# Patient Record
Sex: MALE | Race: WHITE | Employment: FULL TIME | URBAN - METROPOLITAN AREA
[De-identification: names, ages, dates, MRNs, and addresses within clinical notes are randomized per-mention and may not be internally consistent; named-entity substitution may affect disease eponyms.]

---

## 2019-03-14 ENCOUNTER — HOSPITAL ENCOUNTER (OUTPATIENT)
Dept: GENERAL RADIOLOGY | Age: 64
Discharge: HOME | End: 2019-03-14
Payer: COMMERCIAL

## 2019-03-14 DIAGNOSIS — Z87.891 FORMER SMOKER: ICD-10-CM

## 2019-03-14 PROCEDURE — 71046 X-RAY EXAM CHEST 2 VIEWS: CPT

## 2022-06-02 ENCOUNTER — APPOINTMENT (OUTPATIENT)
Dept: MRI IMAGING | Age: 67
DRG: 025 | End: 2022-06-02
Attending: FAMILY MEDICINE
Payer: COMMERCIAL

## 2022-06-02 ENCOUNTER — APPOINTMENT (OUTPATIENT)
Dept: GENERAL RADIOLOGY | Age: 67
DRG: 025 | End: 2022-06-02
Attending: HOSPITALIST
Payer: COMMERCIAL

## 2022-06-02 ENCOUNTER — HOSPITAL ENCOUNTER (INPATIENT)
Age: 67
LOS: 13 days | Discharge: NURSING, REHAB OR LONG-TERM CARE | DRG: 025 | End: 2022-06-15
Attending: EMERGENCY MEDICINE | Admitting: HOSPITALIST
Payer: COMMERCIAL

## 2022-06-02 ENCOUNTER — APPOINTMENT (OUTPATIENT)
Dept: CT IMAGING | Age: 67
DRG: 025 | End: 2022-06-02
Attending: FAMILY MEDICINE
Payer: COMMERCIAL

## 2022-06-02 DIAGNOSIS — G93.89 RIGHT FRONTAL LOBE MASS: Primary | ICD-10-CM

## 2022-06-02 DIAGNOSIS — G93.89 BRAIN MASS: ICD-10-CM

## 2022-06-02 DIAGNOSIS — Z98.890 STATUS POST SURGERY: ICD-10-CM

## 2022-06-02 LAB
ALBUMIN SERPL-MCNC: 3.5 G/DL (ref 3.5–5)
ALBUMIN/GLOB SERPL: 0.9 {RATIO} (ref 1.1–2.2)
ALP SERPL-CCNC: 71 U/L (ref 45–117)
ALT SERPL-CCNC: 42 U/L (ref 12–78)
ANION GAP SERPL CALC-SCNC: 1 MMOL/L (ref 5–15)
APAP SERPL-MCNC: <2 UG/ML (ref 10–30)
APPEARANCE UR: CLEAR
APPEARANCE UR: CLEAR
AST SERPL-CCNC: 23 U/L (ref 15–37)
BACTERIA URNS QL MICRO: NEGATIVE /HPF
BASOPHILS # BLD: 0 K/UL (ref 0–0.1)
BASOPHILS NFR BLD: 0 % (ref 0–1)
BILIRUB SERPL-MCNC: 0.5 MG/DL (ref 0.2–1)
BILIRUB UR QL: NEGATIVE
BILIRUB UR QL: NEGATIVE
BUN SERPL-MCNC: 19 MG/DL (ref 6–20)
BUN/CREAT SERPL: 17 (ref 12–20)
CALCIUM SERPL-MCNC: 9.4 MG/DL (ref 8.5–10.1)
CHLORIDE SERPL-SCNC: 108 MMOL/L (ref 97–108)
CO2 SERPL-SCNC: 31 MMOL/L (ref 21–32)
COLOR UR: NORMAL
COLOR UR: NORMAL
COVID-19 RAPID TEST, COVR: NOT DETECTED
CREAT SERPL-MCNC: 1.12 MG/DL (ref 0.7–1.3)
DIFFERENTIAL METHOD BLD: ABNORMAL
EOSINOPHIL # BLD: 0.1 K/UL (ref 0–0.4)
EOSINOPHIL NFR BLD: 1 % (ref 0–7)
EPITH CASTS URNS QL MICRO: NORMAL /LPF
ERYTHROCYTE [DISTWIDTH] IN BLOOD BY AUTOMATED COUNT: 11.9 % (ref 11.5–14.5)
ETHANOL SERPL-MCNC: <10 MG/DL
GLOBULIN SER CALC-MCNC: 4 G/DL (ref 2–4)
GLUCOSE SERPL-MCNC: 116 MG/DL (ref 65–100)
GLUCOSE UR STRIP.AUTO-MCNC: NEGATIVE MG/DL
GLUCOSE UR STRIP.AUTO-MCNC: NEGATIVE MG/DL
HCT VFR BLD AUTO: 47.4 % (ref 36.6–50.3)
HGB BLD-MCNC: 15.1 G/DL (ref 12.1–17)
HGB UR QL STRIP: NEGATIVE
HGB UR QL STRIP: NEGATIVE
HYALINE CASTS URNS QL MICRO: NORMAL /LPF (ref 0–5)
IMM GRANULOCYTES # BLD AUTO: 0.1 K/UL (ref 0–0.04)
IMM GRANULOCYTES NFR BLD AUTO: 1 % (ref 0–0.5)
KETONES UR QL STRIP.AUTO: NEGATIVE MG/DL
KETONES UR QL STRIP.AUTO: NEGATIVE MG/DL
LEUKOCYTE ESTERASE UR QL STRIP.AUTO: NEGATIVE
LEUKOCYTE ESTERASE UR QL STRIP.AUTO: NEGATIVE
LYMPHOCYTES # BLD: 1.6 K/UL (ref 0.8–3.5)
LYMPHOCYTES NFR BLD: 16 % (ref 12–49)
MCH RBC QN AUTO: 30.7 PG (ref 26–34)
MCHC RBC AUTO-ENTMCNC: 31.9 G/DL (ref 30–36.5)
MCV RBC AUTO: 96.3 FL (ref 80–99)
MONOCYTES # BLD: 0.7 K/UL (ref 0–1)
MONOCYTES NFR BLD: 7 % (ref 5–13)
NEUTS SEG # BLD: 8 K/UL (ref 1.8–8)
NEUTS SEG NFR BLD: 75 % (ref 32–75)
NITRITE UR QL STRIP.AUTO: NEGATIVE
NITRITE UR QL STRIP.AUTO: NEGATIVE
NRBC # BLD: 0 K/UL (ref 0–0.01)
NRBC BLD-RTO: 0 PER 100 WBC
PH UR STRIP: 7.5 [PH] (ref 5–8)
PH UR STRIP: 7.5 [PH] (ref 5–8)
PLATELET # BLD AUTO: 214 K/UL (ref 150–400)
PMV BLD AUTO: 10.7 FL (ref 8.9–12.9)
POTASSIUM SERPL-SCNC: 4.1 MMOL/L (ref 3.5–5.1)
PROT SERPL-MCNC: 7.5 G/DL (ref 6.4–8.2)
PROT UR STRIP-MCNC: NEGATIVE MG/DL
PROT UR STRIP-MCNC: NEGATIVE MG/DL
RBC # BLD AUTO: 4.92 M/UL (ref 4.1–5.7)
RBC #/AREA URNS HPF: NORMAL /HPF (ref 0–5)
SALICYLATES SERPL-MCNC: <1.7 MG/DL (ref 2.8–20)
SODIUM SERPL-SCNC: 140 MMOL/L (ref 136–145)
SOURCE, COVRS: NORMAL
SP GR UR REFRACTOMETRY: 1.01 (ref 1–1.03)
SP GR UR REFRACTOMETRY: 1.01 (ref 1–1.03)
TSH SERPL DL<=0.05 MIU/L-ACNC: 1.03 UIU/ML (ref 0.36–3.74)
UR CULT HOLD, URHOLD: NORMAL
UROBILINOGEN UR QL STRIP.AUTO: 1 EU/DL (ref 0.2–1)
UROBILINOGEN UR QL STRIP.AUTO: 1 EU/DL (ref 0.2–1)
WBC # BLD AUTO: 10.5 K/UL (ref 4.1–11.1)
WBC URNS QL MICRO: NORMAL /HPF (ref 0–4)

## 2022-06-02 PROCEDURE — A9576 INJ PROHANCE MULTIPACK: HCPCS

## 2022-06-02 PROCEDURE — 74011000250 HC RX REV CODE- 250: Performed by: HOSPITALIST

## 2022-06-02 PROCEDURE — 84443 ASSAY THYROID STIM HORMONE: CPT

## 2022-06-02 PROCEDURE — 99285 EMERGENCY DEPT VISIT HI MDM: CPT

## 2022-06-02 PROCEDURE — 71045 X-RAY EXAM CHEST 1 VIEW: CPT

## 2022-06-02 PROCEDURE — 65270000046 HC RM TELEMETRY

## 2022-06-02 PROCEDURE — 74011250636 HC RX REV CODE- 250/636

## 2022-06-02 PROCEDURE — 81001 URINALYSIS AUTO W/SCOPE: CPT

## 2022-06-02 PROCEDURE — 74011250636 HC RX REV CODE- 250/636: Performed by: FAMILY MEDICINE

## 2022-06-02 PROCEDURE — 87635 SARS-COV-2 COVID-19 AMP PRB: CPT

## 2022-06-02 PROCEDURE — 70450 CT HEAD/BRAIN W/O DYE: CPT

## 2022-06-02 PROCEDURE — 82077 ASSAY SPEC XCP UR&BREATH IA: CPT

## 2022-06-02 PROCEDURE — 80179 DRUG ASSAY SALICYLATE: CPT

## 2022-06-02 PROCEDURE — 96375 TX/PRO/DX INJ NEW DRUG ADDON: CPT

## 2022-06-02 PROCEDURE — 74011250636 HC RX REV CODE- 250/636: Performed by: HOSPITALIST

## 2022-06-02 PROCEDURE — 74011250636 HC RX REV CODE- 250/636: Performed by: EMERGENCY MEDICINE

## 2022-06-02 PROCEDURE — 70553 MRI BRAIN STEM W/O & W/DYE: CPT

## 2022-06-02 PROCEDURE — 80053 COMPREHEN METABOLIC PANEL: CPT

## 2022-06-02 PROCEDURE — 96374 THER/PROPH/DIAG INJ IV PUSH: CPT

## 2022-06-02 PROCEDURE — 81003 URINALYSIS AUTO W/O SCOPE: CPT

## 2022-06-02 PROCEDURE — C9113 INJ PANTOPRAZOLE SODIUM, VIA: HCPCS | Performed by: FAMILY MEDICINE

## 2022-06-02 PROCEDURE — 85025 COMPLETE CBC W/AUTO DIFF WBC: CPT

## 2022-06-02 PROCEDURE — 80143 DRUG ASSAY ACETAMINOPHEN: CPT

## 2022-06-02 RX ORDER — ONDANSETRON 2 MG/ML
4 INJECTION INTRAMUSCULAR; INTRAVENOUS
Status: DISCONTINUED | OUTPATIENT
Start: 2022-06-02 | End: 2022-06-15 | Stop reason: HOSPADM

## 2022-06-02 RX ORDER — OXYCODONE HYDROCHLORIDE 5 MG/1
5 TABLET ORAL
Status: DISCONTINUED | OUTPATIENT
Start: 2022-06-02 | End: 2022-06-15 | Stop reason: HOSPADM

## 2022-06-02 RX ORDER — POLYETHYLENE GLYCOL 3350 17 G/17G
17 POWDER, FOR SOLUTION ORAL DAILY PRN
Status: DISCONTINUED | OUTPATIENT
Start: 2022-06-02 | End: 2022-06-07

## 2022-06-02 RX ORDER — PANTOPRAZOLE SODIUM 40 MG/1
40 TABLET, DELAYED RELEASE ORAL
Status: DISCONTINUED | OUTPATIENT
Start: 2022-06-03 | End: 2022-06-07

## 2022-06-02 RX ORDER — OXYCODONE HYDROCHLORIDE 5 MG/1
10 TABLET ORAL
Status: DISCONTINUED | OUTPATIENT
Start: 2022-06-02 | End: 2022-06-15 | Stop reason: HOSPADM

## 2022-06-02 RX ORDER — PANTOPRAZOLE SODIUM 40 MG/10ML
40 INJECTION, POWDER, LYOPHILIZED, FOR SOLUTION INTRAVENOUS
Status: COMPLETED | OUTPATIENT
Start: 2022-06-02 | End: 2022-06-02

## 2022-06-02 RX ORDER — SODIUM CHLORIDE 0.9 % (FLUSH) 0.9 %
5-40 SYRINGE (ML) INJECTION EVERY 8 HOURS
Status: DISCONTINUED | OUTPATIENT
Start: 2022-06-02 | End: 2022-06-15 | Stop reason: HOSPADM

## 2022-06-02 RX ORDER — DEXAMETHASONE SODIUM PHOSPHATE 4 MG/ML
4 INJECTION, SOLUTION INTRA-ARTICULAR; INTRALESIONAL; INTRAMUSCULAR; INTRAVENOUS; SOFT TISSUE EVERY 6 HOURS
Status: DISCONTINUED | OUTPATIENT
Start: 2022-06-02 | End: 2022-06-07

## 2022-06-02 RX ORDER — ACETAMINOPHEN 650 MG/1
650 SUPPOSITORY RECTAL
Status: DISCONTINUED | OUTPATIENT
Start: 2022-06-02 | End: 2022-06-15 | Stop reason: HOSPADM

## 2022-06-02 RX ORDER — SODIUM CHLORIDE 0.9 % (FLUSH) 0.9 %
5-40 SYRINGE (ML) INJECTION AS NEEDED
Status: DISCONTINUED | OUTPATIENT
Start: 2022-06-02 | End: 2022-06-07

## 2022-06-02 RX ORDER — LEVETIRACETAM 500 MG/5ML
500 INJECTION, SOLUTION, CONCENTRATE INTRAVENOUS EVERY 12 HOURS
Status: DISCONTINUED | OUTPATIENT
Start: 2022-06-03 | End: 2022-06-08

## 2022-06-02 RX ORDER — ONDANSETRON 4 MG/1
4 TABLET, ORALLY DISINTEGRATING ORAL
Status: DISCONTINUED | OUTPATIENT
Start: 2022-06-02 | End: 2022-06-15 | Stop reason: HOSPADM

## 2022-06-02 RX ORDER — SODIUM CHLORIDE 0.9 % (FLUSH) 0.9 %
10 SYRINGE (ML) INJECTION
Status: COMPLETED | OUTPATIENT
Start: 2022-06-02 | End: 2022-06-02

## 2022-06-02 RX ORDER — ACETAMINOPHEN 325 MG/1
650 TABLET ORAL
Status: DISCONTINUED | OUTPATIENT
Start: 2022-06-02 | End: 2022-06-15 | Stop reason: HOSPADM

## 2022-06-02 RX ORDER — DEXAMETHASONE SODIUM PHOSPHATE 10 MG/ML
10 INJECTION INTRAMUSCULAR; INTRAVENOUS
Status: COMPLETED | OUTPATIENT
Start: 2022-06-02 | End: 2022-06-02

## 2022-06-02 RX ORDER — LEVETIRACETAM 500 MG/5ML
1000 INJECTION, SOLUTION, CONCENTRATE INTRAVENOUS
Status: COMPLETED | OUTPATIENT
Start: 2022-06-02 | End: 2022-06-02

## 2022-06-02 RX ADMIN — LEVETIRACETAM 1000 MG: 100 INJECTION, SOLUTION INTRAVENOUS at 15:03

## 2022-06-02 RX ADMIN — Medication 10 ML: at 21:33

## 2022-06-02 RX ADMIN — GADOTERIDOL 20 ML: 279.3 INJECTION, SOLUTION INTRAVENOUS at 22:16

## 2022-06-02 RX ADMIN — DEXAMETHASONE SODIUM PHOSPHATE 4 MG: 4 INJECTION, SOLUTION INTRAMUSCULAR; INTRAVENOUS at 20:06

## 2022-06-02 RX ADMIN — SODIUM CHLORIDE, PRESERVATIVE FREE 10 ML: 5 INJECTION INTRAVENOUS at 22:18

## 2022-06-02 RX ADMIN — Medication 10 ML: at 17:42

## 2022-06-02 RX ADMIN — PANTOPRAZOLE SODIUM 40 MG: 40 INJECTION, POWDER, FOR SOLUTION INTRAVENOUS at 17:42

## 2022-06-02 RX ADMIN — DEXAMETHASONE SODIUM PHOSPHATE 10 MG: 10 INJECTION, SOLUTION INTRAMUSCULAR; INTRAVENOUS at 15:03

## 2022-06-02 NOTE — BSMART NOTE
ELSY consulted and patient admitted to medical floor. Consult removed.      PASCUAL Weinberg, Supervisee in Social Work

## 2022-06-02 NOTE — ED TRIAGE NOTES
Patient arrives to ED, reports \"chronic energy issues\". Wife reports patient has been having trouble keeping track of times and sleep schedule has been off. Patient denies SI/HI, denies auditory and visual hallucinations.

## 2022-06-02 NOTE — H&P
[address] [facility] Adult  Hospitalist Group  History and Physical    Date of Service:  6/2/2022  Primary Care Provider: Jessica Bautista MD  Source of information: The patient    Chief Complaint:  weakness and behavior desirae     History of Presenting Illness:   Dick Gore is a 61 y.o. male who presents with weakness and behavior desirae     Patient is a 42-year-old male with past medical history of seasonal allergies, spinal stenosis with chronic back pain,  who presents for evaluation of \"weakness. \"    He said that he has a spinal stenosis which affected his the left side, his left side is always weaker than right side, but lately he notes that he noticed this started in January. It is progressively worsened, particularly over the past 2 weeks. He has not seen his primary care doctor for this problem. His wife is with him and describes that he moves extremely slowly. He endorses difficulty with physically getting in and out of chairs. It seems that he has lost sense of time. For example, he started emptying their groceries from the car the other day at 4 PM and did not finish emptying the groceries until 10 PM.  His wife additionally had a primary care doctor's appointment set up for today, and gave him time to get ready. When she went upstairs to have him come to the car, he was not dressed yet and was sitting on the bed singing to himself. He denies headaches, visual changes. His wife also endorses that his sleep cycle is off. He is now getting up at 8 PM as if it were morning and stays up all night. He has not had fevers at home. He notes that in fall of last year, he noticed a weird flash of image or light out of the corner of his eye. He went to the eye doctor, who ran tests and did not see anything concerning. He was referred to a neurological ophthalmologist, who stated his work-up was normal.  He then saw a cardiologist, and had a normal work-up there.   Denies issues with hallucinations, delusions, anxiety, depression, suicidal ideation, homicidal ideation       REVIEW OF SYSTEMS:  General: hpi,no changes of weight  HEENT: no headache, no vision changes, no nose discharge, no hearing changes   RES: no wheezing, no cough, no sob  CVS: no cp, no palpitation. Muscular: no joint swelling, no muscle pain, no leg swelling  Skin: no rash, no itching   GI: no vomiting, no diarrhea  : no dysuria, no hematuria  Hemo: no gum bleeding, no petechial   Neuro: HPI. Endo: no polydipsia   Psych: denied depression     No past medical history on file. spinal stenosis   No past surgical history on file. none   Prior to Admission medications    Not on File   diclofenac     Allergies   Allergen Reactions    Keflex [Cephalexin] Rash      No family history on file. Denied brain tumor in family. Denied DM in family   Social History:     None smoking  None alcohol     Family and social history were personally reviewed, all pertinent and relevant details are outlined as above. Objective:     Visit Vitals  /79 (BP 1 Location: Left upper arm, BP Patient Position: At rest)   Pulse (!) 106   Temp 98.7 °F (37.1 °C)   Resp 18   SpO2 94%      O2 Device: None (Room air)    PHYSICAL EXAM:   General: Alert x oriented x 3, awake, no acute distress   HEENT: PEERL, EOMI, moist mucus membranes  Neck: Supple, no JVD, no meningeal signs  Chest: Clear to auscultation bilaterally   CVS: RRR, S1 S2 heard, no murmurs/rubs/gallops  Abd: Soft, non-tender, non-distended, +bowel sounds   Ext: No clubbing, no cyanosis, no edema  Neuro/Psych: Pleasant mood and affect, CN 2-12 grossly intact, sensory grossly within normal limit, left leg 5-/5. DTR 1+ x 4  Cap refill: Brisk, less than 3 seconds  Pulses: 2+, symmetric in all extremities  Skin: Warm, dry, without rashes or lesions    Data Review: All diagnostic labs and studies have been reviewed.     Abnormal Labs Reviewed   CBC WITH AUTOMATED DIFF - Abnormal; Notable for the following components:       Result Value    IMMATURE GRANULOCYTES 1 (*)     ABS. IMM. GRANS. 0.1 (*)     All other components within normal limits   METABOLIC PANEL, COMPREHENSIVE - Abnormal; Notable for the following components:    Anion gap 1 (*)     Glucose 116 (*)     A-G Ratio 0.9 (*)     All other components within normal limits   ACETAMINOPHEN - Abnormal; Notable for the following components:    Acetaminophen level <2 (*)     All other components within normal limits   SALICYLATE - Abnormal; Notable for the following components:    Salicylate level <1.7 (*)     All other components within normal limits       All Micro Results     Procedure Component Value Units Date/Time    COVID-19 RAPID TEST [[id number]] Collected: 06/02/22 1531    Order Status: Completed Specimen: Nasopharyngeal Updated: 06/02/22 5412     Specimen source Nasopharyngeal        COVID-19 rapid test Not detected        Comment: Rapid Abbott ID Now       Rapid NAAT:  The specimen is NEGATIVE for SARS-CoV-2, the novel coronavirus associated with COVID-19. Negative results should be treated as presumptive and, if inconsistent with clinical signs and symptoms or necessary for patient management, should be tested with an alternative molecular assay. Negative results do not preclude SARS-CoV-2 infection and should not be used as the sole basis for patient management decisions. This test has been authorized by the FDA under an Emergency Use Authorization (EUA) for use by authorized laboratories. Fact sheet for Healthcare Providers: ConventionUpdate.co.nz  Fact sheet for Patients: ConventionUpdate.co.nz       Methodology: Isothermal Nucleic Acid Amplification         URINE CULTURE HOLD SAMPLE [[id number]]     Order Status: Sent Specimen: Urine           IMAGING:   CT HEAD WO CONT   Final Result      1.  Right frontal lobe mass is suggested with severe right anterior cerebral deep   white matter edema and shift of midline structures. Possible minimal petechial   hemorrhage associated but no significant parenchymal or extra-axial hemorrhagic   collection. 2. Mild sinusitis. The findings were called to Dr. Darris Soulier on 6/2/2022 at 1420 hours by Dr. Lisset Martinez. [facility]    (Results Pending)        ECG/ECHO:  No results found for this or any previous visit. Assessment:   Given the patient's current clinical presentation, there is a high level of concern for decompensation if discharged from the emergency department. Complex decision making was performed, which includes reviewing the patient's available past medical records, laboratory results, and imaging studies. Active Problems:    Brain mass (6/2/2022)      Plan:     1. Brain MAss with cerebral edema: sz prophylaxis with iv keppra. Iv decadron 4mg q6h. NS consulted, pending Brain mri w/wc contrast to further eval, and further plan will be decided based on the image finding. Check cxr           DIET: ADULT DIET Regular   ISOLATION PRECAUTIONS: There are currently no Active Isolations  CODE STATUS: Full Code   DVT PROPHYLAXIS: SCDs  FUNCTIONAL STATUS PRIOR TO HOSPITALIZATION: Fully active and ambulatory; able to carry on all self-care without restriction. EARLY MOBILITY ASSESSMENT: Recommend an assessment from physical therapy and/or occupational therapy  ANTICIPATED DISCHARGE: Greater than 48 hours. EMERGENCY CONTACT/SURROGATE DECISION MAKER: pt makes his own decision, wife bedside     CRITICAL CARE WAS PERFORMED FOR THIS ENCOUNTER: NO.      Signed By: Agnes Morrison MD     June 2, 2022         Please note that this dictation may have been completed with Zina Scale, the computer voice recognition software. Quite often unanticipated grammatical, syntax, homophones, and other interpretive errors are inadvertently transcribed by the computer software. Please disregard these errors.   Please excuse any errors that have escaped final proofreading.

## 2022-06-02 NOTE — CONSULTS
62 yo with progressive memory difficulties and personality change. Head CT shows right frontal mass with significant edema and midline shift. Agree with Decadron and keppra. Agree with admission and MRI brain with and without contrast,  Further recommendations when MRI completed. Thank you for this consultation.

## 2022-06-02 NOTE — ED PROVIDER NOTES
Patient is a 62-year-old male with past medical history of seasonal allergies, past who presents for evaluation of \"chronic energy issues. \"  He notes that he noticed this started in January. It is progressively worsened, particularly over the past 2 weeks. He has not seen his primary care doctor for this problem. His wife is with him and describes that he moves extremely slowly. He endorses difficulty with physically getting in and out of chairs. It seems that he has lost sense of time. For example, he started emptying their groceries from the car the other day at 4 PM and did not finish emptying the groceries until 10 PM.  His wife additionally had a primary care doctor's appointment set up for today, and gave him time to get ready. When she went upstairs to have him come to the car, he was not dressed yet and was sitting on the bed singing to himself. He denies headaches, visual changes. His wife also endorses that his sleep cycle is off. He is now getting up at 8 PM as if it were morning and stays up all night. He has not had fevers at home. He notes that in fall of last year, he noticed a weird flash of image or light out of the corner of his eye. He went to the eye doctor, who ran tests and did not see anything concerning. He was referred to a neurological ophthalmologist, who stated his work-up was normal.  He then saw a cardiologist, and had a normal work-up there. Denies issues with hallucinations, delusions, anxiety, depression, suicidal ideation, homicidal ideation. No past medical history on file. No past surgical history on file. No family history on file.     Social History     Socioeconomic History    Marital status:      Spouse name: Not on file    Number of children: Not on file    Years of education: Not on file    Highest education level: Not on file   Occupational History    Not on file   Tobacco Use    Smoking status: Not on file    Smokeless tobacco: Not on file   Substance and Sexual Activity    Alcohol use: Not on file    Drug use: Not on file    Sexual activity: Not on file   Other Topics Concern    Not on file   Social History Narrative    Not on file     Social Determinants of Health     Financial Resource Strain:     Difficulty of Paying Living Expenses: Not on file   Food Insecurity:     Worried About Running Out of Food in the Last Year: Not on file    Carlos of Food in the Last Year: Not on file   Transportation Needs:     Lack of Transportation (Medical): Not on file    Lack of Transportation (Non-Medical): Not on file   Physical Activity:     Days of Exercise per Week: Not on file    Minutes of Exercise per Session: Not on file   Stress:     Feeling of Stress : Not on file   Social Connections:     Frequency of Communication with Friends and Family: Not on file    Frequency of Social Gatherings with Friends and Family: Not on file    Attends Scientology Services: Not on file    Active Member of Clubs or Organizations: Not on file    Attends Club or Organization Meetings: Not on file    Marital Status: Not on file   Intimate Partner Violence:     Fear of Current or Ex-Partner: Not on file    Emotionally Abused: Not on file    Physically Abused: Not on file    Sexually Abused: Not on file   Housing Stability:     Unable to Pay for Housing in the Last Year: Not on file    Number of [place] in the Last Year: Not on file    Unstable Housing in the Last Year: Not on file         ALLERGIES: Keflex [cephalexin]    Review of Systems   Constitutional: Negative for unexpected weight change. HENT: Negative for congestion. Eyes: Negative for photophobia. Respiratory: Negative for cough, chest tightness and shortness of breath. Cardiovascular: Negative for chest pain. Gastrointestinal: Negative for abdominal pain, nausea and vomiting. Endocrine: Negative for polyuria.    Genitourinary: Negative for dysuria and flank pain. Musculoskeletal: Negative for arthralgias. Skin: Negative for color change. Allergic/Immunologic: Negative for immunocompromised state. Neurological: Positive for weakness. Negative for dizziness, facial asymmetry and headaches. Hematological: Negative for adenopathy. Psychiatric/Behavioral: Positive for sleep disturbance. Negative for agitation. Vitals:    06/02/22 1246   BP: 128/79   Pulse: (!) 106   Resp: 18   Temp: 98.7 °F (37.1 °C)   SpO2: 94%            Physical Exam  Vitals and nursing note reviewed. Constitutional:       Appearance: Normal appearance. He is obese. HENT:      Head: Atraumatic. Eyes:      General: No visual field deficit. Conjunctiva/sclera: Conjunctivae normal.      Pupils: Pupils are equal, round, and reactive to light. Cardiovascular:      Rate and Rhythm: Normal rate and regular rhythm. Pulses: Normal pulses. Heart sounds: Normal heart sounds. Pulmonary:      Effort: Pulmonary effort is normal.      Breath sounds: Normal breath sounds. Abdominal:      General: Abdomen is flat. Bowel sounds are normal.   Musculoskeletal:         General: Normal range of motion. Cervical back: Neck supple. Skin:     General: Skin is warm and dry. Capillary Refill: Capillary refill takes less than 2 seconds. Neurological:      General: No focal deficit present. Mental Status: He is alert and oriented to person, place, and time. Mental status is at baseline. GCS: GCS eye subscore is 4. GCS verbal subscore is 5. GCS motor subscore is 6. Cranial Nerves: Cranial nerves are intact. No dysarthria or facial asymmetry. Sensory: Sensation is intact. Motor: Motor function is intact. No pronator drift. Coordination: Coordination is intact. Finger-Nose-Finger Test normal.      Gait: Gait is intact. Psychiatric:         Attention and Perception: Attention and perception normal.         Mood and Affect: Affect is flat. Speech: Speech is delayed. Behavior: Behavior is slowed. Thought Content: Thought content normal.          MDM  Number of Diagnoses or Management Options  Right frontal lobe mass  Diagnosis management comments: Presenting with multiple months of chronic energy issues, worsening since that time. He has a normal neurological exam, but appears flat, is moving extremely slowly, tends to go off on tangents of unrelated information. Will obtain basic labs to evaluate for any acute abnormalities. Will obtain head CT scan to rule out tumor causing these concerning symptoms. 1420 -emergent findings of head CT scan called to ED attending, Dr. Yoselin Castillo. Head CT with evidence of right frontal lobe mass shift of midline structures. Discussed these findings with patient. Patient does have an established neurosurgeon with [address], Dr. [facility]. He would prefer to be transferred there for care if possible. We will transfer center. Dexamethasone ordered, Keppra ordered. 1523 - [facility] unable to accept transfer due to high patient volume, hospital on diversion. Will consult neurosurgery here. [address] with Dr. Justin Noyola with neurosurgery. Recommended MRI brain w/wo contrast, admission to hospitalist.           Amount and/or Complexity of Data Reviewed  Clinical lab tests: ordered and reviewed  Tests in the radiology section of CPT®: ordered and reviewed  Discuss the patient with other providers: yes (Dr. Yoselin Castillo, ED Attending)           Procedures      Perfect Serve Consult for Admission  3:50 PM    ED Room Number: ER29/29  Patient Name and age:  Hector Tyler 61 y.o.  male  Working Diagnosis:   1.  Right frontal lobe mass        COVID-19 Suspicion:  no  Sepsis present:  no  Reassessment needed: no  Code Status:  Full Code  Readmission: no  Isolation Requirements:  no  Recommended Level of Care:  ICU  Department:  [facility] Adult ED - 21     Other:  82-year-old male presenting with fatigue, worsening mental state over past several months, Head CT with evidence of right frontal lobe mass with shift of midline structures. Received IV decadron, keppra.   Ordered MRI brain w/wo contrast. Spoke to neurosurg, who will touch base with recs after MRI results

## 2022-06-03 ENCOUNTER — APPOINTMENT (OUTPATIENT)
Dept: CT IMAGING | Age: 67
DRG: 025 | End: 2022-06-03
Attending: NEUROLOGICAL SURGERY
Payer: COMMERCIAL

## 2022-06-03 ENCOUNTER — ANESTHESIA EVENT (OUTPATIENT)
Dept: SURGERY | Age: 67
DRG: 025 | End: 2022-06-03
Payer: COMMERCIAL

## 2022-06-03 ENCOUNTER — HOSPITAL ENCOUNTER (INPATIENT)
Dept: CT IMAGING | Age: 67
Discharge: HOME | DRG: 025 | End: 2022-06-03
Attending: NEUROLOGICAL SURGERY
Payer: COMMERCIAL

## 2022-06-03 PROCEDURE — 65270000046 HC RM TELEMETRY

## 2022-06-03 PROCEDURE — 74011250636 HC RX REV CODE- 250/636: Performed by: HOSPITALIST

## 2022-06-03 PROCEDURE — 74011000250 HC RX REV CODE- 250: Performed by: HOSPITALIST

## 2022-06-03 PROCEDURE — 71260 CT THORAX DX C+: CPT

## 2022-06-03 PROCEDURE — 74177 CT ABD & PELVIS W/CONTRAST: CPT

## 2022-06-03 PROCEDURE — 74011250637 HC RX REV CODE- 250/637: Performed by: HOSPITALIST

## 2022-06-03 PROCEDURE — 74011000636 HC RX REV CODE- 636: Performed by: RADIOLOGY

## 2022-06-03 RX ORDER — CHOLECALCIFEROL (VITAMIN D3) 50 MCG
CAPSULE ORAL
COMMUNITY

## 2022-06-03 RX ORDER — DICLOFENAC SODIUM 75 MG/1
75 TABLET, DELAYED RELEASE ORAL 2 TIMES DAILY WITH MEALS
COMMUNITY
End: 2022-06-15

## 2022-06-03 RX ORDER — SOLIFENACIN SUCCINATE 10 MG/1
10 TABLET, FILM COATED ORAL DAILY
COMMUNITY
End: 2022-10-27

## 2022-06-03 RX ORDER — ESOMEPRAZOLE MAGNESIUM 40 MG/1
40 CAPSULE, DELAYED RELEASE ORAL 2 TIMES DAILY
COMMUNITY

## 2022-06-03 RX ORDER — BUDESONIDE AND FORMOTEROL FUMARATE DIHYDRATE 160; 4.5 UG/1; UG/1
2 AEROSOL RESPIRATORY (INHALATION) 2 TIMES DAILY
COMMUNITY

## 2022-06-03 RX ORDER — SILDENAFIL CITRATE 20 MG/1
20 TABLET ORAL AS NEEDED
COMMUNITY
End: 2022-06-15

## 2022-06-03 RX ORDER — FLUTICASONE PROPIONATE 44 UG/1
AEROSOL, METERED RESPIRATORY (INHALATION)
COMMUNITY
End: 2022-10-27

## 2022-06-03 RX ORDER — AZELASTINE 1 MG/ML
2 SPRAY, METERED NASAL 2 TIMES DAILY
COMMUNITY

## 2022-06-03 RX ORDER — BACLOFEN 10 MG/1
10 TABLET ORAL 3 TIMES DAILY
Status: ON HOLD | COMMUNITY
End: 2022-10-31 | Stop reason: SDUPTHER

## 2022-06-03 RX ORDER — METRONIDAZOLE 7.5 MG/G
LOTION TOPICAL 2 TIMES DAILY
COMMUNITY

## 2022-06-03 RX ORDER — SOLRIAMFETOL 75 MG/1
75 TABLET, FILM COATED ORAL DAILY
COMMUNITY
End: 2022-10-27

## 2022-06-03 RX ORDER — LEVOCETIRIZINE DIHYDROCHLORIDE 5 MG/1
5 TABLET, FILM COATED ORAL
COMMUNITY

## 2022-06-03 RX ADMIN — LEVETIRACETAM 500 MG: 100 INJECTION, SOLUTION, CONCENTRATE INTRAVENOUS at 15:00

## 2022-06-03 RX ADMIN — DEXAMETHASONE SODIUM PHOSPHATE 4 MG: 4 INJECTION, SOLUTION INTRAMUSCULAR; INTRAVENOUS at 09:28

## 2022-06-03 RX ADMIN — Medication 10 ML: at 05:20

## 2022-06-03 RX ADMIN — Medication 10 ML: at 21:55

## 2022-06-03 RX ADMIN — ACETAMINOPHEN 650 MG: 325 TABLET ORAL at 13:10

## 2022-06-03 RX ADMIN — Medication 10 ML: at 15:00

## 2022-06-03 RX ADMIN — PANTOPRAZOLE SODIUM 40 MG: 40 TABLET, DELAYED RELEASE ORAL at 07:30

## 2022-06-03 RX ADMIN — DEXAMETHASONE SODIUM PHOSPHATE 4 MG: 4 INJECTION, SOLUTION INTRAMUSCULAR; INTRAVENOUS at 02:28

## 2022-06-03 RX ADMIN — DEXAMETHASONE SODIUM PHOSPHATE 4 MG: 4 INJECTION, SOLUTION INTRAMUSCULAR; INTRAVENOUS at 21:55

## 2022-06-03 RX ADMIN — IOPAMIDOL 100 ML: 755 INJECTION, SOLUTION INTRAVENOUS at 18:00

## 2022-06-03 RX ADMIN — LEVETIRACETAM 500 MG: 100 INJECTION, SOLUTION, CONCENTRATE INTRAVENOUS at 02:28

## 2022-06-03 RX ADMIN — DEXAMETHASONE SODIUM PHOSPHATE 4 MG: 4 INJECTION, SOLUTION INTRAMUSCULAR; INTRAVENOUS at 15:00

## 2022-06-03 NOTE — PROGRESS NOTES
Problem: Falls - Risk of  Goal: *Absence of Falls  Description: Document Sang Nov[place] Fall Risk and appropriate interventions in the flowsheet. Outcome: Progressing Towards Goal  Note: Fall Risk Interventions:  Mobility Interventions: Communicate number of staff needed for ambulation/transfer    Mentation Interventions: Adequate sleep, hydration, pain control    Medication Interventions: Evaluate medications/consider consulting pharmacy    Elimination Interventions:  Toileting schedule/hourly rounds              Problem: TIA/CVA Stroke: 0-24 hours  Goal: Off Pathway (Use only if patient is Off Pathway)  Outcome: Progressing Towards Goal  Goal: Activity/Safety  Outcome: Progressing Towards Goal  Goal: Consults, if ordered  Outcome: Progressing Towards Goal  Goal: Diagnostic Test/Procedures  Outcome: Progressing Towards Goal  Goal: Nutrition/Diet  Outcome: Progressing Towards Goal  Goal: Discharge Planning  Outcome: Progressing Towards Goal  Goal: Medications  Outcome: Progressing Towards Goal  Goal: Respiratory  Outcome: Progressing Towards Goal  Goal: Treatments/Interventions/Procedures  Outcome: Progressing Towards Goal  Goal: Minimize risk of bleeding post-thrombolytic infusion  Outcome: Progressing Towards Goal  Goal: Monitor for complications post-thrombolytic infusion  Outcome: Progressing Towards Goal  Goal: Psychosocial  Outcome: Progressing Towards Goal  Goal: *Hemodynamically stable  Outcome: Progressing Towards Goal  Goal: *Neurologically stable  Description: Absence of additional neurological deficits    Outcome: Progressing Towards Goal  Goal: *Verbalizes anxiety and depression are reduced or absent  Outcome: Progressing Towards Goal  Goal: *Absence of Signs of Aspiration on Current Diet  Outcome: Progressing Towards Goal  Goal: *Absence of deep venous thrombosis signs and symptoms(Stroke Metric)  Outcome: Progressing Towards Goal  Goal: *Ability to perform ADLs and demonstrates progressive mobility and function  Outcome: Progressing Towards Goal  Goal: *Stroke education started(Stroke Metric)  Outcome: Progressing Towards Goal  Goal: *Dysphagia screen performed(Stroke Metric)  Outcome: Progressing Towards Goal  Goal: *Rehab consulted(Stroke Metric)  Outcome: Progressing Towards Goal

## 2022-06-03 NOTE — PROGRESS NOTES
Neurosurgery    Pt is on the OR schedule for Monday 06/06 at 1pm for right frontal craniotomy with tumor resection with Dr. Adriana Leija. Relayed this information to patient and family. NPO after midnight on Sunday and consent for surgery.     Ray Tai, JADON

## 2022-06-03 NOTE — PROGRESS NOTES
T.O.C. · RUR- 4% Low  · DISPOSITION: Discharge plan pending medical progression and further recommendations  · F/U with PCP/Specialist    · Transport: BLS vs WifeMegan, [phone]    Reason for Admission:  Right frontal lobe mass                   RUR Score:        4% Low             Plan for utilizing home health: none at this time         PCP: First and Last name:  Eugene Lau MD     Name of Practice:    Are you a current patient: Yes/No: Yes   Approximate date of last visit: A little over one year ago   Can you participate in a virtual visit with your PCP:                     Current Advanced Directive/Advance Care Plan: Full Code      Healthcare Decision Maker:  WifeMegan, [phone]                  Transition of Care Plan: pending medical progression and recommendations                     CM met with patient's wife at bedside to introduce self and role. Living situation: Lives with his wife and son in a two story home, roughly 14 steps inside of the residence and 5 steps to enter  ADLs: previously independent, wife notes that these tasks have gotten harder, especially in the last three days  DME: cane, cpap, inhaler  Previous IPR/SNF: none  Previous home health: none  Demographics: confirmed  Pharmacy: [facility]  point of contact: WifeMegan, [phone]     Care Management Interventions  PCP Verified by CM: Yes  Palliative Care Criteria Met (RRAT>21 & CHF Dx)?: No  Mode of Transport at Discharge:  Other (see comment) (wife vs BLS)  MyChart Signup: No  Discharge Durable Medical Equipment: No  Health Maintenance Reviewed: Yes  Physical Therapy Consult: No  Occupational Therapy Consult: No  Speech Therapy Consult: No  Support Systems: Spouse/Significant Other,Child(sharron)  Confirm Follow Up Transport: Family  The Plan for Transition of Care is Related to the Following Treatment Goals : Discharge plan pending based on medical progression and post op recommendations  Discharge Location  Patient Expects to be Discharged to[de-identified] Unable to determine at this time    PASCUAL Malhotra, Care Manager

## 2022-06-03 NOTE — PROGRESS NOTES
MRI shows heterogenous large right frontal mass with significant edema. Consistent with primary malignant glioma. Will discuss management with patient and his family tomorrow. Of note there is mention in chart of a request to be transferred to [address] [place] [address]. I will discuss this further with patient and his family tomorrow.

## 2022-06-03 NOTE — PROGRESS NOTES
Bedside and Verbal shift change report given to Celestina RN (oncoming nurse) by Lexis Sánchez RN (offgoing nurse). Report included the following information SBAR, Kardex, ED Summary, Intake/Output, Recent Results, Cardiac Rhythm SR, Alarm Parameters  and Dual Neuro Assessment.

## 2022-06-03 NOTE — PROGRESS NOTES
Bedside shift change report given to Nicholas/RN (oncoming nurse) by Celestina/LPN (offgoing nurse). Report included the following information SBAR, Kardex, OR Summary, Intake/Output, MAR, Recent Results, Alarm Parameters , Quality Measures and Dual Neuro Assessment.

## 2022-06-03 NOTE — PROGRESS NOTES
Admission Medication Reconciliation:    Information obtained from:  Patient provided med list + RxQuery  RxQuery data available¹:  YES    Comments/Recommendations: Updated PTA meds/reviewed patient's allergies. 1)  RN reviewed med list with patient and added all medication names from patient's list on phone to the PTA med list. Did not review doses - asked me to speak with patient. Patient unavailable when attempting med rec today. Was able to verify most meds and directions via RxQuery. 2)  Medication changes (since last review): Added  - Symbicort  - sildenafil  - levocetirizine  - Flovent  - esomeprazole  - diclofenac  - baclofen  - azelastine nasal spray  - Sunosi    3)  The following medications I was unable to verify via OP fill history. Please verify with patient prior to reordering for inpatient use. - solifenacin  - metronidazole topical cream  - levocetirizine  - fluticasone inhaler  - probiotic       ¹RxQuery pharmacy benefit data reflects medications filled and processed through the patient's insurance, however   this data does NOT capture whether the medication was picked up or is currently being taken by the patient. Allergies:  Keflex [cephalexin]    Significant PMH/Disease States: No past medical history on file. Chief Complaint for this Admission:    Chief Complaint   Patient presents with    Mental Health Problem     Prior to Admission Medications:   Prior to Admission Medications   Prescriptions Last Dose Informant Taking? B.animalis,bifid,infantis,long (Probiotic 4X) 10-15 mg TbEC   Yes   Sig: Take  by mouth. azelastine (ASTELIN) 137 mcg (0.1 %) nasal spray   Yes   Si Sprays by Both Nostrils route two (2) times a day. Use in each nostril as directed    baclofen (LIORESAL) 10 mg tablet   Yes   Sig: Take 10 mg by mouth three (3) times daily. budesonide-formoteroL (Symbicort) 160-4.5 mcg/actuation HFAA   Yes   Sig: Take 2 Puffs by inhalation two (2) times a day.    diclofenac EC (VOLTAREN) 75 mg EC tablet   Yes   Sig: Take 75 mg by mouth two (2) times daily (with meals). esomeprazole (NEXIUM) 40 mg capsule   Yes   Sig: Take 40 mg by mouth two (2) times a day. fluticasone propionate (FLOVENT HFA) 44 mcg/actuation inhaler   Yes   Sig: Take  by inhalation. levocetirizine (XYZAL) 5 mg tablet   Yes   Sig: Take 5 mg by mouth.   metroNIDAZOLE (METROLOTION) 0.75 % lotion   Yes   Sig: Apply  to affected area two (2) times a day. sildenafiL (REVATIO) 20 mg tablet   Yes   Sig: Take 20 mg by mouth as needed for PRN Reason (Other). solifenacin (VESICARE) 10 mg tablet   Yes   Sig: Take 10 mg by mouth daily. solriamfetoL (Sunosi) 75 mg tab   Yes   Sig: Take 75 mg by mouth daily. Facility-Administered Medications: None     Please contact the main inpatient pharmacy with any questions or concerns at [phone] and we will direct you to the clinical pharmacist covering this patient's care while in-house.    Magalis Wei, PHARMD

## 2022-06-03 NOTE — PROGRESS NOTES
[address] [facility]                                                                                          Hospitalist Progress Note  Cynthia Bentley MD  Answering service: [phone] or [phone] from in house phone        Date of Service:  6/3/2022  NAME:  Lila Paris  :  1955  MRN:  [id number]      Admission Summary:   Lila Paris is a 61 y.o. male who presents with weakness and behavior changnes      Patient is a 63-year-old male with past medical history of seasonal allergies, spinal stenosis with chronic back pain,  who presents for evaluation of \"weakness. \"    He said that he has a spinal stenosis which affected his the left side, his left side is always weaker than right side, but lately he notes that he noticed this started in January.  It is progressively worsened, particularly over the past 2 weeks. [facility] has not seen his primary care doctor for this problem.  His wife is with him and describes that he moves extremely slowly. [facility] endorses difficulty with physically getting in and out of chairs. Herlindajose Teetee seems that he has lost sense of time.  For example, he started emptying their groceries from the car the other day at 4 PM and did not finish emptying the groceries until 10 PM.  His wife additionally had a primary care doctor's appointment set up for today, and gave him time to get ready.  When she went upstairs to have him come to the car, he was not dressed yet and was sitting on the bed singing to himself. [facility] denies headaches, visual changes.  His wife also endorses that his sleep cycle is off. [facility] is now getting up at 8 PM as if it were morning and stays up all night. [facility] has not had fevers at home. [facility] notes that in fall of last year, he noticed a weird flash of image or light out of the corner of his eye. [facility] went to the eye doctor, who ran tests and did not see anything concerning. [facility] was referred to a neurological ophthalmologist, who stated his work-up was normal.  He then saw a cardiologist, and had a normal work-up there.  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 Chronic back pain- resume baclofen and NSAIDS as needed  3. Elevated BP - without dx of HTN- monitor for now     Code status: Full  Prophylaxis: SCDs  Care Plan discussed with: patient and family  Anticipated Disposition: TBD     Hospital Problems  Never Reviewed          Codes Class Noted POA    Brain mass ICD-10-CM: G93.89  ICD-9-CM: 348.89  6/2/2022 Unknown                Review of Systems:   A comprehensive review of systems was negative except for that written in the HPI. Vital Signs:    Last 24hrs VS reviewed since prior progress note.  Most recent are:  Visit Vitals  BP (!) 143/83 (BP 1 Location: Right upper arm, BP Patient Position: At rest)   Pulse 74   Temp 97.8 °F (36.6 °C)   Resp 18   SpO2 95%     Patient Vitals for the past 24 hrs:   Temp Pulse Resp BP SpO2   06/03/22 0555 97.8 °F (36.6 °C) 74 18 (!) 143/83 --   06/03/22 0552 -- 71 -- -- --   06/03/22 0351 -- 75 -- -- --   06/03/22 0156 98 °F (36.7 °C) 90 18 (!) 143/100 95 %   06/02/22 2316 97.8 °F (36.6 °C) 83 16 (!) 155/82 94 %   06/02/22 2100 -- 84 15 -- --   06/02/22 2009 98.6 °F (37 °C) 94 22 (!) 159/80 92 %   06/02/22 1246 98.7 °F (37.1 °C) (!) 106 18 128/79 94 %     No intake or output data in the 24 hours ending 06/03/22 8361     Physical Examination:     I had a face to face encounter with this patient and independently examined them on 6/3/2022 as outlined below:          Constitutional: No acute distress, cooperative, pleasant    ENT:  Oral mucosa moist, oropharynx benign. Resp:  CTA bilaterally. No wheezing/rhonchi/rales. No accessory muscle use. CV:  Regular rhythm, normal rate, no murmurs,     GI:  Soft, non distended, non tender. normoactive bowel sounds,     Musculoskeletal:  No edema, warm, 2+ pulses throughout    Neurologic:  Moves all extremities. No focal deficts  Psych: abnormal mood and affect            Data Review:    Review and/or order of clinical lab test  Review and/or order of tests in the radiology section of CPT  Review and/or order of tests in the medicine section of CPT   CT Results  (Last 48 hours)               06/02/22 1411  CT HEAD WO CONT Final result    Impression:      1. Right frontal lobe mass is suggested with severe right anterior cerebral deep   white matter edema and shift of midline structures. Possible minimal petechial   hemorrhage associated but no significant parenchymal or extra-axial hemorrhagic   collection. 2. Mild sinusitis. The findings were called to Dr. Jaya Do on 6/2/2022 at 1420 hours by Dr. Antonio Agosto. [phone]               Narrative:  EXAM: CT HEAD WO CONT       INDICATION: confusion, memory issues, more difficulty than usual moving around. COMPARISON: None. CONTRAST: None. TECHNIQUE: Unenhanced CT of the head was performed using 5 mm images. Brain and   bone windows were generated. Coronal and sagittal reformats. CT dose reduction   was achieved through use of a standardized protocol tailored for this   examination and automatic exposure control for dose modulation. FINDINGS:   Lateral ventricular size is upper normal, particularly the occipital horns. . In   the right frontal lobe periventricular deep white matter, there is a mass   suggested, measuring 1.6 x 1.6 x 1.6 cm, with severe surrounding deep white   matter edema compressing the right frontal horn and shifting midline structures   from right to left by 11 mm. Severe deep white matter edema extends throughout   the right frontal and anterior parietal lobe. There is no obvious associated   hemorrhage confirmed, but minimal petechial hemorrhage at the anterior inferior   aspect of the mass is questioned. . Cerebral sulci are diffusely effaced. . The   basilar cisterns are open. No CT evidence of acute infarct. No obvious   herniation. The bone windows demonstrate no abnormalities. Partial opacification of the   ethmoid and frontal air cells without air-fluid levels. Clayborne Hoguet EXAM:  MRI BRAIN W WO CONT  INDICATION:  frontal lobe mass  TECHNIQUE:  Sagittal T1, axial FLAIR, T2, T1 and gradient echo T2-weighted images of the  head were obtained followed by intravenous infusion 20 mL ProHance repeat axial  T1 and coronal T1-weighted images and axial diffusion weighted images. Postcontrast 3-D MP rage T1 sagittal acquired volume acquisition with thin axial  and coronal reconstructions. COMPARISON: CT head yesterday. FINDINGS:  Enhancing right anterior frontal mass noted as described above. There is central  nonenhancement/necrosis. Minimal blood products. Some associated central  restricted diffusion is nonspecific. The mass measures approximately 4.1 x 5.1 x  3.4 cm. There is surrounding vasogenic edema and mass effect with significant  effacement and displacement of the right ventricle, effacement of adjacent sulci  and right to left midline shift measuring approximately 12 mm at the level of  the septum pellucidum. There are no other foci of abnormal enhancement demonstrated in the brain. There  is slight asymmetric prominence of the left lateral ventricle and temporal horn  with some ependymal asymmetric T2 hyperintensity which may represent developing  hydrocephalus. Flow voids are present in vertebral basilar and carotid artery systems. There is  some vascularity in the tumor which does not appear particularly hypervascular.   No abnormal extra-axial fluid collections. No other findings of acute  intracranial hemorrhage. Structures of the skull base including orbits,  paranasal sinuses and temporal bones are unremarkable.     IMPRESSION  Approximately 5 cm right anterior frontal parenchymal mass without other  associated areas of abnormal enhancement in the brain suggests primary brain  neoplasm/GBM although metastasis cannot be entirely excluded. Significant mass  affect. .    Labs:     Recent Labs     06/02/22  1432   WBC 10.5   HGB 15.1   HCT 47.4        Recent Labs     06/02/22  1432      K 4.1      CO2 31   BUN 19   CREA 1.12   *   CA 9.4     Recent Labs     06/02/22  1432   ALT 42   AP 71   TBILI 0.5   TP 7.5   ALB 3.5   GLOB 4.0     No results for input(s): INR, PTP, APTT, INREXT in the last 72 hours. No results for input(s): FE, TIBC, PSAT, FERR in the last 72 hours. No results found for: FOL, RBCF   No results for input(s): PH, PCO2, PO2 in the last 72 hours. No results for input(s): CPK, CKNDX, TROIQ in the last 72 hours.     No lab exists for component: CPKMB  No results found for: CHOL, CHOLX, CHLST, CHOLV, HDL, HDLP, LDL, LDLC, DLDLP, TGLX, TRIGL, TRIGP, CHHD, CHHDX  No results found for: [facility]  Lab Results   Component Value Date/Time    Color YELLOW/STRAW 06/02/2022 08:19 PM    Appearance CLEAR 06/02/2022 08:19 PM    Specific gravity 1.009 06/02/2022 08:19 PM    pH (UA) 7.5 06/02/2022 08:19 PM    Protein Negative 06/02/2022 08:19 PM    Glucose Negative 06/02/2022 08:19 PM    Ketone Negative 06/02/2022 08:19 PM    Bilirubin Negative 06/02/2022 08:19 PM    Urobilinogen 1.0 06/02/2022 08:19 PM    Nitrites Negative 06/02/2022 08:19 PM    Leukocyte Esterase Negative 06/02/2022 08:19 PM    Epithelial cells FEW 06/02/2022 08:19 PM    Bacteria Negative 06/02/2022 08:19 PM    WBC 0-4 06/02/2022 08:19 PM    RBC 0-5 06/02/2022 08:19 PM         Medications Reviewed:     Current Facility-Administered Medications   Medication Dose Route Frequency    sodium chloride (NS) flush 5-40 mL  5-40 mL IntraVENous Q8H    sodium chloride (NS) flush 5-40 mL  5-40 mL IntraVENous PRN    acetaminophen (TYLENOL) tablet 650 mg  650 mg Oral Q6H PRN    Or    acetaminophen (TYLENOL) suppository 650 mg  650 mg Rectal Q6H PRN    polyethylene glycol (MIRALAX) packet 17 g  17 g Oral DAILY PRN    ondansetron (ZOFRAN ODT) tablet 4 mg  4 mg Oral Q8H PRN    Or    ondansetron (ZOFRAN) injection 4 mg  4 mg IntraVENous Q6H PRN    oxyCODONE IR (ROXICODONE) tablet 10 mg  10 mg Oral Q4H PRN    oxyCODONE IR (ROXICODONE) tablet 5 mg  5 mg Oral Q4H PRN    levETIRAcetam (KEPPRA) injection 500 mg  500 mg IntraVENous Q12H    dexamethasone (DECADRON) 4 mg/mL injection 4 mg  4 mg IntraVENous Q6H    pantoprazole (PROTONIX) tablet 40 mg  40 mg Oral ACB     ______________________________________________________________________  EXPECTED LENGTH OF STAY: - - -  ACTUAL LENGTH OF STAY:          1                 Sandy Santos MD

## 2022-06-04 LAB
ABO + RH BLD: NORMAL
BLOOD GROUP ANTIBODIES SERPL: NORMAL
INR PPP: 1.1 (ref 0.9–1.1)
PROTHROMBIN TIME: 11.9 SEC (ref 9–11.1)
SPECIMEN EXP DATE BLD: NORMAL

## 2022-06-04 PROCEDURE — 74011000250 HC RX REV CODE- 250: Performed by: HOSPITALIST

## 2022-06-04 PROCEDURE — 65270000046 HC RM TELEMETRY

## 2022-06-04 PROCEDURE — 36415 COLL VENOUS BLD VENIPUNCTURE: CPT

## 2022-06-04 PROCEDURE — 74011250636 HC RX REV CODE- 250/636: Performed by: HOSPITALIST

## 2022-06-04 PROCEDURE — 74011250637 HC RX REV CODE- 250/637: Performed by: FAMILY MEDICINE

## 2022-06-04 PROCEDURE — 86900 BLOOD TYPING SEROLOGIC ABO: CPT

## 2022-06-04 PROCEDURE — 74011250637 HC RX REV CODE- 250/637: Performed by: HOSPITALIST

## 2022-06-04 PROCEDURE — 85610 PROTHROMBIN TIME: CPT

## 2022-06-04 RX ORDER — AMLODIPINE BESYLATE 5 MG/1
2.5 TABLET ORAL DAILY
Status: DISCONTINUED | OUTPATIENT
Start: 2022-06-04 | End: 2022-06-05

## 2022-06-04 RX ADMIN — DEXAMETHASONE SODIUM PHOSPHATE 4 MG: 4 INJECTION, SOLUTION INTRAMUSCULAR; INTRAVENOUS at 22:35

## 2022-06-04 RX ADMIN — AMLODIPINE BESYLATE 2.5 MG: 5 TABLET ORAL at 12:15

## 2022-06-04 RX ADMIN — Medication 10 ML: at 03:43

## 2022-06-04 RX ADMIN — PANTOPRAZOLE SODIUM 40 MG: 40 TABLET, DELAYED RELEASE ORAL at 06:51

## 2022-06-04 RX ADMIN — DEXAMETHASONE SODIUM PHOSPHATE 4 MG: 4 INJECTION, SOLUTION INTRAMUSCULAR; INTRAVENOUS at 14:45

## 2022-06-04 RX ADMIN — DEXAMETHASONE SODIUM PHOSPHATE 4 MG: 4 INJECTION, SOLUTION INTRAMUSCULAR; INTRAVENOUS at 03:42

## 2022-06-04 RX ADMIN — Medication 5 ML: at 22:00

## 2022-06-04 RX ADMIN — DEXAMETHASONE SODIUM PHOSPHATE 4 MG: 4 INJECTION, SOLUTION INTRAMUSCULAR; INTRAVENOUS at 09:22

## 2022-06-04 RX ADMIN — LEVETIRACETAM 500 MG: 100 INJECTION, SOLUTION, CONCENTRATE INTRAVENOUS at 03:42

## 2022-06-04 RX ADMIN — Medication 10 ML: at 14:45

## 2022-06-04 RX ADMIN — LEVETIRACETAM 500 MG: 100 INJECTION, SOLUTION, CONCENTRATE INTRAVENOUS at 14:45

## 2022-06-04 NOTE — PROGRESS NOTES
Stable  Awake alert  For surgery Monday  Answered questions about tumor type, surgical expectations, post op care etc  No acute needs today

## 2022-06-04 NOTE — ROUTINE PROCESS
Bedside and Verbal shift change report given to argenis Oscar (oncoming nurse) by Arjun Flores (offgoing nurse). Report included the following information SBAR, Kardex, Intake/Output, Recent Results, Cardiac Rhythm sr and Dual Neuro Assessment.

## 2022-06-04 NOTE — PROGRESS NOTES
Bedside shift change report given to Tory/RN (oncoming nurse) by Celestina/MARIA DE JESUSN (offgoing nurse). Report included the following information SBAR, Kardex, ED Summary, Intake/Output, MAR, Accordion, Recent Results, Alarm Parameters , Quality Measures and Dual Neuro Assessment.

## 2022-06-05 LAB
ANION GAP SERPL CALC-SCNC: 5 MMOL/L (ref 5–15)
BNP SERPL-MCNC: 163 PG/ML
BUN SERPL-MCNC: 23 MG/DL (ref 6–20)
BUN/CREAT SERPL: 24 (ref 12–20)
CALCIUM SERPL-MCNC: 8.4 MG/DL (ref 8.5–10.1)
CHLORIDE SERPL-SCNC: 108 MMOL/L (ref 97–108)
CO2 SERPL-SCNC: 27 MMOL/L (ref 21–32)
CREAT SERPL-MCNC: 0.97 MG/DL (ref 0.7–1.3)
ERYTHROCYTE [DISTWIDTH] IN BLOOD BY AUTOMATED COUNT: 11.7 % (ref 11.5–14.5)
GLUCOSE SERPL-MCNC: 127 MG/DL (ref 65–100)
HCT VFR BLD AUTO: 49 % (ref 36.6–50.3)
HGB BLD-MCNC: 15.1 G/DL (ref 12.1–17)
MAGNESIUM SERPL-MCNC: 2.1 MG/DL (ref 1.6–2.4)
MCH RBC QN AUTO: 29.8 PG (ref 26–34)
MCHC RBC AUTO-ENTMCNC: 30.8 G/DL (ref 30–36.5)
MCV RBC AUTO: 96.6 FL (ref 80–99)
NRBC # BLD: 0 K/UL (ref 0–0.01)
NRBC BLD-RTO: 0 PER 100 WBC
PLATELET # BLD AUTO: 176 K/UL (ref 150–400)
PMV BLD AUTO: 11.2 FL (ref 8.9–12.9)
POTASSIUM SERPL-SCNC: 3.9 MMOL/L (ref 3.5–5.1)
RBC # BLD AUTO: 5.07 M/UL (ref 4.1–5.7)
SODIUM SERPL-SCNC: 140 MMOL/L (ref 136–145)
WBC # BLD AUTO: 15.1 K/UL (ref 4.1–11.1)

## 2022-06-05 PROCEDURE — 83735 ASSAY OF MAGNESIUM: CPT

## 2022-06-05 PROCEDURE — 85027 COMPLETE CBC AUTOMATED: CPT

## 2022-06-05 PROCEDURE — 83880 ASSAY OF NATRIURETIC PEPTIDE: CPT

## 2022-06-05 PROCEDURE — 93005 ELECTROCARDIOGRAM TRACING: CPT

## 2022-06-05 PROCEDURE — 74011000250 HC RX REV CODE- 250: Performed by: HOSPITALIST

## 2022-06-05 PROCEDURE — 36415 COLL VENOUS BLD VENIPUNCTURE: CPT

## 2022-06-05 PROCEDURE — 74011250637 HC RX REV CODE- 250/637: Performed by: HOSPITALIST

## 2022-06-05 PROCEDURE — 74011250636 HC RX REV CODE- 250/636: Performed by: HOSPITALIST

## 2022-06-05 PROCEDURE — 65270000046 HC RM TELEMETRY

## 2022-06-05 PROCEDURE — 80048 BASIC METABOLIC PNL TOTAL CA: CPT

## 2022-06-05 PROCEDURE — 74011250637 HC RX REV CODE- 250/637: Performed by: FAMILY MEDICINE

## 2022-06-05 RX ORDER — AMLODIPINE BESYLATE 5 MG/1
5 TABLET ORAL DAILY
Status: DISCONTINUED | OUTPATIENT
Start: 2022-06-06 | End: 2022-06-08

## 2022-06-05 RX ORDER — AMLODIPINE BESYLATE 5 MG/1
2.5 TABLET ORAL ONCE
Status: COMPLETED | OUTPATIENT
Start: 2022-06-05 | End: 2022-06-05

## 2022-06-05 RX ADMIN — AMLODIPINE BESYLATE 2.5 MG: 5 TABLET ORAL at 10:04

## 2022-06-05 RX ADMIN — DEXAMETHASONE SODIUM PHOSPHATE 4 MG: 4 INJECTION, SOLUTION INTRAMUSCULAR; INTRAVENOUS at 10:05

## 2022-06-05 RX ADMIN — DEXAMETHASONE SODIUM PHOSPHATE 4 MG: 4 INJECTION, SOLUTION INTRAMUSCULAR; INTRAVENOUS at 16:03

## 2022-06-05 RX ADMIN — Medication 10 ML: at 21:16

## 2022-06-05 RX ADMIN — AMLODIPINE BESYLATE 2.5 MG: 5 TABLET ORAL at 13:45

## 2022-06-05 RX ADMIN — Medication 10 ML: at 06:51

## 2022-06-05 RX ADMIN — DEXAMETHASONE SODIUM PHOSPHATE 4 MG: 4 INJECTION, SOLUTION INTRAMUSCULAR; INTRAVENOUS at 04:19

## 2022-06-05 RX ADMIN — DEXAMETHASONE SODIUM PHOSPHATE 4 MG: 4 INJECTION, SOLUTION INTRAMUSCULAR; INTRAVENOUS at 21:16

## 2022-06-05 RX ADMIN — PANTOPRAZOLE SODIUM 40 MG: 40 TABLET, DELAYED RELEASE ORAL at 06:50

## 2022-06-05 RX ADMIN — LEVETIRACETAM 500 MG: 100 INJECTION, SOLUTION, CONCENTRATE INTRAVENOUS at 04:19

## 2022-06-05 RX ADMIN — LEVETIRACETAM 500 MG: 100 INJECTION, SOLUTION, CONCENTRATE INTRAVENOUS at 16:03

## 2022-06-05 NOTE — PROGRESS NOTES
[address] [facility]  Hospitalist Group                                                                                          Hospitalist Progress Note  Bro Teixeira MD  Answering service: [phone] or [phone] from in house phone        Date of Service:  2022  NAME:  Ric Barnett  :  1955  MRN:  [id number]      Admission Summary:   Ric Barnett is a 61 y.o. male who presents with weakness and behavior changnes      Patient is a 62-year-old male with past medical history of seasonal allergies, spinal stenosis with chronic back pain,  who presents for evaluation of \"weakness. \"    He said that he has a spinal stenosis which affected his the left side, his left side is always weaker than right side, but lately he notes that he noticed this started in January.  It is progressively worsened, particularly over the past 2 weeks. Kristen Pickard has not seen his primary care doctor for this problem.  His wife is with him and describes that he moves extremely slowly. Kristen Pickard endorses difficulty with physically getting in and out of chairs. Daria Augustin seems that he has lost sense of time.  For example, he started emptying their groceries from the car the other day at 4 PM and did not finish emptying the groceries until 10 PM.  His wife additionally had a primary care doctor's appointment set up for today, and gave him time to get ready.  When she went upstairs to have him come to the car, he was not dressed yet and was sitting on the bed singing to himself. Kristen Pickard denies headaches, visual changes.  His wife also endorses that his sleep cycle is off. Krisetn Pickard is now getting up at 8 PM as if it were morning and stays up all night. Kristen Pickard has not had fevers at home. Kristen Pickard notes that in fall of last year, he noticed a weird flash of image or light out of the corner of his eye. Kristen Pickard went to the eye doctor, who ran tests and did not see anything concerning. Kristen Pickard was referred to a neurological ophthalmologist, who stated his work-up was normal.  He then saw a cardiologist, and had a normal work-up there.  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 Chronic back pain- resume baclofen and NSAIDS as needed  3. Elevated BP - without dx of HTN- started amlodipine- dose increased today  Reviewed preop EKG  Patient reports cardiac workup last year at [facility]- Dr Kelly Gilbert- with ECHO that was normal     Code status: Full  Prophylaxis: SCDs  Care Plan discussed with: patient and family  Anticipated Disposition: TBD     Hospital Problems  Never Reviewed          Codes Class Noted POA    Brain mass ICD-10-CM: G93.89  ICD-9-CM: 348.89  6/2/2022 Unknown                Review of Systems:   A comprehensive review of systems was negative except for that written in the HPI. Vital Signs:    Last 24hrs VS reviewed since prior progress note.  Most recent are:  Visit Vitals  BP (!) 149/90 (BP 1 Location: Left upper arm, BP Patient Position: Lying)   Pulse 87   Temp 97.9 °F (36.6 °C)   Resp 16   Ht 5' 6\" (1.676 m)   SpO2 93%     Patient Vitals for the past 24 hrs:   Temp Pulse Resp BP SpO2   06/05/22 1543 97.9 °F (36.6 °C) 87 16 (!) 149/90 93 %   06/05/22 1344 98.4 °F (36.9 °C) 94 16 (!) 156/82 95 %   06/05/22 1012 -- 64 -- -- --   06/05/22 1004 -- 64 -- (!) 140/88 --   06/05/22 0600 97.9 °F (36.6 °C) (!) 53 16 (!) 155/71 94 %   06/05/22 0210 98 °F (36.7 °C) 69 16 136/75 98 %   06/04/22 2215 98.4 °F (36.9 °C) 74 16 114/64 93 %     No intake or output data in the 24 hours ending 06/05/22 1802     Physical Examination:     I had a face to face encounter with this patient and independently examined them on 6/5/2022 as outlined below:          Constitutional:  No acute distress, cooperative, pleasant    ENT:  Oral mucosa moist, EOMI, normocephalic. Resp:  CTA bilaterally. No wheezing/rhonchi/rales. No accessory muscle use. CV:  Regular rhythm, normal rate, no murmurs,     GI:  Soft, non distended, non tender. normoactive bowel sounds,     Musculoskeletal:  No edema, warm, 2+ pulses throughout    Neurologic:  Moves all extremities. No focal deficts  Psych: normal mood and affect            Data Review:    Review and/or order of clinical lab test  Review and/or order of tests in the radiology section of CPT  Review and/or order of tests in the medicine section of CPT   CT Results  (Last 48 hours)               06/03/22 1856  CT CHEST W CONT Final result    Impression:  1. No evidence of primary malignancy in the chest, abdomen or pelvis. 2. Incidental findings as above           Narrative:  EXAM:  CT ABD PELV W CONT, CT CHEST W CONT   INDICATION:  brain tumor, rule out metastatic disease. Additional history:   COMPARISON: None. .   TECHNIQUE:    Multislice helical CT was performed from the thoracic inlet to the pubic   symphysis with intravenous contrast administration. Contiguous 5 mm axial images   were reconstructed and lung and soft tissue windows were generated. Coronal and   sagittal reformations were generated. CT dose reduction was achieved through use of a standardized protocol tailored   for this examination and automatic exposure control for dose modulation. Venancio Alegre FINDINGS:   CHEST:   Chest wall/thoracic inlet: Within normal limits. Thyroid: Within normal limits. Mediastinum/george: Within normal limits. Heart/vessels: Within normal limits. Lungs/Pleura: Within normal limits.    .   ABDOMEN:   Liver: Diffuse, diminished attenuation throughout the liver suggesting hepatic   steatosis. There is a subcentimeter, low-attenuation lesion which is too small   to accurately characterize and statistically likely benign. .   Gallbladder/Biliary: Calculus in the dependent portion of the gallbladder neck. Spleen: Within normal limits. Pancreas: Within normal limits. Adrenals: Within normal limits. Kidneys: Likely cysts in both kidneys which are incompletely characterized. Peritoneum/Mesenteries: Within normal limits. Extraperitoneum: Within normal limits. Gastrointestinal tract: Diverticulosis in the descending and sigmoid colon. Vascular: Within normal limits. Roslynn Mutton PELVIS:   Extraperitoneum: Within normal limits. Ureters: Within normal limits. Bladder: Within normal limits. Reproductive System: Prostatomegaly. Surgical clips versus brachytherapy seeds   in the prostate. .   MSK:    Small, fat-containing umbilical hernia. Posterior pedicle screw and apollo fixation   of T10-S1   .       06/03/22 1856  CT ABD PELV W CONT Final result    Impression:  1. No evidence of primary malignancy in the chest, abdomen or pelvis. 2. Incidental findings as above           Narrative:  EXAM:  CT ABD PELV W CONT, CT CHEST W CONT   INDICATION:  brain tumor, rule out metastatic disease. Additional history:   COMPARISON: None. .   TECHNIQUE:    Multislice helical CT was performed from the thoracic inlet to the pubic   symphysis with intravenous contrast administration. Contiguous 5 mm axial images   were reconstructed and lung and soft tissue windows were generated. Coronal and   sagittal reformations were generated. CT dose reduction was achieved through use of a standardized protocol tailored   for this examination and automatic exposure control for dose modulation. Darius Santos FINDINGS:   CHEST:   Chest wall/thoracic inlet: Within normal limits. Thyroid: Within normal limits. Mediastinum/george: Within normal limits.    Heart/vessels: Within normal limits. Lungs/Pleura: Within normal limits. .   ABDOMEN:   Liver: Diffuse, diminished attenuation throughout the liver suggesting hepatic   steatosis. There is a subcentimeter, low-attenuation lesion which is too small   to accurately characterize and statistically likely benign. .   Gallbladder/Biliary: Calculus in the dependent portion of the gallbladder neck. Spleen: Within normal limits. Pancreas: Within normal limits. Adrenals: Within normal limits. Kidneys: Likely cysts in both kidneys which are incompletely characterized. Peritoneum/Mesenteries: Within normal limits. Extraperitoneum: Within normal limits. Gastrointestinal tract: Diverticulosis in the descending and sigmoid colon. Vascular: Within normal limits. Rajesh Delbert PELVIS:   Extraperitoneum: Within normal limits. Ureters: Within normal limits. Bladder: Within normal limits. Reproductive System: Prostatomegaly. Surgical clips versus brachytherapy seeds   in the prostate. .   MSK:    Small, fat-containing umbilical hernia. Posterior pedicle screw and apollo fixation   of T10-S1   . EXAM:  MRI BRAIN W WO CONT  INDICATION:  frontal lobe mass  TECHNIQUE:  Sagittal T1, axial FLAIR, T2, T1 and gradient echo T2-weighted images of the  head were obtained followed by intravenous infusion 20 mL ProHance repeat axial  T1 and coronal T1-weighted images and axial diffusion weighted images. Postcontrast 3-D MP rage T1 sagittal acquired volume acquisition with thin axial  and coronal reconstructions. COMPARISON: CT head yesterday. FINDINGS:  Enhancing right anterior frontal mass noted as described above. There is central  nonenhancement/necrosis. Minimal blood products. Some associated central  restricted diffusion is nonspecific. The mass measures approximately 4.1 x 5.1 x  3.4 cm.  There is surrounding vasogenic edema and mass effect with significant  effacement and displacement of the right ventricle, effacement of adjacent sulci  and right to left midline shift measuring approximately 12 mm at the level of  the septum pellucidum. There are no other foci of abnormal enhancement demonstrated in the brain. There  is slight asymmetric prominence of the left lateral ventricle and temporal horn  with some ependymal asymmetric T2 hyperintensity which may represent developing  hydrocephalus. Flow voids are present in vertebral basilar and carotid artery systems. There is  some vascularity in the tumor which does not appear particularly hypervascular. No abnormal extra-axial fluid collections. No other findings of acute  intracranial hemorrhage. Structures of the skull base including orbits,  paranasal sinuses and temporal bones are unremarkable.     IMPRESSION  Approximately 5 cm right anterior frontal parenchymal mass without other  associated areas of abnormal enhancement in the brain suggests primary brain  neoplasm/GBM although metastasis cannot be entirely excluded. Significant mass  affect. .    Labs:     Recent Labs     06/05/22 0447   WBC 15.1*   HGB 15.1   HCT 49.0        Recent Labs     06/05/22 0447      K 3.9      CO2 27   BUN 23*   CREA 0.97   *   CA 8.4*   MG 2.1     No results for input(s): ALT, AP, TBIL, TBILI, TP, ALB, GLOB, GGT, AML, LPSE in the last 72 hours. No lab exists for component: SGOT, GPT, AMYP, HLPSE  Recent Labs     06/04/22  0416   INR 1.1   PTP 11.9*      No results for input(s): FE, TIBC, PSAT, FERR in the last 72 hours. No results found for: FOL, RBCF   No results for input(s): PH, PCO2, PO2 in the last 72 hours. No results for input(s): CPK, CKNDX, TROIQ in the last 72 hours.     No lab exists for component: CPKMB  No results found for: CHOL, CHOLX, CHLST, CHOLV, HDL, HDLP, LDL, LDLC, DLDLP, TGLX, TRIGL, TRIGP, CHHD, CHHDX  No results found for: [facility]  Lab Results   Component Value Date/Time    Color YELLOW/STRAW 06/02/2022 08:19 PM    Appearance CLEAR 06/02/2022 08:19 PM    Specific gravity 1.009 06/02/2022 08:19 PM    pH (UA) 7.5 06/02/2022 08:19 PM    Protein Negative 06/02/2022 08:19 PM    Glucose Negative 06/02/2022 08:19 PM    Ketone Negative 06/02/2022 08:19 PM    Bilirubin Negative 06/02/2022 08:19 PM    Urobilinogen 1.0 06/02/2022 08:19 PM    Nitrites Negative 06/02/2022 08:19 PM    Leukocyte Esterase Negative 06/02/2022 08:19 PM    Epithelial cells FEW 06/02/2022 08:19 PM    Bacteria Negative 06/02/2022 08:19 PM    WBC 0-4 06/02/2022 08:19 PM    RBC 0-5 06/02/2022 08:19 PM         Medications Reviewed:     Current Facility-Administered Medications   Medication Dose Route Frequency    [START ON 6/6/2022] amLODIPine (NORVASC) tablet 5 mg  5 mg Oral DAILY    sodium chloride (NS) flush 5-40 mL  5-40 mL IntraVENous Q8H    sodium chloride (NS) flush 5-40 mL  5-40 mL IntraVENous PRN    acetaminophen (TYLENOL) tablet 650 mg  650 mg Oral Q6H PRN    Or    acetaminophen (TYLENOL) suppository 650 mg  650 mg Rectal Q6H PRN    polyethylene glycol (MIRALAX) packet 17 g  17 g Oral DAILY PRN    ondansetron (ZOFRAN ODT) tablet 4 mg  4 mg Oral Q8H PRN    Or    ondansetron (ZOFRAN) injection 4 mg  4 mg IntraVENous Q6H PRN    oxyCODONE IR (ROXICODONE) tablet 10 mg  10 mg Oral Q4H PRN    oxyCODONE IR (ROXICODONE) tablet 5 mg  5 mg Oral Q4H PRN    levETIRAcetam (KEPPRA) injection 500 mg  500 mg IntraVENous Q12H    dexamethasone (DECADRON) 4 mg/mL injection 4 mg  4 mg IntraVENous Q6H    pantoprazole (PROTONIX) tablet 40 mg  40 mg Oral ACB     ______________________________________________________________________  EXPECTED LENGTH OF STAY: - - -  ACTUAL LENGTH OF STAY:          3                 Dwayne Louis MD

## 2022-06-05 NOTE — PROGRESS NOTES
[id number] [facility]  Hospitalist Group                                                                                          Hospitalist Progress Note  True MD Nicki  Answering service: [phone] or [phone] from in house phone        Date of Service:  2022  NAME:  Mike Miranda  :  1955  MRN:  [id number]      Admission Summary:   Mike Miranda is a 61 y.o. male who presents with weakness and behavior changnes      Patient is a 62-year-old male with past medical history of seasonal allergies, spinal stenosis with chronic back pain,  who presents for evaluation of \"weakness. \"    He said that he has a spinal stenosis which affected his the left side, his left side is always weaker than right side, but lately he notes that he noticed this started in January.  It is progressively worsened, particularly over the past 2 weeks. iNck Valencia has not seen his primary care doctor for this problem.  His wife is with him and describes that he moves extremely slowly. Nick Valencia endorses difficulty with physically getting in and out of chairs. Charisse Ortiz seems that he has lost sense of time.  For example, he started emptying their groceries from the car the other day at 4 PM and did not finish emptying the groceries until 10 PM.  His wife additionally had a primary care doctor's appointment set up for today, and gave him time to get ready.  When she went upstairs to have him come to the car, he was not dressed yet and was sitting on the bed singing to himself. Nick Valencia denies headaches, visual changes.  His wife also endorses that his sleep cycle is off. Nick Valencia is now getting up at 8 PM as if it were morning and stays up all night. Nick Valencia has not had fevers at home. Nick Valencia notes that in fall of last year, he noticed a weird flash of image or light out of the corner of his eye. Nick Valencia went to the eye doctor, who ran tests and did not see anything concerning. Nick Valencia was referred to a neurological ophthalmologist, who stated his work-up was normal.  He then saw a cardiologist, and had a normal work-up there.  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 Chronic back pain- resume baclofen and NSAIDS as needed  3. Elevated BP - without dx of HTN- started amlodipine     Code status: Full  Prophylaxis: SCDs  Care Plan discussed with: patient and family  Anticipated Disposition: TBD     Hospital Problems  Never Reviewed          Codes Class Noted POA    Brain mass ICD-10-CM: G93.89  ICD-9-CM: 348.89  6/2/2022 Unknown                Review of Systems:   A comprehensive review of systems was negative except for that written in the HPI. Vital Signs:    Last 24hrs VS reviewed since prior progress note.  Most recent are:  Visit Vitals  /78 (BP 1 Location: Left upper arm, BP Patient Position: At rest)   Pulse 87   Temp 98 °F (36.7 °C)   Resp 16   SpO2 96%     Patient Vitals for the past 24 hrs:   Temp Pulse Resp BP SpO2   06/04/22 1800 -- 87 -- -- --   06/04/22 1749 98 °F (36.7 °C) 82 16 134/78 96 %   06/04/22 1400 98.7 °F (37.1 °C) 77 16 (!) 164/84 96 %   06/04/22 1227 -- 88 -- -- --   06/04/22 1215 -- 86 -- (!) 145/77 --   06/04/22 1000 97.8 °F (36.6 °C) 67 16 136/82 96 %   06/04/22 0601 98.4 °F (36.9 °C) 65 18 (!) 143/86 93 %   06/04/22 0600 -- 71 12 (!) 143/86 --   06/04/22 0400 -- 69 -- -- --   06/04/22 0201 98.3 °F (36.8 °C) 65 13 137/70 92 %   06/04/22 0200 -- 64 -- -- --   06/04/22 0000 -- 67 -- -- --       Intake/Output Summary (Last 24 hours) at 6/4/2022 2210  Last data filed at 6/4/2022 1215  Gross per 24 hour   Intake 480 ml   Output 750 ml   Net -270 ml        Physical Examination:     I had a face to face encounter with this patient and independently examined them on 6/4/2022 as outlined below:          Constitutional:  No acute distress, cooperative, pleasant    ENT:  Oral mucosa moist, oropharynx benign. Resp:  CTA bilaterally. No wheezing/rhonchi/rales. No accessory muscle use. CV:  Regular rhythm, normal rate, no murmurs,     GI:  Soft, non distended, non tender. normoactive bowel sounds,     Musculoskeletal:  No edema, warm, 2+ pulses throughout    Neurologic:  Moves all extremities. No focal deficts  Psych: abnormal mood and affect            Data Review:    Review and/or order of clinical lab test  Review and/or order of tests in the radiology section of CPT  Review and/or order of tests in the medicine section of CPT   CT Results  (Last 48 hours)               06/03/22 1856  CT CHEST W CONT Final result    Impression:  1. No evidence of primary malignancy in the chest, abdomen or pelvis. 2. Incidental findings as above           Narrative:  EXAM:  CT ABD PELV W CONT, CT CHEST W CONT   INDICATION:  brain tumor, rule out metastatic disease. Additional history:   COMPARISON: None. .   TECHNIQUE:    Multislice helical CT was performed from the thoracic inlet to the pubic   symphysis with intravenous contrast administration. Contiguous 5 mm axial images   were reconstructed and lung and soft tissue windows were generated. Coronal and   sagittal reformations were generated. CT dose reduction was achieved through use of a standardized protocol tailored   for this examination and automatic exposure control for dose modulation. Esther Mckinley FINDINGS:   CHEST:   Chest wall/thoracic inlet: Within normal limits. Thyroid: Within normal limits. Mediastinum/george: Within normal limits.    Heart/vessels: Within normal limits. Lungs/Pleura: Within normal limits. .   ABDOMEN:   Liver: Diffuse, diminished attenuation throughout the liver suggesting hepatic   steatosis. There is a subcentimeter, low-attenuation lesion which is too small   to accurately characterize and statistically likely benign. .   Gallbladder/Biliary: Calculus in the dependent portion of the gallbladder neck. Spleen: Within normal limits. Pancreas: Within normal limits. Adrenals: Within normal limits. Kidneys: Likely cysts in both kidneys which are incompletely characterized. Peritoneum/Mesenteries: Within normal limits. Extraperitoneum: Within normal limits. Gastrointestinal tract: Diverticulosis in the descending and sigmoid colon. Vascular: Within normal limits. Chandler Brocks PELVIS:   Extraperitoneum: Within normal limits. Ureters: Within normal limits. Bladder: Within normal limits. Reproductive System: Prostatomegaly. Surgical clips versus brachytherapy seeds   in the prostate. .   MSK:    Small, fat-containing umbilical hernia. Posterior pedicle screw and apollo fixation   of T10-S1   .       06/03/22 1856  CT ABD PELV W CONT Final result    Impression:  1. No evidence of primary malignancy in the chest, abdomen or pelvis. 2. Incidental findings as above           Narrative:  EXAM:  CT ABD PELV W CONT, CT CHEST W CONT   INDICATION:  brain tumor, rule out metastatic disease. Additional history:   COMPARISON: None. .   TECHNIQUE:    Multislice helical CT was performed from the thoracic inlet to the pubic   symphysis with intravenous contrast administration. Contiguous 5 mm axial images   were reconstructed and lung and soft tissue windows were generated. Coronal and   sagittal reformations were generated. CT dose reduction was achieved through use of a standardized protocol tailored   for this examination and automatic exposure control for dose modulation. Marquis Hernandez FINDINGS:   CHEST:   Chest wall/thoracic inlet: Within normal limits. Thyroid: Within normal limits. Mediastinum/george: Within normal limits. Heart/vessels: Within normal limits. Lungs/Pleura: Within normal limits. .   ABDOMEN:   Liver: Diffuse, diminished attenuation throughout the liver suggesting hepatic   steatosis. There is a subcentimeter, low-attenuation lesion which is too small   to accurately characterize and statistically likely benign. .   Gallbladder/Biliary: Calculus in the dependent portion of the gallbladder neck. Spleen: Within normal limits. Pancreas: Within normal limits. Adrenals: Within normal limits. Kidneys: Likely cysts in both kidneys which are incompletely characterized. Peritoneum/Mesenteries: Within normal limits. Extraperitoneum: Within normal limits. Gastrointestinal tract: Diverticulosis in the descending and sigmoid colon. Vascular: Within normal limits. Vonne Dach PELVIS:   Extraperitoneum: Within normal limits. Ureters: Within normal limits. Bladder: Within normal limits. Reproductive System: Prostatomegaly. Surgical clips versus brachytherapy seeds   in the prostate. .   MSK:    Small, fat-containing umbilical hernia. Posterior pedicle screw and apollo fixation   of T10-S1   . EXAM:  MRI BRAIN W WO CONT  INDICATION:  frontal lobe mass  TECHNIQUE:  Sagittal T1, axial FLAIR, T2, T1 and gradient echo T2-weighted images of the  head were obtained followed by intravenous infusion 20 mL ProHance repeat axial  T1 and coronal T1-weighted images and axial diffusion weighted images. Postcontrast 3-D MP rage T1 sagittal acquired volume acquisition with thin axial  and coronal reconstructions. COMPARISON: CT head yesterday. FINDINGS:  Enhancing right anterior frontal mass noted as described above. There is central  nonenhancement/necrosis. Minimal blood products. Some associated central  restricted diffusion is nonspecific. The mass measures approximately 4.1 x 5.1 x  3.4 cm.  There is surrounding vasogenic edema and mass effect with significant  effacement and displacement of the right ventricle, effacement of adjacent sulci  and right to left midline shift measuring approximately 12 mm at the level of  the septum pellucidum. There are no other foci of abnormal enhancement demonstrated in the brain. There  is slight asymmetric prominence of the left lateral ventricle and temporal horn  with some ependymal asymmetric T2 hyperintensity which may represent developing  hydrocephalus. Flow voids are present in vertebral basilar and carotid artery systems. There is  some vascularity in the tumor which does not appear particularly hypervascular. No abnormal extra-axial fluid collections. No other findings of acute  intracranial hemorrhage. Structures of the skull base including orbits,  paranasal sinuses and temporal bones are unremarkable.     IMPRESSION  Approximately 5 cm right anterior frontal parenchymal mass without other  associated areas of abnormal enhancement in the brain suggests primary brain  neoplasm/GBM although metastasis cannot be entirely excluded. Significant mass  affect. .    Labs:     Recent Labs     06/02/22  1432   WBC 10.5   HGB 15.1   HCT 47.4        Recent Labs     06/02/22  1432      K 4.1      CO2 31   BUN 19   CREA 1.12   *   CA 9.4     Recent Labs     06/02/22  1432   ALT 42   AP 71   TBILI 0.5   TP 7.5   ALB 3.5   GLOB 4.0     Recent Labs     06/04/22  0416   INR 1.1   PTP 11.9*      No results for input(s): FE, TIBC, PSAT, FERR in the last 72 hours. No results found for: FOL, RBCF   No results for input(s): PH, PCO2, PO2 in the last 72 hours. No results for input(s): CPK, CKNDX, TROIQ in the last 72 hours.     No lab exists for component: CPKMB  No results found for: CHOL, CHOLX, CHLST, CHOLV, HDL, HDLP, LDL, LDLC, DLDLP, TGLX, TRIGL, TRIGP, CHHD, CHHDX  No results found for: [facility]  Lab Results   Component Value Date/Time    Color YELLOW/STRAW 06/02/2022 08:19 PM Appearance CLEAR 06/02/2022 08:19 PM    Specific gravity 1.009 06/02/2022 08:19 PM    pH (UA) 7.5 06/02/2022 08:19 PM    Protein Negative 06/02/2022 08:19 PM    Glucose Negative 06/02/2022 08:19 PM    Ketone Negative 06/02/2022 08:19 PM    Bilirubin Negative 06/02/2022 08:19 PM    Urobilinogen 1.0 06/02/2022 08:19 PM    Nitrites Negative 06/02/2022 08:19 PM    Leukocyte Esterase Negative 06/02/2022 08:19 PM    Epithelial cells FEW 06/02/2022 08:19 PM    Bacteria Negative 06/02/2022 08:19 PM    WBC 0-4 06/02/2022 08:19 PM    RBC 0-5 06/02/2022 08:19 PM         Medications Reviewed:     Current Facility-Administered Medications   Medication Dose Route Frequency    amLODIPine (NORVASC) tablet 2.5 mg  2.5 mg Oral DAILY    sodium chloride (NS) flush 5-40 mL  5-40 mL IntraVENous Q8H    sodium chloride (NS) flush 5-40 mL  5-40 mL IntraVENous PRN    acetaminophen (TYLENOL) tablet 650 mg  650 mg Oral Q6H PRN    Or    acetaminophen (TYLENOL) suppository 650 mg  650 mg Rectal Q6H PRN    polyethylene glycol (MIRALAX) packet 17 g  17 g Oral DAILY PRN    ondansetron (ZOFRAN ODT) tablet 4 mg  4 mg Oral Q8H PRN    Or    ondansetron (ZOFRAN) injection 4 mg  4 mg IntraVENous Q6H PRN    oxyCODONE IR (ROXICODONE) tablet 10 mg  10 mg Oral Q4H PRN    oxyCODONE IR (ROXICODONE) tablet 5 mg  5 mg Oral Q4H PRN    levETIRAcetam (KEPPRA) injection 500 mg  500 mg IntraVENous Q12H    dexamethasone (DECADRON) 4 mg/mL injection 4 mg  4 mg IntraVENous Q6H    pantoprazole (PROTONIX) tablet 40 mg  40 mg Oral ACB     ______________________________________________________________________  EXPECTED LENGTH OF STAY: - - -  ACTUAL LENGTH OF STAY:          2                 Darius Calix MD

## 2022-06-05 NOTE — PROGRESS NOTES
Problem: Falls - Risk of  Goal: *Absence of Falls  Description: Document Nathan Mroa Fall Risk and appropriate interventions in the flowsheet.   Outcome: Progressing Towards Goal  Note: Fall Risk Interventions:  Mobility Interventions: Patient to call before getting OOB,Communicate number of staff needed for ambulation/transfer    Mentation Interventions: Family/sitter at bedside    Medication Interventions: Patient to call before getting OOB    Elimination Interventions: Call light in reach              Problem: Patient Education: Go to Patient Education Activity  Goal: Patient/Family Education  Outcome: Progressing Towards Goal     Problem: TIA/CVA Stroke: Day 2 Until Discharge  Goal: Off Pathway (Use only if patient is Off Pathway)  Outcome: Progressing Towards Goal  Goal: Activity/Safety  Outcome: Progressing Towards Goal  Goal: Diagnostic Test/Procedures  Outcome: Progressing Towards Goal  Goal: Nutrition/Diet  Outcome: Progressing Towards Goal  Goal: Discharge Planning  Outcome: Progressing Towards Goal  Goal: Medications  Outcome: Progressing Towards Goal  Goal: Respiratory  Outcome: Progressing Towards Goal  Goal: Treatments/Interventions/Procedures  Outcome: Progressing Towards Goal  Goal: Psychosocial  Outcome: Progressing Towards Goal  Goal: *Verbalizes anxiety and depression are reduced or absent  Outcome: Progressing Towards Goal  Goal: *Absence of aspiration  Outcome: Progressing Towards Goal  Goal: *Absence of deep venous thrombosis signs and symptoms(Stroke Metric)  Outcome: Progressing Towards Goal  Goal: *Optimal pain control at patient's stated goal  Outcome: Progressing Towards Goal  Goal: *Tolerating diet  Outcome: Progressing Towards Goal  Goal: *Ability to perform ADLs and demonstrates progressive mobility and function  Outcome: Progressing Towards Goal  Goal: *Stroke education continued(Stroke Metric)  Outcome: Progressing Towards Goal

## 2022-06-06 ENCOUNTER — APPOINTMENT (OUTPATIENT)
Dept: CT IMAGING | Age: 67
DRG: 025 | End: 2022-06-06
Attending: NEUROLOGICAL SURGERY
Payer: COMMERCIAL

## 2022-06-06 ENCOUNTER — ANESTHESIA (OUTPATIENT)
Dept: SURGERY | Age: 67
DRG: 025 | End: 2022-06-06
Payer: COMMERCIAL

## 2022-06-06 LAB
ATRIAL RATE: 73 BPM
CALCULATED P AXIS, ECG09: 48 DEGREES
CALCULATED R AXIS, ECG10: -28 DEGREES
CALCULATED T AXIS, ECG11: 7 DEGREES
DIAGNOSIS, 93000: NORMAL
P-R INTERVAL, ECG05: 164 MS
Q-T INTERVAL, ECG07: 396 MS
QRS DURATION, ECG06: 90 MS
QTC CALCULATION (BEZET), ECG08: 436 MS
VENTRICULAR RATE, ECG03: 73 BPM

## 2022-06-06 PROCEDURE — 00B70ZZ EXCISION OF CEREBRAL HEMISPHERE, OPEN APPROACH: ICD-10-PCS | Performed by: NEUROLOGICAL SURGERY

## 2022-06-06 PROCEDURE — 77030037673 HC TBNG VISTA V-LYTE PMP STRY -B: Performed by: NEUROLOGICAL SURGERY

## 2022-06-06 PROCEDURE — 77030026438 HC STYL ET INTUB CARD -A: Performed by: NURSE ANESTHETIST, CERTIFIED REGISTERED

## 2022-06-06 PROCEDURE — 88331 PATH CONSLTJ SURG 1 BLK 1SPC: CPT

## 2022-06-06 PROCEDURE — 74011250636 HC RX REV CODE- 250/636: Performed by: STUDENT IN AN ORGANIZED HEALTH CARE EDUCATION/TRAINING PROGRAM

## 2022-06-06 PROCEDURE — 74011000250 HC RX REV CODE- 250: Performed by: NURSE ANESTHETIST, CERTIFIED REGISTERED

## 2022-06-06 PROCEDURE — 74011250636 HC RX REV CODE- 250/636: Performed by: HOSPITALIST

## 2022-06-06 PROCEDURE — 77030014650 HC SEAL MTRX FLOSEL BAXT -C: Performed by: NEUROLOGICAL SURGERY

## 2022-06-06 PROCEDURE — 77010033678 HC OXYGEN DAILY

## 2022-06-06 PROCEDURE — 74011250636 HC RX REV CODE- 250/636: Performed by: NEUROLOGICAL SURGERY

## 2022-06-06 PROCEDURE — 65610000006 HC RM INTENSIVE CARE

## 2022-06-06 PROCEDURE — 77030005513 HC CATH URETH FOL11 MDII -B: Performed by: NEUROLOGICAL SURGERY

## 2022-06-06 PROCEDURE — 74011000250 HC RX REV CODE- 250: Performed by: NEUROLOGICAL SURGERY

## 2022-06-06 PROCEDURE — 88307 TISSUE EXAM BY PATHOLOGIST: CPT

## 2022-06-06 PROCEDURE — 74011250636 HC RX REV CODE- 250/636: Performed by: NURSE ANESTHETIST, CERTIFIED REGISTERED

## 2022-06-06 PROCEDURE — 74011250637 HC RX REV CODE- 250/637: Performed by: FAMILY MEDICINE

## 2022-06-06 PROCEDURE — 74011000250 HC RX REV CODE- 250

## 2022-06-06 PROCEDURE — C1713 ANCHOR/SCREW BN/BN,TIS/BN: HCPCS | Performed by: NEUROLOGICAL SURGERY

## 2022-06-06 PROCEDURE — 94762 N-INVAS EAR/PLS OXIMTRY CONT: CPT

## 2022-06-06 PROCEDURE — 77030003029 HC SUT VCRL J&J -B: Performed by: NEUROLOGICAL SURGERY

## 2022-06-06 PROCEDURE — 03HY32Z INSERTION OF MONITORING DEVICE INTO UPPER ARTERY, PERCUTANEOUS APPROACH: ICD-10-PCS | Performed by: ANESTHESIOLOGY

## 2022-06-06 PROCEDURE — 77030040361 HC SLV COMPR DVT MDII -B: Performed by: NEUROLOGICAL SURGERY

## 2022-06-06 PROCEDURE — 77030008684 HC TU ET CUF COVD -B: Performed by: ANESTHESIOLOGY

## 2022-06-06 PROCEDURE — 77030004472 HC BUR TAPR MEDT -B: Performed by: NEUROLOGICAL SURGERY

## 2022-06-06 PROCEDURE — 74011000258 HC RX REV CODE- 258: Performed by: NURSE ANESTHETIST, CERTIFIED REGISTERED

## 2022-06-06 PROCEDURE — 2709999900 HC NON-CHARGEABLE SUPPLY: Performed by: NEUROLOGICAL SURGERY

## 2022-06-06 PROCEDURE — 74011250637 HC RX REV CODE- 250/637: Performed by: NEUROLOGICAL SURGERY

## 2022-06-06 PROCEDURE — 74011000250 HC RX REV CODE- 250: Performed by: HOSPITALIST

## 2022-06-06 PROCEDURE — 77030038552 HC DRN WND MDII -A: Performed by: NEUROLOGICAL SURGERY

## 2022-06-06 PROCEDURE — 77030004391 HC BUR FLUT MEDT -C: Performed by: NEUROLOGICAL SURGERY

## 2022-06-06 PROCEDURE — 70450 CT HEAD/BRAIN W/O DYE: CPT

## 2022-06-06 PROCEDURE — 74011250637 HC RX REV CODE- 250/637: Performed by: NURSE ANESTHETIST, CERTIFIED REGISTERED

## 2022-06-06 PROCEDURE — 77030003152 HC GRFT COLGN DURAL INLC -H: Performed by: NEUROLOGICAL SURGERY

## 2022-06-06 PROCEDURE — 77030013079 HC BLNKT BAIR HGGR 3M -A: Performed by: ANESTHESIOLOGY

## 2022-06-06 PROCEDURE — 8E090EM FLUORESCENCE GUIDED PROCEDURE OF HEAD AND NECK REGION USING AMINOLEVULINIC ACID, OPEN APPROACH: ICD-10-PCS | Performed by: NEUROLOGICAL SURGERY

## 2022-06-06 PROCEDURE — 76060000042 HC ANESTHESIA 5.5 TO 6 HR: Performed by: NEUROLOGICAL SURGERY

## 2022-06-06 PROCEDURE — 77030034348 HC TIP STR SONOPET DISP STRY -E: Performed by: NEUROLOGICAL SURGERY

## 2022-06-06 PROCEDURE — 76010000178 HC OR TIME 5.5 TO 6 HR INTENSV-TIER 1: Performed by: NEUROLOGICAL SURGERY

## 2022-06-06 PROCEDURE — 77030014007 HC SPNG HEMSTAT J&J -B: Performed by: NEUROLOGICAL SURGERY

## 2022-06-06 PROCEDURE — 77030014008 HC SPNG HEMSTAT J&J -C: Performed by: NEUROLOGICAL SURGERY

## 2022-06-06 PROCEDURE — 77030002946 HC SUT NRLN J&J -B: Performed by: NEUROLOGICAL SURGERY

## 2022-06-06 PROCEDURE — 77030014355 HC CVR BUR H TI BIOM -C: Performed by: NEUROLOGICAL SURGERY

## 2022-06-06 PROCEDURE — 76210000017 HC OR PH I REC 1.5 TO 2 HR: Performed by: NEUROLOGICAL SURGERY

## 2022-06-06 DEVICE — COVER BUR H SM DIA13MM THK0.5MM 5 H NEURO TI FOR 1.5MM SCR: Type: IMPLANTABLE DEVICE | Site: SKULL | Status: FUNCTIONAL

## 2022-06-06 DEVICE — COVER BUR H L DIA18.5MM THK0.5MM 5 H NEURO TI W/O TAB: Type: IMPLANTABLE DEVICE | Site: SKULL | Status: FUNCTIONAL

## 2022-06-06 DEVICE — SCREW BNE L4MM DIA1.5MM CORT MAXILLOMANDIBULAR GRN TI SELF: Type: IMPLANTABLE DEVICE | Site: SKULL | Status: FUNCTIONAL

## 2022-06-06 DEVICE — DURAGEN® PLUS DURAL REGENERATION MATRIX, 2 IN X 2 IN (5 CM X 5 CM)
Type: IMPLANTABLE DEVICE | Site: BRAIN | Status: FUNCTIONAL
Brand: DURAGEN® PLUS

## 2022-06-06 RX ORDER — MORPHINE SULFATE 2 MG/ML
2 INJECTION, SOLUTION INTRAMUSCULAR; INTRAVENOUS
Status: DISCONTINUED | OUTPATIENT
Start: 2022-06-06 | End: 2022-06-08

## 2022-06-06 RX ORDER — ONDANSETRON 2 MG/ML
INJECTION INTRAMUSCULAR; INTRAVENOUS AS NEEDED
Status: DISCONTINUED | OUTPATIENT
Start: 2022-06-06 | End: 2022-06-06

## 2022-06-06 RX ORDER — ONDANSETRON 2 MG/ML
4 INJECTION INTRAMUSCULAR; INTRAVENOUS AS NEEDED
Status: DISCONTINUED | OUTPATIENT
Start: 2022-06-06 | End: 2022-06-06 | Stop reason: HOSPADM

## 2022-06-06 RX ORDER — MIDAZOLAM HYDROCHLORIDE 1 MG/ML
1 INJECTION, SOLUTION INTRAMUSCULAR; INTRAVENOUS AS NEEDED
Status: CANCELLED | OUTPATIENT
Start: 2022-06-06

## 2022-06-06 RX ORDER — FENTANYL CITRATE 50 UG/ML
25 INJECTION, SOLUTION INTRAMUSCULAR; INTRAVENOUS
Status: DISCONTINUED | OUTPATIENT
Start: 2022-06-06 | End: 2022-06-06 | Stop reason: HOSPADM

## 2022-06-06 RX ORDER — VANCOMYCIN/0.9 % SOD CHLORIDE 1.5G/250ML
1500 PLASTIC BAG, INJECTION (ML) INTRAVENOUS ONCE
Status: DISPENSED | OUTPATIENT
Start: 2022-06-06 | End: 2022-06-07

## 2022-06-06 RX ORDER — FENTANYL CITRATE 50 UG/ML
50 INJECTION, SOLUTION INTRAMUSCULAR; INTRAVENOUS AS NEEDED
Status: CANCELLED | OUTPATIENT
Start: 2022-06-06

## 2022-06-06 RX ORDER — SUCCINYLCHOLINE CHLORIDE 20 MG/ML
INJECTION INTRAMUSCULAR; INTRAVENOUS AS NEEDED
Status: DISCONTINUED | OUTPATIENT
Start: 2022-06-06 | End: 2022-06-06 | Stop reason: HOSPADM

## 2022-06-06 RX ORDER — ONDANSETRON 2 MG/ML
INJECTION INTRAMUSCULAR; INTRAVENOUS AS NEEDED
Status: DISCONTINUED | OUTPATIENT
Start: 2022-06-06 | End: 2022-06-06 | Stop reason: HOSPADM

## 2022-06-06 RX ORDER — BACITRACIN 500 UNIT/G
PACKET (EA) TOPICAL
Status: COMPLETED
Start: 2022-06-06 | End: 2022-06-06

## 2022-06-06 RX ORDER — SODIUM CHLORIDE, SODIUM LACTATE, POTASSIUM CHLORIDE, CALCIUM CHLORIDE 600; 310; 30; 20 MG/100ML; MG/100ML; MG/100ML; MG/100ML
1000 INJECTION, SOLUTION INTRAVENOUS CONTINUOUS
Status: DISCONTINUED | OUTPATIENT
Start: 2022-06-06 | End: 2022-06-06 | Stop reason: HOSPADM

## 2022-06-06 RX ORDER — SODIUM CHLORIDE, SODIUM LACTATE, POTASSIUM CHLORIDE, CALCIUM CHLORIDE 600; 310; 30; 20 MG/100ML; MG/100ML; MG/100ML; MG/100ML
125 INJECTION, SOLUTION INTRAVENOUS CONTINUOUS
Status: CANCELLED | OUTPATIENT
Start: 2022-06-06 | End: 2022-06-07

## 2022-06-06 RX ORDER — LIDOCAINE HYDROCHLORIDE AND EPINEPHRINE 15; 5 MG/ML; UG/ML
INJECTION, SOLUTION EPIDURAL AS NEEDED
Status: DISCONTINUED | OUTPATIENT
Start: 2022-06-06 | End: 2022-06-06 | Stop reason: HOSPADM

## 2022-06-06 RX ORDER — OXYCODONE AND ACETAMINOPHEN 5; 325 MG/1; MG/1
1 TABLET ORAL AS NEEDED
Status: DISCONTINUED | OUTPATIENT
Start: 2022-06-06 | End: 2022-06-06 | Stop reason: HOSPADM

## 2022-06-06 RX ORDER — ESMOLOL HYDROCHLORIDE 10 MG/ML
INJECTION INTRAVENOUS AS NEEDED
Status: DISCONTINUED | OUTPATIENT
Start: 2022-06-06 | End: 2022-06-06 | Stop reason: HOSPADM

## 2022-06-06 RX ORDER — ROPIVACAINE HYDROCHLORIDE 5 MG/ML
30 INJECTION, SOLUTION EPIDURAL; INFILTRATION; PERINEURAL AS NEEDED
Status: CANCELLED | OUTPATIENT
Start: 2022-06-06

## 2022-06-06 RX ORDER — MORPHINE SULFATE 2 MG/ML
2 INJECTION, SOLUTION INTRAMUSCULAR; INTRAVENOUS
Status: DISCONTINUED | OUTPATIENT
Start: 2022-06-06 | End: 2022-06-06 | Stop reason: HOSPADM

## 2022-06-06 RX ORDER — LIDOCAINE HYDROCHLORIDE 20 MG/ML
INJECTION, SOLUTION EPIDURAL; INFILTRATION; INTRACAUDAL; PERINEURAL AS NEEDED
Status: DISCONTINUED | OUTPATIENT
Start: 2022-06-06 | End: 2022-06-06 | Stop reason: HOSPADM

## 2022-06-06 RX ORDER — DEXMEDETOMIDINE HYDROCHLORIDE 100 UG/ML
INJECTION, SOLUTION INTRAVENOUS AS NEEDED
Status: DISCONTINUED | OUTPATIENT
Start: 2022-06-06 | End: 2022-06-06 | Stop reason: HOSPADM

## 2022-06-06 RX ORDER — OXYCODONE AND ACETAMINOPHEN 5; 325 MG/1; MG/1
1 TABLET ORAL
Status: DISCONTINUED | OUTPATIENT
Start: 2022-06-06 | End: 2022-06-07

## 2022-06-06 RX ORDER — DEXAMETHASONE SODIUM PHOSPHATE 4 MG/ML
INJECTION, SOLUTION INTRA-ARTICULAR; INTRALESIONAL; INTRAMUSCULAR; INTRAVENOUS; SOFT TISSUE AS NEEDED
Status: DISCONTINUED | OUTPATIENT
Start: 2022-06-06 | End: 2022-06-06 | Stop reason: HOSPADM

## 2022-06-06 RX ORDER — DIPHENHYDRAMINE HYDROCHLORIDE 50 MG/ML
12.5 INJECTION, SOLUTION INTRAMUSCULAR; INTRAVENOUS AS NEEDED
Status: DISCONTINUED | OUTPATIENT
Start: 2022-06-06 | End: 2022-06-06 | Stop reason: HOSPADM

## 2022-06-06 RX ORDER — SODIUM CHLORIDE 0.9 % (FLUSH) 0.9 %
5-40 SYRINGE (ML) INJECTION AS NEEDED
Status: CANCELLED | OUTPATIENT
Start: 2022-06-06

## 2022-06-06 RX ORDER — SODIUM CHLORIDE 9 MG/ML
125 INJECTION, SOLUTION INTRAVENOUS CONTINUOUS
Status: CANCELLED | OUTPATIENT
Start: 2022-06-06 | End: 2022-06-07

## 2022-06-06 RX ORDER — MIDAZOLAM HYDROCHLORIDE 1 MG/ML
0.5 INJECTION, SOLUTION INTRAMUSCULAR; INTRAVENOUS
Status: DISCONTINUED | OUTPATIENT
Start: 2022-06-06 | End: 2022-06-06 | Stop reason: HOSPADM

## 2022-06-06 RX ORDER — FENTANYL CITRATE 50 UG/ML
INJECTION, SOLUTION INTRAMUSCULAR; INTRAVENOUS AS NEEDED
Status: DISCONTINUED | OUTPATIENT
Start: 2022-06-06 | End: 2022-06-06 | Stop reason: HOSPADM

## 2022-06-06 RX ORDER — SODIUM CHLORIDE 0.9 % (FLUSH) 0.9 %
5-40 SYRINGE (ML) INJECTION AS NEEDED
Status: DISCONTINUED | OUTPATIENT
Start: 2022-06-06 | End: 2022-06-15 | Stop reason: HOSPADM

## 2022-06-06 RX ORDER — ACETAMINOPHEN 500 MG
1000 TABLET ORAL ONCE
Status: CANCELLED | OUTPATIENT
Start: 2022-06-06 | End: 2022-06-06

## 2022-06-06 RX ORDER — SODIUM CHLORIDE 0.9 % (FLUSH) 0.9 %
5-40 SYRINGE (ML) INJECTION AS NEEDED
Status: DISCONTINUED | OUTPATIENT
Start: 2022-06-06 | End: 2022-06-06 | Stop reason: HOSPADM

## 2022-06-06 RX ORDER — SODIUM CHLORIDE, SODIUM LACTATE, POTASSIUM CHLORIDE, CALCIUM CHLORIDE 600; 310; 30; 20 MG/100ML; MG/100ML; MG/100ML; MG/100ML
INJECTION, SOLUTION INTRAVENOUS
Status: DISCONTINUED | OUTPATIENT
Start: 2022-06-06 | End: 2022-06-06 | Stop reason: HOSPADM

## 2022-06-06 RX ORDER — MANNITOL 20 G/100ML
INJECTION, SOLUTION INTRAVENOUS
Status: DISCONTINUED | OUTPATIENT
Start: 2022-06-06 | End: 2022-06-06 | Stop reason: HOSPADM

## 2022-06-06 RX ORDER — ALBUTEROL SULFATE 90 UG/1
AEROSOL, METERED RESPIRATORY (INHALATION) AS NEEDED
Status: DISCONTINUED | OUTPATIENT
Start: 2022-06-06 | End: 2022-06-06 | Stop reason: HOSPADM

## 2022-06-06 RX ORDER — SODIUM CHLORIDE AND POTASSIUM CHLORIDE .9; .15 G/100ML; G/100ML
SOLUTION INTRAVENOUS CONTINUOUS
Status: DISCONTINUED | OUTPATIENT
Start: 2022-06-06 | End: 2022-06-07

## 2022-06-06 RX ORDER — SODIUM CHLORIDE 0.9 % (FLUSH) 0.9 %
5-40 SYRINGE (ML) INJECTION EVERY 8 HOURS
Status: CANCELLED | OUTPATIENT
Start: 2022-06-06

## 2022-06-06 RX ORDER — POLYETHYLENE GLYCOL 3350 17 G/17G
17 POWDER, FOR SOLUTION ORAL DAILY PRN
Status: DISCONTINUED | OUTPATIENT
Start: 2022-06-06 | End: 2022-06-15 | Stop reason: HOSPADM

## 2022-06-06 RX ORDER — SODIUM CHLORIDE 9 MG/ML
1000 INJECTION, SOLUTION INTRAVENOUS CONTINUOUS
Status: DISCONTINUED | OUTPATIENT
Start: 2022-06-06 | End: 2022-06-06 | Stop reason: HOSPADM

## 2022-06-06 RX ORDER — LIDOCAINE HYDROCHLORIDE 10 MG/ML
0.1 INJECTION, SOLUTION EPIDURAL; INFILTRATION; INTRACAUDAL; PERINEURAL AS NEEDED
Status: CANCELLED | OUTPATIENT
Start: 2022-06-06

## 2022-06-06 RX ORDER — PROPOFOL 10 MG/ML
INJECTION, EMULSION INTRAVENOUS AS NEEDED
Status: DISCONTINUED | OUTPATIENT
Start: 2022-06-06 | End: 2022-06-06 | Stop reason: HOSPADM

## 2022-06-06 RX ORDER — SODIUM CHLORIDE 0.9 % (FLUSH) 0.9 %
5-40 SYRINGE (ML) INJECTION EVERY 8 HOURS
Status: DISCONTINUED | OUTPATIENT
Start: 2022-06-06 | End: 2022-06-06 | Stop reason: HOSPADM

## 2022-06-06 RX ORDER — SODIUM CHLORIDE 0.9 % (FLUSH) 0.9 %
5-40 SYRINGE (ML) INJECTION EVERY 8 HOURS
Status: DISCONTINUED | OUTPATIENT
Start: 2022-06-06 | End: 2022-06-15 | Stop reason: HOSPADM

## 2022-06-06 RX ORDER — HYDROMORPHONE HYDROCHLORIDE 1 MG/ML
0.2 INJECTION, SOLUTION INTRAMUSCULAR; INTRAVENOUS; SUBCUTANEOUS
Status: DISCONTINUED | OUTPATIENT
Start: 2022-06-06 | End: 2022-06-06 | Stop reason: HOSPADM

## 2022-06-06 RX ORDER — ROCURONIUM BROMIDE 10 MG/ML
INJECTION, SOLUTION INTRAVENOUS AS NEEDED
Status: DISCONTINUED | OUTPATIENT
Start: 2022-06-06 | End: 2022-06-06 | Stop reason: HOSPADM

## 2022-06-06 RX ORDER — CLINDAMYCIN PHOSPHATE 900 MG/50ML
900 INJECTION INTRAVENOUS EVERY 8 HOURS
Status: DISCONTINUED | OUTPATIENT
Start: 2022-06-06 | End: 2022-06-06

## 2022-06-06 RX ORDER — DOCUSATE SODIUM 100 MG/1
100 CAPSULE, LIQUID FILLED ORAL 2 TIMES DAILY
Status: DISCONTINUED | OUTPATIENT
Start: 2022-06-06 | End: 2022-06-15 | Stop reason: HOSPADM

## 2022-06-06 RX ADMIN — ROCURONIUM BROMIDE 10 MG: 10 SOLUTION INTRAVENOUS at 16:10

## 2022-06-06 RX ADMIN — POTASSIUM CHLORIDE AND SODIUM CHLORIDE: 900; 150 INJECTION, SOLUTION INTRAVENOUS at 19:15

## 2022-06-06 RX ADMIN — FLUORESCEIN SODIUM 500 MG: 100 INJECTION INTRAVENOUS at 13:54

## 2022-06-06 RX ADMIN — ONDANSETRON HYDROCHLORIDE 4 MG: 2 INJECTION, SOLUTION INTRAMUSCULAR; INTRAVENOUS at 17:42

## 2022-06-06 RX ADMIN — DEXMEDETOMIDINE HYDROCHLORIDE 8 MCG: 100 INJECTION, SOLUTION, CONCENTRATE INTRAVENOUS at 17:58

## 2022-06-06 RX ADMIN — NICARDIPINE HYDROCHLORIDE 5 MG/HR: 25 INJECTION, SOLUTION INTRAVENOUS at 18:55

## 2022-06-06 RX ADMIN — PHENYLEPHRINE HYDROCHLORIDE 15 MCG/MIN: 10 INJECTION INTRAVENOUS at 16:49

## 2022-06-06 RX ADMIN — FENTANYL CITRATE 25 MCG: 50 INJECTION, SOLUTION INTRAMUSCULAR; INTRAVENOUS at 19:40

## 2022-06-06 RX ADMIN — LEVETIRACETAM 500 MG: 100 INJECTION, SOLUTION, CONCENTRATE INTRAVENOUS at 02:48

## 2022-06-06 RX ADMIN — SODIUM CHLORIDE 900 MG: 9 INJECTION, SOLUTION INTRAVENOUS at 13:51

## 2022-06-06 RX ADMIN — DEXMEDETOMIDINE HYDROCHLORIDE 8 MCG: 100 INJECTION, SOLUTION, CONCENTRATE INTRAVENOUS at 18:23

## 2022-06-06 RX ADMIN — DEXAMETHASONE SODIUM PHOSPHATE 10 MG: 4 INJECTION, SOLUTION INTRAMUSCULAR; INTRAVENOUS at 14:13

## 2022-06-06 RX ADMIN — FENTANYL CITRATE 100 MCG: 50 INJECTION, SOLUTION INTRAMUSCULAR; INTRAVENOUS at 13:40

## 2022-06-06 RX ADMIN — LIDOCAINE HYDROCHLORIDE 80 MG: 20 INJECTION, SOLUTION EPIDURAL; INFILTRATION; INTRACAUDAL; PERINEURAL at 13:21

## 2022-06-06 RX ADMIN — FENTANYL CITRATE 100 MCG: 50 INJECTION, SOLUTION INTRAMUSCULAR; INTRAVENOUS at 13:22

## 2022-06-06 RX ADMIN — VANCOMYCIN HYDROCHLORIDE 1000 MG: 1 INJECTION, POWDER, LYOPHILIZED, FOR SOLUTION INTRAVENOUS at 23:53

## 2022-06-06 RX ADMIN — Medication 10 ML: at 05:09

## 2022-06-06 RX ADMIN — NICARDIPINE HYDROCHLORIDE 7.5 MG/HR: 25 INJECTION, SOLUTION INTRAVENOUS at 19:21

## 2022-06-06 RX ADMIN — DEXAMETHASONE SODIUM PHOSPHATE 4 MG: 4 INJECTION, SOLUTION INTRAMUSCULAR; INTRAVENOUS at 10:18

## 2022-06-06 RX ADMIN — AMLODIPINE BESYLATE 5 MG: 5 TABLET ORAL at 10:18

## 2022-06-06 RX ADMIN — PROPOFOL 50 MG: 10 INJECTION, EMULSION INTRAVENOUS at 14:16

## 2022-06-06 RX ADMIN — SODIUM CHLORIDE, POTASSIUM CHLORIDE, SODIUM LACTATE AND CALCIUM CHLORIDE: 600; 310; 30; 20 INJECTION, SOLUTION INTRAVENOUS at 15:33

## 2022-06-06 RX ADMIN — SUCCINYLCHOLINE CHLORIDE 200 MG: 20 INJECTION, SOLUTION INTRAMUSCULAR; INTRAVENOUS at 13:23

## 2022-06-06 RX ADMIN — OXYCODONE AND ACETAMINOPHEN 1 TABLET: 5; 325 TABLET ORAL at 21:40

## 2022-06-06 RX ADMIN — PROPOFOL 150 MG: 10 INJECTION, EMULSION INTRAVENOUS at 13:23

## 2022-06-06 RX ADMIN — SODIUM CHLORIDE, PRESERVATIVE FREE 10 ML: 5 INJECTION INTRAVENOUS at 21:44

## 2022-06-06 RX ADMIN — PROPOFOL 50 MG: 10 INJECTION, EMULSION INTRAVENOUS at 13:38

## 2022-06-06 RX ADMIN — ROCURONIUM BROMIDE 10 MG: 10 SOLUTION INTRAVENOUS at 17:05

## 2022-06-06 RX ADMIN — MANNITOL: 20 INJECTION, SOLUTION INTRAVENOUS at 13:44

## 2022-06-06 RX ADMIN — LEVETIRACETAM 500 MG: 100 INJECTION, SOLUTION, CONCENTRATE INTRAVENOUS at 14:00

## 2022-06-06 RX ADMIN — DEXTROSE MONOHYDRATE 5 MG/HR: 5 INJECTION, SOLUTION INTRAVENOUS at 18:01

## 2022-06-06 RX ADMIN — ESMOLOL HYDROCHLORIDE 20 MG: 10 INJECTION, SOLUTION INTRAVENOUS at 13:42

## 2022-06-06 RX ADMIN — DEXMEDETOMIDINE HYDROCHLORIDE 8 MCG: 100 INJECTION, SOLUTION, CONCENTRATE INTRAVENOUS at 18:12

## 2022-06-06 RX ADMIN — ROCURONIUM BROMIDE 50 MG: 10 SOLUTION INTRAVENOUS at 13:31

## 2022-06-06 RX ADMIN — ROCURONIUM BROMIDE 50 MG: 10 SOLUTION INTRAVENOUS at 14:39

## 2022-06-06 RX ADMIN — ALBUTEROL SULFATE 3 PUFF: 90 AEROSOL, METERED RESPIRATORY (INHALATION) at 18:39

## 2022-06-06 RX ADMIN — BACITRACIN: 500 OINTMENT TOPICAL at 20:00

## 2022-06-06 RX ADMIN — SODIUM CHLORIDE, POTASSIUM CHLORIDE, SODIUM LACTATE AND CALCIUM CHLORIDE: 600; 310; 30; 20 INJECTION, SOLUTION INTRAVENOUS at 13:06

## 2022-06-06 RX ADMIN — SUGAMMADEX 200 MG: 100 INJECTION, SOLUTION INTRAVENOUS at 18:26

## 2022-06-06 RX ADMIN — DEXAMETHASONE SODIUM PHOSPHATE 4 MG: 4 INJECTION, SOLUTION INTRAMUSCULAR; INTRAVENOUS at 16:00

## 2022-06-06 RX ADMIN — FENTANYL CITRATE 25 MCG: 50 INJECTION, SOLUTION INTRAMUSCULAR; INTRAVENOUS at 19:24

## 2022-06-06 RX ADMIN — DEXAMETHASONE SODIUM PHOSPHATE 4 MG: 4 INJECTION, SOLUTION INTRAMUSCULAR; INTRAVENOUS at 21:48

## 2022-06-06 RX ADMIN — Medication 10 ML: at 21:43

## 2022-06-06 RX ADMIN — DEXAMETHASONE SODIUM PHOSPHATE 4 MG: 4 INJECTION, SOLUTION INTRAMUSCULAR; INTRAVENOUS at 02:48

## 2022-06-06 RX ADMIN — OXYCODONE 5 MG: 5 TABLET ORAL at 23:10

## 2022-06-06 RX ADMIN — NICARDIPINE HYDROCHLORIDE 10 MG/HR: 25 INJECTION, SOLUTION INTRAVENOUS at 21:46

## 2022-06-06 NOTE — ANESTHESIA POSTPROCEDURE EVALUATION
Procedure(s):  RIGHT  FRONTAL CRANIOTOMY WITH FLOUROCINE. general    Anesthesia Post Evaluation      Multimodal analgesia: multimodal analgesia used between 6 hours prior to anesthesia start to PACU discharge  Patient location during evaluation: bedside  Patient participation: complete - patient participated  Level of consciousness: awake  Pain score: 0  Pain management: satisfactory to patient  Airway patency: patent  Anesthetic complications: no  Cardiovascular status: acceptable and blood pressure returned to baseline  Respiratory status: acceptable  Hydration status: acceptable  Comments: I have evaluated the patient and meets criteria for discharge from PACU. Dahlia Schuster DO. Post anesthesia nausea and vomiting:  none  Final Post Anesthesia Temperature Assessment:  Normothermia (36.0-37.5 degrees C)      INITIAL Post-op Vital signs:   Vitals Value Taken Time   /77 06/06/22 1910   Temp 36.7 °C (98.1 °F) 06/06/22 1902   Pulse 85 06/06/22 1911   Resp 12 06/06/22 1911   SpO2 95 % 06/06/22 1911   Vitals shown include unvalidated device data.

## 2022-06-06 NOTE — BRIEF OP NOTE
Brief Postoperative Note    Patient: Nohemy Guallpa  YOB: 1955  MRN: [id number]    Date of Procedure: 6/6/2022     Pre-Op Diagnosis: RIGHT FRONTAL TUMOR    Post-Op Diagnosis: SAME     Procedure(s):  RIGHT  FRONTAL CRANIOTOMY WITH FLUORESCEIN AND BRAIN LAB NAVIGATION    Surgeon(s):  Steve Alvarado MD    Surgical Assistant: Surg Asst-1: Ksenia Lopez  Surg Asst-2: Hector Simpson    Anesthesia: General     Estimated Blood Loss (mL): 930OS    Complications: none    Specimens:   ID Type Source Tests Collected by Time Destination   1 : Right frontal brain tumor Frozen Section Brain  Steve Alvarado MD 6/6/2022 1515 Pathology   2 : Right frontal brain tumor Frozen Section Brain  Steve Alvarado MD 6/6/2022 1548 Pathology   3 : Right Frontal Brain Tumor Fresh Brain  Steve Alvarado MD 6/6/2022 1757 Pathology        Implants:   Implant Name Type Inv.  Item Serial No.  Lot No. LRB No. Used Action   COVER BUR H SM [id number] THK0.5MM 5 H NEURO TI FOR 1.5MM SCR - SNA  COVER BUR H SM [id number] THK0.5MM 5 H NEURO TI FOR 1.5MM SCR NA [employer]WD NA Right 1 Implanted   COVER BUR H L DIA18.5MM THK0.5MM 5 H NEURO TI W/O TAB - SNA  COVER BUR H L DIA18.5MM THK0.5MM 5 H NEURO TI W/O TAB NA [employer]WD NA Right 2 Implanted   SCREW BNE L4MM DIA1.5MM RANDI MAXILLOMANDIBULAR GRN TI SELF - SNA  SCREW BNE L4MM DIA1.5MM RANDI MAXILLOMANDIBULAR GRN TI SELF NA [employer]CRO[employer]WD NA Right 13 Implanted   GRAFT DURA [id number] ULTRAPURE DURAGN+ 5PK/EA - SNA  GRAFT DURA [id number] ULTRAPURE DURAGN+ 5PK/EA NA [employer]WD [id number] Right 1 Implanted       Drains:   [REMOVED] Condom Catheter 06/03/22 (Removed)   Indications for Use Need for fluid volume management of the critically ill patient in a critical care setting 06/03/22 2201   Status Draining;Patent 06/03/22 2201   Site Condition No abnormalities 06/03/22 2201   Drainage Tube Clipped to Bed Yes 06/03/22 2201   Catheter Secured to Thigh Yes 06/03/22 2201   Tamper Seal Intact No 06/03/22 2201   Bag Below Bladder/Not on Floor Yes 06/03/22 2201   Lack of Dependent Loop in Tubing Yes 06/03/22 2201   Drainage Bag Less Than Half Full Yes 06/03/22 2201   Sterile Solution Used for  Irrigation N/A 06/03/22 0557   Urine Output (mL) 500 ml 06/04/22 0600   Removal Criteria No longer has urinary retention and urinary obstruction 06/03/22 0557       Findings: very vascular tumor, thrombosed vessels, areas of necrosis, no plane, good enhancement with fluorescein    Electronically Signed by Yinka Cifuentes MD on 6/6/2022 at 6:35 PM

## 2022-06-06 NOTE — PERIOP NOTES
Floseal 10 ml added to a sterile field. REF [id number]  LOT [id number]  EXP 01/05/2022    Surgifoam added to a sterile field. REF [id number]  LOT [id number]  EXP 01/05/2026    Surgicel added to a sterile field.   August Washington  92. [id number]  EXP 11/30/2026

## 2022-06-06 NOTE — ROUTINE PROCESS
Bedside and Verbal shift change report given to darling Oscar (oncoming nurse) by Zina Manley (offgoing nurse).  Report included the following information SBAR, Kardex, Intake/Output, Recent Results and Cardiac Rhythm SR.

## 2022-06-06 NOTE — ANESTHESIA PREPROCEDURE EVALUATION
Relevant Problems   No relevant active problems       Anesthetic History   No history of anesthetic complications            Review of Systems / Medical History  Patient summary reviewed, nursing notes reviewed and pertinent labs reviewed    Pulmonary  Within defined limits                 Neuro/Psych   Within defined limits           Cardiovascular  Within defined limits                     GI/Hepatic/Renal  Within defined limits              Endo/Other  Within defined limits           Other Findings   Comments: Brain tumor            Physical Exam    Airway  Mallampati: II  TM Distance: > 6 cm  Neck ROM: normal range of motion   Mouth opening: Normal     Cardiovascular  Regular rate and rhythm,  S1 and S2 normal,  no murmur, click, rub, or gallop             Dental  No notable dental hx       Pulmonary  Breath sounds clear to auscultation               Abdominal  GI exam deferred       Other Findings            Anesthetic Plan    ASA: 2  Anesthesia type: general    Monitoring Plan: Arterial line      Induction: Intravenous  Anesthetic plan and risks discussed with: Patient
PROVIDER:[TOKEN:[[id number]]],PROVIDER:[TOKEN:[[id number]]]

## 2022-06-06 NOTE — ANESTHESIA PROCEDURE NOTES
Arterial Line Placement    Performed by: Emil Delgado DO  Authorized by: Emil Delgado DO     Pre-Procedure  Indications:  Arterial pressure monitoring  Preanesthetic Checklist: patient identified, risks and benefits discussed, anesthesia consent, site marked, patient being monitored, timeout performed and patient being monitored      Procedure:   Prep:  Chlorhexidine  Seldinger Technique?: Yes    Orientation:  Right  Location:  Radial artery  Catheter size:  20 G  Number of attempts:  1  Cont Cardiac Output Sensor: No      Assessment:   Post-procedure:  Line secured and sterile dressing applied  Patient Tolerance:  Patient tolerated the procedure well with no immediate complications

## 2022-06-06 NOTE — CONSULTS
[address]    Name:  Meenakshi Gifford  MR#:  [id number]  :  1955  ACCOUNT #:  [de-identified]  DATE OF SERVICE:  2022    NEUROSURGERY CONSULTATION    REASON FOR CONSULTATION:  Intracranial mass. HISTORY OF PRESENT ILLNESS:  This is a 79-year-old , [address] [place] [address]. He had noticed some fatigue and generalized weakness towards the end of the school semester. He said that it is not unusual for him. 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 However, his wife started noticing increasing signs of confusion over the past few weeks. He had one day where it took him almost 6 hours to remove all the groceries from the car. She said another day, they were scheduled to go somewhere and he was not able to get himself ready, which was very unusual for him. She has not noticed any focal weakness, has not noticed any seizure activity. He has not had any headaches, nausea, or vomiting. She brought him to the hospital for further evaluation, where workup included a head CT that showed a large intracranial mass. PAST MEDICAL HISTORY:  Chronic back pain with spinal stenosis. PAST SURGICAL HISTORY:  Lumbar surgery x2, Dr. Luz Herbert at [facility]. CURRENT MEDICATIONS:  1. Decadron 4 mg IV q.6 h.  2.  Keppra 500 mg b.i.d.  3.  Protonix 40 mg q. day. ALLERGIES:  Evangelina Ran. SOCIAL HISTORY:  He is . He is a  at [address] [place] [address]. No tobacco use. No alcohol use. FAMILY HISTORY:  No family history of brain tumors or intracranial lesions. REVIEW OF SYSTEMS:  He denies any headache, vision changes, hearing difficulty, chest pain, shortness of breath, abdominal pain, urinary dysfunction, numbness, muscle spasm, or skin eruption. He does note some generalized fatigue recently, but he does not notice anything out of the ordinary. Of note, he may have some difficulty with memory at this point.     PHYSICAL EXAMINATION:  VITAL SIGNS:  Height 5 feet 6 inches, weight not taken. Blood pressure 128/79, pulse 106, and temperature 98.7. GENERAL:  A well-developed, obese male, who is examined seated on a hospital bed. NEUROLOGIC:  He is alert. He is oriented to name, to place, and to date, though he does have some difficulty giving me recent history and does seem to be ramble. Conjugate gaze, symmetric face. No pronator drift. Full strength in bilateral upper extremities. He has an arthropod stance when he walks, with full strength in his lower extremities. Sensory intact. IMAGING STUDIES:  He had a head CT and MRI that confirmed a large right frontal enhancing mass. MRI shows it to be 4.1 x 5.1 x 3.4 cm. There is significant amount of edema. There is mass effect with effacement of the frontal horn of the right lateral ventricle. There is midline shift of approximately 12 mm. Basal cisterns are open. No other lesions are noted. 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 I agree with plan to have a CT of the chest, abdomen, and pelvis. We are going to also have him started on steroids, gastrointestinal prophylaxis. He will be scheduled for surgery on Monday. Risks and benefits have been discussed in detail. He verbalized understanding and has agreed to proceed. Thank you for allowing me to participate in his care.       MD FE Bowser/[email or web address]  D:  06/06/2022 6:24  T:  06/06/2022 11:26  JOB #:  [id number]

## 2022-06-06 NOTE — PROGRESS NOTES
[facility] Adult  Hospitalist Group                                                                                          Hospitalist Progress Note  Joseph Rodriguez MD  Answering service: [phone] or [phone] from in house phone        Date of Service:  2022  NAME:  Garrett Sylvester  :  1955  MRN:  [id number]      Admission Summary:   Garrett Sylvester is a 61 y.o. male who presents with weakness and behavior changnes      Patient is a 29-year-old male with past medical history of seasonal allergies, spinal stenosis with chronic back pain,  who presents for evaluation of \"weakness. \"    He said that he has a spinal stenosis which affected his the left side, his left side is always weaker than right side, but lately he notes that he noticed this started in January.  It is progressively worsened, particularly over the past 2 weeks. [facility] has not seen his primary care doctor for this problem.  His wife is with him and describes that he moves extremely slowly. [facility] endorses difficulty with physically getting in and out of chairs. Claire Castro seems that he has lost sense of time.  For example, he started emptying their groceries from the car the other day at 4 PM and did not finish emptying the groceries until 10 PM.  His wife additionally had a primary care doctor's appointment set up for today, and gave him time to get ready.  When she went upstairs to have him come to the car, he was not dressed yet and was sitting on the bed singing to himself. [facility] denies headaches, visual changes.  His wife also endorses that his sleep cycle is off. [facility] is now getting up at 8 PM as if it were morning and stays up all night. [facility] has not had fevers at home. [facility] notes that in fall of last year, he noticed a weird flash of image or light out of the corner of his eye. [facility] went to the eye doctor, who ran tests and did not see anything concerning. [facility] was referred to a neurological ophthalmologist, who stated his work-up was normal.  He then saw a cardiologist, and had a normal work-up there.  Denies issues with hallucinations, delusions, anxiety, depression, suicidal ideation, homicidal ideation     Interval history / Subjective:   Patient seen and examined- reviewed vitals, labs, scans, and care plan  Surgery planned for 1pm today  Patient clinically improved since admitted and after starting steroids  Patient to go to the ICU postop- called intensivist Dr Gibson Sessions  At 11:20am and signed out this patient with planned postop transfer to the ICU from PACU later today or early this evening     Assessment & Plan:      1) Mobility and behavior changes-greatly improved after starting steroids  Likely due to large R frontal lobe brain mass with cerebral edema:   Brain MRI completed- cont IV steroids and Keppra  Surgery planned for today 6/6/22 at 1pm with Dr Amalia Renee   Chest and Abd/pel CT did not show any evidence of cancer        2. Chronic back pain- resume baclofen and NSAIDS as needed  3. Elevated BP - without dx of HTN- cont amlodipine- dose increased 6/5/22  Reviewed preop EKG  Patient reports cardiac workup last year at [facility]- Dr Alvin Hendrix- with ECHO that was normal     Code status: Full  Prophylaxis: SCDs  Care Plan discussed with: patient and family  Anticipated Disposition: TBD     Hospital Problems  Never Reviewed          Codes Class Noted POA    Brain mass ICD-10-CM: G93.89  ICD-9-CM: 348.89  6/2/2022 Unknown                Review of Systems:   A comprehensive review of systems was negative except for that written in the HPI. Vital Signs:    Last 24hrs VS reviewed since prior progress note.  Most recent are:  Visit Vitals  BP (!) 147/81   Pulse 72   Temp 98 °F (36.7 °C)   Resp 18   Ht 5' 6\" (1.676 m)   SpO2 96%     Patient Vitals for the past 24 hrs:   Temp Pulse Resp BP SpO2   06/06/22 1011 98 °F (36.7 °C) 72 18 (!) 147/81 --   06/06/22 1000 -- 76 -- -- --   06/06/22 0800 -- -- -- -- 96 %   06/06/22 0600 98.5 °F (36.9 °C) (!) 58 13 (!) 145/80 94 %   06/06/22 0400 -- 66 -- -- --   06/06/22 0200 98 °F (36.7 °C) 64 16 133/71 95 %   06/05/22 2200 98 °F (36.7 °C) 78 14 (!) 145/85 95 %   06/05/22 2000 -- 91 -- -- 95 %   06/05/22 1831 98.1 °F (36.7 °C) 75 14 (!) 146/86 --   06/05/22 1817 -- 82 -- -- --   06/05/22 1543 97.9 °F (36.6 °C) 87 16 (!) 149/90 93 %   06/05/22 1344 98.4 °F (36.9 °C) 94 16 (!) 156/82 95 %     No intake or output data in the 24 hours ending 06/06/22 1104     Physical Examination:     I had a face to face encounter with this patient and independently examined them on 6/6/2022 as outlined below:          Constitutional:  No acute distress, cooperative, pleasant    ENT:  Oral mucosa moist, EOMI, normocephalic. Resp:  CTA bilaterally. No wheezing/rhonchi/rales. No accessory muscle use. CV:  Regular rhythm, normal rate, no murmurs,     GI:  Soft, non distended, non tender. normoactive bowel sounds,     Musculoskeletal:  No edema, warm, 2+ pulses throughout    Neurologic:  Moves all extremities. No focal deficts  Psych: normal mood and affect            Data Review:    Review and/or order of clinical lab test  Review and/or order of tests in the radiology section of CPT  Review and/or order of tests in the medicine section of [facility]     CT ABD PELV W CONT, CT CHEST W CONT  INDICATION:  brain tumor, rule out metastatic disease. Thresa Abed FINDINGS:  CHEST:  Chest wall/thoracic inlet: Within normal limits. Thyroid: Within normal limits. Mediastinum/george: Within normal limits. Heart/vessels: Within normal limits. Lungs/Pleura: Within normal limits. .  ABDOMEN:  Liver: Diffuse, diminished attenuation throughout the liver suggesting hepatic  steatosis. There is a subcentimeter, low-attenuation lesion which is too small  to accurately characterize and statistically likely benign. .  Gallbladder/Biliary: Calculus in the dependent portion of the gallbladder neck. Spleen: Within normal limits.   Pancreas: Within normal limits. Adrenals: Within normal limits. Kidneys: Likely cysts in both kidneys which are incompletely characterized. Peritoneum/Mesenteries: Within normal limits. Extraperitoneum: Within normal limits. Gastrointestinal tract: Diverticulosis in the descending and sigmoid colon. Vascular: Within normal limits. [place] PELVIS:  Extraperitoneum: Within normal limits. Ureters: Within normal limits. Bladder: Within normal limits. Reproductive System: Prostatomegaly. Surgical clips versus brachytherapy seeds  in the prostate. .  MSK:   Small, fat-containing umbilical hernia. Posterior pedicle screw and apollo fixation  of T10-S1  . IMPRESSION  1. No evidence of primary malignancy in the chest, abdomen or pelvis. 2. Incidental findings as above    EXAM:  MRI BRAIN W WO CONT  INDICATION:  frontal lobe mass  FINDINGS:  Enhancing right anterior frontal mass noted as described above. There is central  nonenhancement/necrosis. Minimal blood products. Some associated central  restricted diffusion is nonspecific. The mass measures approximately 4.1 x 5.1 x  3.4 cm. There is surrounding vasogenic edema and mass effect with significant  effacement and displacement of the right ventricle, effacement of adjacent sulci  and right to left midline shift measuring approximately 12 mm at the level of  the septum pellucidum. There are no other foci of abnormal enhancement demonstrated in the brain. There  is slight asymmetric prominence of the left lateral ventricle and temporal horn  with some ependymal asymmetric T2 hyperintensity which may represent developing  hydrocephalus. Flow voids are present in vertebral basilar and carotid artery systems. There is  some vascularity in the tumor which does not appear particularly hypervascular. No abnormal extra-axial fluid collections. No other findings of acute  intracranial hemorrhage.  Structures of the skull base including orbits,  paranasal sinuses and temporal bones are unremarkable.     IMPRESSION  Approximately 5 cm right anterior frontal parenchymal mass without other  associated areas of abnormal enhancement in the brain suggests primary brain  neoplasm/GBM although metastasis cannot be entirely excluded. Significant mass  affect. .    Labs:     Recent Labs     06/05/22 0447   WBC 15.1*   HGB 15.1   HCT 49.0        Recent Labs     06/05/22 0447      K 3.9      CO2 27   BUN 23*   CREA 0.97   *   CA 8.4*   MG 2.1     No results for input(s): ALT, AP, TBIL, TBILI, TP, ALB, GLOB, GGT, AML, LPSE in the last 72 hours. No lab exists for component: SGOT, GPT, AMYP, HLPSE  Recent Labs     06/04/22 0416   INR 1.1   PTP 11.9*      No results for input(s): FE, TIBC, PSAT, FERR in the last 72 hours. No results found for: FOL, RBCF   No results for input(s): PH, PCO2, PO2 in the last 72 hours. No results for input(s): CPK, CKNDX, TROIQ in the last 72 hours.     No lab exists for component: CPKMB  No results found for: CHOL, CHOLX, CHLST, CHOLV, HDL, HDLP, LDL, LDLC, DLDLP, TGLX, TRIGL, TRIGP, CHHD, CHHDX  No results found for: [facility]  Lab Results   Component Value Date/Time    Color YELLOW/STRAW 06/02/2022 08:19 PM    Appearance CLEAR 06/02/2022 08:19 PM    Specific gravity 1.009 06/02/2022 08:19 PM    pH (UA) 7.5 06/02/2022 08:19 PM    Protein Negative 06/02/2022 08:19 PM    Glucose Negative 06/02/2022 08:19 PM    Ketone Negative 06/02/2022 08:19 PM    Bilirubin Negative 06/02/2022 08:19 PM    Urobilinogen 1.0 06/02/2022 08:19 PM    Nitrites Negative 06/02/2022 08:19 PM    Leukocyte Esterase Negative 06/02/2022 08:19 PM    Epithelial cells FEW 06/02/2022 08:19 PM    Bacteria Negative 06/02/2022 08:19 PM    WBC 0-4 06/02/2022 08:19 PM    RBC 0-5 06/02/2022 08:19 PM         Medications Reviewed:     Current Facility-Administered Medications   Medication Dose Route Frequency    amLODIPine (NORVASC) tablet 5 mg  5 mg Oral DAILY    sodium chloride (NS) flush 5-40 mL  5-40 mL IntraVENous Q8H    sodium chloride (NS) flush 5-40 mL  5-40 mL IntraVENous PRN    acetaminophen (TYLENOL) tablet 650 mg  650 mg Oral Q6H PRN    Or    acetaminophen (TYLENOL) suppository 650 mg  650 mg Rectal Q6H PRN    polyethylene glycol (MIRALAX) packet 17 g  17 g Oral DAILY PRN    ondansetron (ZOFRAN ODT) tablet 4 mg  4 mg Oral Q8H PRN    Or    ondansetron (ZOFRAN) injection 4 mg  4 mg IntraVENous Q6H PRN    oxyCODONE IR (ROXICODONE) tablet 10 mg  10 mg Oral Q4H PRN    oxyCODONE IR (ROXICODONE) tablet 5 mg  5 mg Oral Q4H PRN    levETIRAcetam (KEPPRA) injection 500 mg  500 mg IntraVENous Q12H    dexamethasone (DECADRON) 4 mg/mL injection 4 mg  4 mg IntraVENous Q6H    pantoprazole (PROTONIX) tablet 40 mg  40 mg Oral ACB     ______________________________________________________________________  EXPECTED LENGTH OF STAY: - - -  ACTUAL LENGTH OF STAY:          4                 Cadence Persaud MD

## 2022-06-07 ENCOUNTER — APPOINTMENT (OUTPATIENT)
Dept: MRI IMAGING | Age: 67
DRG: 025 | End: 2022-06-07
Attending: NEUROLOGICAL SURGERY
Payer: COMMERCIAL

## 2022-06-07 LAB
ANION GAP SERPL CALC-SCNC: 6 MMOL/L (ref 5–15)
BASOPHILS # BLD: 0 K/UL (ref 0–0.1)
BASOPHILS NFR BLD: 0 % (ref 0–1)
BUN SERPL-MCNC: 26 MG/DL (ref 6–20)
BUN/CREAT SERPL: 24 (ref 12–20)
CALCIUM SERPL-MCNC: 8.4 MG/DL (ref 8.5–10.1)
CHLORIDE SERPL-SCNC: 105 MMOL/L (ref 97–108)
CO2 SERPL-SCNC: 26 MMOL/L (ref 21–32)
CREAT SERPL-MCNC: 1.1 MG/DL (ref 0.7–1.3)
DIFFERENTIAL METHOD BLD: ABNORMAL
EOSINOPHIL # BLD: 0 K/UL (ref 0–0.4)
EOSINOPHIL NFR BLD: 0 % (ref 0–7)
ERYTHROCYTE [DISTWIDTH] IN BLOOD BY AUTOMATED COUNT: 11.9 % (ref 11.5–14.5)
GLUCOSE SERPL-MCNC: 139 MG/DL (ref 65–100)
HCT VFR BLD AUTO: 46.2 % (ref 36.6–50.3)
HGB BLD-MCNC: 15.2 G/DL (ref 12.1–17)
IMM GRANULOCYTES # BLD AUTO: 0.5 K/UL (ref 0–0.04)
IMM GRANULOCYTES NFR BLD AUTO: 2 % (ref 0–0.5)
LYMPHOCYTES # BLD: 0.9 K/UL (ref 0.8–3.5)
LYMPHOCYTES NFR BLD: 4 % (ref 12–49)
MAGNESIUM SERPL-MCNC: 1.9 MG/DL (ref 1.6–2.4)
MCH RBC QN AUTO: 30.8 PG (ref 26–34)
MCHC RBC AUTO-ENTMCNC: 32.9 G/DL (ref 30–36.5)
MCV RBC AUTO: 93.5 FL (ref 80–99)
MONOCYTES # BLD: 1.4 K/UL (ref 0–1)
MONOCYTES NFR BLD: 6 % (ref 5–13)
NEUTS SEG # BLD: 20.5 K/UL (ref 1.8–8)
NEUTS SEG NFR BLD: 88 % (ref 32–75)
NRBC # BLD: 0 K/UL (ref 0–0.01)
NRBC BLD-RTO: 0 PER 100 WBC
PHOSPHATE SERPL-MCNC: 4 MG/DL (ref 2.6–4.7)
PLATELET # BLD AUTO: 236 K/UL (ref 150–400)
PLATELET COMMENTS,PCOM: ABNORMAL
PMV BLD AUTO: 10.8 FL (ref 8.9–12.9)
POTASSIUM SERPL-SCNC: 4 MMOL/L (ref 3.5–5.1)
RBC # BLD AUTO: 4.94 M/UL (ref 4.1–5.7)
RBC MORPH BLD: ABNORMAL
RBC MORPH BLD: ABNORMAL
SODIUM SERPL-SCNC: 137 MMOL/L (ref 136–145)
WBC # BLD AUTO: 23.3 K/UL (ref 4.1–11.1)

## 2022-06-07 PROCEDURE — 70553 MRI BRAIN STEM W/O & W/DYE: CPT

## 2022-06-07 PROCEDURE — 74011250636 HC RX REV CODE- 250/636: Performed by: NEUROLOGICAL SURGERY

## 2022-06-07 PROCEDURE — 74011250636 HC RX REV CODE- 250/636: Performed by: NURSE PRACTITIONER

## 2022-06-07 PROCEDURE — 36415 COLL VENOUS BLD VENIPUNCTURE: CPT

## 2022-06-07 PROCEDURE — 74011000250 HC RX REV CODE- 250: Performed by: NURSE PRACTITIONER

## 2022-06-07 PROCEDURE — 77010033678 HC OXYGEN DAILY

## 2022-06-07 PROCEDURE — 74011000250 HC RX REV CODE- 250: Performed by: NEUROLOGICAL SURGERY

## 2022-06-07 PROCEDURE — 74011250636 HC RX REV CODE- 250/636

## 2022-06-07 PROCEDURE — 65610000006 HC RM INTENSIVE CARE

## 2022-06-07 PROCEDURE — 74011250637 HC RX REV CODE- 250/637: Performed by: NEUROLOGICAL SURGERY

## 2022-06-07 PROCEDURE — 83735 ASSAY OF MAGNESIUM: CPT

## 2022-06-07 PROCEDURE — 97112 NEUROMUSCULAR REEDUCATION: CPT | Performed by: OCCUPATIONAL THERAPIST

## 2022-06-07 PROCEDURE — 74011000250 HC RX REV CODE- 250

## 2022-06-07 PROCEDURE — 84100 ASSAY OF PHOSPHORUS: CPT

## 2022-06-07 PROCEDURE — 97535 SELF CARE MNGMENT TRAINING: CPT | Performed by: OCCUPATIONAL THERAPIST

## 2022-06-07 PROCEDURE — 80048 BASIC METABOLIC PNL TOTAL CA: CPT

## 2022-06-07 PROCEDURE — 85025 COMPLETE CBC W/AUTO DIFF WBC: CPT

## 2022-06-07 PROCEDURE — 97530 THERAPEUTIC ACTIVITIES: CPT

## 2022-06-07 PROCEDURE — 97165 OT EVAL LOW COMPLEX 30 MIN: CPT | Performed by: OCCUPATIONAL THERAPIST

## 2022-06-07 PROCEDURE — 74011250637 HC RX REV CODE- 250/637: Performed by: EMERGENCY MEDICINE

## 2022-06-07 PROCEDURE — 97161 PT EVAL LOW COMPLEX 20 MIN: CPT

## 2022-06-07 PROCEDURE — A9576 INJ PROHANCE MULTIPACK: HCPCS

## 2022-06-07 RX ORDER — DEXAMETHASONE SODIUM PHOSPHATE 4 MG/ML
4 INJECTION, SOLUTION INTRA-ARTICULAR; INTRALESIONAL; INTRAMUSCULAR; INTRAVENOUS; SOFT TISSUE EVERY 8 HOURS
Status: DISCONTINUED | OUTPATIENT
Start: 2022-06-07 | End: 2022-06-09

## 2022-06-07 RX ORDER — HYDRALAZINE HYDROCHLORIDE 20 MG/ML
20 INJECTION INTRAMUSCULAR; INTRAVENOUS ONCE
Status: COMPLETED | OUTPATIENT
Start: 2022-06-08 | End: 2022-06-08

## 2022-06-07 RX ORDER — LABETALOL HYDROCHLORIDE 5 MG/ML
10 INJECTION, SOLUTION INTRAVENOUS
Status: DISCONTINUED | OUTPATIENT
Start: 2022-06-07 | End: 2022-06-15 | Stop reason: HOSPADM

## 2022-06-07 RX ORDER — PANTOPRAZOLE SODIUM 40 MG/1
40 TABLET, DELAYED RELEASE ORAL
Status: DISCONTINUED | OUTPATIENT
Start: 2022-06-07 | End: 2022-06-15 | Stop reason: HOSPADM

## 2022-06-07 RX ORDER — BACITRACIN 500 UNIT/G
PACKET (EA) TOPICAL
Status: COMPLETED
Start: 2022-06-07 | End: 2022-06-07

## 2022-06-07 RX ORDER — BACITRACIN 500 [USP'U]/G
OINTMENT TOPICAL ONCE
Status: COMPLETED | OUTPATIENT
Start: 2022-06-07 | End: 2022-06-07

## 2022-06-07 RX ADMIN — DOCUSATE SODIUM 100 MG: 100 CAPSULE, LIQUID FILLED ORAL at 08:08

## 2022-06-07 RX ADMIN — DEXAMETHASONE SODIUM PHOSPHATE 4 MG: 4 INJECTION, SOLUTION INTRAMUSCULAR; INTRAVENOUS at 03:02

## 2022-06-07 RX ADMIN — DOCUSATE SODIUM 100 MG: 100 CAPSULE, LIQUID FILLED ORAL at 17:09

## 2022-06-07 RX ADMIN — Medication 10 ML: at 05:55

## 2022-06-07 RX ADMIN — SODIUM CHLORIDE, PRESERVATIVE FREE 10 ML: 5 INJECTION INTRAVENOUS at 21:43

## 2022-06-07 RX ADMIN — NICARDIPINE HYDROCHLORIDE 10 MG/HR: 25 INJECTION, SOLUTION INTRAVENOUS at 00:47

## 2022-06-07 RX ADMIN — MORPHINE SULFATE 2 MG: 2 INJECTION, SOLUTION INTRAMUSCULAR; INTRAVENOUS at 09:48

## 2022-06-07 RX ADMIN — NICARDIPINE HYDROCHLORIDE 10 MG/HR: 25 INJECTION, SOLUTION INTRAVENOUS at 21:41

## 2022-06-07 RX ADMIN — GADOTERIDOL 20 ML: 279.3 INJECTION, SOLUTION INTRAVENOUS at 12:43

## 2022-06-07 RX ADMIN — LABETALOL HYDROCHLORIDE 10 MG: 5 INJECTION INTRAVENOUS at 09:48

## 2022-06-07 RX ADMIN — DEXAMETHASONE SODIUM PHOSPHATE 4 MG: 4 INJECTION, SOLUTION INTRAMUSCULAR; INTRAVENOUS at 09:48

## 2022-06-07 RX ADMIN — BACITRACIN: 500 OINTMENT TOPICAL at 23:00

## 2022-06-07 RX ADMIN — PANTOPRAZOLE SODIUM 40 MG: 40 TABLET, DELAYED RELEASE ORAL at 17:09

## 2022-06-07 RX ADMIN — LABETALOL HYDROCHLORIDE 10 MG: 5 INJECTION INTRAVENOUS at 21:09

## 2022-06-07 RX ADMIN — OXYCODONE 10 MG: 5 TABLET ORAL at 08:07

## 2022-06-07 RX ADMIN — ACETAMINOPHEN 650 MG: 325 TABLET ORAL at 11:01

## 2022-06-07 RX ADMIN — NICARDIPINE HYDROCHLORIDE 10 MG/HR: 25 INJECTION, SOLUTION INTRAVENOUS at 03:32

## 2022-06-07 RX ADMIN — SODIUM CHLORIDE, PRESERVATIVE FREE 10 ML: 5 INJECTION INTRAVENOUS at 06:00

## 2022-06-07 RX ADMIN — MORPHINE SULFATE 2 MG: 2 INJECTION, SOLUTION INTRAMUSCULAR; INTRAVENOUS at 17:09

## 2022-06-07 RX ADMIN — OXYCODONE 10 MG: 5 TABLET ORAL at 14:27

## 2022-06-07 RX ADMIN — Medication 10 ML: at 14:28

## 2022-06-07 RX ADMIN — NICARDIPINE HYDROCHLORIDE 5 MG/HR: 25 INJECTION, SOLUTION INTRAVENOUS at 18:42

## 2022-06-07 RX ADMIN — NICARDIPINE HYDROCHLORIDE 12.5 MG/HR: 25 INJECTION, SOLUTION INTRAVENOUS at 05:58

## 2022-06-07 RX ADMIN — LABETALOL HYDROCHLORIDE 10 MG: 5 INJECTION INTRAVENOUS at 16:13

## 2022-06-07 RX ADMIN — Medication: at 22:15

## 2022-06-07 RX ADMIN — Medication 10 ML: at 21:43

## 2022-06-07 RX ADMIN — NICARDIPINE HYDROCHLORIDE 10 MG/HR: 25 INJECTION, SOLUTION INTRAVENOUS at 08:22

## 2022-06-07 RX ADMIN — SODIUM CHLORIDE, PRESERVATIVE FREE 10 ML: 5 INJECTION INTRAVENOUS at 14:28

## 2022-06-07 RX ADMIN — VANCOMYCIN HYDROCHLORIDE 1000 MG: 1 INJECTION, POWDER, LYOPHILIZED, FOR SOLUTION INTRAVENOUS at 11:01

## 2022-06-07 RX ADMIN — AMLODIPINE BESYLATE 5 MG: 5 TABLET ORAL at 08:07

## 2022-06-07 RX ADMIN — LEVETIRACETAM 500 MG: 100 INJECTION, SOLUTION, CONCENTRATE INTRAVENOUS at 14:27

## 2022-06-07 RX ADMIN — PANTOPRAZOLE SODIUM 40 MG: 40 TABLET, DELAYED RELEASE ORAL at 07:04

## 2022-06-07 RX ADMIN — LEVETIRACETAM 500 MG: 100 INJECTION, SOLUTION, CONCENTRATE INTRAVENOUS at 02:55

## 2022-06-07 RX ADMIN — DEXAMETHASONE SODIUM PHOSPHATE 4 MG: 4 INJECTION, SOLUTION INTRAMUSCULAR; INTRAVENOUS at 21:07

## 2022-06-07 RX ADMIN — MORPHINE SULFATE 2 MG: 2 INJECTION, SOLUTION INTRAMUSCULAR; INTRAVENOUS at 00:13

## 2022-06-07 NOTE — PROGRESS NOTES
REASON FOR ICU ADMISSION:  Right frontal lobe brain mass with cerebral edema s/p craniotomy for resection    Interval Events    6/7-still requiring Cardene    Current meds reviewed    Patient Vitals for the past 24 hrs:   Temp Pulse Resp BP SpO2   06/07/22 1149 -- (!) 104 -- (!) 149/85 100 %   06/07/22 1147 -- (!) 110 -- (!) 118/107 --   06/07/22 1140 -- 96 -- (!) 156/79 --   06/07/22 1131 -- 94 -- (!) 153/74 --   06/07/22 0900 -- 100 14 (!) 157/89 100 %   06/07/22 0800 98.5 °F (36.9 °C) (!) 104 16 (!) 163/82 93 %   06/07/22 0700 -- (!) 103 15 (!) 155/82 92 %   06/07/22 0600 -- 99 16 (!) 165/154 93 %   06/07/22 0500 -- 99 16 (!) 160/79 93 %   06/07/22 0400 98.8 °F (37.1 °C) 93 15 133/80 93 %   06/07/22 0300 -- 96 19 124/77 93 %   06/07/22 0200 -- 90 14 (!) 149/83 93 %   06/07/22 0100 -- 88 15 124/84 94 %   06/07/22 0000 98.8 °F (37.1 °C) 87 16 136/86 96 %   06/06/22 2300 -- 87 16 -- 97 %   06/06/22 2200 -- 80 14 -- 97 %   06/06/22 2100 -- 82 13 123/65 97 %   06/06/22 2045 97.9 °F (36.6 °C) 80 14 120/67 98 %   06/06/22 2039 -- -- -- -- 97 %   06/06/22 2000 98.1 °F (36.7 °C) -- -- 124/78 --   06/06/22 1945 98.1 °F (36.7 °C) 80 11 130/78 97 %   06/06/22 1930 -- 82 14 135/80 97 %   06/06/22 1915 -- 76 14 118/79 100 %   06/06/22 1910 -- 87 13 124/77 95 %   06/06/22 1905 -- 88 14 131/82 97 %   06/06/22 1902 98.1 °F (36.7 °C) 82 12 (!) 127/54 96 %   06/06/22 1900 -- 75 13 123/78 96 %   06/06/22 1855 -- 83 14 128/78 94 %     Physical exam  General-NAD  Neuro-surgical site C/D, left-sided weakness-upper worse than lower  CV-tachycardic, regular  Pulmonary-clear lungs-clear  Abdomen-soft, nontender, nondistended  Extremities-warm    Recent Results (from the past 24 hour(s))   CBC WITH AUTOMATED DIFF    Collection Time: 06/07/22  3:01 AM   Result Value Ref Range    WBC 23.3 (H) 4.1 - 11.1 K/uL    RBC 4.94 4.10 - 5.70 M/uL    HGB 15.2 12.1 - 17.0 g/dL    HCT 46.2 36.6 - 50.3 %    MCV 93.5 80.0 - 99.0 FL    MCH 30.8 26.0 - 34.0 PG    MCHC 32.9 30.0 - 36.5 g/dL    RDW 11.9 11.5 - 14.5 %    PLATELET 000 165 - 823 K/uL    MPV 10.8 8.9 - 12.9 FL    NRBC 0.0 0  WBC    ABSOLUTE NRBC 0.00 0.00 - 0.01 K/uL    NEUTROPHILS 88 (H) 32 - 75 %    LYMPHOCYTES 4 (L) 12 - 49 %    MONOCYTES 6 5 - 13 %    EOSINOPHILS 0 0 - 7 %    BASOPHILS 0 0 - 1 %    IMMATURE GRANULOCYTES 2 (H) 0.0 - 0.5 %    ABS. NEUTROPHILS 20.5 (H) 1.8 - 8.0 K/UL    ABS. LYMPHOCYTES 0.9 0.8 - 3.5 K/UL    ABS. MONOCYTES 1.4 (H) 0.0 - 1.0 K/UL    ABS. EOSINOPHILS 0.0 0.0 - 0.4 K/UL    ABS. BASOPHILS 0.0 0.0 - 0.1 K/UL    ABS. IMM.  GRANS. 0.5 (H) 0.00 - 0.04 K/UL    DF SMEAR SCANNED      PLATELET COMMENTS CLUMPED PLATELETS      RBC COMMENTS NORMOCYTIC, NORMOCHROMIC      RBC COMMENTS ATYPICAL LYMPHOCYTES PRESENT     METABOLIC PANEL, BASIC    Collection Time: 06/07/22  3:01 AM   Result Value Ref Range    Sodium 137 136 - 145 mmol/L    Potassium 4.0 3.5 - 5.1 mmol/L    Chloride 105 97 - 108 mmol/L    CO2 26 21 - 32 mmol/L    Anion gap 6 5 - 15 mmol/L    Glucose 139 (H) 65 - 100 mg/dL    BUN 26 (H) 6 - 20 MG/DL    Creatinine 1.10 0.70 - 1.30 MG/DL    BUN/Creatinine ratio 24 (H) 12 - 20      GFR est AA >60 >60 ml/min/1.73m2    GFR est non-AA >60 >60 ml/min/1.73m2    Calcium 8.4 (L) 8.5 - 10.1 MG/DL   MAGNESIUM    Collection Time: 06/07/22  3:01 AM   Result Value Ref Range    Magnesium 1.9 1.6 - 2.4 mg/dL   PHOSPHORUS    Collection Time: 06/07/22  3:01 AM   Result Value Ref Range    Phosphorus 4.0 2.6 - 4.7 MG/DL     Imaging reviewed    Assessment  Right frontal lobe brain mass  S/P craniotomy for resection    Plan    Neuro-serial neuro checks, follow-up neurosurgery recommendations, PT/OT as tolerated, on dexamethasone, Keppra, delirium prevention strategies    Cardiac-BP goals per surgical service, requiring Cardene, started on Norvasc    Pulmonary-encourage incentive spirometry, on NRB for pneumocephalus    GI-diet per surgical service, on Protonix therapy    Renal-monitor urine output, correct electrolyte derangements as needed    Hematology/oncology-SCDs for DVT prophylaxis, follow-up tissue diagnosis    ID-on perioperative vancomycin    Endocrinology-keep glucose less than 180    Critical care time-40 minutes    Signed By: Jose Pimentel MD     June 7, 2022

## 2022-06-07 NOTE — PROGRESS NOTES
Orders received, chart reviewed and patient evaluated by physical therapy. Pending progression with skilled acute physical therapy, recommend:  Therapy 3 hours per day 5-7 days per week      Full evaluation to follow.      Maeve Singh, PT, DPT

## 2022-06-07 NOTE — OP NOTES
[address]   OPERATIVE REPORT    Name:  Liliane Gaucher  MR#:  [id number]  :  1955  ACCOUNT #:  [de-identified]  DATE OF SERVICE:  2022    PREOPERATIVE DIAGNOSIS:  Right frontal tumor. POSTOPERATIVE DIAGNOSIS:  Right frontal tumor. PROCEDURES PERFORMED:  Right frontal craniotomy, resection of tumor using fluorescein, operating microscope and BrainLAB navigation. SURGEON:  Hugh Rosenthal MD    ASSISTANT:  1. Shanita Bergeron. 2.  Feroz Alcaraz. ANESTHESIA:  General.    COMPLICATIONS:  None. SPECIMENS REMOVED:  Sent for permanent and frozen section. Preliminary diagnosis, necrotic glial tumor. IMPLANTS:  Please see operative record. ESTIMATED BLOOD LOSS:  250 mL. DRAINS:  None. FINDINGS:  Very vascular tumor, thrombosed vessels, areas of necrosis, no plane between tumor and brain, good enhancement with fluorescein. INDICATIONS FOR SURGERY:  This is a 68-year-old gentleman who is a , [address] [place] [address]. He is having increasing difficulty with confusion over the past few weeks. His wife brought him to the emergency room;  workup included a head CT that revealed a heterogenous right frontal mass. He was admitted. MRI of the brain showed heterogeneously enhancing large mass with significant edema. He was on IV steroids for approximately 72 hours, then taken to the operating room. Risks and benefits of surgical resection were discussed. He did have a CT scan of the chest, abdomen and pelvis that did not show any other lesions. This appears to be a solitary lesion. PROCEDURE:  The patient was brought to the operating room. He was placed under general endotracheal anesthesia. He has a Keflex allergy, therefore he was given clindamycin 900 mg within 1 hour prior to incision. He also received mannitol 1 g/kg, Keppra 500 mg, and Decadron 10 mg. He had SCDs on and functioning during the entire case.   After he was placed under general endotracheal anesthesia, I placed his head in Mcnally headholder which was secured to the bed. We then attached the [facility] reference array, and acquired registration. We had excellent registration and used this for navigation. A planned an incision just posterior to his hairline on the right side that went from below the temporal line to midline. I washed his hair for a total of 8 minutes with 3 separate chlorhexidine scrubs and parted his hair, stapled back his hair. Then, he was sterilely draped in a standard fashion. I infiltrated the proposed incision with Xylocaine with epinephrine. I used a 10 blade to incise the skin. Hemostasis was obtained with Bovie and bipolar cautery. I also used Antonina clips. Cerebellar retractors were placed. I used Mobicious to create the frontal craniotomy. I used a matchstick drill to make 3 janet holes and then connected them with the craniotome in turn to cranial flap. I placed multiple epidural tack up sutures. I opened the dura in a cruciate fashion directly over the lesion. I bipolared the cortex and continued with dissection until I came upon abnormal tissue. I sent multiple specimens initially that did show necrosis and abnormal tissue. I sent additional samples later that confirmed abnormal glial tissue, this did not appear to metastastatic, and did not appear to be lymphoma. I brought in the operating microscope. I used the fluorescein filter to identify abnormal tissue as there was no significant plane between brain and abnormal tissue. There were some areas of tissue that were purplish, some areas that had thrombosed vessels, some areas that were more firm than normal brain, more vascular than normal brain. When I finished, I did not see any fluorescein enhancement under the microscope and BrainLAB showed that we had resected everything within the tumor bed. I irrigated. I used cotton balls soaked in thrombin for hemostasis.   I used bipolar for hemostasis. I lined the surgical cavity with Surgicel and reapproximated the dura with 4-0 interrupted Nurolon. I placed DuraGen. I replaced the bone flap, and secured it with cranial plate. The wound was copiously irrigated and then the galea was reapproximated with 2-0 interrupted Vicryl and the skin with a running Rapide. He tolerated the surgery well and was taken to the postoperative care unit.       MD FE Bishop/[email or web address]/[email or web address]  D:  06/06/2022 18:53  T:  06/07/2022 4:28  JOB #:  [id number]

## 2022-06-07 NOTE — PROGRESS NOTES
Problem: Mobility Impaired (Adult and Pediatric)  Goal: *Acute Goals and Plan of Care (Insert Text)  Description:   FUNCTIONAL STATUS PRIOR TO ADMISSION: Patient was independent and active without use of DME. Works full time as a  at [address] [place] [address]. HOME SUPPORT PRIOR TO ADMISSION: The patient lived with his spouse but did not require assist.    Physical Therapy Goals  Initiated 6/7/2022  1. Patient will move from supine to sit and sit to supine  and scoot up and down in bed with supervision/set-up within 7 day(s). 2.  Patient will transfer from bed to chair and chair to bed with supervision/set-up using the least restrictive device within 7 day(s). 3.  Patient will perform sit to stand with supervision/set-up within 7 day(s). 4.  Patient will ambulate with supervision/set-up for 150 feet with the least restrictive device within 7 day(s). 5.  Patient will ascend/descend 14 stairs with  handrail(s) with supervision/set-up within 7 day(s). 6.  Patient will improve Shipley Balance score by 7 points within 7 days. Outcome: Progressing Towards Goal     PHYSICAL THERAPY EVALUATION- NEURO POPULATION  Patient: Joselin Petersen (66 y.o. male)  Date: 6/7/2022  Primary Diagnosis: Brain mass [G93.89]  Procedure(s) (LRB):  RIGHT  FRONTAL CRANIOTOMY WITH FLOUROCINE (Right) 1 Day Post-Op   Precautions:   Fall,Skin,Seizure      ASSESSMENT  Based on the objective data described below, the patient presents with impaired functional mobility as compared to baseline level 2* L sided weakness (UE>LE), L inattention, overall poor visual attention, impaired L/R discrimination, decreased command following, increased processing time, impaired balance, and impaired gait following admission for c/o weakness and confusion. Workup revealed a large R frontal mass, now POD 1 R frontal crani with resection. At baseline, he lives with his spouse and is indep and active, working full time as a .     Today, he required multi modal cues, repetition, and significantly increased time for most simple commands. Responded better to spouse giving same instruction. BP elevated initially and RN present for medication titration. He was able to mobilize to EOB with up to min A and cues for sequencing. Demos fair sitting balance once up. Progressed to completing sit<>stand and taking several small shuffled steps along EOB with near constant min A x2 to correct from anterior lean/LOB and facilitate L step advancement. Fatigued quickly and impulsively returned to sitting. Assisted back to bed at end of session (prepping for MRI), NAD. At this time, he is far below his baseline cognitive and physical status - recommending discharge to [facility] to maximize indep and safety. Current Level of Function Impacting Discharge (mobility/balance): min A; L inattention, L UE > LE weakness, impaired cognition     Functional Outcome Measure: The patient scored Total: 5/56 on the [facility] Assessment which is indicative of high fall risk. Other factors to consider for discharge: indep at baseline     Patient will benefit from skilled therapy intervention to address the above noted impairments. PLAN :  Recommendations and Planned Interventions: bed mobility training, transfer training, gait training, therapeutic exercises, neuromuscular re-education, patient and family training/education and therapeutic activities      Frequency/Duration: Patient will be followed by physical therapy:  5 times a week to address goals. Recommendation for discharge: (in order for the patient to meet his/her long term goals)  Therapy 3 hours per day 5-7 days per week pending progress. .     This discharge recommendation:  Has been made in collaboration with the attending provider and/or case management    IF patient discharges home will need the following DME: to be determined (TBD)         SUBJECTIVE:   Patient stated Ok I can do that.  (followed by prolonged pause and no active movement)    OBJECTIVE DATA SUMMARY:   HISTORY:    Past Medical History:   Diagnosis Date    Arthritis     GERD (gastroesophageal reflux disease)     Morbid obesity ([facility] [address]      Past Surgical History:   Procedure Laterality Date    HX BACK SURGERY      2 laminectomies and fusion    HX ORTHOPAEDIC Right     orif hand    HX SHOULDER ARTHROSCOPY      HX TONSILLECTOMY      as a child       Personal factors and/or comorbidities impacting plan of care: PMHx    Home Situation  Home Environment: Private residence  # Steps to Enter: 5  Rails to Enter: Yes  Hand Rails : Bilateral  One/Two Story Residence: Two story  # of Interior Steps: 15  Interior Rails: Right  Living Alone: No  Support Systems: Spouse/Significant Other,Other Family Member(s),Friend/Neighbor (wife, adult son)  Patient Expects to be Discharged to[de-identified] Unable to determine at this time  Current DME Used/Available at Home: Cane, straight,Grab bars  Tub or Shower Type: Shower (built in seat)    EXAMINATION/PRESENTATION/DECISION MAKING:   Critical Behavior:  Neurologic State: Alert  Orientation Level: Oriented X4 (slow processing and speech)  Cognition: Decreased attention/concentration,Decreased command following,Poor safety awareness  Safety/Judgement: Awareness of environment,Decreased awareness of need for assistance,Decreased awareness of need for safety,Decreased insight into deficits,Fall prevention  Hearing:   Auditory  Auditory Impairment: None  Skin:    Edema:   Range Of Motion:  AROM: Generally decreased, functional  PROM: Generally decreased, functional        Strength:    Strength:  (imp cog- RUE/LE WFL,LUE 3-/5 shldrand3/5 all other,LLE3-5/5)     Tone & Sensation:   Tone: Normal   Sensation: Impaired (imp command following LUE imp > LE)         Coordination:  Coordination:  (imp cognition and command follow)  Vision:   Tracking: Unable to test secondary due to decreased visual attention  Diplopia: No  Acuity: Within Defined Limits;Able to read clock/calendar on wall without difficulty; Able to read employee name badge without difficulty  Corrective Lenses: Reading glasses  Functional Mobility:  Bed Mobility:     Supine to Sit: Minimum assistance  Sit to Supine: Stand-by assistance     Transfers:  Sit to Stand: Contact guard assistance  Stand to Sit: Minimum assistance  Bed to Chair: Moderate assistance; Additional time;Assist x1     Balance:   Sitting: Impaired; Without support  Sitting - Static: Good (unsupported)  Sitting - Dynamic: Fair (occasional)  Standing: Impaired; With support  Standing - Static: Fair  Standing - Dynamic : Fair;Constant support  Ambulation/Gait Training:  Distance (ft): 3 Feet (ft) (side steps)  Assistive Device: Gait belt (B HHA)  Ambulation - Level of Assistance: Assist x2;Minimal assistance  Gait Description (WDL): Exceptions to WDL  Gait Abnormalities: Altered arm swing;Decreased step clearance;Shuffling gait;Trunk sway increased (incr ant/post sway, cues for L LE advancement)  Base of Support: Widened;Center of gravity altered  Speed/Susi: Shuffled; Slow  Step Length: Right shortened;Left shortened    Therapeutic Exercises:       Functional Measure  Shipley Balance Test:    Sitting to Standin  Standing Unsupported: 0  Sitting with Back Unsupported: 3  Standing to Sittin  Transfers: 1  Standing Unsupported with Eyes Closed: 0  Standing Unsupported with Feet Together: 0  Reach Forward with Outstretched Arm: 0   Object: 0  Turn to Look Over Shoulders: 0  Turn 360 Degrees: 0  Alternate Foot on Step/Stool: 0  Standing Unsupported One Foot in Front: 0  Stand on One Le  Total:          56=Maximum possible score;   0-20=High fall risk  21-40=Moderate fall risk   41-56=Low fall risk        Physical Therapy Evaluation Charge Determination   History Examination Presentation Decision-Making   MEDIUM  Complexity : 1-2 comorbidities / personal factors will impact the outcome/ POC  HIGH Complexity : 4+ Standardized tests and measures addressing body structure, function, activity limitation and / or participation in recreation  LOW Complexity : Stable, uncomplicated  HIGH Complexity : FOTO score of 1- 25       Based on the above components, the patient evaluation is determined to be of the following complexity level: LOW       Activity Tolerance:   Good, Fair, requires frequent rest breaks and observed SOB with activity      After treatment patient left in no apparent distress:   Supine in bed, Heels elevated for pressure relief, Call bell within reach, Bed / chair alarm activated, Caregiver / family present and Side rails x 3    COMMUNICATION/EDUCATION:   The patients plan of care was discussed with: Occupational therapist, Registered nurse and Physician. , CM    Patient was educated regarding his deficit(s) of L inattention/weakness as this relates to his diagnosis s/p R crani. He demonstrated Fair understanding as evidenced by nodding. Fall prevention education was provided and the patient/caregiver indicated understanding., Patient/family have participated as able in goal setting and plan of care. and Patient/family agree to work toward stated goals and plan of care.     Thank you for this referral.  Prince Murray, PT, DPT   Time Calculation: 40 mins

## 2022-06-07 NOTE — PROGRESS NOTES
Neurosurgery Progress Note  Fede Briggs, ACNP-BC          Admit Date: 2022   LOS: 5 days        Daily Progress Note: 2022    POD:1 Day Post-Op    S/P: Procedure(s):  RIGHT  FRONTAL CRANIOTOMY WITH FLOUROSCEIN    Subjective: The patient went to the OR yesterday with Dr. Rosita Reyes for a right frontal craniotomy with tumor resection. This morning he presents in the ICU with his wife at bedside. He is complaining of a headache. Otherwise, doing ok. Denies numbness, tingling, chest pain, leg pain, nausea, vomiting, difficulty swallowing, and dyspnea. Objective:     Vital signs  Temp (24hrs), Av.3 °F (36.8 °C), Min:97.9 °F (36.6 °C), Max:98.8 °F (37.1 °C)   701 -  190  In: 200 [I.V.:200]  Out: -    190 -  0700  In: 5074.6 [P.O.:180; I.V.:4894.6]  Out: 2960 [Urine:2710]    Visit Vitals  BP (!) 157/89   Pulse 100   Temp 98.5 °F (36.9 °C)   Resp 14   Ht 5' 6\" (1.676 m)   Wt 112.9 kg (248 lb 14.4 oz)   SpO2 100%   BMI 40.17 kg/m²    O2 Flow Rate (L/min): 2 l/min O2 Device: None (Room air)     Pain control  Pain Assessment  Pain Scale 1: Numeric (0 - 10)  Pain Intensity 1: 10  Pain Onset 1: Postop  Pain Location 1: Head  Pain Orientation 1: Anterior  Pain Description 1: Aching  Pain Intervention(s) 1: Medication (see MAR)    PT/OT  Gait                 Physical Exam:  Gen:NAD. Neuro: A&Ox2. Initially thought he was at home then corrected to the hospital. Follows commands. Speech clear. Affect flat. PERRL. EOMI. Face symmetric. Tongue midline. FUNG spontaneously. Negative drift. Gait deferred. Skin: Right frontal incision C/D/I with sutures in place    CT head without contrast on 22 at 2126 shows postoperative changes and pneumocephalus following tumor resection. No unexpected postoperative complication is identified.     24 hour results:    Recent Results (from the past 24 hour(s))   CBC WITH AUTOMATED DIFF    Collection Time: 22  3:01 AM   Result Value Ref Range    WBC 23.3 (H) 4.1 - 11.1 K/uL    RBC 4.94 4.10 - 5.70 M/uL    HGB 15.2 12.1 - 17.0 g/dL    HCT 46.2 36.6 - 50.3 %    MCV 93.5 80.0 - 99.0 FL    MCH 30.8 26.0 - 34.0 PG    MCHC 32.9 30.0 - 36.5 g/dL    RDW 11.9 11.5 - 14.5 %    PLATELET 863 888 - 932 K/uL    MPV 10.8 8.9 - 12.9 FL    NRBC 0.0 0  WBC    ABSOLUTE NRBC 0.00 0.00 - 0.01 K/uL    NEUTROPHILS 88 (H) 32 - 75 %    LYMPHOCYTES 4 (L) 12 - 49 %    MONOCYTES 6 5 - 13 %    EOSINOPHILS 0 0 - 7 %    BASOPHILS 0 0 - 1 %    IMMATURE GRANULOCYTES 2 (H) 0.0 - 0.5 %    ABS. NEUTROPHILS 20.5 (H) 1.8 - 8.0 K/UL    ABS. LYMPHOCYTES 0.9 0.8 - 3.5 K/UL    ABS. MONOCYTES 1.4 (H) 0.0 - 1.0 K/UL    ABS. EOSINOPHILS 0.0 0.0 - 0.4 K/UL    ABS. BASOPHILS 0.0 0.0 - 0.1 K/UL    ABS. IMM. GRANS. 0.5 (H) 0.00 - 0.04 K/UL    DF SMEAR SCANNED      PLATELET COMMENTS CLUMPED PLATELETS      RBC COMMENTS NORMOCYTIC, NORMOCHROMIC      RBC COMMENTS ATYPICAL LYMPHOCYTES PRESENT     METABOLIC PANEL, BASIC    Collection Time: 06/07/22  3:01 AM   Result Value Ref Range    Sodium 137 136 - 145 mmol/L    Potassium 4.0 3.5 - 5.1 mmol/L    Chloride 105 97 - 108 mmol/L    CO2 26 21 - 32 mmol/L    Anion gap 6 5 - 15 mmol/L    Glucose 139 (H) 65 - 100 mg/dL    BUN 26 (H) 6 - 20 MG/DL    Creatinine 1.10 0.70 - 1.30 MG/DL    BUN/Creatinine ratio 24 (H) 12 - 20      GFR est AA >60 >60 ml/min/1.73m2    GFR est non-AA >60 >60 ml/min/1.73m2    Calcium 8.4 (L) 8.5 - 10.1 MG/DL   MAGNESIUM    Collection Time: 06/07/22  3:01 AM   Result Value Ref Range    Magnesium 1.9 1.6 - 2.4 mg/dL   PHOSPHORUS    Collection Time: 06/07/22  3:01 AM   Result Value Ref Range    Phosphorus 4.0 2.6 - 4.7 MG/DL          Assessment:     Active Problems:    Brain mass (6/2/2022)        Plan:   1. Right frontal brain mass   - s/p crani 06/06   - cont decadron taper   - cont keppra   - PT/OT evals   - normalize and mobilize   [facility] for psychosocial support  2.  Cerebral edema with brain compression   - due to #1   - plans as above  3. Pneumocephalus   - due to surgery   - NRB x 24 hours as patient tolerates  4. HTN   - SBP<140   - Cardene PRN   - Hydralazine/labetalol PRN   - Cont Norvasc   - Intensivist following  5. Leukocytosis   - WBC 23.3   - Afebrile   - Likely due to stress of surgery and steroids   - cont to monitor    Activity: up with assist  DVT ppx: SCDs  Dispo: tbd    Plan d/w Dr. Justin Noyola, intensivist, nurse, wife at bedside.       Alicia Doyle, JADON

## 2022-06-07 NOTE — PROGRESS NOTES
Transition of Care Plan   RUR- Low     DISPOSITION: Inpatient Rehab   F/U with PCP/Specialist     Transport: [facility]  Care manager met with patient and his wife Megan Sears [phone] to discuss transitions of care. Therapy is recommending inpatient rehab. CM provided wife the list of facilities and she has chosen [facility]. Will need insurance auth. Refferal made through [facility]. [place] of choice letter signed by wife and placed on bedside chart.    Alexei Benito RN,Care Management

## 2022-06-07 NOTE — PROGRESS NOTES
CRITICAL CARE PROGRESS NOTE        Name: Amarjit Ocampo   : 1955   MRN: [id number]   Date: 2022      REASON FOR ICU ADMISSION:  Right frontal lobe brain mass with cerebral edema s/p craniotomy for resection    IMPRESSION/PROBLEM LIST   Right frontal lobe brain mass  S/P craniotomy for resection    ASSESSMENT & PLAN   61year old male past medical history of chronic back pain/spinal stenosis admitted () for a right frontal lobe brain mass after presenting with increasing 2-3 weeks of confusion. Transferred to the ICU  s/p craniotomy for tumor resection.     25 HOUR EVENTS   · Transferred to the ICU s/p craniotomy for tumor resection  · Neurological checks, SBP goal less then 140 mm Hg,   · Head MRI in AM    NEUROLOGICAL: Hx of chronic back pain on baclofen   Right frontal lobe brain mass s/p craniotomy for tumor resection  · Neurological checks  · SBP less than 140 mmHg  · Brain MRI in AM  · Continue scheduled decadron and Keppra for seizure prophylaxis    PULMONOLOGY:   · Supplemental oxygen for goal > 92 %  · Aspiration precautions, HOB elevated > 30 %    CARDIOVASCULAR:   No dx hx of hypertention  · Sinus Rhythm on telemetry  · Continue amlodipine  · Nicardipine for strict SBP < 140 mm HG    GASTROINTESTINAL:   · Clear liquid diet as tolerated  · Protonix for GI prophylaxis    RENAL/ELECTROLYTE/FLUIDS:   · BUN/creatinine-stable  · Strict I and O's    ENDOCRINE:   · No known history of diabetes or thyroid disease  · Avoid hypo-/hyperglycemia    HEMATOLOGY/ONCOLOGY:   · H/H Stable  · SCDs for DVT prophylaxis    ID/MICRO:   · Afebrile, elevated WBCs, likely steroid-induced    ANTIBIOTICS TO DATE:  · Vancomycin (-)     CULTURES TO DATE:  · None      ICU DAILY CHECKLIST     Code Status:FULL  DVT Prophylaxis:SCDs  T/L/D: Thacker, PIV, A- line  SUP: Protonix  Diet: Clear  Activity Level:Bedrest postop, re-evauate in AM  ABCDEF Bundle/Checklist Completed:Yes  Disposition: Stay in ICU  Multidisciplinary Rounds Completed:  Pending  Patient/Family Updated: Yes    [place]   Review of Systems   Constitutional: Positive for malaise/fatigue. HENT: Negative. Eyes: Negative. Respiratory: Negative. Cardiovascular: Negative. Gastrointestinal: Negative. Genitourinary: Negative. Musculoskeletal: Negative. Skin: Negative. Neurological: Positive for headaches. Endo/Heme/Allergies: Negative. Psychiatric/Behavioral: Negative. OBJECTIVE     Labs and Data: Reviewed 06/06/22  Medications: Reviewed 06/06/22    Physical Exam  Vitals reviewed. Constitutional:       General: He is not in acute distress. Appearance: He is obese. HENT:      Head: Normocephalic. Comments: R frontal craniotomy sutures C/D/I     Nose: Nose normal.      Mouth/Throat:      Mouth: Mucous membranes are moist.      Pharynx: Oropharynx is clear. Eyes:      General: No scleral icterus. Pupils: Pupils are equal, round, and reactive to light. Cardiovascular:      Rate and Rhythm: Normal rate and regular rhythm. Pulses: Normal pulses. Heart sounds: Normal heart sounds. Pulmonary:      Effort: Pulmonary effort is normal.      Breath sounds: Normal breath sounds. Abdominal:      General: Abdomen is flat. Bowel sounds are normal.      Palpations: Abdomen is soft. Musculoskeletal:         General: Normal range of motion. Cervical back: Normal range of motion. Right lower leg: No edema. Left lower leg: No edema. Skin:     General: Skin is warm and dry. Neurological:      General: No focal deficit present. Mental Status: He is alert and oriented to person, place, and time. Mental status is at baseline.       Comments: Awake, oriented x 4, strength 5/5 throughout, sensation intact, no pronator drift, visual fields intact, pupils 3.5 mm->3.0 mm bilateral   Psychiatric:      Comments: Flat affect          Visit Vitals  /65   Pulse 87   Temp 97.9 °F (36.6 °C)   Resp 16   Ht 5' 6\" (1.676 m)   Wt 111.6 kg (246 lb 0.5 oz)   SpO2 97%   BMI 39.71 kg/m²    O2 Flow Rate (L/min): 2 l/min O2 Device: Nasal cannula Temp (24hrs), Av.1 °F (36.7 °C), Min:97.9 °F (36.6 °C), Max:98.5 °F (36.9 °C)           Intake/Output:     Intake/Output Summary (Last 24 hours) at 2022 2320  Last data filed at 2022 2300  Gross per 24 hour   Intake 2862.08 ml   Output 2035 ml   Net 827.08 ml       Imaging  CXR Results  (Last 48 hours)    None                CRITICAL CARE DOCUMENTATION  I had a face to face encounter with the patient, reviewed and interpreted patient data including clinical events, labs, images, vital signs, I/O's, and examined patient. I have discussed the case and the plan and management of the patient's care with the consulting services, the bedside nurses and the respiratory therapist.      NOTE OF PERSONAL INVOLVEMENT IN CARE   This patient has a high probability of imminent, clinically significant deterioration, which requires the highest level of preparedness to intervene urgently. I participated in the decision-making and personally managed or directed the management of the following life and organ supporting interventions that required my frequent assessment to treat or prevent imminent deterioration. I personally spent 40 minutes of critical care time. This is time spent at this critically ill patient's bedside actively involved in patient care as well as the coordination of care. This does not include any procedural time which has been billed separately.     [address] [place] [address]  2022

## 2022-06-07 NOTE — PROGRESS NOTES
1930:Bedside and Verbal shift change report given to CHRISTIAN Lepe RN (oncoming nurse) by Arely Watson. Nay Overton RN (offgoing nurse). Report included the following information SBAR, Kardex, Intake/Output, MAR, Accordion, Recent Results, Med Rec Status, Cardiac Rhythm NSR, Alarm Parameters  and Dual Neuro Assessment. 7637: Overnight updates given to Dr. Bert Fernandez. Discussion was had in regards to difficulty with BP management overnight. RN mentioned increased incontinence and difficulty with keeping pt dry despite condom catheter. Verbal orders received to place Thacker catheter by Dr. Bert Fernandez. 4830: Bedside and Verbal shift change report given to GUERDA Overton RN (oncoming nurse) by CHRISTIAN Lepe RN (offgoing nurse). Report included the following information SBAR, Kardex, Intake/Output, MAR, Accordion, Recent Results, Med Rec Status, Cardiac Rhythm NSR, Alarm Parameters  and Dual Neuro Assessment.

## 2022-06-07 NOTE — PROGRESS NOTES
Problem: Self Care Deficits Care Plan (Adult)  Goal: *Acute Goals and Plan of Care (Insert Text)  Description: FUNCTIONAL STATUS PRIOR TO ADMISSION: Patient was modified independent using a single point cane for functional mobility. Drives, works as a  at [employer]. HOME SUPPORT: The patient lived with wife but did not require assist.    Occupational Therapy Goals  Initiated 6/7/2022   1. Patient will perform grooming with supervision/set-up within 7 day(s). 2.  Patient will perform upper body dressing with supervision/set-up within 7 day(s). 3.  Patient will perform lower body dressing with moderate assistance  within 7 day(s). 4.  Patient will perform toilet transfers with minimal assistance/contact guard assist within 7 day(s). 5.  Patient will perform all aspects of toileting with moderate assistance  within 7 day(s). 6.  Patient will participate in upper extremity therapeutic exercise/activities with supervision/set-up within 7 day(s). 7.  Patient will utilize energy conservation techniques during functional activities with verbal, visual, and tactile cues within 7 day(s). 8.  Patient will improve their LUE Fugl Aponte score by 5 points in prep for ADLs within 7 days. Outcome: Progressing Towards Goal     OCCUPATIONAL THERAPY EVALUATION  Patient: Mendel Morse (34 y.o. male)  Date: 6/7/2022  Primary Diagnosis: Brain mass [G93.89]  Procedure(s) (LRB):  RIGHT  FRONTAL CRANIOTOMY WITH FLOUROCINE (Right) 1 Day Post-Op   Precautions:  Fall,Skin,Seizure    ASSESSMENT  Based on the objective data described below, the patient presents POD 1 R frontal crani with tumor resection. Pt is limited by impaired cognition, command following and attention to task, decreased attention to L side of body, imp R/L awareness, RUE > LE hemiparesis, decreased endurance, mobility, balance, coordination and safety. Pt responded to wife's commands > therapist's.   He required significant cognitive and physical assist with all ADLs and functional mobility. Recommend IP rehab at discharge. Current Level of Function Impacting Discharge (ADLs/self-care): up to mod A UE ADLs, max A LE ADLs and toileting, mod A OOB mobility    Functional Outcome Measure: The patient scored Total A-D  Total A-D (Motor Function): 45/66 on the Fugl-Aponte Assessment which is indicative of moderate impairment in upper extremity functional status. Other factors to consider for discharge: see above     Patient will benefit from skilled therapy intervention to address the above noted impairments. PLAN :  Recommendations and Planned Interventions: self care training, functional mobility training, therapeutic exercise, balance training, visual/perceptual training, therapeutic activities, cognitive retraining, endurance activities, neuromuscular re-education, patient education, home safety training, and family training/education    Frequency/Duration: Patient will be followed by occupational therapy 5 times a week to address goals. Recommendation for discharge: (in order for the patient to meet his/her long term goals)  Therapy 3 hours per day 5-7 days per week    This discharge recommendation:  Has been made in collaboration with the attending provider and/or case management    IF patient discharges home will need the following DME: TBD       SUBJECTIVE:   Patient stated My head hurts.     OBJECTIVE DATA SUMMARY:   HISTORY:   Past Medical History:   Diagnosis Date    Arthritis     GERD (gastroesophageal reflux disease)     Morbid obesity ([address]      Past Surgical History:   Procedure Laterality Date    HX BACK SURGERY      2 laminectomies and fusion    HX ORTHOPAEDIC Right     orif hand    HX SHOULDER ARTHROSCOPY      HX TONSILLECTOMY      as a child     Expanded or extensive additional review of patient history:     Home Situation  Home Environment: Private residence  # Steps to Enter: 5  Rails to Enter: Yes  [employer] : Bilateral  One/Two Story Residence: Two story  # of Interior Steps: 15  Interior Rails: Right  Living Alone: No  Support Systems: Spouse/Significant Other,Other Family Member(s),Friend/Neighbor (wife, adult son)  Patient Expects to be Discharged to[de-identified] Unable to determine at this time  Current DME Used/Available at Home: Cane, straight,Grab bars  Tub or Shower Type: Shower (built in seat)    Hand dominance: Right    EXAMINATION OF PERFORMANCE DEFICITS:  Cognitive/Behavioral Status:  Neurologic State: Alert  Orientation Level: Oriented X4 (slow processing and speech)  Cognition: Decreased attention/concentration;Decreased command following;Poor safety awareness  Perception: Cues to attend left visual field;Cues to attend to left side of body; Tactile;Verbal;Visual  Perseveration: No perseveration noted  Safety/Judgement: Awareness of environment;Decreased awareness of need for assistance;Decreased awareness of need for safety;Decreased insight into deficits; Fall prevention    Hearing: Auditory  Auditory Impairment: None    Vision/Perceptual:    Tracking: Unable to test secondary due to decreased visual attention                 Diplopia: No    Acuity: Within Defined Limits;Able to read clock/calendar on wall without difficulty; Able to read employee name badge without difficulty    Corrective Lenses: Reading glasses    Range of Motion:  AROM: Generally decreased, functional  PROM: Generally decreased, functional                      Strength:  Strength:  (imp cog- RUE/LE WFL,LUE 3-/5 shldrand3/5 all other,LLE3-5/5)                Coordination:  Coordination:  (imp cognition and command follow)  Fine Motor Skills-Upper: Left Impaired;Right Impaired    Gross Motor Skills-Upper: Left Impaired;Right Intact    Tone & Sensation:  Tone: Normal  Sensation: Impaired (imp command following LUE imp > LE)                      Balance:  Sitting: Impaired; Without support  Sitting - Static: Good (unsupported)  Sitting - Dynamic: Fair (occasional)  Standing: Impaired; With support  Standing - Static: Fair  Standing - Dynamic : Fair;Constant support    Functional Mobility and Transfers for ADLs:  Bed Mobility:  Supine to Sit: Minimum assistance  Sit to Supine: Stand-by assistance    Transfers:  Sit to Stand: Contact guard assistance  Stand to Sit: Minimum assistance  Bed to Chair: Moderate assistance; Additional time;Assist x1  Toilet Transfer : Moderate assistance; Additional time;Assist x1 (BSC)    ADL Assessment and Intervention:  Feeding: Minimum assistance;Setup; Additional time (A to open containers and cut food, attention to task)    Oral Facial Hygiene/Grooming: Moderate assistance; Additional time;Assist x1 (imp cognition, attention to L and task, L UE HP)    Bathing: Maximum assistance; Additional time;Assist x1 (imp cognition, attention to L and task, L UE HP)    Upper Body Dressing: Moderate assistance; Additional time;Assist x1 (seated EOB)    Lower Body Dressing: Maximum assistance; Additional time;Assist x1    Toileting: Total assistance; Additional time;Assist x1 ([place])    Cognitive Retraining  Safety/Judgement: Awareness of environment;Decreased awareness of need for assistance;Decreased awareness of need for safety;Decreased insight into deficits; Fall prevention      Functional Measure:  Fugl-Aponte Assessment of Motor Recovery after Stroke: LUE  Reflex Activity  Flexors/Biceps/Fingers: Can be elicited  Extensors/Triceps: Can be elicited  Reflex Subtotal: 4    Volitional Movement Within Synergies  Shoulder Retraction: Full  Shoulder Elevation: Full  Shoulder Abduction (90 degrees): Partial  Shoulder External Rotation: Partial  Elbow Flexion: Full  Forearm Supination: Partial  Shoulder Adduction/Internal Rotation: Full  Elbow Extension: Full  Forearm Pronation: Full  Subtotal: 15    Volitional Movement Mixing Synergies  Hand to Lumbar Spine: Full  Shoulder Flexion (0-90 degrees): Full  Pronation-Supination: Full  Subtotal: 6    Volitional Movement With Little or No Synergy  Shoulder Abduction (0-90 degrees): Partial  Shoulder Flexion ( degrees): None  Pronation/Supination: Partial  Subtotal : 2    Normal Reflex Activity  Biceps, Triceps, Finger Flexors: Full  Subtotal : 2    Upper Extremity Total   Upper Extremity Total: 29    Wrist  Stability at 15 Degree Dorsiflexion: Partial  Repeated Dorsiflexion/ Volar Flexion: Partial  Stability at 15 Degree Dorsiflexion: Partial  Repeated Dorsiflexion/ Volar Flexion: Partial  Circumduction: Partial  Wrist Total: 5    Hand  Mass Flexion: Full  Mass Extension: Full  Grasp A: Partial  Grasp B: Partial  Grasp C: Partial  Grasp D: Partial  Grasp E: Partial  Hand Total: 9    Coordination/Speed  Tremor: Slight  Dysmetria: Slight  Time: >5s (only able to attend to task for 3 reps)  Coordination/Speed Total : 2    Total A-D  Total A-D (Motor Function): 45/66     This is a reliable/valid measure of arm function after a neurological event. It has established value to characterize functional status and for measuring spontaneous and therapy-induced recovery; tests proximal and distal motor functions. Fugl-Aponte Assessment - UE scores recorded between five and 30 days post neurologic event can be used to predict UE recovery at six months post neurologic event. Severe = 0-21 points   Moderately Severe = 22-33 points   Moderate = 34-47 points   Mild = 48-66 points  TAI Ren, DOREEN Colmenares, & MITCH Hernandez (1992). Measurement of motor recovery after stroke: Outcome assessment and sample size requirements.  Stroke, 23, pp. 5087-1064.   ------------------------------------------------------------------------------------------------------------------------------------------------------------------  MCID:  Stroke:   Balbir Barnett et al, 2001; n = 171; mean age 79 (6) years; assessed within 16 (12) days of stroke, Acute Stroke)  FMA Motor Scores from Admission to Discharge   10 point increase in FMA Upper Extremity = 1.5 change in discharge FIM   10 point increase in FMA Lower Extremity = 1.9 change in discharge FIM  MDC:   Stroke:   Indio Burgess et al, 2008, n = 14, mean age = 59.9 (14.6) years, assessed on average 14 (6.5) months post stroke, Chronic Stroke)   FMA = 5.2 points for the Upper Extremity portion of the assessment     Occupational Therapy Evaluation Charge Determination   History Examination Decision-Making   LOW Complexity : Brief history review  HIGH Complexity : 5 or more performance deficits relating to physical, cognitive , or psychosocial skils that result in activity limitations and / or participation restrictions HIGH Complexity : Patient presents with comorbidities that affect occupational performance. Signifigant modification of tasks or assistance (eg, physical or verbal) with assessment (s) is necessary to enable patient to complete evaluation       Based on the above components, the patient evaluation is determined to be of the following complexity level: LOW   Pain Rating:  10/10 HA    Activity Tolerance:   Fair and requires frequent rest breaks    After treatment patient left in no apparent distress:    Supine in bed, Call bell within reach, Bed / chair alarm activated, and Caregiver / family present    COMMUNICATION/EDUCATION:   The patients plan of care was discussed with: Physical therapist and Registered nurse. Patient was educated regarding his deficit(s) of mpaired cognition, command following and attention to task, decreased attention to L side of body, imp R/L awareness, RUE > LE hemiparesis, decreased endurance, mobility, balance, coordination and safety as this relates to his diagnosis of R frontal crani. He demonstrated Fair understanding as evidenced by awareness of situation. Patient and/or family was verbally educated on the BE FAST acronym for signs/symptoms of CVA and TIA. All questions answered with patient indicating fair understanding.      Home safety education was provided and the patient/caregiver indicated understanding., Patient/family have participated as able in goal setting and plan of care. , and Patient/family agree to work toward stated goals and plan of care. This patients plan of care is appropriate for delegation to [facility].     Thank you for this referral.  Yuri Mazariegos, OT

## 2022-06-07 NOTE — PERIOP NOTES
TRANSFER - OUT REPORT:    Verbal report given to Efraín Erazo RN(name) on Lillian Watts  being transferred to ICU 17(unit) for routine post - op       Report consisted of patients Situation, Background, Assessment and   Recommendations(SBAR). Time Pre op antibiotic given:1351  Anesthesia Stop time: 2553  Thacker Present on Transfer to floor:yes  Order for Thacker on Chart:yes  Discharge Prescriptions with Chart:no    Information from the following report(s) SBAR, Kardex, Procedure Summary, Intake/Output, MAR and Recent Results was reviewed with the receiving nurse. Opportunity for questions and clarification was provided. Is the patient on 02? YES       L/Min 2       Other none    Is the patient on a monitor? YES    Is the nurse transporting with the patient? YES    Surgical Waiting Area notified of patient's transfer from PACU?  NO      The following personal items collected during your admission accompanied patient upon transfer:   Dental Appliance: Dental Appliances: None  Vision: Visual Aid: Glasses  Hearing Aid:    Jewelry:    Clothing:    Other Valuables:    Valuables sent to safe:

## 2022-06-07 NOTE — PROGRESS NOTES
2015:TRANSFER - IN REPORT:    Verbal report received from ANNETTE Moeller(name) on Amanda Parisi  being received from PACU(unit) for routine progression of care      Report consisted of patients Situation, Background, Assessment and   Recommendations(SBAR). Information from the following report(s) SBAR, Kardex, OR Summary, Intake/Output, MAR, Accordion, Cardiac Rhythm NSR, Alarm Parameters  and Dual Neuro Assessment was reviewed with the receiving nurse. Opportunity for questions and clarification was provided. Assessment completed upon patients arrival to unit and care assumed. 2045: Primary Nurse Arthur Case RN and [facility], RN performed a dual skin assessment on this patient No impairment noted  Daniel score is 17    [id number]: Pt transported to CT and back to unit without incident. 2157: Pt was scheduled to receive Vancomycin and was not given and does not have any antibiotics scheduled post-op. On-call Neurosurgery paged for clarification of post-op orders. Verbal orders received from Malaika Hennessy MD to administer 1g Vancomycin Q12H for 2 doses. 0515: Updates given to Dr. Quentin Frazier via telephone. Verbal orders received to add PRNs for BP management. 0730: Bedside and Verbal shift change report given to GUERDA Araya RN (oncoming nurse) by CHRISTIAN Ashley RN (offgoing nurse). Report included the following information SBAR, Kardex, ED Summary, OR Summary, Intake/Output, MAR, Accordion, Recent Results, Med Rec Status, Cardiac Rhythm NSR, Alarm Parameters  and Dual Neuro Assessment.

## 2022-06-08 LAB
ANION GAP SERPL CALC-SCNC: 6 MMOL/L (ref 5–15)
BASOPHILS # BLD: 0 K/UL (ref 0–0.1)
BASOPHILS NFR BLD: 0 % (ref 0–1)
BUN SERPL-MCNC: 25 MG/DL (ref 6–20)
BUN/CREAT SERPL: 27 (ref 12–20)
CALCIUM SERPL-MCNC: 8.2 MG/DL (ref 8.5–10.1)
CHLORIDE SERPL-SCNC: 104 MMOL/L (ref 97–108)
CO2 SERPL-SCNC: 26 MMOL/L (ref 21–32)
CREAT SERPL-MCNC: 0.94 MG/DL (ref 0.7–1.3)
DIFFERENTIAL METHOD BLD: ABNORMAL
EOSINOPHIL # BLD: 0 K/UL (ref 0–0.4)
EOSINOPHIL NFR BLD: 0 % (ref 0–7)
ERYTHROCYTE [DISTWIDTH] IN BLOOD BY AUTOMATED COUNT: 11.8 % (ref 11.5–14.5)
GLUCOSE SERPL-MCNC: 152 MG/DL (ref 65–100)
HCT VFR BLD AUTO: 43.3 % (ref 36.6–50.3)
HGB BLD-MCNC: 14 G/DL (ref 12.1–17)
IMM GRANULOCYTES # BLD AUTO: 0.4 K/UL (ref 0–0.04)
IMM GRANULOCYTES NFR BLD AUTO: 2 % (ref 0–0.5)
LYMPHOCYTES # BLD: 0.7 K/UL (ref 0.8–3.5)
LYMPHOCYTES NFR BLD: 3 % (ref 12–49)
MAGNESIUM SERPL-MCNC: 2.5 MG/DL (ref 1.6–2.4)
MCH RBC QN AUTO: 30.3 PG (ref 26–34)
MCHC RBC AUTO-ENTMCNC: 32.3 G/DL (ref 30–36.5)
MCV RBC AUTO: 93.7 FL (ref 80–99)
MONOCYTES # BLD: 1.5 K/UL (ref 0–1)
MONOCYTES NFR BLD: 7 % (ref 5–13)
NEUTS SEG # BLD: 19.1 K/UL (ref 1.8–8)
NEUTS SEG NFR BLD: 88 % (ref 32–75)
NRBC # BLD: 0 K/UL (ref 0–0.01)
NRBC BLD-RTO: 0 PER 100 WBC
PHOSPHATE SERPL-MCNC: 2.4 MG/DL (ref 2.6–4.7)
PLATELET # BLD AUTO: 210 K/UL (ref 150–400)
PMV BLD AUTO: 10.7 FL (ref 8.9–12.9)
POTASSIUM SERPL-SCNC: 3.9 MMOL/L (ref 3.5–5.1)
RBC # BLD AUTO: 4.62 M/UL (ref 4.1–5.7)
RBC MORPH BLD: ABNORMAL
SODIUM SERPL-SCNC: 136 MMOL/L (ref 136–145)
WBC # BLD AUTO: 21.7 K/UL (ref 4.1–11.1)

## 2022-06-08 PROCEDURE — 74011250636 HC RX REV CODE- 250/636: Performed by: NEUROLOGICAL SURGERY

## 2022-06-08 PROCEDURE — 74011250636 HC RX REV CODE- 250/636: Performed by: NURSE PRACTITIONER

## 2022-06-08 PROCEDURE — 99223 1ST HOSP IP/OBS HIGH 75: CPT | Performed by: INTERNAL MEDICINE

## 2022-06-08 PROCEDURE — 97530 THERAPEUTIC ACTIVITIES: CPT

## 2022-06-08 PROCEDURE — 80048 BASIC METABOLIC PNL TOTAL CA: CPT

## 2022-06-08 PROCEDURE — 74011250637 HC RX REV CODE- 250/637: Performed by: NURSE PRACTITIONER

## 2022-06-08 PROCEDURE — 74011250637 HC RX REV CODE- 250/637: Performed by: EMERGENCY MEDICINE

## 2022-06-08 PROCEDURE — 74011000250 HC RX REV CODE- 250: Performed by: NEUROLOGICAL SURGERY

## 2022-06-08 PROCEDURE — 85025 COMPLETE CBC W/AUTO DIFF WBC: CPT

## 2022-06-08 PROCEDURE — 83735 ASSAY OF MAGNESIUM: CPT

## 2022-06-08 PROCEDURE — 74011250637 HC RX REV CODE- 250/637: Performed by: NEUROLOGICAL SURGERY

## 2022-06-08 PROCEDURE — 36415 COLL VENOUS BLD VENIPUNCTURE: CPT

## 2022-06-08 PROCEDURE — 74011250636 HC RX REV CODE- 250/636: Performed by: EMERGENCY MEDICINE

## 2022-06-08 PROCEDURE — 77010033678 HC OXYGEN DAILY

## 2022-06-08 PROCEDURE — 84100 ASSAY OF PHOSPHORUS: CPT

## 2022-06-08 PROCEDURE — 65610000006 HC RM INTENSIVE CARE

## 2022-06-08 RX ORDER — LISINOPRIL 5 MG/1
15 TABLET ORAL ONCE
Status: COMPLETED | OUTPATIENT
Start: 2022-06-08 | End: 2022-06-08

## 2022-06-08 RX ORDER — LISINOPRIL 5 MG/1
5 TABLET ORAL DAILY
Status: DISCONTINUED | OUTPATIENT
Start: 2022-06-08 | End: 2022-06-08

## 2022-06-08 RX ORDER — LEVETIRACETAM 500 MG/1
500 TABLET ORAL EVERY 12 HOURS
Status: DISCONTINUED | OUTPATIENT
Start: 2022-06-08 | End: 2022-06-15 | Stop reason: HOSPADM

## 2022-06-08 RX ORDER — AMLODIPINE BESYLATE 5 MG/1
10 TABLET ORAL DAILY
Status: DISCONTINUED | OUTPATIENT
Start: 2022-06-08 | End: 2022-06-15 | Stop reason: HOSPADM

## 2022-06-08 RX ORDER — LISINOPRIL 20 MG/1
40 TABLET ORAL DAILY
Status: DISCONTINUED | OUTPATIENT
Start: 2022-06-09 | End: 2022-06-08

## 2022-06-08 RX ORDER — MORPHINE SULFATE 2 MG/ML
1 INJECTION, SOLUTION INTRAMUSCULAR; INTRAVENOUS
Status: DISCONTINUED | OUTPATIENT
Start: 2022-06-08 | End: 2022-06-10

## 2022-06-08 RX ORDER — TAMSULOSIN HYDROCHLORIDE 0.4 MG/1
0.4 CAPSULE ORAL DAILY
Status: DISCONTINUED | OUTPATIENT
Start: 2022-06-08 | End: 2022-06-15 | Stop reason: HOSPADM

## 2022-06-08 RX ORDER — LISINOPRIL 20 MG/1
20 TABLET ORAL DAILY
Status: DISCONTINUED | OUTPATIENT
Start: 2022-06-09 | End: 2022-06-09

## 2022-06-08 RX ORDER — HYDRALAZINE HYDROCHLORIDE 20 MG/ML
10 INJECTION INTRAMUSCULAR; INTRAVENOUS
Status: DISCONTINUED | OUTPATIENT
Start: 2022-06-08 | End: 2022-06-09

## 2022-06-08 RX ADMIN — PANTOPRAZOLE SODIUM 40 MG: 40 TABLET, DELAYED RELEASE ORAL at 16:10

## 2022-06-08 RX ADMIN — NICARDIPINE HYDROCHLORIDE 12.5 MG/HR: 25 INJECTION, SOLUTION INTRAVENOUS at 00:18

## 2022-06-08 RX ADMIN — DEXAMETHASONE SODIUM PHOSPHATE 4 MG: 4 INJECTION, SOLUTION INTRAMUSCULAR; INTRAVENOUS at 21:49

## 2022-06-08 RX ADMIN — LISINOPRIL 5 MG: 5 TABLET ORAL at 03:31

## 2022-06-08 RX ADMIN — LABETALOL HYDROCHLORIDE 10 MG: 5 INJECTION INTRAVENOUS at 01:34

## 2022-06-08 RX ADMIN — LEVETIRACETAM 500 MG: 100 INJECTION, SOLUTION, CONCENTRATE INTRAVENOUS at 03:30

## 2022-06-08 RX ADMIN — OXYCODONE 10 MG: 5 TABLET ORAL at 10:09

## 2022-06-08 RX ADMIN — NICARDIPINE HYDROCHLORIDE 15 MG/HR: 25 INJECTION, SOLUTION INTRAVENOUS at 08:36

## 2022-06-08 RX ADMIN — NICARDIPINE HYDROCHLORIDE 15 MG/HR: 25 INJECTION, SOLUTION INTRAVENOUS at 04:23

## 2022-06-08 RX ADMIN — MORPHINE SULFATE 1 MG: 2 INJECTION, SOLUTION INTRAMUSCULAR; INTRAVENOUS at 19:14

## 2022-06-08 RX ADMIN — NICARDIPINE HYDROCHLORIDE 12.5 MG/HR: 25 INJECTION, SOLUTION INTRAVENOUS at 10:27

## 2022-06-08 RX ADMIN — Medication 10 ML: at 05:50

## 2022-06-08 RX ADMIN — MORPHINE SULFATE 2 MG: 2 INJECTION, SOLUTION INTRAMUSCULAR; INTRAVENOUS at 13:22

## 2022-06-08 RX ADMIN — ACETAMINOPHEN 650 MG: 325 TABLET ORAL at 20:24

## 2022-06-08 RX ADMIN — NICARDIPINE HYDROCHLORIDE 15 MG/HR: 25 INJECTION, SOLUTION INTRAVENOUS at 06:23

## 2022-06-08 RX ADMIN — SODIUM CHLORIDE, PRESERVATIVE FREE 20 ML: 5 INJECTION INTRAVENOUS at 21:50

## 2022-06-08 RX ADMIN — DEXAMETHASONE SODIUM PHOSPHATE 4 MG: 4 INJECTION, SOLUTION INTRAMUSCULAR; INTRAVENOUS at 05:34

## 2022-06-08 RX ADMIN — HYDRALAZINE HYDROCHLORIDE 10 MG: 20 INJECTION INTRAMUSCULAR; INTRAVENOUS at 04:15

## 2022-06-08 RX ADMIN — OXYCODONE 10 MG: 5 TABLET ORAL at 17:43

## 2022-06-08 RX ADMIN — NICARDIPINE HYDROCHLORIDE 10 MG/HR: 25 INJECTION, SOLUTION INTRAVENOUS at 13:29

## 2022-06-08 RX ADMIN — ACETAMINOPHEN 650 MG: 325 TABLET ORAL at 01:38

## 2022-06-08 RX ADMIN — TAMSULOSIN HYDROCHLORIDE 0.4 MG: 0.4 CAPSULE ORAL at 13:22

## 2022-06-08 RX ADMIN — DOCUSATE SODIUM 100 MG: 100 CAPSULE, LIQUID FILLED ORAL at 08:39

## 2022-06-08 RX ADMIN — NICARDIPINE HYDROCHLORIDE 10 MG/HR: 25 INJECTION, SOLUTION INTRAVENOUS at 20:54

## 2022-06-08 RX ADMIN — OXYCODONE 10 MG: 5 TABLET ORAL at 05:49

## 2022-06-08 RX ADMIN — NICARDIPINE HYDROCHLORIDE 5 MG/HR: 25 INJECTION, SOLUTION INTRAVENOUS at 16:11

## 2022-06-08 RX ADMIN — LISINOPRIL 15 MG: 5 TABLET ORAL at 11:39

## 2022-06-08 RX ADMIN — PANTOPRAZOLE SODIUM 40 MG: 40 TABLET, DELAYED RELEASE ORAL at 07:04

## 2022-06-08 RX ADMIN — DOCUSATE SODIUM 100 MG: 100 CAPSULE, LIQUID FILLED ORAL at 17:44

## 2022-06-08 RX ADMIN — LABETALOL HYDROCHLORIDE 10 MG: 5 INJECTION INTRAVENOUS at 07:04

## 2022-06-08 RX ADMIN — HYDRALAZINE HYDROCHLORIDE 20 MG: 20 INJECTION INTRAMUSCULAR; INTRAVENOUS at 00:11

## 2022-06-08 RX ADMIN — SODIUM CHLORIDE, PRESERVATIVE FREE 10 ML: 5 INJECTION INTRAVENOUS at 05:50

## 2022-06-08 RX ADMIN — LABETALOL HYDROCHLORIDE 10 MG: 5 INJECTION INTRAVENOUS at 19:19

## 2022-06-08 RX ADMIN — AMLODIPINE BESYLATE 10 MG: 5 TABLET ORAL at 08:41

## 2022-06-08 RX ADMIN — NICARDIPINE HYDROCHLORIDE 15 MG/HR: 25 INJECTION, SOLUTION INTRAVENOUS at 02:26

## 2022-06-08 RX ADMIN — Medication 10 ML: at 21:50

## 2022-06-08 RX ADMIN — OXYCODONE 10 MG: 5 TABLET ORAL at 21:48

## 2022-06-08 RX ADMIN — HYDRALAZINE HYDROCHLORIDE 10 MG: 20 INJECTION INTRAMUSCULAR; INTRAVENOUS at 20:25

## 2022-06-08 RX ADMIN — DEXAMETHASONE SODIUM PHOSPHATE 4 MG: 4 INJECTION, SOLUTION INTRAMUSCULAR; INTRAVENOUS at 13:22

## 2022-06-08 RX ADMIN — LEVETIRACETAM 500 MG: 500 TABLET, FILM COATED ORAL at 20:24

## 2022-06-08 NOTE — PROGRESS NOTES
Patient not available due to staff care. Will follow up as necessary.    Nancy Chapin, [facility], [facility] Provider

## 2022-06-08 NOTE — PROGRESS NOTES
[facility] at [address], [address] [place]: [phone]  F: [phone]    Reason for Visit:   Alexandru Ragland is a 61 y.o. male who is seen in hospital at the request of Dr Luis Abdi for evaluation of brain tumor. Treatment History:   22 RIGHT frontal craniotomy    History of Present Illness:     Seen today in hospital as a new patient for brain tumor. Admitted with increasing confusion. 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 MRI confirmed. Seen by neurosurgery and had crani 22. Seen today in ICU. Family with pt and pt eating sub. Pt doing ok. Limited conversation. ROS not obtainable    Past Medical History:   Diagnosis Date    Arthritis     GERD (gastroesophageal reflux disease)     Morbid obesity ([address]       Past Surgical History:   Procedure Laterality Date    HX BACK SURGERY      2 laminectomies and fusion    HX ORTHOPAEDIC Right     orif hand    HX SHOULDER ARTHROSCOPY      HX TONSILLECTOMY      as a child      Social History     Tobacco Use    Smoking status: Former Smoker     Quit date: 1981     Years since quittin.0    Smokeless tobacco: Never Used   Substance Use Topics    Alcohol use: Yes     Comment: occasional      History reviewed. No pertinent family history.   Current Facility-Administered Medications   Medication Dose Route Frequency    hydrALAZINE (APRESOLINE) 20 mg/mL injection 10 mg  10 mg IntraVENous Q6H PRN    amLODIPine (NORVASC) tablet 10 mg  10 mg Oral DAILY    levETIRAcetam (KEPPRA) tablet 500 mg  500 mg Oral Q12H    [START ON 2022] lisinopriL (PRINIVIL, ZESTRIL) tablet 20 mg  20 mg Oral DAILY    tamsulosin (FLOMAX) capsule 0.4 mg  0.4 mg Oral DAILY    labetaloL (NORMODYNE;TRANDATE) injection 10 mg  10 mg IntraVENous Q4H PRN    pantoprazole (PROTONIX) tablet 40 mg  40 mg Oral ACB&D    dexamethasone (DECADRON) 4 mg/mL injection 4 mg  4 mg IntraVENous Q8H  sodium chloride (NS) flush 5-40 mL  5-40 mL IntraVENous Q8H    sodium chloride (NS) flush 5-40 mL  5-40 mL IntraVENous PRN    morphine injection 2 mg  2 mg IntraVENous Q2H PRN    docusate sodium (COLACE) capsule 100 mg  100 mg Oral BID    polyethylene glycol (MIRALAX) packet 17 g  17 g Oral DAILY PRN    niCARdipine (CARDENE) 25 mg in 0.9% sodium chloride 250 mL ([id number])  0-15 mg/hr IntraVENous TITRATE    sodium chloride (NS) flush 5-40 mL  5-40 mL IntraVENous Q8H    acetaminophen (TYLENOL) tablet 650 mg  650 mg Oral Q6H PRN    Or    acetaminophen (TYLENOL) suppository 650 mg  650 mg Rectal Q6H PRN    ondansetron (ZOFRAN ODT) tablet 4 mg  4 mg Oral Q8H PRN    Or    ondansetron (ZOFRAN) injection 4 mg  4 mg IntraVENous Q6H PRN    oxyCODONE IR (ROXICODONE) tablet 10 mg  10 mg Oral Q4H PRN    oxyCODONE IR (ROXICODONE) tablet 5 mg  5 mg Oral Q4H PRN      Allergies   Allergen Reactions    Keflex [Cephalexin] Rash        Review of Systems: not obtainable    Physical Exam:     Visit Vitals  /62   Pulse 84   Temp 99 °F (37.2 °C)   Resp 12   Ht 5' 6\" (1.676 m)   Wt 248 lb 14.4 oz (112.9 kg)   SpO2 91%   BMI 40.17 kg/m²     ECOG PS: 2  General: No distress  Eyes:  anicteric sclerae  HENT: post surgery scar  Neck: Supple  Respiratory:  normal respiratory effort  CV: Normal rate, regular rhythm on monitor  MS: in chair, moving, eating  Skin: No rashes, ecchymoses, or petechiae. Normal temperature, turgor, and texture. Psych: responds to questions, limited conversation    Results:     Lab Results   Component Value Date/Time    WBC 21.7 (H) 06/08/2022 05:15 AM    HGB 14.0 06/08/2022 05:15 AM    HCT 43.3 06/08/2022 05:15 AM    PLATELET 085 19/52/5276 05:15 AM    MCV 93.7 06/08/2022 05:15 AM    ABS.  NEUTROPHILS 19.1 (H) 06/08/2022 05:15 AM     Lab Results   Component Value Date/Time    Sodium 136 06/08/2022 05:15 AM    Potassium 3.9 06/08/2022 05:15 AM    Chloride 104 06/08/2022 05:15 AM    CO2 26 06/08/2022 05:15 AM    Glucose 152 (H) 06/08/2022 05:15 AM    BUN 25 (H) 06/08/2022 05:15 AM    Creatinine 0.94 06/08/2022 05:15 AM    GFR est AA >60 06/08/2022 05:15 AM    GFR est non-AA >60 06/08/2022 05:15 AM    Calcium 8.2 (L) 06/08/2022 05:15 AM     Lab Results   Component Value Date/Time    Bilirubin, total 0.5 06/02/2022 02:32 PM    ALT (SGPT) 42 06/02/2022 02:32 PM    Alk. phosphatase 71 06/02/2022 02:32 PM    Protein, total 7.5 06/02/2022 02:32 PM    Albumin 3.5 06/02/2022 02:32 PM    Globulin 4.0 06/02/2022 02:32 PM       MRI Results (most recent):  Results from Hospital Encounter encounter on 06/02/22    MRI BRAIN W WO CONT    Narrative  INDICATION:  s/p crani    COMPARISON:  CT June 6, 2022, MRI June 2, 2022    TECHNIQUE:  Multiplanar multisequence acquisition without and with IV contrast  of the brain. FINDINGS:    There has been recent right frontal craniotomy with resection of previous large  right frontal lobe mass. There is susceptibility artifact in anterior frontal  region consistent with pneumocephalus. There is also susceptibility artifact  within the right frontal lobe resection cavity with heterogeneous T1 and T2  signal most consistent with blood product. No significant residual masslike  enhancement. Extensive surrounding vasogenic edema with persistent mass effect  upon the lateral ventricles, and approximately 9 mm of midline shift. Small  amount of extra-axial hemorrhage in the right lateral frontal region, as well as  small amount of intraventricular hemorrhage in the occipital horns and fourth  ventricle. No hydrocephalus. Small areas of T2 hyperintensity in the left  centrum semiovale are unchanged. Diffusion restriction around the right frontal  lobe resection cavity. Major intracranial vascular flow voids are patent. Right  frontal scalp edema and fluid. Impression  Recent right frontal lobe mass resection, with postoperative changes as  described above.  Persistent vasogenic edema, regional mass effect and right to  left midline shift. Blood product within the resection cavity and mild  surrounding diffusion restriction likely postoperative. No significant residual  masslike enhancement though close follow-up is recommended. Brain MRI:  IMPRESSION  Approximately 5 cm right anterior frontal parenchymal mass without other  associated areas of abnormal enhancement in the brain suggests primary brain  neoplasm/GBM although metastasis cannot be entirely excluded. Significant mass  Affect. .    CT Results (most recent):  Results from Hospital Encounter encounter on 06/02/22    CT HEAD WO CONT    Narrative  EXAM: CT HEAD WO CONT    INDICATION: s/p crani and tumor resection    COMPARISON: 6/2/2022. CONTRAST: None. TECHNIQUE: Unenhanced CT of the head was performed using 5 mm images. Brain and  bone windows were generated. Coronal and sagittal reformats. CT dose reduction  was achieved through use of a standardized protocol tailored for this  examination and automatic exposure control for dose modulation. FINDINGS:  The patient has undergone right frontal craniotomy in the interval.  Postoperative changes and pneumocephalus are noted following tumor resection. Subfalcine herniation persists but has improved compared to the prior exam. No  unexpected postoperative complication is identified. Impression  Postoperative changes and pneumocephalus following tumor resection. No  unexpected postoperative complication is identified. CT C/A/P  IMPRESSION  1. No evidence of primary malignancy in the chest, abdomen or pelvis. 2. Incidental findings as above    Records reviewed and summarized above. Pathology report(s) reviewed above. Radiology report(s) reviewed above.       Assessment/PLAN:     1) Brain tumor  5 cm right anterior frontal parenchymal mass without other  associated areas of abnormal enhancement in the brain suggests primary brain  Neoplasm  Post neurosurgery/ RIGHT frontal craniotomy 6/6/22. Records and history reviewed with pt/ family at bedside. Pt seen in ICU post op. Up in chair and doing ok / eating. Limited conversation. Path pending. Discussed treatment plan from here is pending path. Pt will recover from surgery and we will follow for path. Seen today with Rad/onc also at bedside. 2) confusion at presentation. Monitor. 3) HTN per IM. 4) psychosocial. Confusion. Family at bedside. Has good support. May need rehab  Professor at [employer]. We will follow for path  Call if questions  D/w neuro. Seen with Rad/onc    I appreciate the opportunity to participate in  Akash Cami Phd [facility]'s care.     Signed By: Kandis Ortiz DO

## 2022-06-08 NOTE — PROGRESS NOTES
Problem: Falls - Risk of  Goal: *Absence of Falls  Description: Document Jasmin Raymundo Fall Risk and appropriate interventions in the flowsheet.   Outcome: Progressing Towards Goal  Note: Fall Risk Interventions:  Mobility Interventions: Communicate number of staff needed for ambulation/transfer,Patient to call before getting OOB    Mentation Interventions: Adequate sleep, hydration, pain control,Bed/chair exit alarm,Door open when patient unattended,Reorient patient,Room close to nurse's station    Medication Interventions: Assess postural VS orthostatic hypotension,Bed/chair exit alarm,Evaluate medications/consider consulting pharmacy    Elimination Interventions: Call light in reach,Patient to call for help with toileting needs,Toileting schedule/hourly rounds              Problem: Patient Education: Go to Patient Education Activity  Goal: Patient/Family Education  Outcome: Progressing Towards Goal     Problem: TIA/CVA Stroke: 0-24 hours  Goal: Off Pathway (Use only if patient is Off Pathway)  Outcome: Progressing Towards Goal  Goal: Activity/Safety  Outcome: Progressing Towards Goal  Goal: Consults, if ordered  Outcome: Progressing Towards Goal  Goal: Diagnostic Test/Procedures  Outcome: Progressing Towards Goal  Goal: Nutrition/Diet  Outcome: Progressing Towards Goal  Goal: Medications  Outcome: Progressing Towards Goal  Goal: Respiratory  Outcome: Progressing Towards Goal  Goal: Treatments/Interventions/Procedures  Outcome: Progressing Towards Goal  Goal: Minimize risk of bleeding post-thrombolytic infusion  Outcome: Progressing Towards Goal  Goal: Monitor for complications post-thrombolytic infusion  Outcome: Progressing Towards Goal  Goal: *Hemodynamically stable  Outcome: Progressing Towards Goal  Goal: *Neurologically stable  Description: Absence of additional neurological deficits    Outcome: Progressing Towards Goal  Goal: *Verbalizes anxiety and depression are reduced or absent  Outcome: Progressing Towards Goal  Goal: *Absence of Signs of Aspiration on Current Diet  Outcome: Progressing Towards Goal

## 2022-06-08 NOTE — PROGRESS NOTES
Radiation oncology referral received for Dr. Zainab Starr. I have reviewed his imaging, pathology is pending but certainly concerning for an aggressive primary brain malignancy. I introduced myself and discussed that we would need to await final pathology prior to making any recommendations or having further discussions. I tentatively set him up with an outpatient consultation on 6/16/2022 at which time hopefully pathology would be back. He will likely be at [facility] so we will coordinate. Thank you for this kind consultation and the ability to participate in the care of Dr. Zainab Starr.

## 2022-06-08 NOTE — PROGRESS NOTES
POD 2  afeb  Hypertensive  cardene gtt  Prn labetalol and hydralazine  norvasc  lisinopril started this am  Decadron 4mg q 8hr  incontinent  WBC 21.7  Alert  oriented  No drift  FC x 4  Incision C/D/I  MRI: No residual tumor, post-surgical changes, edema  S/p: crani and resection primary glial tumor  Increased BP overnight  Place duckworth for incontinence and to see if this helps with agitation and blood pressure  May need to start additional po medications for BP

## 2022-06-08 NOTE — PROGRESS NOTES
REASON FOR ICU ADMISSION:  Right frontal lobe brain mass with cerebral edema s/p craniotomy for resection    Interval Events    6/7-8-still requiring Cardene      Patient Vitals for the past 24 hrs:   Temp Pulse Resp BP SpO2   06/08/22 1500 -- 84 12 139/62 91 %   06/08/22 1400 -- 84 -- (!) 146/68 92 %   06/08/22 1300 -- 95 -- (!) 152/71 94 %   06/08/22 1200 98.9 °F (37.2 °C) 85 -- (!) 145/66 93 %   06/08/22 1100 -- 82 -- (!) 140/60 93 %   06/08/22 1000 -- 80 -- (!) 150/64 91 %   06/08/22 0900 -- -- -- (!) 143/68 92 %   06/08/22 0800 98.7 °F (37.1 °C) 78 -- -- 92 %   06/08/22 0700 -- 86 -- (!) 144/66 93 %   06/08/22 0600 -- 91 -- (!) 162/68 93 %   06/08/22 0500 -- 97 -- -- 92 %   06/08/22 0400 99.2 °F (37.3 °C) 89 18 (!) 160/67 94 %   06/08/22 0300 -- 93 -- (!) 155/79 94 %   06/08/22 0200 -- (!) 101 -- 133/69 94 %   06/08/22 0134 -- (!) 101 -- (!) 164/78 --   06/08/22 0100 -- 99 15 (!) 164/75 94 %   06/08/22 0054 -- -- -- -- 94 %   06/08/22 0000 99.1 °F (37.3 °C) 90 12 (!) 143/76 94 %   06/07/22 2300 -- 91 15 (!) 171/127 94 %   06/07/22 2200 -- 87 12 133/77 93 %   06/07/22 2100 -- 97 13 (!) 167/83 95 %   06/07/22 2000 99 °F (37.2 °C) 93 14 (!) 148/82 92 %   06/07/22 1900 -- 88 13 (!) 153/88 91 %   06/07/22 1800 -- 85 12 (!) 158/95 90 %   06/07/22 1700 -- 86 13 (!) 156/92 92 %   06/07/22 1600 -- 90 13 (!) 163/103 (!) 88 %     Physical exam  General-NAD  Neuro-AAO x3, intermittent drowsiness  CV- regular  Pulmonary-clear lungs-clear  Abdomen-soft, nontender, nondistended  Extremities-warm    Recent Results (from the past 24 hour(s))   METABOLIC PANEL, BASIC    Collection Time: 06/08/22  5:15 AM   Result Value Ref Range    Sodium 136 136 - 145 mmol/L    Potassium 3.9 3.5 - 5.1 mmol/L    Chloride 104 97 - 108 mmol/L    CO2 26 21 - 32 mmol/L    Anion gap 6 5 - 15 mmol/L    Glucose 152 (H) 65 - 100 mg/dL    BUN 25 (H) 6 - 20 MG/DL    Creatinine 0.94 0.70 - 1.30 MG/DL    BUN/Creatinine ratio 27 (H) 12 - 20      GFR est AA >60 >60 ml/min/1.73m2    GFR est non-AA >60 >60 ml/min/1.73m2    Calcium 8.2 (L) 8.5 - 10.1 MG/DL   MAGNESIUM    Collection Time: 06/08/22  5:15 AM   Result Value Ref Range    Magnesium 2.5 (H) 1.6 - 2.4 mg/dL   PHOSPHORUS    Collection Time: 06/08/22  5:15 AM   Result Value Ref Range    Phosphorus 2.4 (L) 2.6 - 4.7 MG/DL   CBC WITH AUTOMATED DIFF    Collection Time: 06/08/22  5:15 AM   Result Value Ref Range    WBC 21.7 (H) 4.1 - 11.1 K/uL    RBC 4.62 4.10 - 5.70 M/uL    HGB 14.0 12.1 - 17.0 g/dL    HCT 43.3 36.6 - 50.3 %    MCV 93.7 80.0 - 99.0 FL    MCH 30.3 26.0 - 34.0 PG    MCHC 32.3 30.0 - 36.5 g/dL    RDW 11.8 11.5 - 14.5 %    PLATELET 093 755 - 228 K/uL    MPV 10.7 8.9 - 12.9 FL    NRBC 0.0 0  WBC    ABSOLUTE NRBC 0.00 0.00 - 0.01 K/uL    NEUTROPHILS 88 (H) 32 - 75 %    LYMPHOCYTES 3 (L) 12 - 49 %    MONOCYTES 7 5 - 13 %    EOSINOPHILS 0 0 - 7 %    BASOPHILS 0 0 - 1 %    IMMATURE GRANULOCYTES 2 (H) 0.0 - 0.5 %    ABS. NEUTROPHILS 19.1 (H) 1.8 - 8.0 K/UL    ABS. LYMPHOCYTES 0.7 (L) 0.8 - 3.5 K/UL    ABS. MONOCYTES 1.5 (H) 0.0 - 1.0 K/UL    ABS. EOSINOPHILS 0.0 0.0 - 0.4 K/UL    ABS. BASOPHILS 0.0 0.0 - 0.1 K/UL    ABS. IMM.  GRANS. 0.4 (H) 0.00 - 0.04 K/UL    DF SMEAR SCANNED      RBC COMMENTS NORMOCYTIC, NORMOCHROMIC       Imaging reviewed    Current Outpatient Medications   Medication Instructions    azelastine (ASTELIN) 137 mcg (0.1 %) nasal spray 2 Sprays, Both Nostrils, 2 TIMES DAILY, Use in each nostril as directed    B.animalis,bifid,infantis,long (Probiotic 4X) 10-15 mg TbEC Oral    baclofen (LIORESAL) 10 mg, Oral, 3 TIMES DAILY    budesonide-formoteroL (Symbicort) 160-4.5 mcg/actuation HFAA 2 Puffs, Inhalation, 2 TIMES DAILY    diclofenac EC (VOLTAREN) 75 mg, Oral, 2 TIMES DAILY WITH MEALS    esomeprazole (NEXIUM) 40 mg, Oral, 2 TIMES DAILY    fluticasone propionate (FLOVENT HFA) 44 mcg/actuation inhaler Inhalation    levocetirizine (XYZAL) 5 mg, Oral    metroNIDAZOLE (METROLOTION) 0.75 % lotion Topical, 2 TIMES DAILY    sildenafiL (REVATIO) 20 mg, Oral, AS NEEDED    solifenacin (VESICARE) 10 mg, Oral, DAILY    Sunosi 75 mg, Oral, DAILY         Assessment  Right frontal lobe brain mass  S/P craniotomy for resection    Plan    Neuro- still at risk for severe neurologic deterioration, serial neuro checks, follow-up neurosurgery recommendations, PT/OT as tolerated, on dexamethasone, Keppra, delirium prevention strategies    Cardiac-BP goals per surgical service, requiring Cardene, increase lisinopril and Norvasc doses    Pulmonary-encourage incentive spirometry    GI-diet as tolerated, on Protonix therapy    Renal-monitor urine output, correct electrolyte derangements as needed, flomax for retention    Hematology/oncology-SCDs for DVT prophylaxis, tissue diagnosis - likely GBM per surgery, follow-up oncology recommendations    ID- undulating WBC count - likely reactional/steroid related    Endocrinology-keep glucose less than 180    Critical care time-40 minutes    Signed By: Franky Casey MD     June 8, 2022

## 2022-06-08 NOTE — PROGRESS NOTES
Neurosurgery Progress Note  Stephan Alexis, [facility]          Admit Date: 2022   LOS: 6 days        Daily Progress Note: 2022    POD:2 Day Post-Op    S/P: Procedure(s):  RIGHT  FRONTAL CRANIOTOMY WITH FLOUROSCEIN    Subjective: The patient continues to have BP issues. His Cardene was maxed out earlier today but has been weaned to 5 mg/hr. He is in good spirits. Denies numbness, tingling, chest pain, leg pain, nausea, vomiting, difficulty swallowing, and dyspnea. Objective:     Vital signs  Temp (24hrs), Av °F (37.2 °C), Min:98.7 °F (37.1 °C), Max:99.2 °F (37.3 °C)    07 -  1900  In: 980 [I.V.:980]  Out: -    190 -  0700  In: 4723.8 [P.O.:180; I.V.:4543.8]  Out: 2235 [Urine:2235]    Visit Vitals  /62   Pulse 84   Temp 99 °F (37.2 °C)   Resp 12   Ht 5' 6\" (1.676 m)   Wt 112.9 kg (248 lb 14.4 oz)   SpO2 91%   BMI 40.17 kg/m²    O2 Flow Rate (L/min): 2 l/min O2 Device: Nasal cannula     Pain control  Pain Assessment  Pain Scale 1: Numeric (0 - 10)  Pain Intensity 1: 2  Pain Onset 1: Postop  Pain Location 1: Head  Pain Orientation 1: Anterior  Pain Description 1: Aching  Pain Intervention(s) 1: Medication (see MAR)    PT/OT  Gait     Gait  Base of Support: Widened  Speed/Susi: Shuffled,Slow  Step Length: Right shortened,Left shortened  Gait Abnormalities: Decreased step clearance,Altered arm swing,Shuffling gait,Trunk sway increased  Ambulation - Level of Assistance: Minimal assistance,Additional time  Distance (ft): 4 Feet (ft)  Assistive Device: Gait belt           Physical Exam:  Gen:NAD. Neuro: A&Ox3. Follows commands. Speech clear. Affect flat. PERRL. EOMI. Face symmetric. Tongue midline. FUNG spontaneously. Generalized weakness. Left inattention. Negative drift. Gait deferred.   Skin: Right frontal incision C/D/I with sutures in place    CT head without contrast on 22 at 2126 shows postoperative changes and pneumocephalus following tumor resection. No unexpected postoperative complication is identified. MRI brain with and without contrast on 06/07/22 shows recent right frontal lobe mass resection, with postoperative changes as described above. Persistent vasogenic edema, regional mass effect and right to left midline shift. Blood product within the resection cavity and mild surrounding diffusion restriction likely postoperative. No significant residual masslike enhancement though close follow-up is recommended. 24 hour results:    Recent Results (from the past 24 hour(s))   METABOLIC PANEL, BASIC    Collection Time: 06/08/22  5:15 AM   Result Value Ref Range    Sodium 136 136 - 145 mmol/L    Potassium 3.9 3.5 - 5.1 mmol/L    Chloride 104 97 - 108 mmol/L    CO2 26 21 - 32 mmol/L    Anion gap 6 5 - 15 mmol/L    Glucose 152 (H) 65 - 100 mg/dL    BUN 25 (H) 6 - 20 MG/DL    Creatinine 0.94 0.70 - 1.30 MG/DL    BUN/Creatinine ratio 27 (H) 12 - 20      GFR est AA >60 >60 ml/min/1.73m2    GFR est non-AA >60 >60 ml/min/1.73m2    Calcium 8.2 (L) 8.5 - 10.1 MG/DL   MAGNESIUM    Collection Time: 06/08/22  5:15 AM   Result Value Ref Range    Magnesium 2.5 (H) 1.6 - 2.4 mg/dL   PHOSPHORUS    Collection Time: 06/08/22  5:15 AM   Result Value Ref Range    Phosphorus 2.4 (L) 2.6 - 4.7 MG/DL   CBC WITH AUTOMATED DIFF    Collection Time: 06/08/22  5:15 AM   Result Value Ref Range    WBC 21.7 (H) 4.1 - 11.1 K/uL    RBC 4.62 4.10 - 5.70 M/uL    HGB 14.0 12.1 - 17.0 g/dL    HCT 43.3 36.6 - 50.3 %    MCV 93.7 80.0 - 99.0 FL    MCH 30.3 26.0 - 34.0 PG    MCHC 32.3 30.0 - 36.5 g/dL    RDW 11.8 11.5 - 14.5 %    PLATELET 740 096 - 424 K/uL    MPV 10.7 8.9 - 12.9 FL    NRBC 0.0 0  WBC    ABSOLUTE NRBC 0.00 0.00 - 0.01 K/uL    NEUTROPHILS 88 (H) 32 - 75 %    LYMPHOCYTES 3 (L) 12 - 49 %    MONOCYTES 7 5 - 13 %    EOSINOPHILS 0 0 - 7 %    BASOPHILS 0 0 - 1 %    IMMATURE GRANULOCYTES 2 (H) 0.0 - 0.5 %    ABS. NEUTROPHILS 19.1 (H) 1.8 - 8.0 K/UL    ABS.  LYMPHOCYTES 0.7 (L) 0.8 - 3.5 K/UL    ABS. MONOCYTES 1.5 (H) 0.0 - 1.0 K/UL    ABS. EOSINOPHILS 0.0 0.0 - 0.4 K/UL    ABS. BASOPHILS 0.0 0.0 - 0.1 K/UL    ABS. IMM. GRANS. 0.4 (H) 0.00 - 0.04 K/UL    DF SMEAR SCANNED      RBC COMMENTS NORMOCYTIC, NORMOCHROMIC            Assessment:     Active Problems:    Brain mass (6/2/2022)        Plan:   1. Right frontal brain mass   - s/p crani 06/06   - cont decadron taper   - cont keppra   - PT/OT evals   - normalize and mobilize   [facility] for psychosocial support   - Med onc and rad onc evals  2. Cerebral edema with brain compression   - due to #1   - plans as above  3. Pneumocephalus   - due to surgery   - Improving  4. HTN   - SBP<140   - Cardene PRN   - Hydralazine/labetalol PRN   - Cont Norvasc, lisinopril   - Intensivist following  5. Leukocytosis   - WBC 21.7 (down from 23.3)   - Afebrile   - Likely due to stress of surgery and steroids   - cont to monitor    Activity: up with assist  DVT ppx: SCDs  Dispo: tbd    Plan d/w Dr. Melissa Prieto, intensivist, nurse, children at bedside. Needs BP better controlled before transferring out of ICU.       Wilver Lopez NP

## 2022-06-08 NOTE — PROGRESS NOTES
Problem: Mobility Impaired (Adult and Pediatric)  Goal: *Acute Goals and Plan of Care (Insert Text)  Description:   FUNCTIONAL STATUS PRIOR TO ADMISSION: Patient was independent and active without use of DME. Works full time as a  at [employer]. HOME SUPPORT PRIOR TO ADMISSION: The patient lived with his spouse but did not require assist.    Physical Therapy Goals  Initiated 6/7/2022  1. Patient will move from supine to sit and sit to supine  and scoot up and down in bed with supervision/set-up within 7 day(s). 2.  Patient will transfer from bed to chair and chair to bed with supervision/set-up using the least restrictive device within 7 day(s). 3.  Patient will perform sit to stand with supervision/set-up within 7 day(s). 4.  Patient will ambulate with supervision/set-up for 150 feet with the least restrictive device within 7 day(s). 5.  Patient will ascend/descend 14 stairs with  handrail(s) with supervision/set-up within 7 day(s). 6.  Patient will improve Shipley Balance score by 7 points within 7 days. Outcome: Progressing Towards Goal     PHYSICAL THERAPY TREATMENT  Patient: Dick Gore (40 y.o. male)  Date: 6/8/2022  Diagnosis: Brain mass [G93.89] <principal problem not specified>  Procedure(s) (LRB):  RIGHT  FRONTAL CRANIOTOMY WITH FLOUROCINE (Right) 2 Days Post-Op  Precautions: Fall,Skin,Seizure  Chart, physical therapy assessment, plan of care and goals were reviewed. ASSESSMENT  Patient continues with skilled PT services and is progressing towards goals however remains most limited by L sided weakness (UE>LE), L inattention, overall poor visual attention, impaired L/R discrimination, decreased command following, increased processing time, decr sustained attention to task, impaired balance, and impaired gait leading to increased falls risk and dependency from baseline level. Received pt supine in bed, cleared for mobility by RN.  BPs monitored throughout and remained in the 087V systolic. He continues to require significantly increased time with all tasks and frequent redirection - tends to drift off or close eyes mid task. He was able to mobilize to EOB with up to mod A. In sitting, demos L lateral LOBs initially requiring near constant assist to maintain midline - improved with time to acclimate and cues. Progressed to completing sit<>stands, marches, and taking several shuffled steps over to chair with increased time, max cues, and min A but no knee buckle. Remained up in chair at end of session, appears fatigued. VSS. He remains well below his baseline level and a high falls risk -recommending discharge to IPR to maximize indep and safety, pending medical POC. . If home, he will need physical and cognitive assist with all ADLs/mobility and 24/7 SUP at this time. .    Current Level of Function Impacting Discharge (mobility/balance): min/mod A; impaired cognition; L inattention     Other factors to consider for discharge: high falls risk; indep at baseline          PLAN :  Patient continues to benefit from skilled intervention to address the above impairments. Continue treatment per established plan of care. to address goals. Recommendation for discharge: (in order for the patient to meet his/her long term goals)  recommending discharge to IPR to maximize indep and safety, pending medical POC. . If home, he will need physical and cognitive assist with all ADLs/mobility and 24/7 SUP at this time. .    This discharge recommendation:  A follow-up discussion with the attending provider and/or case management is planned    IF patient discharges home will need the following DME: to be determined (TBD)       SUBJECTIVE:   Patient stated You want my butt to land there.     OBJECTIVE DATA SUMMARY:   Critical Behavior:  Neurologic State: Alert,Drowsy  Orientation Level: Oriented X4  Cognition: Follows commands  Safety/Judgement: Awareness of environment,Decreased awareness of need for assistance,Decreased awareness of need for safety,Decreased insight into deficits,Fall prevention  Functional Mobility Training:  Bed Mobility:     Supine to Sit: Moderate assistance; Additional time     Transfers:  Sit to Stand: Minimum assistance  Stand to Sit: Minimum assistance  Bed to Chair: Minimum assistance    Balance:  Sitting: Impaired  Sitting - Static: Fair (occasional)  Sitting - Dynamic: Fair (occasional)  Standing: Impaired; With support  Standing - Static: Fair  Standing - Dynamic : Fair;Constant support  Ambulation/Gait Training:  Distance (ft): 4 Feet (ft)  Assistive Device: Gait belt  Ambulation - Level of Assistance: Minimal assistance; Additional time  Gait Abnormalities: Decreased step clearance; Altered arm swing;Shuffling gait;Trunk sway increased  Base of Support: Widened  Speed/Susi: Shuffled; Slow  Step Length: Right shortened;Left shortened    Activity Tolerance:   Good and requires frequent rest breaks    After treatment patient left in no apparent distress:   Sitting in chair, Call bell within reach, Bed / chair alarm activated, and Caregiver / family present    COMMUNICATION/COLLABORATION:   The patients plan of care was discussed with: Registered nurse.      Duc Rick PT, DPT   Time Calculation: 34 mins

## 2022-06-09 PROCEDURE — 97530 THERAPEUTIC ACTIVITIES: CPT

## 2022-06-09 PROCEDURE — 74011250636 HC RX REV CODE- 250/636: Performed by: NEUROLOGICAL SURGERY

## 2022-06-09 PROCEDURE — 74011250637 HC RX REV CODE- 250/637: Performed by: NEUROLOGICAL SURGERY

## 2022-06-09 PROCEDURE — 74011000250 HC RX REV CODE- 250: Performed by: NEUROLOGICAL SURGERY

## 2022-06-09 PROCEDURE — 74011250637 HC RX REV CODE- 250/637: Performed by: EMERGENCY MEDICINE

## 2022-06-09 PROCEDURE — 74011250636 HC RX REV CODE- 250/636: Performed by: NURSE PRACTITIONER

## 2022-06-09 PROCEDURE — 99231 SBSQ HOSP IP/OBS SF/LOW 25: CPT | Performed by: INTERNAL MEDICINE

## 2022-06-09 PROCEDURE — 74011250636 HC RX REV CODE- 250/636: Performed by: EMERGENCY MEDICINE

## 2022-06-09 PROCEDURE — 65610000006 HC RM INTENSIVE CARE

## 2022-06-09 PROCEDURE — 97116 GAIT TRAINING THERAPY: CPT

## 2022-06-09 PROCEDURE — 77010033678 HC OXYGEN DAILY

## 2022-06-09 PROCEDURE — 74011250637 HC RX REV CODE- 250/637: Performed by: NURSE PRACTITIONER

## 2022-06-09 RX ORDER — LISINOPRIL 20 MG/1
20 TABLET ORAL ONCE
Status: COMPLETED | OUTPATIENT
Start: 2022-06-09 | End: 2022-06-09

## 2022-06-09 RX ORDER — HYDRALAZINE HYDROCHLORIDE 20 MG/ML
10 INJECTION INTRAMUSCULAR; INTRAVENOUS
Status: DISCONTINUED | OUTPATIENT
Start: 2022-06-09 | End: 2022-06-15 | Stop reason: HOSPADM

## 2022-06-09 RX ORDER — LISINOPRIL 20 MG/1
40 TABLET ORAL DAILY
Status: DISCONTINUED | OUTPATIENT
Start: 2022-06-10 | End: 2022-06-15 | Stop reason: HOSPADM

## 2022-06-09 RX ORDER — LABETALOL 100 MG/1
100 TABLET, FILM COATED ORAL EVERY 12 HOURS
Status: DISCONTINUED | OUTPATIENT
Start: 2022-06-09 | End: 2022-06-15 | Stop reason: HOSPADM

## 2022-06-09 RX ORDER — DEXAMETHASONE SODIUM PHOSPHATE 4 MG/ML
2 INJECTION, SOLUTION INTRA-ARTICULAR; INTRALESIONAL; INTRAMUSCULAR; INTRAVENOUS; SOFT TISSUE EVERY 8 HOURS
Status: DISCONTINUED | OUTPATIENT
Start: 2022-06-09 | End: 2022-06-10

## 2022-06-09 RX ORDER — FUROSEMIDE 10 MG/ML
20 INJECTION INTRAMUSCULAR; INTRAVENOUS ONCE
Status: COMPLETED | OUTPATIENT
Start: 2022-06-09 | End: 2022-06-09

## 2022-06-09 RX ADMIN — LISINOPRIL 20 MG: 20 TABLET ORAL at 08:15

## 2022-06-09 RX ADMIN — NICARDIPINE HYDROCHLORIDE 5 MG/HR: 25 INJECTION, SOLUTION INTRAVENOUS at 12:51

## 2022-06-09 RX ADMIN — DEXAMETHASONE SODIUM PHOSPHATE 2 MG: 4 INJECTION, SOLUTION INTRA-ARTICULAR; INTRALESIONAL; INTRAMUSCULAR; INTRAVENOUS; SOFT TISSUE at 21:57

## 2022-06-09 RX ADMIN — SODIUM CHLORIDE, PRESERVATIVE FREE 10 ML: 5 INJECTION INTRAVENOUS at 15:41

## 2022-06-09 RX ADMIN — LEVETIRACETAM 500 MG: 500 TABLET, FILM COATED ORAL at 08:16

## 2022-06-09 RX ADMIN — NICARDIPINE HYDROCHLORIDE 7.5 MG/HR: 25 INJECTION, SOLUTION INTRAVENOUS at 04:59

## 2022-06-09 RX ADMIN — LEVETIRACETAM 500 MG: 500 TABLET, FILM COATED ORAL at 20:37

## 2022-06-09 RX ADMIN — Medication 10 ML: at 22:01

## 2022-06-09 RX ADMIN — LABETALOL HYDROCHLORIDE 10 MG: 5 INJECTION INTRAVENOUS at 12:51

## 2022-06-09 RX ADMIN — DOCUSATE SODIUM 100 MG: 100 CAPSULE, LIQUID FILLED ORAL at 08:16

## 2022-06-09 RX ADMIN — HYDRALAZINE HYDROCHLORIDE 10 MG: 20 INJECTION INTRAMUSCULAR; INTRAVENOUS at 18:32

## 2022-06-09 RX ADMIN — ACETAMINOPHEN 650 MG: 325 TABLET ORAL at 08:29

## 2022-06-09 RX ADMIN — Medication 10 ML: at 06:40

## 2022-06-09 RX ADMIN — LABETALOL HYDROCHLORIDE 10 MG: 5 INJECTION INTRAVENOUS at 19:10

## 2022-06-09 RX ADMIN — NICARDIPINE HYDROCHLORIDE 10 MG/HR: 25 INJECTION, SOLUTION INTRAVENOUS at 01:50

## 2022-06-09 RX ADMIN — SODIUM CHLORIDE, PRESERVATIVE FREE 10 ML: 5 INJECTION INTRAVENOUS at 22:01

## 2022-06-09 RX ADMIN — TAMSULOSIN HYDROCHLORIDE 0.4 MG: 0.4 CAPSULE ORAL at 08:15

## 2022-06-09 RX ADMIN — LABETALOL HYDROCHLORIDE 100 MG: 100 TABLET, FILM COATED ORAL at 20:37

## 2022-06-09 RX ADMIN — PANTOPRAZOLE SODIUM 40 MG: 40 TABLET, DELAYED RELEASE ORAL at 06:40

## 2022-06-09 RX ADMIN — DOCUSATE SODIUM 100 MG: 100 CAPSULE, LIQUID FILLED ORAL at 18:01

## 2022-06-09 RX ADMIN — POLYETHYLENE GLYCOL 3350 17 G: 17 POWDER, FOR SOLUTION ORAL at 20:38

## 2022-06-09 RX ADMIN — DIBASIC SODIUM PHOSPHATE, MONOBASIC POTASSIUM PHOSPHATE AND MONOBASIC SODIUM PHOSPHATE 1 TABLET: 852; 155; 130 TABLET ORAL at 18:01

## 2022-06-09 RX ADMIN — NICARDIPINE HYDROCHLORIDE 7.5 MG/HR: 25 INJECTION, SOLUTION INTRAVENOUS at 09:06

## 2022-06-09 RX ADMIN — DEXAMETHASONE SODIUM PHOSPHATE 4 MG: 4 INJECTION, SOLUTION INTRAMUSCULAR; INTRAVENOUS at 06:40

## 2022-06-09 RX ADMIN — FUROSEMIDE 20 MG: 10 INJECTION, SOLUTION INTRAVENOUS at 12:15

## 2022-06-09 RX ADMIN — LISINOPRIL 20 MG: 20 TABLET ORAL at 12:15

## 2022-06-09 RX ADMIN — DIBASIC SODIUM PHOSPHATE, MONOBASIC POTASSIUM PHOSPHATE AND MONOBASIC SODIUM PHOSPHATE 1 TABLET: 852; 155; 130 TABLET ORAL at 12:14

## 2022-06-09 RX ADMIN — SODIUM CHLORIDE, PRESERVATIVE FREE 10 ML: 5 INJECTION INTRAVENOUS at 06:40

## 2022-06-09 RX ADMIN — AMLODIPINE BESYLATE 10 MG: 5 TABLET ORAL at 08:16

## 2022-06-09 RX ADMIN — PANTOPRAZOLE SODIUM 40 MG: 40 TABLET, DELAYED RELEASE ORAL at 15:41

## 2022-06-09 RX ADMIN — DEXAMETHASONE SODIUM PHOSPHATE 2 MG: 4 INJECTION, SOLUTION INTRA-ARTICULAR; INTRALESIONAL; INTRAMUSCULAR; INTRAVENOUS; SOFT TISSUE at 15:41

## 2022-06-09 RX ADMIN — Medication 10 ML: at 15:42

## 2022-06-09 NOTE — PROGRESS NOTES
REASON FOR ICU ADMISSION:  Right frontal lobe brain mass with cerebral edema s/p craniotomy for resection    Interval Events    6/7-9-still requiring Cardene      Patient Vitals for the past 24 hrs:   Temp Pulse Resp BP SpO2   06/09/22 1600 98.8 °F (37.1 °C) 85 13 (!) 148/61 94 %   06/09/22 1500 -- 86 17 -- 95 %   06/09/22 1400 -- 83 13 (!) 107/90 94 %   06/09/22 1300 -- 76 12 119/65 91 %   06/09/22 1209 -- 88 17 (!) 133/57 94 %   06/09/22 1200 99.3 °F (37.4 °C) 88 15 (!) 122/106 93 %   06/09/22 1100 -- 77 11 125/63 91 %   06/09/22 1000 -- 86 14 (!) 125/58 95 %   06/09/22 0900 -- 79 13 133/71 96 %   06/09/22 0800 98.8 °F (37.1 °C) 80 17 (!) 148/79 94 %   06/09/22 0700 -- 79 10 134/62 93 %   06/09/22 0600 -- 89 15 132/72 93 %   06/09/22 0500 -- 87 16 (!) 148/59 --   06/09/22 0400 98.3 °F (36.8 °C) 85 12 134/76 (!) 89 %   06/09/22 0300 -- 81 12 118/68 (!) 87 %   06/09/22 0200 -- 85 11 129/72 (!) 87 %   06/09/22 0100 -- 80 11 118/62 (!) 88 %   06/09/22 0041 -- -- -- -- 90 %   06/09/22 0000 98.8 °F (37.1 °C) 86 13 129/66 92 %   06/08/22 2300 -- 78 9 119/61 91 %   06/08/22 2200 -- 87 14 137/62 93 %   06/08/22 2100 -- 84 11 (!) 154/120 91 %   06/08/22 2000 99.1 °F (37.3 °C) 83 11 (!) 146/62 92 %   06/08/22 1900 -- 86 14 (!) 151/72 93 %   06/08/22 1800 -- 83 12 (!) 147/68 92 %     Physical exam  General-NAD  Neuro-A&Ox3. Follows commands. Speech clear. Affect flat. PERRL. EOMI. Face symmetric. Tongue midline. FUNG spontaneously. Generalized weakness. Left inattention. Negative drift  CV- regular  Pulmonary-clear lungs-clear  Abdomen-soft, nontender, nondistended  Extremities-warm    No results found for this or any previous visit (from the past 24 hour(s)).   Imaging reviewed    Current Outpatient Medications   Medication Instructions    azelastine (ASTELIN) 137 mcg (0.1 %) nasal spray 2 Sprays, Both Nostrils, 2 TIMES DAILY, Use in each nostril as directed    B.animalis,bifid,infantis,long (Probiotic 4X) 10-15 mg TbEC Oral    baclofen (LIORESAL) 10 mg, Oral, 3 TIMES DAILY    budesonide-formoteroL (Symbicort) 160-4.5 mcg/actuation HFAA 2 Puffs, Inhalation, 2 TIMES DAILY    diclofenac EC (VOLTAREN) 75 mg, Oral, 2 TIMES DAILY WITH MEALS    esomeprazole (NEXIUM) 40 mg, Oral, 2 TIMES DAILY    fluticasone propionate (FLOVENT HFA) 44 mcg/actuation inhaler Inhalation    levocetirizine (XYZAL) 5 mg, Oral    metroNIDAZOLE (METROLOTION) 0.75 % lotion Topical, 2 TIMES DAILY    sildenafiL (REVATIO) 20 mg, Oral, AS NEEDED    solifenacin (VESICARE) 10 mg, Oral, DAILY    Sunosi 75 mg, Oral, DAILY         Assessment  Right frontal lobe brain mass  S/P craniotomy for resection    Plan    Neuro- serial neuro checks, follow-up neurosurgery recommendations, PT/OT as tolerated, on dexamethasone, Keppra, delirium prevention strategies    Cardiac-BP goals per surgical service, still requiring Cardene, increase lisinopril dose, cont norvasc    Pulmonary-encourage incentive spirometry    GI-diet as tolerated, on Protonix therapy    Renal-monitor urine output, correct electrolyte derangements as needed, flomax for retention    Hematology/oncology-SCDs for DVT prophylaxis, tissue diagnosis - likely GBM per surgery, follow-up oncology recommendations    ID- undulating WBC count - likely reactional/steroid related    Endocrinology-keep euglycemic    Time 35 mins    Signed By: Minna Lynch MD     June 9, 2022

## 2022-06-09 NOTE — PROGRESS NOTES
Problem: Mobility Impaired (Adult and Pediatric)  Goal: *Acute Goals and Plan of Care (Insert Text)  Description:   FUNCTIONAL STATUS PRIOR TO ADMISSION: Patient was independent and active without use of DME. Works full time as a  at [employer]. HOME SUPPORT PRIOR TO ADMISSION: The patient lived with his spouse but did not require assist.    Physical Therapy Goals  Initiated 6/7/2022  1. Patient will move from supine to sit and sit to supine  and scoot up and down in bed with supervision/set-up within 7 day(s). 2.  Patient will transfer from bed to chair and chair to bed with supervision/set-up using the least restrictive device within 7 day(s). 3.  Patient will perform sit to stand with supervision/set-up within 7 day(s). 4.  Patient will ambulate with supervision/set-up for 150 feet with the least restrictive device within 7 day(s). 5.  Patient will ascend/descend 14 stairs with  handrail(s) with supervision/set-up within 7 day(s). 6.  Patient will improve Shipley Balance score by 7 points within 7 days. Outcome: Progressing Towards Goal     PHYSICAL THERAPY TREATMENT  Patient: Mendel Morse (62 y.o. male)  Date: 6/9/2022  Diagnosis: Brain mass [G93.89] <principal problem not specified>  Procedure(s) (LRB):  RIGHT  FRONTAL CRANIOTOMY WITH FLOUROCINE (Right) 3 Days Post-Op  Precautions: Fall,Skin,Seizure  Chart, physical therapy assessment, plan of care and goals were reviewed. ASSESSMENT  Patient continues with skilled PT services and is slowly progressing towards goals however remains most limited by L sided weakness (UE>LE), L inattention, overall poor visual attention, impaired L/R discrimination, decreased command following, increased processing time, decr sustained attention to task, impaired balance, and impaired gait leading to increased falls risk and dependency from baseline level. Received pt supine in bed, cleared for mobility by RN.  BPs monitored throughout and remained in the 922C to 609Y systolic. SpO2 95% on room air with activity. Affect appears brighter this session. He continues to require increased time with all tasks and frequent redirection. He was able to mobilize to EOB with min A and one step cues. Initial sitting balance improved today. Progressed to completing repeated sit<>stands, marches, and taking several shuffled steps over to chair with increased time, max cues, and min A/mod A. Attempted fwd gait progression however pt with B knee buckling, difficulty with motor planning, and ?fear of falling (continuously trying to keep hold of chair) - may benefit from trial RW next session. Remained up in chair, VSS, NAD. He remains well below his baseline level and a high falls risk -recommending discharge to [facility] to maximize indep and safety, pending medical POC. . If home, he will need physical and cognitive assist with all ADLs/mobility and 24/7 SUP at this time. ..     Current Level of Function Impacting Discharge (mobility/balance): mod/min A x 2    Other factors to consider for discharge: below baseline          PLAN :  Patient continues to benefit from skilled intervention to address the above impairments. Continue treatment per established plan of care. to address goals. Recommendation for discharge: (in order for the patient to meet his/her long term goals)  Therapy 3 hours per day 5-7 days per week    This discharge recommendation:  A follow-up discussion with the attending provider and/or case management is planned    IF patient discharges home will need the following DME: to be determined (TBD)       SUBJECTIVE:   Patient stated potassium chloride does not contain salt and it is pink.     OBJECTIVE DATA SUMMARY:   Critical Behavior:  Neurologic State: Alert  Orientation Level: Oriented X4  Cognition: Follows commands  Safety/Judgement: Awareness of environment,Decreased awareness of need for assistance,Decreased awareness of need for safety,Decreased insight into deficits,Fall prevention  Functional Mobility Training:  Bed Mobility:     Supine to Sit: Minimum assistance; Additional time    Transfers:  Sit to Stand: Minimum assistance  Stand to Sit: Minimum assistance  Bed to Chair: Assist x2;Minimum assistance     Balance:  Sitting: Impaired; With support  Sitting - Static: Fair (occasional); Good (unsupported)  Sitting - Dynamic: Fair (occasional)  Standing: Impaired; With support  Standing - Static: Fair  Standing - Dynamic : Fair;Constant support    Ambulation/Gait Training:  Distance (ft): 5 Feet (ft)  Assistive Device: Gait belt  Ambulation - Level of Assistance: Assist x2;Minimal assistance; Moderate assistance  Gait Abnormalities: Decreased step clearance;Shuffling gait;Trunk sway increased  Base of Support: Widened  Speed/Susi: Slow;Shuffled  Step Length: Left shortened;Right shortened    Activity Tolerance:   Good, SpO2 stable on RA, requires frequent rest breaks, and observed SOB with activity    After treatment patient left in no apparent distress:   Sitting in chair, Call bell within reach, and Bed / chair alarm activated    COMMUNICATION/COLLABORATION:   The patients plan of care was discussed with: Registered nurse.      Donna Zuniga, PT, DPT   Time Calculation: 24 mins

## 2022-06-09 NOTE — PROGRESS NOTES
[facility] at [address], [address]  [place] [address]: [phone]  F: [phone]    Reason for Visit:   Hector Tyler is a 61 y.o. male who is seen for fu of brain tumor. Treatment History:   22 RIGHT frontal craniotomy    History of Present Illness:     Seen today in hospital as a new patient for brain tumor. Admitted with increasing confusion. 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 MRI confirmed. Seen by neurosurgery and had crani 22. Seen today in ICU. Family with pt and pt eating sub. Pt doing ok. Limited conversation. ROS not obtainable    INTERIM HX:  Seen today for fu in ICU. Path for send out pt in bed comfortable with family at bedside. No fevers/ chills/ chest pain/ SOB/ nausea/ vomiting/diarrhea/       Past Medical History:   Diagnosis Date    Arthritis     GERD (gastroesophageal reflux disease)     Morbid obesity (HCC)       Past Surgical History:   Procedure Laterality Date    HX BACK SURGERY      2 laminectomies and fusion    HX ORTHOPAEDIC Right     orif hand    HX SHOULDER ARTHROSCOPY      HX TONSILLECTOMY      as a child      Social History     Tobacco Use    Smoking status: Former Smoker     Quit date: 1981     Years since quittin.0    Smokeless tobacco: Never Used   Substance Use Topics    Alcohol use: Yes     Comment: occasional      History reviewed. No pertinent family history.   Current Facility-Administered Medications   Medication Dose Route Frequency    dexamethasone (DECADRON) 4 mg/mL injection 2 mg  2 mg IntraVENous Q8H    hydrALAZINE (APRESOLINE) 20 mg/mL injection 10 mg  10 mg IntraVENous Q4H PRN    [START ON 6/10/2022] lisinopriL (PRINIVIL, ZESTRIL) tablet 40 mg  40 mg Oral DAILY    phosphorus (K PHOS NEUTRAL) 250 mg tablet 1 Tablet  1 Tablet Oral BID    amLODIPine (NORVASC) tablet 10 mg  10 mg Oral DAILY    levETIRAcetam (KEPPRA) tablet 500 mg  500 mg Oral Q12H    tamsulosin (FLOMAX) capsule 0.4 mg  0.4 mg Oral DAILY    morphine injection 1 mg  1 mg IntraVENous Q2H PRN    labetaloL (NORMODYNE;TRANDATE) injection 10 mg  10 mg IntraVENous Q4H PRN    pantoprazole (PROTONIX) tablet 40 mg  40 mg Oral ACB&D    sodium chloride (NS) flush 5-40 mL  5-40 mL IntraVENous Q8H    sodium chloride (NS) flush 5-40 mL  5-40 mL IntraVENous PRN    docusate sodium (COLACE) capsule 100 mg  100 mg Oral BID    polyethylene glycol (MIRALAX) packet 17 g  17 g Oral DAILY PRN    niCARdipine (CARDENE) 25 mg in 0.9% sodium chloride 250 mL ([id number])  0-15 mg/hr IntraVENous TITRATE    sodium chloride (NS) flush 5-40 mL  5-40 mL IntraVENous Q8H    acetaminophen (TYLENOL) tablet 650 mg  650 mg Oral Q6H PRN    Or    acetaminophen (TYLENOL) suppository 650 mg  650 mg Rectal Q6H PRN    ondansetron (ZOFRAN ODT) tablet 4 mg  4 mg Oral Q8H PRN    Or    ondansetron (ZOFRAN) injection 4 mg  4 mg IntraVENous Q6H PRN    oxyCODONE IR (ROXICODONE) tablet 10 mg  10 mg Oral Q4H PRN    oxyCODONE IR (ROXICODONE) tablet 5 mg  5 mg Oral Q4H PRN      Allergies   Allergen Reactions    Keflex [Cephalexin] Rash        Review of Systems: not obtainable    Physical Exam:     Visit Vitals  /65   Pulse 76   Temp 99.3 °F (37.4 °C)   Resp 12   Ht 5' 6\" (1.676 m)   Wt 248 lb 14.4 oz (112.9 kg)   SpO2 91%   BMI 40.17 kg/m²     ECOG PS: 2  General: No distress  Eyes:  anicteric sclerae  HENT: post surgery scar  Neck: Supple  Respiratory:  normal respiratory effort  CV: Normal rate, regular rhythm on monitor  MS: in bed  Skin: No rashes, ecchymoses, or petechiae. Normal temperature, turgor, and texture. Psych: responds to questions, limited conversation    Results:     Lab Results   Component Value Date/Time    WBC 21.7 (H) 06/08/2022 05:15 AM    HGB 14.0 06/08/2022 05:15 AM    HCT 43.3 06/08/2022 05:15 AM    PLATELET 400 20/02/4365 05:15 AM    MCV 93.7 06/08/2022 05:15 AM    ABS.  NEUTROPHILS 19.1 (H) 06/08/2022 05:15 AM     Lab Results   Component Value Date/Time    Sodium 136 06/08/2022 05:15 AM    Potassium 3.9 06/08/2022 05:15 AM    Chloride 104 06/08/2022 05:15 AM    CO2 26 06/08/2022 05:15 AM    Glucose 152 (H) 06/08/2022 05:15 AM    BUN 25 (H) 06/08/2022 05:15 AM    Creatinine 0.94 06/08/2022 05:15 AM    GFR est AA >60 06/08/2022 05:15 AM    GFR est non-AA >60 06/08/2022 05:15 AM    Calcium 8.2 (L) 06/08/2022 05:15 AM     Lab Results   Component Value Date/Time    Bilirubin, total 0.5 06/02/2022 02:32 PM    ALT (SGPT) 42 06/02/2022 02:32 PM    Alk. phosphatase 71 06/02/2022 02:32 PM    Protein, total 7.5 06/02/2022 02:32 PM    Albumin 3.5 06/02/2022 02:32 PM    Globulin 4.0 06/02/2022 02:32 PM       MRI Results (most recent):  Results from Hospital Encounter encounter on 06/02/22    MRI BRAIN W WO CONT    Narrative  INDICATION:  s/p crani    COMPARISON:  CT June 6, 2022, MRI June 2, 2022    TECHNIQUE:  Multiplanar multisequence acquisition without and with IV contrast  of the brain. FINDINGS:    There has been recent right frontal craniotomy with resection of previous large  right frontal lobe mass. There is susceptibility artifact in anterior frontal  region consistent with pneumocephalus. There is also susceptibility artifact  within the right frontal lobe resection cavity with heterogeneous T1 and T2  signal most consistent with blood product. No significant residual masslike  enhancement. Extensive surrounding vasogenic edema with persistent mass effect  upon the lateral ventricles, and approximately 9 mm of midline shift. Small  amount of extra-axial hemorrhage in the right lateral frontal region, as well as  small amount of intraventricular hemorrhage in the occipital horns and fourth  ventricle. No hydrocephalus. Small areas of T2 hyperintensity in the left  centrum semiovale are unchanged. Diffusion restriction around the right frontal  lobe resection cavity.  Major intracranial vascular flow voids are patent. Right  frontal scalp edema and fluid. Impression  Recent right frontal lobe mass resection, with postoperative changes as  described above. Persistent vasogenic edema, regional mass effect and right to  left midline shift. Blood product within the resection cavity and mild  surrounding diffusion restriction likely postoperative. No significant residual  masslike enhancement though close follow-up is recommended. Brain MRI:  IMPRESSION  Approximately 5 cm right anterior frontal parenchymal mass without other  associated areas of abnormal enhancement in the brain suggests primary brain  neoplasm/GBM although metastasis cannot be entirely excluded. Significant mass  Affect. .    CT Results (most recent):  Results from Hospital Encounter encounter on 06/02/22    CT HEAD WO CONT    Narrative  EXAM: CT HEAD WO CONT    INDICATION: s/p crani and tumor resection    COMPARISON: 6/2/2022. CONTRAST: None. TECHNIQUE: Unenhanced CT of the head was performed using 5 mm images. Brain and  bone windows were generated. Coronal and sagittal reformats. CT dose reduction  was achieved through use of a standardized protocol tailored for this  examination and automatic exposure control for dose modulation. FINDINGS:  The patient has undergone right frontal craniotomy in the interval.  Postoperative changes and pneumocephalus are noted following tumor resection. Subfalcine herniation persists but has improved compared to the prior exam. No  unexpected postoperative complication is identified. Impression  Postoperative changes and pneumocephalus following tumor resection. No  unexpected postoperative complication is identified. CT C/A/P  IMPRESSION  1. No evidence of primary malignancy in the chest, abdomen or pelvis. 2. Incidental findings as above    Records reviewed and summarized above. Pathology report(s) reviewed above. Radiology report(s) reviewed above.       Assessment/PLAN:     1) Brain tumor  5 cm right anterior frontal parenchymal mass without other  associated areas of abnormal enhancement in the brain suggests primary brain  Neoplasm  Post neurosurgery/ RIGHT frontal craniotomy 6/6/22. PATH:   Brain, right frontal lobe, excision:        Hypercellular glial tissue with necrosis.      Pending additional consultation for further characterization.        Results will be reported in an addendum. Seen early this am for fu. In bed. Family at bedside. Did ok over night per pt. Limited conversation. comfortable  Path pending send out as above. Discussed treatment plan from here is pending path. Pt will recover from surgery and we will follow for path. 2) confusion at presentation. Converses some. Monitor. 3) HTN per IM. 4) psychosocial. Confusion. Family at bedside. Has good support. 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 Geovanna Werner Phd [facility]'s care.     Signed By: Venita Castillo DO

## 2022-06-09 NOTE — PROGRESS NOTES
1930: Bedside and Verbal shift change report given to [address] (oncoming nurse) by Dorcas Mariano (offgoing nurse). Report included the following information SBAR, Kardex, ED Summary, Procedure Summary, MAR, Recent Results, and Cardiac Rhythm NSR . Dual neuro completed at bedside.

## 2022-06-09 NOTE — PROGRESS NOTES
Neurosurgery Progress Note  [facility], ACNP-BC          Admit Date: 2022   LOS: 7 days        Daily Progress Note: 2022    POD:3 Day Post-Op    S/P: Procedure(s):  RIGHT  FRONTAL CRANIOTOMY WITH FLOUROSCEIN    Subjective: The patient is still having issues with BP on Cardene. He is in bed reading a book. Low O2 sats overnight due to patient typically using CPAP at night for sleep. Denies numbness, tingling, chest pain, leg pain, nausea, vomiting, difficulty swallowing, and dyspnea. Objective:     Vital signs  Temp (24hrs), Av.9 °F (37.2 °C), Min:98.3 °F (36.8 °C), Max:99.3 °F (37.4 °C)   701 -  1900  In: 717.5 [I.V.:717.5]  Out: 0836 [Urine:1450]   190 -  0700  In: 3571.3 [P.O.:300; I.V.:3271.3]  Out: 2275 [Urine:2275]    Visit Vitals  /65   Pulse 76   Temp 99.3 °F (37.4 °C)   Resp 12   Ht 5' 6\" (1.676 m)   Wt 112.9 kg (248 lb 14.4 oz)   SpO2 91%   BMI 40.17 kg/m²    O2 Flow Rate (L/min): 2 l/min O2 Device: Nasal cannula     Pain control  Pain Assessment  Pain Scale 1: Numeric (0 - 10)  Pain Intensity 1: 2  Pain Onset 1: Postop  Pain Location 1: Head  Pain Orientation 1: Anterior  Pain Description 1: Aching  Pain Intervention(s) 1: Medication (see MAR)    PT/OT  Gait     Gait  Base of Support: Widened  Speed/Susi: Slow,Shuffled  Step Length: Left shortened,Right shortened  Gait Abnormalities: Decreased step clearance,Shuffling gait,Trunk sway increased  Ambulation - Level of Assistance: Assist x2,Minimal assistance,Moderate assistance  Distance (ft): 5 Feet (ft)  Assistive Device: Gait belt           Physical Exam:  Gen:NAD. Neuro: A&Ox3. Follows commands. Speech clear. Affect flat. PERRL. EOMI. Face symmetric. Tongue midline. FUNG spontaneously. Generalized weakness. Left inattention. Negative drift. Gait deferred.   Skin: Right frontal incision C/D/I with sutures in place    CT head without contrast on 22 at 2126 shows postoperative changes and pneumocephalus following tumor resection. No unexpected postoperative complication is identified. MRI brain with and without contrast on 06/07/22 shows recent right frontal lobe mass resection, with postoperative changes as described above. Persistent vasogenic edema, regional mass effect and right to left midline shift. Blood product within the resection cavity and mild surrounding diffusion restriction likely postoperative. No significant residual masslike enhancement though close follow-up is recommended. 24 hour results:    No results found for this or any previous visit (from the past 24 hour(s)). Assessment:     Active Problems:    Brain mass (6/2/2022)        Plan:   1. Right frontal brain mass   - s/p crani 06/06   - cont decadron taper   - cont keppra   - PT/OT evals   - normalize and mobilize   [facility] for psychosocial support   - Med onc and rad onc evals appreciated  2. Cerebral edema with brain compression   - due to #1   - plans as above  3. Pneumocephalus   - due to surgery   - Improving  4. HTN   - SBP<140   - Cardene PRN   - Hydralazine/labetalol PRN   - Cont Norvasc, lisinopril with adjustments by intensivist   - Intensivist following  5. Leukocytosis   - WBC 21.7 (down from 23.3)   - Afebrile   - Likely due to stress of surgery and steroids   - cont to monitor    Activity: up with assist  DVT ppx: SCDs  Dispo: tbd    Plan d/w Dr. Chase Mosher, intensivist, nurse, wife and daughter at bedside. Would like patient to be off Cardene before transferring to [facility].       Krupa Fisher NP

## 2022-06-09 NOTE — PROGRESS NOTES
1930 - Bedside and Verbal shift change report given to Lenora Felix (oncoming nurse) by Siva Nava (offgoing nurse). Report included the following information SBAR, Kardex, ED Summary, OR Summary, Procedure Summary, Intake/Output, MAR, Recent Results, Cardiac Rhythm NSR, Alarm Parameters  and Dual Neuro Assessment. 0730 - Bedside and Verbal shift change report given to Siva Nava (oncoming nurse) by Lenora Felix (offgoing nurse). Report included the following information SBAR, Kardex, ED Summary, Procedure Summary, Intake/Output, MAR, Recent Results, Cardiac Rhythm NSR, Alarm Parameters  and Dual Neuro Assessment.

## 2022-06-10 LAB
ANION GAP SERPL CALC-SCNC: 5 MMOL/L (ref 5–15)
BASOPHILS # BLD: 0 K/UL (ref 0–0.1)
BASOPHILS NFR BLD: 0 % (ref 0–1)
BUN SERPL-MCNC: 30 MG/DL (ref 6–20)
BUN/CREAT SERPL: 33 (ref 12–20)
CALCIUM SERPL-MCNC: 7.8 MG/DL (ref 8.5–10.1)
CHLORIDE SERPL-SCNC: 103 MMOL/L (ref 97–108)
CO2 SERPL-SCNC: 27 MMOL/L (ref 21–32)
CREAT SERPL-MCNC: 0.9 MG/DL (ref 0.7–1.3)
DIFFERENTIAL METHOD BLD: ABNORMAL
EOSINOPHIL # BLD: 0 K/UL (ref 0–0.4)
EOSINOPHIL NFR BLD: 0 % (ref 0–7)
ERYTHROCYTE [DISTWIDTH] IN BLOOD BY AUTOMATED COUNT: 11.9 % (ref 11.5–14.5)
GLUCOSE SERPL-MCNC: 141 MG/DL (ref 65–100)
HCT VFR BLD AUTO: 41.4 % (ref 36.6–50.3)
HGB BLD-MCNC: 13.7 G/DL (ref 12.1–17)
IMM GRANULOCYTES # BLD AUTO: 0.3 K/UL (ref 0–0.04)
IMM GRANULOCYTES NFR BLD AUTO: 2 % (ref 0–0.5)
LYMPHOCYTES # BLD: 0.7 K/UL (ref 0.8–3.5)
LYMPHOCYTES NFR BLD: 4 % (ref 12–49)
MAGNESIUM SERPL-MCNC: 2.6 MG/DL (ref 1.6–2.4)
MCH RBC QN AUTO: 30.8 PG (ref 26–34)
MCHC RBC AUTO-ENTMCNC: 33.1 G/DL (ref 30–36.5)
MCV RBC AUTO: 93 FL (ref 80–99)
MONOCYTES # BLD: 1.1 K/UL (ref 0–1)
MONOCYTES NFR BLD: 7 % (ref 5–13)
NEUTS SEG # BLD: 14.2 K/UL (ref 1.8–8)
NEUTS SEG NFR BLD: 87 % (ref 32–75)
NRBC # BLD: 0 K/UL (ref 0–0.01)
NRBC BLD-RTO: 0 PER 100 WBC
PHOSPHATE SERPL-MCNC: 3.3 MG/DL (ref 2.6–4.7)
PLATELET # BLD AUTO: 207 K/UL (ref 150–400)
PMV BLD AUTO: 10.4 FL (ref 8.9–12.9)
POTASSIUM SERPL-SCNC: 4.2 MMOL/L (ref 3.5–5.1)
RBC # BLD AUTO: 4.45 M/UL (ref 4.1–5.7)
RBC MORPH BLD: ABNORMAL
SODIUM SERPL-SCNC: 135 MMOL/L (ref 136–145)
WBC # BLD AUTO: 16.3 K/UL (ref 4.1–11.1)

## 2022-06-10 PROCEDURE — 74011250637 HC RX REV CODE- 250/637: Performed by: NEUROLOGICAL SURGERY

## 2022-06-10 PROCEDURE — 74011250636 HC RX REV CODE- 250/636: Performed by: NURSE PRACTITIONER

## 2022-06-10 PROCEDURE — 74011000250 HC RX REV CODE- 250: Performed by: NEUROLOGICAL SURGERY

## 2022-06-10 PROCEDURE — 74011250637 HC RX REV CODE- 250/637: Performed by: NURSE PRACTITIONER

## 2022-06-10 PROCEDURE — 97530 THERAPEUTIC ACTIVITIES: CPT

## 2022-06-10 PROCEDURE — 85025 COMPLETE CBC W/AUTO DIFF WBC: CPT

## 2022-06-10 PROCEDURE — 36415 COLL VENOUS BLD VENIPUNCTURE: CPT

## 2022-06-10 PROCEDURE — 80048 BASIC METABOLIC PNL TOTAL CA: CPT

## 2022-06-10 PROCEDURE — 97535 SELF CARE MNGMENT TRAINING: CPT

## 2022-06-10 PROCEDURE — 74011250637 HC RX REV CODE- 250/637: Performed by: EMERGENCY MEDICINE

## 2022-06-10 PROCEDURE — 65270000046 HC RM TELEMETRY

## 2022-06-10 PROCEDURE — 83735 ASSAY OF MAGNESIUM: CPT

## 2022-06-10 PROCEDURE — 84100 ASSAY OF PHOSPHORUS: CPT

## 2022-06-10 RX ORDER — DEXAMETHASONE 1 MG/1
2 TABLET ORAL EVERY 8 HOURS
Status: DISCONTINUED | OUTPATIENT
Start: 2022-06-10 | End: 2022-06-13

## 2022-06-10 RX ADMIN — Medication 10 ML: at 14:45

## 2022-06-10 RX ADMIN — SODIUM CHLORIDE, PRESERVATIVE FREE 10 ML: 5 INJECTION INTRAVENOUS at 14:45

## 2022-06-10 RX ADMIN — AMLODIPINE BESYLATE 10 MG: 5 TABLET ORAL at 08:18

## 2022-06-10 RX ADMIN — LEVETIRACETAM 500 MG: 500 TABLET, FILM COATED ORAL at 21:18

## 2022-06-10 RX ADMIN — DOCUSATE SODIUM 100 MG: 100 CAPSULE, LIQUID FILLED ORAL at 08:18

## 2022-06-10 RX ADMIN — PANTOPRAZOLE SODIUM 40 MG: 40 TABLET, DELAYED RELEASE ORAL at 16:27

## 2022-06-10 RX ADMIN — DIBASIC SODIUM PHOSPHATE, MONOBASIC POTASSIUM PHOSPHATE AND MONOBASIC SODIUM PHOSPHATE 1 TABLET: 852; 155; 130 TABLET ORAL at 08:18

## 2022-06-10 RX ADMIN — DEXAMETHASONE SODIUM PHOSPHATE 2 MG: 4 INJECTION, SOLUTION INTRA-ARTICULAR; INTRALESIONAL; INTRAMUSCULAR; INTRAVENOUS; SOFT TISSUE at 14:45

## 2022-06-10 RX ADMIN — TAMSULOSIN HYDROCHLORIDE 0.4 MG: 0.4 CAPSULE ORAL at 08:18

## 2022-06-10 RX ADMIN — DEXAMETHASONE SODIUM PHOSPHATE 2 MG: 4 INJECTION, SOLUTION INTRA-ARTICULAR; INTRALESIONAL; INTRAMUSCULAR; INTRAVENOUS; SOFT TISSUE at 06:33

## 2022-06-10 RX ADMIN — LABETALOL HYDROCHLORIDE 100 MG: 100 TABLET, FILM COATED ORAL at 08:18

## 2022-06-10 RX ADMIN — PANTOPRAZOLE SODIUM 40 MG: 40 TABLET, DELAYED RELEASE ORAL at 06:33

## 2022-06-10 RX ADMIN — SODIUM CHLORIDE, PRESERVATIVE FREE 10 ML: 5 INJECTION INTRAVENOUS at 06:33

## 2022-06-10 RX ADMIN — DEXAMETHASONE 2 MG: 1 TABLET ORAL at 21:18

## 2022-06-10 RX ADMIN — LISINOPRIL 40 MG: 20 TABLET ORAL at 08:18

## 2022-06-10 RX ADMIN — DIBASIC SODIUM PHOSPHATE, MONOBASIC POTASSIUM PHOSPHATE AND MONOBASIC SODIUM PHOSPHATE 1 TABLET: 852; 155; 130 TABLET ORAL at 18:53

## 2022-06-10 RX ADMIN — LABETALOL HYDROCHLORIDE 100 MG: 100 TABLET, FILM COATED ORAL at 21:18

## 2022-06-10 RX ADMIN — Medication 10 ML: at 06:33

## 2022-06-10 RX ADMIN — LEVETIRACETAM 500 MG: 500 TABLET, FILM COATED ORAL at 08:18

## 2022-06-10 NOTE — PROGRESS NOTES
Problem: Falls - Risk of  Goal: *Absence of Falls  Description: Document Lindsey Charles Fall Risk and appropriate interventions in the flowsheet.   Outcome: Progressing Towards Goal  Note: Fall Risk Interventions:  Mobility Interventions: Communicate number of staff needed for ambulation/transfer,OT consult for ADLs,Patient to call before getting OOB,PT Consult for mobility concerns    Mentation Interventions: Adequate sleep, hydration, pain control    Medication Interventions: Bed/chair exit alarm,Patient to call before getting OOB    Elimination Interventions: Call light in reach,Patient to call for help with toileting needs,Toileting schedule/hourly rounds              Problem: Patient Education: Go to Patient Education Activity  Goal: Patient/Family Education  Outcome: Progressing Towards Goal     Problem: TIA/CVA Stroke: 0-24 hours  Goal: Off Pathway (Use only if patient is Off Pathway)  Outcome: Progressing Towards Goal  Goal: Activity/Safety  Outcome: Progressing Towards Goal  Goal: Consults, if ordered  Outcome: Progressing Towards Goal  Goal: Diagnostic Test/Procedures  Outcome: Progressing Towards Goal  Goal: Nutrition/Diet  Outcome: Progressing Towards Goal  Goal: Discharge Planning  Outcome: Progressing Towards Goal  Goal: Medications  Outcome: Progressing Towards Goal  Goal: Respiratory  Outcome: Progressing Towards Goal  Goal: Treatments/Interventions/Procedures  Outcome: Progressing Towards Goal  Goal: Minimize risk of bleeding post-thrombolytic infusion  Outcome: Progressing Towards Goal  Goal: Monitor for complications post-thrombolytic infusion  Outcome: Progressing Towards Goal  Goal: Psychosocial  Outcome: Progressing Towards Goal  Goal: *Hemodynamically stable  Outcome: Progressing Towards Goal  Goal: *Neurologically stable  Description: Absence of additional neurological deficits    Outcome: Progressing Towards Goal  Goal: *Verbalizes anxiety and depression are reduced or absent  Outcome: Progressing Towards Goal  Goal: *Absence of Signs of Aspiration on Current Diet  Outcome: Progressing Towards Goal  Goal: *Absence of deep venous thrombosis signs and symptoms(Stroke Metric)  Outcome: Progressing Towards Goal  Goal: *Ability to perform ADLs and demonstrates progressive mobility and function  Outcome: Progressing Towards Goal  Goal: *Stroke education started(Stroke Metric)  Outcome: Progressing Towards Goal  Goal: *Dysphagia screen performed(Stroke Metric)  Outcome: Progressing Towards Goal  Goal: *Rehab consulted(Stroke Metric)  Outcome: Progressing Towards Goal     Problem: Patient Education: Go to Patient Education Activity  Goal: Patient/Family Education  Outcome: Progressing Towards Goal     Problem: TIA/CVA Stroke: 0-24 hours  Goal: Off Pathway (Use only if patient is Off Pathway)  Outcome: Progressing Towards Goal  Goal: Activity/Safety  Outcome: Progressing Towards Goal  Goal: Consults, if ordered  Outcome: Progressing Towards Goal  Goal: Diagnostic Test/Procedures  Outcome: Progressing Towards Goal  Goal: Nutrition/Diet  Outcome: Progressing Towards Goal  Goal: Discharge Planning  Outcome: Progressing Towards Goal  Goal: Medications  Outcome: Progressing Towards Goal  Goal: Respiratory  Outcome: Progressing Towards Goal  Goal: Treatments/Interventions/Procedures  Outcome: Progressing Towards Goal  Goal: Minimize risk of bleeding post-thrombolytic infusion  Outcome: Progressing Towards Goal  Goal: Monitor for complications post-thrombolytic infusion  Outcome: Progressing Towards Goal  Goal: Psychosocial  Outcome: Progressing Towards Goal  Goal: *Hemodynamically stable  Outcome: Progressing Towards Goal  Goal: *Neurologically stable  Description: Absence of additional neurological deficits    Outcome: Progressing Towards Goal  Goal: *Verbalizes anxiety and depression are reduced or absent  Outcome: Progressing Towards Goal  Goal: *Absence of Signs of Aspiration on Current Diet  Outcome: Progressing Towards Goal  Goal: *Absence of deep venous thrombosis signs and symptoms(Stroke Metric)  Outcome: Progressing Towards Goal  Goal: *Ability to perform ADLs and demonstrates progressive mobility and function  Outcome: Progressing Towards Goal  Goal: *Stroke education started(Stroke Metric)  Outcome: Progressing Towards Goal  Goal: *Dysphagia screen performed(Stroke Metric)  Outcome: Progressing Towards Goal  Goal: *Rehab consulted(Stroke Metric)  Outcome: Progressing Towards Goal     Problem: TIA/CVA Stroke: Day 2 Until Discharge  Goal: Off Pathway (Use only if patient is Off Pathway)  Outcome: Progressing Towards Goal  Goal: Activity/Safety  Outcome: Progressing Towards Goal  Goal: Diagnostic Test/Procedures  Outcome: Progressing Towards Goal  Goal: Nutrition/Diet  Outcome: Progressing Towards Goal  Goal: Discharge Planning  Outcome: Progressing Towards Goal  Goal: Medications  Outcome: Progressing Towards Goal  Goal: Respiratory  Outcome: Progressing Towards Goal  Goal: Treatments/Interventions/Procedures  Outcome: Progressing Towards Goal  Goal: Psychosocial  Outcome: Progressing Towards Goal  Goal: *Verbalizes anxiety and depression are reduced or absent  Outcome: Progressing Towards Goal  Goal: *Absence of aspiration  Outcome: Progressing Towards Goal  Goal: *Absence of deep venous thrombosis signs and symptoms(Stroke Metric)  Outcome: Progressing Towards Goal  Goal: *Optimal pain control at patient's stated goal  Outcome: Progressing Towards Goal  Goal: *Tolerating diet  Outcome: Progressing Towards Goal  Goal: *Ability to perform ADLs and demonstrates progressive mobility and function  Outcome: Progressing Towards Goal  Goal: *Stroke education continued(Stroke Metric)  Outcome: Progressing Towards Goal     Problem: Ischemic Stroke: Discharge Outcomes  Goal: *Verbalizes anxiety and depression are reduced or absent  Outcome: Progressing Towards Goal  Goal: *Verbalize understanding of risk factor modification(Stroke Metric)  Outcome: Progressing Towards Goal  Goal: *Hemodynamically stable  Outcome: Progressing Towards Goal  Goal: *Absence of aspiration pneumonia  Outcome: Progressing Towards Goal  Goal: *Aware of needed dietary changes  Outcome: Progressing Towards Goal  Goal: *Verbalize understanding of prescribed medications including anti-coagulants, anti-lipid, and/or anti-platelets(Stroke Metric)  Outcome: Progressing Towards Goal  Goal: *Tolerating diet  Outcome: Progressing Towards Goal  Goal: *Aware of follow-up diagnostics related to anticoagulants  Outcome: Progressing Towards Goal  Goal: *Ability to perform ADLs and demonstrates progressive mobility and function  Outcome: Progressing Towards Goal  Goal: *Absence of DVT(Stroke Metric)  Outcome: Progressing Towards Goal  Goal: *Absence of aspiration  Outcome: Progressing Towards Goal  Goal: *Optimal pain control at patient's stated goal  Outcome: Progressing Towards Goal  Goal: *Home safety concerns addressed  Outcome: Progressing Towards Goal  Goal: *Describes available resources and support systems  Outcome: Progressing Towards Goal  Goal: *Verbalizes understanding of activation of EMS(911) for stroke symptoms(Stroke Metric)  Outcome: Progressing Towards Goal  Goal: *Understands and describes signs and symptoms to report to providers(Stroke Metric)  Outcome: Progressing Towards Goal  Goal: *Neurolgocially stable (absence of additional neurological deficits)  Outcome: Progressing Towards Goal  Goal: *Verbalizes importance of follow-up with primary care physician(Stroke Metric)  Outcome: Progressing Towards Goal  Goal: *Smoking cessation discussed,if applicable(Stroke Metric)  Outcome: Progressing Towards Goal  Goal: *Depression screening completed(Stroke Metric)  Outcome: Progressing Towards Goal

## 2022-06-10 NOTE — ROUTINE PROCESS
Bedside and Verbal shift change report given to Faye Bain RN (oncoming nurse) by Brandon Gutiérrez RN (offgoing nurse). Report included the following information SBAR, Kardex, MAR, Cardiac Rhythm NSR and Dual Neuro Assessment.

## 2022-06-10 NOTE — PROGRESS NOTES
Neurosurgery Progress Note  [facility]          Admit Date: 2022   LOS: 8 days        Daily Progress Note: 6/10/2022    POD:4 Day Post-Op    S/P: Procedure(s):  RIGHT  FRONTAL CRANIOTOMY WITH FLOUROSCEIN    Subjective: The patient has been off Cardene since 1800 yesterday and last IVP meds for BP were at 1900 yesterday. He is sitting up in the chair doing well today. Denies numbness, tingling, chest pain, leg pain, nausea, vomiting, difficulty swallowing, and dyspnea. Objective:     Vital signs  Temp (24hrs), Av °F (37.2 °C), Min:98.5 °F (36.9 °C), Max:99.2 °F (37.3 °C)   06/10 0701 - 06/10 1900  In: -   Out: 0153 [Urine:1175]  1901 - 06/10 0700  In: 1983.3 [P.O.:540; I.V.:1443.3]  Out: 6280 [Urine:6280]    Visit Vitals  BP (!) 147/80   Pulse 84   Temp 98.5 °F (36.9 °C)   Resp 14   Ht 5' 6\" (1.676 m)   Wt 110.5 kg (243 lb 9.7 oz)   SpO2 95%   BMI 39.32 kg/m²    O2 Flow Rate (L/min): 4 l/min O2 Device: None (Room air)     Pain control  Pain Assessment  Pain Scale 1: Numeric (0 - 10)  Pain Intensity 1: 0  Pain Onset 1: Postop  Pain Location 1: Head  Pain Orientation 1: Anterior  Pain Description 1: Aching  Pain Intervention(s) 1: Medication (see MAR)    PT/OT  Gait     Gait  Base of Support: Widened,Center of gravity altered  Speed/Susi: Slow,Shuffled  Step Length: Left shortened,Right shortened  Gait Abnormalities: Decreased step clearance,Trunk sway increased,Shuffling gait  Ambulation - Level of Assistance: Moderate assistance,Assist x2  Distance (ft): 5 Feet (ft)  Assistive Device: Gait belt,Walker, rolling           Physical Exam:  Gen:NAD. Neuro: A&Ox3. Follows commands. Speech clear. Affect flat. PERRL. EOMI. Face symmetric. Tongue midline. FUNG spontaneously. Generalized weakness. Left inattention. Negative drift. Gait deferred.   Skin: Right frontal incision C/D/I with sutures in place    CT head without contrast on 22 at 2126 shows postoperative changes and pneumocephalus following tumor resection. No unexpected postoperative complication is identified. MRI brain with and without contrast on 06/07/22 shows recent right frontal lobe mass resection, with postoperative changes as described above. Persistent vasogenic edema, regional mass effect and right to left midline shift. Blood product within the resection cavity and mild surrounding diffusion restriction likely postoperative. No significant residual masslike enhancement though close follow-up is recommended. 24 hour results:    Recent Results (from the past 24 hour(s))   METABOLIC PANEL, BASIC    Collection Time: 06/10/22  4:24 AM   Result Value Ref Range    Sodium 135 (L) 136 - 145 mmol/L    Potassium 4.2 3.5 - 5.1 mmol/L    Chloride 103 97 - 108 mmol/L    CO2 27 21 - 32 mmol/L    Anion gap 5 5 - 15 mmol/L    Glucose 141 (H) 65 - 100 mg/dL    BUN 30 (H) 6 - 20 MG/DL    Creatinine 0.90 0.70 - 1.30 MG/DL    BUN/Creatinine ratio 33 (H) 12 - 20      GFR est AA >60 >60 ml/min/1.73m2    GFR est non-AA >60 >60 ml/min/1.73m2    Calcium 7.8 (L) 8.5 - 10.1 MG/DL   MAGNESIUM    Collection Time: 06/10/22  4:24 AM   Result Value Ref Range    Magnesium 2.6 (H) 1.6 - 2.4 mg/dL   PHOSPHORUS    Collection Time: 06/10/22  4:24 AM   Result Value Ref Range    Phosphorus 3.3 2.6 - 4.7 MG/DL   CBC WITH AUTOMATED DIFF    Collection Time: 06/10/22  4:24 AM   Result Value Ref Range    WBC 16.3 (H) 4.1 - 11.1 K/uL    RBC 4.45 4.10 - 5.70 M/uL    HGB 13.7 12.1 - 17.0 g/dL    HCT 41.4 36.6 - 50.3 %    MCV 93.0 80.0 - 99.0 FL    MCH 30.8 26.0 - 34.0 PG    MCHC 33.1 30.0 - 36.5 g/dL    RDW 11.9 11.5 - 14.5 %    PLATELET 604 356 - 615 K/uL    MPV 10.4 8.9 - 12.9 FL    NRBC 0.0 0  WBC    ABSOLUTE NRBC 0.00 0.00 - 0.01 K/uL    NEUTROPHILS 87 (H) 32 - 75 %    LYMPHOCYTES 4 (L) 12 - 49 %    MONOCYTES 7 5 - 13 %    EOSINOPHILS 0 0 - 7 %    BASOPHILS 0 0 - 1 %    IMMATURE GRANULOCYTES 2 (H) 0.0 - 0.5 %    ABS.  NEUTROPHILS 14.2 (H) 1.8 - 8.0 K/UL    ABS. LYMPHOCYTES 0.7 (L) 0.8 - 3.5 K/UL    ABS. MONOCYTES 1.1 (H) 0.0 - 1.0 K/UL    ABS. EOSINOPHILS 0.0 0.0 - 0.4 K/UL    ABS. BASOPHILS 0.0 0.0 - 0.1 K/UL    ABS. IMM. GRANS. 0.3 (H) 0.00 - 0.04 K/UL    DF SMEAR SCANNED      RBC COMMENTS NORMOCYTIC, NORMOCHROMIC            Assessment:     Active Problems:    Brain mass (6/2/2022)        Plan:   1. Right frontal brain mass   - s/p crani 06/06   - cont decadron taper   - cont keppra   - PT/OT evals   - normalize and mobilize   [facility] for psychosocial support   - Med onc and rad onc evals appreciated  2. Cerebral edema with brain compression   - due to #1   - plans as above  3. Pneumocephalus   - due to surgery   - Improving  4. HTN   - SBP<140   - Cardene PRN   - Hydralazine/labetalol PRN   - Cont Norvasc, lisinopril    - Intensivist following  5. Leukocytosis   - WBC 21.7 (down from 23.3)   - Afebrile   - Likely due to stress of surgery and steroids   - cont to monitor    Activity: up with assist  DVT ppx: SCDs  Dispo: tbd    Plan d/w Dr. Francis Hendricks, intensivist, nurse, wife and daughter at bedside. Ok to transfer to [facility]. Will see upon request over the weekend.       Alejandra Yuen, NP

## 2022-06-10 NOTE — PROGRESS NOTES
0730 Bedside and Verbal shift change report given to Leida Medina RN (oncoming nurse) by Suman Garcia RN (offgoing nurse). Report included the following information SBAR, Kardex, ED Summary, OR Summary, Procedure Summary, Intake/Output, MAR, Accordion, Recent Results, Med Rec Status, Cardiac Rhythm Sinus rhythm, Alarm Parameters  and Dual Neuro Assessment. 1604 TRANSFER - OUT REPORT:    Verbal report given to ANNETTE Lowery(name) on Clemente Capps  being transferred to [facility](unit) for routine progression of care       Report consisted of patients Situation, Background, Assessment and   Recommendations(SBAR). Information from the following report(s) SBAR, Kardex, ED Summary, OR Summary, Procedure Summary, Intake/Output, MAR, Accordion, Recent Results, Med Rec Status, Cardiac Rhythm Sinus rhythm, Alarm Parameters  and Dual Neuro Assessment was reviewed with the receiving nurse. Lines:   Peripheral IV 06/04/22 Left;Posterior Hand (Active)   Site Assessment Clean, dry, & intact 06/10/22 1600   Phlebitis Assessment 0 06/10/22 1600   Infiltration Assessment 0 06/10/22 1600   Dressing Status Clean, dry, & intact 06/10/22 1600   Dressing Type Transparent 06/10/22 1600   Hub Color/Line Status Blue;Capped 06/10/22 1600   Action Taken Open ports on tubing capped 06/10/22 1600   Alcohol Cap Used Yes 06/10/22 1600       Peripheral IV 06/06/22 Anterior;Left;Proximal Forearm (Active)   Site Assessment Clean, dry, & intact 06/10/22 1600   Phlebitis Assessment 0 06/10/22 1600   Infiltration Assessment 0 06/10/22 1600   Dressing Status Clean, dry, & intact 06/10/22 1600   Dressing Type Transparent 06/10/22 1600   Hub Color/Line Status Pink;Flushed;Capped 06/10/22 1600   Action Taken Open ports on tubing capped 06/10/22 1600   Alcohol Cap Used Yes 06/10/22 1600        Opportunity for questions and clarification was provided.       Patient transported with:   Monitor  Registered Nurse  Tech

## 2022-06-10 NOTE — PROGRESS NOTES
[id number]  Hospitalist Group                                                                                          Hospitalist Progress Note  Prashant Aldana MD  Answering service: [phone] from in house phone        Date of Service:  6/10/2022  NAME:  Leonardo Mims  :  1955  MRN:  [id number]      Admission Summary:   Patient is a 44-year-old male with past medical history of seasonal allergies, spinal stenosis with chronic back pain,  who presents for evaluation of \"weakness. \"    He said that he has a spinal stenosis which affected his the left side, his left side is always weaker than right side, but lately he notes that he noticed this started in January.  It is progressively worsened, particularly over the past 2 weeks. Chiquis Cormier has not seen his primary care doctor for this problem.  His wife is with him and describes that he moves extremely slowly. Chiquis Cormier endorses difficulty with physically getting in and out of chairs. Brittni James seems that he has lost sense of time.  For example, he started emptying their groceries from the car the other day at 4 PM and did not finish emptying the groceries until 10 PM.  His wife additionally had a primary care doctor's appointment set up for today, and gave him time to get ready.  When she went upstairs to have him come to the car, he was not dressed yet and was sitting on the bed singing to himself. Chiquis Cormier denies headaches, visual changes.  His wife also endorses that his sleep cycle is off. Chiquis Cormier is now getting up at 8 PM as if it were morning and stays up all night. Chiquis Cormier has not had fevers at home. Chiquis Cormier notes that in fall of last year, he noticed a weird flash of image or light out of the corner of his eye. Chiquis Cormier went to the eye doctor, who ran tests and did not see anything concerning. Chiquis Cormier was referred to a neurological ophthalmologist, who stated his work-up was normal.  He then saw a cardiologist, and had a normal work-up there.  Denies issues with hallucinations, delusions, anxiety, depression, suicidal ideation, homicidal ideation       Interval history / Subjective:   F/u Brain mass   No pain , nausea, vomiting, dizziness  No SOB  Assessment & Plan:     Right frontal brain mass  Cerebral edema with brain compression  Pneumocephalus dure to surgery  - s/p crani 06/06, follow path  - cont decadron taper  - cont keppra   - PT/OT evals  - normalize and mobilize  [facility] for psychosocial support  - Appreciate Neurosurgery/Oncology/Radiation oncology    Acute encephalopathy: sec to above, now improving    HTN  - SBP<140  - Cardene PRN  - Hydralazine/labetalol PRN  - Cont Norvasc, lisinopril, labetalol     Leukocytosis  - WBC 21.7 (down from 23.3)  - Afebrile  - Likely due to stress of surgery and steroids    - cont to monitor    Hypophosphatemia: replace as needed. Will consider stopping oral Kphos tomorrow     Cardiac diet     Code status: FULL CODE  Prophylaxis: scd    Plan: Follow Neurosurgery, oncology  Care Plan discussed with: Patient  Anticipated Disposition: TBD     Hospital Problems  Date Reviewed: 6/10/2022          Codes Class Noted POA    * (Principal) Brain mass ICD-10-CM: S77.68  ICD-9-CM: 348.89  6/2/2022 Yes                Review of Systems:   A comprehensive review of systems was negative except for that written in the HPI. Vital Signs:    Last 24hrs VS reviewed since prior progress note.  Most recent are:  Visit Vitals  BP (!) 147/80 (BP 1 Location: Right arm, BP Patient Position: At rest)   Pulse 84   Temp 98.5 °F (36.9 °C)   Resp 15   Ht 5' 6\" (1.676 m)   Wt 110.5 kg (243 lb 9.7 oz)   SpO2 95%   BMI 39.32 kg/m²         Intake/Output Summary (Last 24 hours) at 6/10/2022 1734  Last data filed at 6/10/2022 1630  Gross per 24 hour   Intake 240 ml   Output 4905 ml   Net -4665 ml        Physical Examination:     I had a face to face encounter with this patient and independently examined them on 6/10/2022 as outlined below:          Constitutional:  No acute distress   ENT:  Oral mucosa moist, oropharynx benign. Resp:  CTA bilaterally. No wheezing/rhonchi/rales. No accessory muscle use. CV:  Regular rhythm, normal rate, no murmurs, gallops, rubs    GI:  Soft, non distended, non tender. normoactive bowel sounds, no hepatosplenomegaly     Musculoskeletal:  No edema, warm, 2+ pulses throughout    Neurologic:  Moves all extremities. AAOx3, CN II-XII reviewed            Data Review:    Review and/or order of clinical lab test      Labs:     Recent Labs     06/10/22  0424 06/08/22  0515   WBC 16.3* 21.7*   HGB 13.7 14.0   HCT 41.4 43.3    210     Recent Labs     06/10/22  0424 06/08/22  0515   * 136   K 4.2 3.9    104   CO2 27 26   BUN 30* 25*   CREA 0.90 0.94   * 152*   CA 7.8* 8.2*   MG 2.6* 2.5*   PHOS 3.3 2.4*     No results for input(s): ALT, AP, TBIL, TBILI, TP, ALB, GLOB, GGT, AML, LPSE in the last 72 hours. No lab exists for component: SGOT, GPT, AMYP, HLPSE  No results for input(s): INR, PTP, APTT, INREXT in the last 72 hours. No results for input(s): FE, TIBC, PSAT, FERR in the last 72 hours. No results found for: FOL, RBCF   No results for input(s): PH, PCO2, PO2 in the last 72 hours. No results for input(s): CPK, CKNDX, TROIQ in the last 72 hours.     No lab exists for component: CPKMB  No results found for: CHOL, CHOLX, CHLST, CHOLV, HDL, HDLP, LDL, LDLC, DLDLP, TGLX, TRIGL, TRIGP, CHHD, CHHDX  No results found for: [facility]  Lab Results   Component Value Date/Time    Color YELLOW/STRAW 06/02/2022 08:19 PM    Appearance CLEAR 06/02/2022 08:19 PM    Specific gravity 1.009 06/02/2022 08:19 PM    pH (UA) 7.5 06/02/2022 08:19 PM    Protein Negative 06/02/2022 08:19 PM    Glucose Negative 06/02/2022 08:19 PM    Ketone Negative 06/02/2022 08:19 PM    Bilirubin Negative 06/02/2022 08:19 PM    Urobilinogen 1.0 06/02/2022 08:19 PM    Nitrites Negative 06/02/2022 08:19 PM    Leukocyte Esterase Negative 06/02/2022 08:19 PM    Epithelial cells FEW 06/02/2022 08:19 PM    Bacteria Negative 06/02/2022 08:19 PM    WBC 0-4 06/02/2022 08:19 PM    RBC 0-5 06/02/2022 08:19 PM         Medications Reviewed:     Current Facility-Administered Medications   Medication Dose Route Frequency    dexAMETHasone (DECADRON) tablet 2 mg  2 mg Oral Q8H    hydrALAZINE (APRESOLINE) 20 mg/mL injection 10 mg  10 mg IntraVENous Q4H PRN    lisinopriL (PRINIVIL, ZESTRIL) tablet 40 mg  40 mg Oral DAILY    phosphorus (K PHOS NEUTRAL) 250 mg tablet 1 Tablet  1 Tablet Oral BID    labetaloL (NORMODYNE) tablet 100 mg  100 mg Oral Q12H    amLODIPine (NORVASC) tablet 10 mg  10 mg Oral DAILY    levETIRAcetam (KEPPRA) tablet 500 mg  500 mg Oral Q12H    tamsulosin (FLOMAX) capsule 0.4 mg  0.4 mg Oral DAILY    labetaloL (NORMODYNE;TRANDATE) injection 10 mg  10 mg IntraVENous Q4H PRN    pantoprazole (PROTONIX) tablet 40 mg  40 mg Oral ACB&D    sodium chloride (NS) flush 5-40 mL  5-40 mL IntraVENous Q8H    sodium chloride (NS) flush 5-40 mL  5-40 mL IntraVENous PRN    docusate sodium (COLACE) capsule 100 mg  100 mg Oral BID    polyethylene glycol (MIRALAX) packet 17 g  17 g Oral DAILY PRN    sodium chloride (NS) flush 5-40 mL  5-40 mL IntraVENous Q8H    acetaminophen (TYLENOL) tablet 650 mg  650 mg Oral Q6H PRN    Or    acetaminophen (TYLENOL) suppository 650 mg  650 mg Rectal Q6H PRN    ondansetron (ZOFRAN ODT) tablet 4 mg  4 mg Oral Q8H PRN    Or    ondansetron (ZOFRAN) injection 4 mg  4 mg IntraVENous Q6H PRN    oxyCODONE IR (ROXICODONE) tablet 10 mg  10 mg Oral Q4H PRN    oxyCODONE IR (ROXICODONE) tablet 5 mg  5 mg Oral Q4H PRN     ______________________________________________________________________  EXPECTED LENGTH OF STAY: 6d 14h  ACTUAL LENGTH OF STAY:          Shirley Bass MD

## 2022-06-10 NOTE — PROGRESS NOTES
Problem: Mobility Impaired (Adult and Pediatric)  Goal: *Acute Goals and Plan of Care (Insert Text)  Description:   FUNCTIONAL STATUS PRIOR TO ADMISSION: Patient was independent and active without use of DME. Works full time as a  at [address] [place] [address]. HOME SUPPORT PRIOR TO ADMISSION: The patient lived with his spouse but did not require assist.    Physical Therapy Goals  Initiated 6/7/2022  1. Patient will move from supine to sit and sit to supine  and scoot up and down in bed with supervision/set-up within 7 day(s). 2.  Patient will transfer from bed to chair and chair to bed with supervision/set-up using the least restrictive device within 7 day(s). 3.  Patient will perform sit to stand with supervision/set-up within 7 day(s). 4.  Patient will ambulate with supervision/set-up for 150 feet with the least restrictive device within 7 day(s). 5.  Patient will ascend/descend 14 stairs with  handrail(s) with supervision/set-up within 7 day(s). 6.  Patient will improve Shipley Balance score by 7 points within 7 days. Outcome: Progressing Towards Goal   PHYSICAL THERAPY TREATMENT  Patient: Amarjit Ocampo (6 y.o. male)  Date: 6/10/2022  Diagnosis: Brain mass [G93.89] <principal problem not specified>  Procedure(s) (LRB):  RIGHT  FRONTAL CRANIOTOMY WITH FLOUROCINE (Right) 4 Days Post-Op  Precautions: Fall,Skin,Seizure  Chart, physical therapy assessment, plan of care and goals were reviewed. ASSESSMENT  Patient continues with skilled PT services and is progressing towards goals. He continues to demonstrate some impulsive mobility with inattention to the L UE. Patient is able to transition supine to sit with HOB elevated and Mod A to right his trunk. He is provided Min Ax2 to stand pivot to to the R. Patient is instructed to reach for far arm of commode while pivoting. RW is provided in attempts to stand and complete toilet hygiene but patient is unable to adequately reach with one UE support. Patient also demonstrates difficulty maintaining L grasp while distracted with task requiring VC to safe hand placement on RW. He ambulates a  short distance with Mod Ax2 to bedside chair demonstrates increased trunk and hip flexion requiring verbal and tactile cues for improved posture and hip extension. Patient impulsively sits in chair demonstrating difficulty stepping to his left to center himself. Current Level of Function Impacting Discharge (mobility/balance): Min- Mod Ax2     Other factors to consider for discharge: medical stability, decreased balance, increased need for assistance, increased risk for falls          PLAN :  Patient continues to benefit from skilled intervention to address the above impairments. Continue treatment per established plan of care. to address goals. Recommendation for discharge: (in order for the patient to meet his/her long term goals)  Therapy 3 hours per day 5-7 days per week    This discharge recommendation:  Has been made in collaboration with the attending provider and/or case management    IF patient discharges home will need the following DME: to be determined (TBD)       SUBJECTIVE:   Patient stated I think I need to use the bathroom.     OBJECTIVE DATA SUMMARY:   Critical Behavior:  Neurologic State: Alert  Orientation Level: Oriented X4 (periods of confusion with time)  Cognition: Decreased attention/concentration,Follows commands,Impaired decision making,Impulsive,Poor safety awareness,Memory loss  Safety/Judgement: Decreased awareness of environment,Decreased awareness of need for assistance,Decreased awareness of need for safety,Decreased insight into deficits  Functional Mobility Training:  Bed Mobility:     Supine to Sit: Minimum assistance; Additional time  Sit to Supine:  (in chair)  Scooting: Contact guard assistance        Transfers:  Sit to Stand: Minimum assistance (Simultaneous filing.  User may not have seen previous data.)  Stand to Sit: Minimum assistance (Simultaneous filing. User may not have seen previous data.)  Stand Pivot Transfers: Minimum assistance;Assist x2     Bed to Chair: Assist x2; Moderate assistance (Simultaneous filing. User may not have seen previous data.)                    Balance:  Sitting: Impaired  Sitting - Static: Good (unsupported)  Sitting - Dynamic: Fair (occasional)  Standing: Impaired; With support (RW)  Standing - Static: Fair;Constant support  Standing - Dynamic : Fair;Poor;Constant support  Ambulation/Gait Training:  Distance (ft): 5 Feet (ft)  Assistive Device: Gait belt;Walker, rolling  Ambulation - Level of Assistance: Moderate assistance;Assist x2        Gait Abnormalities: Decreased step clearance;Trunk sway increased; Shuffling gait        Base of Support: Widened;Center of gravity altered     Speed/Susi: Slow;Shuffled                       Stairs: Therapeutic Exercises:     Pain Rating:      Activity Tolerance:   Good    After treatment patient left in no apparent distress:   Sitting in chair, Call bell within reach, and Caregiver / family present    COMMUNICATION/COLLABORATION:   The patients plan of care was discussed with: Occupational therapist and Registered nurse.      Kashif Pickard, PT   Time Calculation: 25 mins

## 2022-06-10 NOTE — PROGRESS NOTES
Problem: Self Care Deficits Care Plan (Adult)  Goal: *Acute Goals and Plan of Care (Insert Text)  Description: FUNCTIONAL STATUS PRIOR TO ADMISSION: Patient was modified independent using a single point cane for functional mobility. Drives, works as a  at [employer]. HOME SUPPORT: The patient lived with wife but did not require assist.    Occupational Therapy Goals  Initiated 6/7/2022   1. Patient will perform grooming with supervision/set-up within 7 day(s). 2.  Patient will perform upper body dressing with supervision/set-up within 7 day(s). 3.  Patient will perform lower body dressing with moderate assistance  within 7 day(s). 4.  Patient will perform toilet transfers with minimal assistance/contact guard assist within 7 day(s). 5.  Patient will perform all aspects of toileting with moderate assistance  within 7 day(s). 6.  Patient will participate in upper extremity therapeutic exercise/activities with supervision/set-up within 7 day(s). 7.  Patient will utilize energy conservation techniques during functional activities with verbal, visual, and tactile cues within 7 day(s). 8.  Patient will improve their LUE Fugl Aponte score by 5 points in prep for ADLs within 7 days. Outcome: Progressing Towards Goal     OCCUPATIONAL THERAPY TREATMENT  Patient: Charline Wilkins (34 y.o. male)  Date: 6/10/2022  Diagnosis: Brain mass [G93.89] <principal problem not specified>  Procedure(s) (LRB):  RIGHT  FRONTAL CRANIOTOMY WITH FLOUROCINE (Right) 4 Days Post-Op  Precautions: Fall,Skin,Seizure  Chart, occupational therapy assessment, plan of care, and goals were reviewed.     ASSESSMENT  Patient continues with skilled OT services and is progressing towards goals however remains limited by decreased balance with L lateral lean, cognition (memory, sequencing, processing, attention, problem solving, safety, judgment), attention to the L, LUE/LLE function, activity tolerance, functional reach, distal lower body access, and coordination with toileting, bathing, and upper body dressing tasks completed this session. He continues to progress, mobilizing on & off the [facility] with Min-Mod A x2 with RW support, anterior and L lateral lean noted with facilitation required. Patient attempting to complete bowel hygiene however limited functional reach standing requiring Max A. Upon transfer to the chair, completed seated bathing & upper body dressing with Min-Mod A with mod cues for facilitation, initiation, and sequencing of task, min-mod redirection required as he is quite distractable with some decreased STM. He is making excellent functional gains, continue to strongly recommend d/t to IPR. Current Level of Function Impacting Discharge (ADLs): Min-Total A for ADLs, Min-Mod A x1-2 for OOB mobility with RW    Other factors to consider for discharge: fall risk, assist of 2, PMH, PLOF         PLAN :  Patient continues to benefit from skilled intervention to address the above impairments. Continue treatment per established plan of care to address goals. Recommend with staff: Recommend with nursing, ADLs with assist, OOB to chair 3x/day via rolling walker and toileting via  BSC with 2 assist . Thank you for completing as able in order to maintain patient strength, endurance and independence. Recommendation for discharge: (in order for the patient to meet his/her long term goals)  Therapy 3 hours per day 5-7 days per week    This discharge recommendation:  Has been made in collaboration with the attending provider and/or case management    IF patient discharges home will need the following DME: To be determined (TBD)         SUBJECTIVE:   Patient stated I think I feel something coming down. I've been farting all night so sorry for the sound effects.     OBJECTIVE DATA SUMMARY:   Cognitive/Behavioral Status:  Neurologic State: Alert  Orientation Level: Oriented X4 (periods of confusion with time)  Cognition: Decreased attention/concentration; Follows commands; Impaired decision making; Impulsive;Poor safety awareness;Memory loss  Perception: Cues to attend to left side of body;Cues to attend left visual field;Cues to maintain midline in sitting;Cues to maintain midline in standing  Perseveration: No perseveration noted  Safety/Judgement: Decreased awareness of environment;Decreased awareness of need for assistance;Decreased awareness of need for safety;Decreased insight into deficits    Functional Mobility and Transfers for ADLs:  Bed Mobility:  Supine to Sit: Minimum assistance; Additional time  Sit to Supine:  (in chair)  Scooting: Contact guard assistance    Transfers:  Sit to Stand: Minimum assistance (Simultaneous filing. User may not have seen previous data.)  Functional Transfers  Toilet Transfer : Minimum assistance; Moderate assistance;Assist x2 (to/from [facility], impulsive with anterior & L lean)  Cues: Physical assistance; Tactile cues provided;Verbal cues provided;Visual cues provided;Visual/perceptual training/retraining  Adaptive Equipment: Bedside commode;Walker (comment)  Bed to Chair: Assist x2; Moderate assistance (Simultaneous filing. User may not have seen previous data.)    Balance:  Sitting: Impaired  Sitting - Static: Good (unsupported)  Sitting - Dynamic: Fair (occasional)  Standing: Impaired; With support (RW)  Standing - Static: Fair;Constant support  Standing - Dynamic : Fair;Poor;Constant support    ADL Intervention:            Upper Body Bathing  Bathing Assistance: Contact guard assistance (mod cues for sequencing & technique, merle with LUE)  Position Performed: Seated in chair  Cues: Verbal cues provided;Visual cues provided; Tactile cues provided    Lower Body Bathing  Bathing Assistance: Moderate assistance  Perineal  : Maximum assistance  Position Performed: Standing  Cues: Tactile cues provided;Visual cues provided;Verbal cues provided  Lower Body :  Moderate assistance  Position Performed: Seated in chair  Cues: Physical assistance; Tactile cues provided;Verbal cues provided;Visual cues provided;Visual/perceptual training/retraining (mod cues for technique and sequencing w/ tailor sit)  Adaptive Equipment: Walker    Upper Body Dressing Assistance  Dressing Assistance: Moderate assistance  Hospital Gown: Moderate assistance  Cues: Physical assistance; Tactile cues provided;Verbal cues provided;Visual cues provided;Visual/perceptual training/retraining    Lower Body Dressing Assistance  Underpants: Compensatory technique training  Pants With Elastic Waist: Compensatory technique training  Socks: Minimum assistance; Compensatory technique training  Leg Crossed Method Used: Yes  Position Performed: Seated in chair;Standing  Cues: Physical assistance; Tactile cues provided;Verbal cues provided;Visual cues provided;Visual/perceptual training/retraining  Adaptive Equipment Used: Walker    Toileting  Toileting Assistance: Maximum assistance  Bladder Hygiene: Total assistance (dependent) ([place])  Bowel Hygiene: Maximum assistance  Clothing Management: Maximum assistance  Cues: Tactile cues provided;Visual cues provided;Verbal cues provided  Adaptive Equipment: Walker [facility])    Cognitive Retraining  Orientation Retraining: Awareness of environment  Problem Solving: Awareness of environment  Executive Functions: Executing cognitive plans  Organizing/Sequencing: Breaking task down  Attention to Task: Single task  Following Commands: Follows one step commands/directions; Follows two step commands/directions  Safety/Judgement: Decreased awareness of environment;Decreased awareness of need for assistance;Decreased awareness of need for safety;Decreased insight into deficits  Cues: Tactile cues provided;Verbal cues provided;Visual cues provided    Neuro Re-Education:  - Visual attention to and scanning to the L for increased awareness to LUE/LLE  - Midline orientation seated & standing with L lateral lean, mod cues for awareness, able to self-correct  - LUE AROM with ADLs    Pain:  Slight incisional discomfort reported, not limiting    Activity Tolerance:   Good    After treatment patient left in no apparent distress:   Sitting in chair, Call bell within reach, and Bed / chair alarm activated    COMMUNICATION/COLLABORATION:   The patients plan of care was discussed with: Physical therapist and Registered nurse. Patient was educated regarding His deficit(s) stated above as this relates to His diagnosis of s/p crani. He demonstrated Fair understanding as evidenced by verbal discussion, carryover, and cognition limited recall). Patient and/or family was verbally educated on the BE FAST acronym for signs/symptoms of CVA and TIA. BE FAST was written on patient's communication board  for visual education and reinforcement. All questions answered with patient indicating fair understanding.      KIM Holt, OTR/L  Time Calculation: 32 mins

## 2022-06-11 LAB
ANION GAP SERPL CALC-SCNC: 7 MMOL/L (ref 5–15)
BASOPHILS # BLD: 0 K/UL (ref 0–0.1)
BASOPHILS NFR BLD: 0 % (ref 0–1)
BUN SERPL-MCNC: 26 MG/DL (ref 6–20)
BUN/CREAT SERPL: 30 (ref 12–20)
CALCIUM SERPL-MCNC: 8.2 MG/DL (ref 8.5–10.1)
CHLORIDE SERPL-SCNC: 99 MMOL/L (ref 97–108)
CO2 SERPL-SCNC: 29 MMOL/L (ref 21–32)
CREAT SERPL-MCNC: 0.86 MG/DL (ref 0.7–1.3)
DIFFERENTIAL METHOD BLD: ABNORMAL
EOSINOPHIL # BLD: 0 K/UL (ref 0–0.4)
EOSINOPHIL NFR BLD: 0 % (ref 0–7)
ERYTHROCYTE [DISTWIDTH] IN BLOOD BY AUTOMATED COUNT: 11.9 % (ref 11.5–14.5)
GLUCOSE SERPL-MCNC: 129 MG/DL (ref 65–100)
HCT VFR BLD AUTO: 42.5 % (ref 36.6–50.3)
HGB BLD-MCNC: 14.1 G/DL (ref 12.1–17)
IMM GRANULOCYTES # BLD AUTO: 0.3 K/UL (ref 0–0.04)
IMM GRANULOCYTES NFR BLD AUTO: 2 % (ref 0–0.5)
LYMPHOCYTES # BLD: 0.8 K/UL (ref 0.8–3.5)
LYMPHOCYTES NFR BLD: 6 % (ref 12–49)
MAGNESIUM SERPL-MCNC: 2.3 MG/DL (ref 1.6–2.4)
MCH RBC QN AUTO: 30.4 PG (ref 26–34)
MCHC RBC AUTO-ENTMCNC: 33.2 G/DL (ref 30–36.5)
MCV RBC AUTO: 91.6 FL (ref 80–99)
MONOCYTES # BLD: 1.2 K/UL (ref 0–1)
MONOCYTES NFR BLD: 9 % (ref 5–13)
NEUTS SEG # BLD: 11.4 K/UL (ref 1.8–8)
NEUTS SEG NFR BLD: 83 % (ref 32–75)
NRBC # BLD: 0 K/UL (ref 0–0.01)
NRBC BLD-RTO: 0 PER 100 WBC
PHOSPHATE SERPL-MCNC: 3 MG/DL (ref 2.6–4.7)
PLATELET # BLD AUTO: 241 K/UL (ref 150–400)
PMV BLD AUTO: 10.4 FL (ref 8.9–12.9)
POTASSIUM SERPL-SCNC: 4.3 MMOL/L (ref 3.5–5.1)
RBC # BLD AUTO: 4.64 M/UL (ref 4.1–5.7)
SODIUM SERPL-SCNC: 135 MMOL/L (ref 136–145)
WBC # BLD AUTO: 13.7 K/UL (ref 4.1–11.1)

## 2022-06-11 PROCEDURE — 74011250637 HC RX REV CODE- 250/637: Performed by: EMERGENCY MEDICINE

## 2022-06-11 PROCEDURE — 74011000250 HC RX REV CODE- 250: Performed by: NEUROLOGICAL SURGERY

## 2022-06-11 PROCEDURE — 80048 BASIC METABOLIC PNL TOTAL CA: CPT

## 2022-06-11 PROCEDURE — 36415 COLL VENOUS BLD VENIPUNCTURE: CPT

## 2022-06-11 PROCEDURE — 74011250637 HC RX REV CODE- 250/637: Performed by: NURSE PRACTITIONER

## 2022-06-11 PROCEDURE — 84100 ASSAY OF PHOSPHORUS: CPT

## 2022-06-11 PROCEDURE — 83735 ASSAY OF MAGNESIUM: CPT

## 2022-06-11 PROCEDURE — 85025 COMPLETE CBC W/AUTO DIFF WBC: CPT

## 2022-06-11 PROCEDURE — 74011250636 HC RX REV CODE- 250/636: Performed by: NURSE PRACTITIONER

## 2022-06-11 PROCEDURE — 65270000046 HC RM TELEMETRY

## 2022-06-11 PROCEDURE — 74011250637 HC RX REV CODE- 250/637: Performed by: NEUROLOGICAL SURGERY

## 2022-06-11 PROCEDURE — 94760 N-INVAS EAR/PLS OXIMETRY 1: CPT

## 2022-06-11 RX ADMIN — DOCUSATE SODIUM 100 MG: 100 CAPSULE, LIQUID FILLED ORAL at 09:13

## 2022-06-11 RX ADMIN — LEVETIRACETAM 500 MG: 500 TABLET, FILM COATED ORAL at 21:39

## 2022-06-11 RX ADMIN — AMLODIPINE BESYLATE 10 MG: 5 TABLET ORAL at 09:13

## 2022-06-11 RX ADMIN — ACETAMINOPHEN 650 MG: 325 TABLET ORAL at 11:27

## 2022-06-11 RX ADMIN — DEXAMETHASONE 2 MG: 1 TABLET ORAL at 14:22

## 2022-06-11 RX ADMIN — ACETAMINOPHEN 650 MG: 325 TABLET ORAL at 22:32

## 2022-06-11 RX ADMIN — PANTOPRAZOLE SODIUM 40 MG: 40 TABLET, DELAYED RELEASE ORAL at 07:22

## 2022-06-11 RX ADMIN — Medication 10 ML: at 09:18

## 2022-06-11 RX ADMIN — SODIUM CHLORIDE, PRESERVATIVE FREE 10 ML: 5 INJECTION INTRAVENOUS at 14:22

## 2022-06-11 RX ADMIN — LEVETIRACETAM 500 MG: 500 TABLET, FILM COATED ORAL at 09:13

## 2022-06-11 RX ADMIN — LISINOPRIL 40 MG: 20 TABLET ORAL at 09:13

## 2022-06-11 RX ADMIN — DEXAMETHASONE 2 MG: 1 TABLET ORAL at 21:40

## 2022-06-11 RX ADMIN — TAMSULOSIN HYDROCHLORIDE 0.4 MG: 0.4 CAPSULE ORAL at 09:13

## 2022-06-11 RX ADMIN — PANTOPRAZOLE SODIUM 40 MG: 40 TABLET, DELAYED RELEASE ORAL at 18:08

## 2022-06-11 RX ADMIN — SODIUM CHLORIDE, PRESERVATIVE FREE 10 ML: 5 INJECTION INTRAVENOUS at 09:17

## 2022-06-11 RX ADMIN — DEXAMETHASONE 2 MG: 1 TABLET ORAL at 07:23

## 2022-06-11 RX ADMIN — Medication 10 ML: at 21:42

## 2022-06-11 RX ADMIN — LABETALOL HYDROCHLORIDE 100 MG: 100 TABLET, FILM COATED ORAL at 21:40

## 2022-06-11 RX ADMIN — LABETALOL HYDROCHLORIDE 100 MG: 100 TABLET, FILM COATED ORAL at 09:13

## 2022-06-11 RX ADMIN — SODIUM CHLORIDE, PRESERVATIVE FREE 10 ML: 5 INJECTION INTRAVENOUS at 21:41

## 2022-06-11 NOTE — PROGRESS NOTES
[address] [facility] Adult  Hospitalist Group                                                                                          Hospitalist Progress Note  Sveta Charles MD  Answering service: [phone] OR 36 from in house phone        Date of Service:  2022  NAME:  Monique Parmar  :  1955  MRN:  [id number]      Admission Summary:   Patient is a 79-year-old male with past medical history of seasonal allergies, spinal stenosis with chronic back pain,  who presents for evaluation of \"weakness. \"    He said that he has a spinal stenosis which affected his the left side, his left side is always weaker than right side, but lately he notes that he noticed this started in January.  It is progressively worsened, particularly over the past 2 weeks. Cheryn Kanner has not seen his primary care doctor for this problem.  His wife is with him and describes that he moves extremely slowly. Cheryn Kanner endorses difficulty with physically getting in and out of chairs. [place] seems that he has lost sense of time.  For example, he started emptying their groceries from the car the other day at 4 PM and did not finish emptying the groceries until 10 PM.  His wife additionally had a primary care doctor's appointment set up for today, and gave him time to get ready.  When she went upstairs to have him come to the car, he was not dressed yet and was sitting on the bed singing to himself. Cheryn Kanner denies headaches, visual changes.  His wife also endorses that his sleep cycle is off. Cheryn Kanner is now getting up at 8 PM as if it were morning and stays up all night. Cheryn Kanner has not had fevers at home. Cheryn Kanner notes that in fall of last year, he noticed a weird flash of image or light out of the corner of his eye. Cheryn Kanner went to the eye doctor, who ran tests and did not see anything concerning. Cheryn Kanner was referred to a neurological ophthalmologist, who stated his work-up was normal.  He then saw a cardiologist, and had a normal work-up there.  Denies issues with hallucinations, delusions, anxiety, depression, suicidal ideation, homicidal ideation       Interval history / Subjective:   F/u Brain mass   No pain , nausea, vomiting, dizziness  No SOB  Assessment & Plan:     Right frontal brain mass  Cerebral edema with brain compression  Pneumocephalus dure to surgery  - s/p crani 06/06, follow path  - cont decadron taper  - cont keppra   - PT/OT evals  - normalize and mobilize  [facility] for psychosocial support  - Appreciate Neurosurgery/Oncology/Radiation oncology    Acute encephalopathy: sec to above, now improving    HTN  - SBP<140  - Cardene PRN  - Hydralazine/labetalol PRN  - Cont Norvasc, lisinopril, labetalol     Leukocytosis  - WBC 21.7 (down from 23.3)  - Afebrile  - Likely due to stress of surgery and steroids    - cont to monitor    Hypophosphatemia: replace as needed. Will consider stopping oral Kphos tomorrow     Cardiac diet     Code status: FULL CODE  Prophylaxis: scd    Plan: Follow Neurosurgery, oncology, awaiting rehab at [address] discussed with: Patient  Anticipated Disposition: D     Hospital Problems  Date Reviewed: 6/10/2022          Codes Class Noted POA    * (Principal) Brain mass ICD-10-CM: W30.59  ICD-9-CM: 348.89  6/2/2022 Yes                Review of Systems:   A comprehensive review of systems was negative except for that written in the HPI. Vital Signs:    Last 24hrs VS reviewed since prior progress note.  Most recent are:  Visit Vitals  /77 (BP 1 Location: Right upper arm, BP Patient Position: At rest)   Pulse 76   Temp 98.7 °F (37.1 °C)   Resp 16   Ht 5' 6\" (1.676 m)   Wt 110.5 kg (243 lb 9.7 oz)   SpO2 95%   BMI 39.32 kg/m²         Intake/Output Summary (Last 24 hours) at 6/11/2022 0908  Last data filed at 6/10/2022 1630  Gross per 24 hour   Intake --   Output 850 ml   Net -850 ml        Physical Examination:     I had a face to face encounter with this patient and independently examined them on 6/11/2022 as outlined below:          Constitutional:  No acute distress   ENT:  Oral mucosa moist, oropharynx benign. Resp:  CTA bilaterally. No wheezing/rhonchi/rales. No accessory muscle use. CV:  Regular rhythm, normal rate, no murmurs, gallops, rubs    GI:  Soft, non distended, non tender. normoactive bowel sounds, no hepatosplenomegaly     Musculoskeletal:  No edema, warm, 2+ pulses throughout    Neurologic:  Moves all extremities. AAOx3, CN II-XII reviewed            Data Review:    Review and/or order of clinical lab test      Labs:     Recent Labs     06/11/22  0732 06/10/22  0424   WBC 13.7* 16.3*   HGB 14.1 13.7   HCT 42.5 41.4    207     Recent Labs     06/11/22  0732 06/10/22  0424   * 135*   K 4.3 4.2   CL 99 103   CO2 29 27   BUN 26* 30*   CREA 0.86 0.90   * 141*   CA 8.2* 7.8*   MG 2.3 2.6*   PHOS 3.0 3.3     No results for input(s): ALT, AP, TBIL, TBILI, TP, ALB, GLOB, GGT, AML, LPSE in the last 72 hours. No lab exists for component: SGOT, GPT, AMYP, HLPSE  No results for input(s): INR, PTP, APTT, INREXT, INREXT in the last 72 hours. No results for input(s): FE, TIBC, PSAT, FERR in the last 72 hours. No results found for: FOL, RBCF   No results for input(s): PH, PCO2, PO2 in the last 72 hours. No results for input(s): CPK, CKNDX, TROIQ in the last 72 hours.     No lab exists for component: CPKMB  No results found for: CHOL, CHOLX, CHLST, CHOLV, HDL, HDLP, LDL, LDLC, DLDLP, TGLX, TRIGL, TRIGP, CHHD, CHHDX  No results found for: [facility]  Lab Results   Component Value Date/Time    Color YELLOW/STRAW 06/02/2022 08:19 PM    Appearance CLEAR 06/02/2022 08:19 PM    Specific gravity 1.009 06/02/2022 08:19 PM    pH (UA) 7.5 06/02/2022 08:19 PM    Protein Negative 06/02/2022 08:19 PM    Glucose Negative 06/02/2022 08:19 PM    Ketone Negative 06/02/2022 08:19 PM    Bilirubin Negative 06/02/2022 08:19 PM    Urobilinogen 1.0 06/02/2022 08:19 PM    Nitrites Negative 06/02/2022 08:19 PM    Leukocyte Esterase Negative 06/02/2022 08:19 PM    Epithelial cells FEW 06/02/2022 08:19 PM    Bacteria Negative 06/02/2022 08:19 PM    WBC 0-4 06/02/2022 08:19 PM    RBC 0-5 06/02/2022 08:19 PM         Medications Reviewed:     Current Facility-Administered Medications   Medication Dose Route Frequency    dexAMETHasone (DECADRON) tablet 2 mg  2 mg Oral Q8H    hydrALAZINE (APRESOLINE) 20 mg/mL injection 10 mg  10 mg IntraVENous Q4H PRN    lisinopriL (PRINIVIL, ZESTRIL) tablet 40 mg  40 mg Oral DAILY    labetaloL (NORMODYNE) tablet 100 mg  100 mg Oral Q12H    amLODIPine (NORVASC) tablet 10 mg  10 mg Oral DAILY    levETIRAcetam (KEPPRA) tablet 500 mg  500 mg Oral Q12H    tamsulosin (FLOMAX) capsule 0.4 mg  0.4 mg Oral DAILY    labetaloL (NORMODYNE;TRANDATE) injection 10 mg  10 mg IntraVENous Q4H PRN    pantoprazole (PROTONIX) tablet 40 mg  40 mg Oral ACB&D    sodium chloride (NS) flush 5-40 mL  5-40 mL IntraVENous Q8H    sodium chloride (NS) flush 5-40 mL  5-40 mL IntraVENous PRN    docusate sodium (COLACE) capsule 100 mg  100 mg Oral BID    polyethylene glycol (MIRALAX) packet 17 g  17 g Oral DAILY PRN    sodium chloride (NS) flush 5-40 mL  5-40 mL IntraVENous Q8H    acetaminophen (TYLENOL) tablet 650 mg  650 mg Oral Q6H PRN    Or    acetaminophen (TYLENOL) suppository 650 mg  650 mg Rectal Q6H PRN    ondansetron (ZOFRAN ODT) tablet 4 mg  4 mg Oral Q8H PRN    Or    ondansetron (ZOFRAN) injection 4 mg  4 mg IntraVENous Q6H PRN    oxyCODONE IR (ROXICODONE) tablet 10 mg  10 mg Oral Q4H PRN    oxyCODONE IR (ROXICODONE) tablet 5 mg  5 mg Oral Q4H PRN     ______________________________________________________________________  EXPECTED LENGTH OF STAY: 6d 14h  ACTUAL LENGTH OF STAY:          Orlando De Luna MD

## 2022-06-11 NOTE — PROGRESS NOTES
Problem: Falls - Risk of  Goal: *Absence of Falls  Description: Document Lb Sites Fall Risk and appropriate interventions in the flowsheet.   Outcome: Progressing Towards Goal  Note: Fall Risk Interventions:  Mobility Interventions: Communicate number of staff needed for ambulation/transfer,Patient to call before getting OOB,PT Consult for mobility concerns,OT consult for ADLs,Utilize walker, cane, or other assistive device    Mentation Interventions: Adequate sleep, hydration, pain control,Family/sitter at bedside    Medication Interventions: Patient to call before getting OOB,Teach patient to arise slowly    Elimination Interventions: Call light in reach,Patient to call for help with toileting needs,Toileting schedule/hourly rounds              Problem: Patient Education: Go to Patient Education Activity  Goal: Patient/Family Education  Outcome: Progressing Towards Goal     Problem: TIA/CVA Stroke: 0-24 hours  Goal: Off Pathway (Use only if patient is Off Pathway)  Outcome: Progressing Towards Goal  Goal: Activity/Safety  Outcome: Progressing Towards Goal  Goal: Consults, if ordered  Outcome: Progressing Towards Goal  Goal: Diagnostic Test/Procedures  Outcome: Progressing Towards Goal  Goal: Nutrition/Diet  Outcome: Progressing Towards Goal  Goal: Discharge Planning  Outcome: Progressing Towards Goal  Goal: Medications  Outcome: Progressing Towards Goal  Goal: Respiratory  Outcome: Progressing Towards Goal  Goal: Treatments/Interventions/Procedures  Outcome: Progressing Towards Goal  Goal: Minimize risk of bleeding post-thrombolytic infusion  Outcome: Progressing Towards Goal  Goal: Monitor for complications post-thrombolytic infusion  Outcome: Progressing Towards Goal  Goal: Psychosocial  Outcome: Progressing Towards Goal  Goal: *Hemodynamically stable  Outcome: Progressing Towards Goal  Goal: *Neurologically stable  Description: Absence of additional neurological deficits    Outcome: Progressing Towards Goal  Goal: *Verbalizes anxiety and depression are reduced or absent  Outcome: Progressing Towards Goal  Goal: *Absence of Signs of Aspiration on Current Diet  Outcome: Progressing Towards Goal  Goal: *Absence of deep venous thrombosis signs and symptoms(Stroke Metric)  Outcome: Progressing Towards Goal  Goal: *Ability to perform ADLs and demonstrates progressive mobility and function  Outcome: Progressing Towards Goal  Goal: *Stroke education started(Stroke Metric)  Outcome: Progressing Towards Goal  Goal: *Dysphagia screen performed(Stroke Metric)  Outcome: Progressing Towards Goal  Goal: *Rehab consulted(Stroke Metric)  Outcome: Progressing Towards Goal     Problem: Patient Education: Go to Patient Education Activity  Goal: Patient/Family Education  Outcome: Progressing Towards Goal     Problem: TIA/CVA Stroke: 0-24 hours  Goal: Off Pathway (Use only if patient is Off Pathway)  Outcome: Progressing Towards Goal  Goal: Activity/Safety  Outcome: Progressing Towards Goal  Goal: Consults, if ordered  Outcome: Progressing Towards Goal  Goal: Diagnostic Test/Procedures  Outcome: Progressing Towards Goal  Goal: Nutrition/Diet  Outcome: Progressing Towards Goal  Goal: Discharge Planning  Outcome: Progressing Towards Goal  Goal: Medications  Outcome: Progressing Towards Goal  Goal: Respiratory  Outcome: Progressing Towards Goal  Goal: Treatments/Interventions/Procedures  Outcome: Progressing Towards Goal  Goal: Minimize risk of bleeding post-thrombolytic infusion  Outcome: Progressing Towards Goal  Goal: Monitor for complications post-thrombolytic infusion  Outcome: Progressing Towards Goal  Goal: Psychosocial  Outcome: Progressing Towards Goal  Goal: *Hemodynamically stable  Outcome: Progressing Towards Goal  Goal: *Neurologically stable  Description: Absence of additional neurological deficits    Outcome: Progressing Towards Goal  Goal: *Verbalizes anxiety and depression are reduced or absent  Outcome: Progressing Towards Goal  Goal: *Absence of Signs of Aspiration on Current Diet  Outcome: Progressing Towards Goal  Goal: *Absence of deep venous thrombosis signs and symptoms(Stroke Metric)  Outcome: Progressing Towards Goal  Goal: *Ability to perform ADLs and demonstrates progressive mobility and function  Outcome: Progressing Towards Goal  Goal: *Stroke education started(Stroke Metric)  Outcome: Progressing Towards Goal  Goal: *Dysphagia screen performed(Stroke Metric)  Outcome: Progressing Towards Goal  Goal: *Rehab consulted(Stroke Metric)  Outcome: Progressing Towards Goal     Problem: TIA/CVA Stroke: Day 2 Until Discharge  Goal: Off Pathway (Use only if patient is Off Pathway)  Outcome: Progressing Towards Goal  Goal: Activity/Safety  Outcome: Progressing Towards Goal  Goal: Diagnostic Test/Procedures  Outcome: Progressing Towards Goal  Goal: Nutrition/Diet  Outcome: Progressing Towards Goal  Goal: Discharge Planning  Outcome: Progressing Towards Goal  Goal: Medications  Outcome: Progressing Towards Goal  Goal: Respiratory  Outcome: Progressing Towards Goal  Goal: Treatments/Interventions/Procedures  Outcome: Progressing Towards Goal  Goal: Psychosocial  Outcome: Progressing Towards Goal  Goal: *Verbalizes anxiety and depression are reduced or absent  Outcome: Progressing Towards Goal  Goal: *Absence of aspiration  Outcome: Progressing Towards Goal  Goal: *Absence of deep venous thrombosis signs and symptoms(Stroke Metric)  Outcome: Progressing Towards Goal  Goal: *Optimal pain control at patient's stated goal  Outcome: Progressing Towards Goal  Goal: *Tolerating diet  Outcome: Progressing Towards Goal  Goal: *Ability to perform ADLs and demonstrates progressive mobility and function  Outcome: Progressing Towards Goal  Goal: *Stroke education continued(Stroke Metric)  Outcome: Progressing Towards Goal     Problem: Ischemic Stroke: Discharge Outcomes  Goal: *Verbalizes anxiety and depression are reduced or absent  Outcome: Progressing Towards Goal  Goal: *Verbalize understanding of risk factor modification(Stroke Metric)  Outcome: Progressing Towards Goal  Goal: *Hemodynamically stable  Outcome: Progressing Towards Goal  Goal: *Absence of aspiration pneumonia  Outcome: Progressing Towards Goal  Goal: *Aware of needed dietary changes  Outcome: Progressing Towards Goal  Goal: *Verbalize understanding of prescribed medications including anti-coagulants, anti-lipid, and/or anti-platelets(Stroke Metric)  Outcome: Progressing Towards Goal  Goal: *Tolerating diet  Outcome: Progressing Towards Goal  Goal: *Aware of follow-up diagnostics related to anticoagulants  Outcome: Progressing Towards Goal  Goal: *Ability to perform ADLs and demonstrates progressive mobility and function  Outcome: Progressing Towards Goal  Goal: *Absence of DVT(Stroke Metric)  Outcome: Progressing Towards Goal  Goal: *Absence of aspiration  Outcome: Progressing Towards Goal  Goal: *Optimal pain control at patient's stated goal  Outcome: Progressing Towards Goal  Goal: *Home safety concerns addressed  Outcome: Progressing Towards Goal  Goal: *Describes available resources and support systems  Outcome: Progressing Towards Goal  Goal: *Verbalizes understanding of activation of EMS(911) for stroke symptoms(Stroke Metric)  Outcome: Progressing Towards Goal  Goal: *Understands and describes signs and symptoms to report to providers(Stroke Metric)  Outcome: Progressing Towards Goal  Goal: *Neurolgocially stable (absence of additional neurological deficits)  Outcome: Progressing Towards Goal  Goal: *Verbalizes importance of follow-up with primary care physician(Stroke Metric)  Outcome: Progressing Towards Goal  Goal: *Smoking cessation discussed,if applicable(Stroke Metric)  Outcome: Progressing Towards Goal  Goal: *Depression screening completed(Stroke Metric)  Outcome: Progressing Towards Goal

## 2022-06-11 NOTE — ROUTINE PROCESS
Bedside and Verbal shift change report given to Dina Muñiz RN (oncoming nurse) by David Acevedo RN (offgoing nurse). Report included the following information SBAR, Kardex, MAR, Cardiac Rhythm NSR and Dual Neuro Assessment.

## 2022-06-12 LAB
ANION GAP SERPL CALC-SCNC: 5 MMOL/L (ref 5–15)
BASOPHILS # BLD: 0 K/UL (ref 0–0.1)
BASOPHILS NFR BLD: 0 % (ref 0–1)
BUN SERPL-MCNC: 25 MG/DL (ref 6–20)
BUN/CREAT SERPL: 31 (ref 12–20)
CALCIUM SERPL-MCNC: 8.2 MG/DL (ref 8.5–10.1)
CHLORIDE SERPL-SCNC: 101 MMOL/L (ref 97–108)
CO2 SERPL-SCNC: 27 MMOL/L (ref 21–32)
CREAT SERPL-MCNC: 0.81 MG/DL (ref 0.7–1.3)
DIFFERENTIAL METHOD BLD: ABNORMAL
EOSINOPHIL # BLD: 0 K/UL (ref 0–0.4)
EOSINOPHIL NFR BLD: 0 % (ref 0–7)
ERYTHROCYTE [DISTWIDTH] IN BLOOD BY AUTOMATED COUNT: 11.9 % (ref 11.5–14.5)
GLUCOSE SERPL-MCNC: 154 MG/DL (ref 65–100)
HCT VFR BLD AUTO: 43.3 % (ref 36.6–50.3)
HGB BLD-MCNC: 14.3 G/DL (ref 12.1–17)
IMM GRANULOCYTES # BLD AUTO: 0 K/UL
IMM GRANULOCYTES NFR BLD AUTO: 0 %
LYMPHOCYTES # BLD: 0.8 K/UL (ref 0.8–3.5)
LYMPHOCYTES NFR BLD: 5 % (ref 12–49)
MAGNESIUM SERPL-MCNC: 2.1 MG/DL (ref 1.6–2.4)
MCH RBC QN AUTO: 31.1 PG (ref 26–34)
MCHC RBC AUTO-ENTMCNC: 33 G/DL (ref 30–36.5)
MCV RBC AUTO: 94.1 FL (ref 80–99)
MONOCYTES # BLD: 1 K/UL (ref 0–1)
MONOCYTES NFR BLD: 6 % (ref 5–13)
NEUTS SEG # BLD: 14.2 K/UL (ref 1.8–8)
NEUTS SEG NFR BLD: 89 % (ref 32–75)
NRBC # BLD: 0 K/UL (ref 0–0.01)
NRBC BLD-RTO: 0 PER 100 WBC
PHOSPHATE SERPL-MCNC: 2.9 MG/DL (ref 2.6–4.7)
PLATELET # BLD AUTO: 223 K/UL (ref 150–400)
PMV BLD AUTO: 10.1 FL (ref 8.9–12.9)
POTASSIUM SERPL-SCNC: 4.3 MMOL/L (ref 3.5–5.1)
RBC # BLD AUTO: 4.6 M/UL (ref 4.1–5.7)
RBC MORPH BLD: ABNORMAL
SODIUM SERPL-SCNC: 133 MMOL/L (ref 136–145)
WBC # BLD AUTO: 16 K/UL (ref 4.1–11.1)

## 2022-06-12 PROCEDURE — 74011000250 HC RX REV CODE- 250: Performed by: NEUROLOGICAL SURGERY

## 2022-06-12 PROCEDURE — 80048 BASIC METABOLIC PNL TOTAL CA: CPT

## 2022-06-12 PROCEDURE — 84100 ASSAY OF PHOSPHORUS: CPT

## 2022-06-12 PROCEDURE — 94760 N-INVAS EAR/PLS OXIMETRY 1: CPT

## 2022-06-12 PROCEDURE — 74011250636 HC RX REV CODE- 250/636: Performed by: NURSE PRACTITIONER

## 2022-06-12 PROCEDURE — 74011250637 HC RX REV CODE- 250/637: Performed by: NEUROLOGICAL SURGERY

## 2022-06-12 PROCEDURE — 74011250637 HC RX REV CODE- 250/637: Performed by: NURSE PRACTITIONER

## 2022-06-12 PROCEDURE — 85025 COMPLETE CBC W/AUTO DIFF WBC: CPT

## 2022-06-12 PROCEDURE — 36415 COLL VENOUS BLD VENIPUNCTURE: CPT

## 2022-06-12 PROCEDURE — 65270000046 HC RM TELEMETRY

## 2022-06-12 PROCEDURE — 83735 ASSAY OF MAGNESIUM: CPT

## 2022-06-12 PROCEDURE — 74011250636 HC RX REV CODE- 250/636: Performed by: HOSPITALIST

## 2022-06-12 PROCEDURE — 74011250637 HC RX REV CODE- 250/637: Performed by: EMERGENCY MEDICINE

## 2022-06-12 RX ORDER — ENOXAPARIN SODIUM 100 MG/ML
30 INJECTION SUBCUTANEOUS EVERY 12 HOURS
Status: DISCONTINUED | OUTPATIENT
Start: 2022-06-12 | End: 2022-06-15 | Stop reason: HOSPADM

## 2022-06-12 RX ORDER — ENOXAPARIN SODIUM 100 MG/ML
40 INJECTION SUBCUTANEOUS EVERY 24 HOURS
Status: DISCONTINUED | OUTPATIENT
Start: 2022-06-12 | End: 2022-06-12

## 2022-06-12 RX ADMIN — PANTOPRAZOLE SODIUM 40 MG: 40 TABLET, DELAYED RELEASE ORAL at 18:25

## 2022-06-12 RX ADMIN — LABETALOL HYDROCHLORIDE 100 MG: 100 TABLET, FILM COATED ORAL at 08:54

## 2022-06-12 RX ADMIN — SODIUM CHLORIDE, PRESERVATIVE FREE 10 ML: 5 INJECTION INTRAVENOUS at 07:12

## 2022-06-12 RX ADMIN — DOCUSATE SODIUM 100 MG: 100 CAPSULE, LIQUID FILLED ORAL at 08:54

## 2022-06-12 RX ADMIN — AMLODIPINE BESYLATE 10 MG: 5 TABLET ORAL at 08:53

## 2022-06-12 RX ADMIN — SODIUM CHLORIDE, PRESERVATIVE FREE 10 ML: 5 INJECTION INTRAVENOUS at 18:25

## 2022-06-12 RX ADMIN — DOCUSATE SODIUM 100 MG: 100 CAPSULE, LIQUID FILLED ORAL at 18:25

## 2022-06-12 RX ADMIN — TAMSULOSIN HYDROCHLORIDE 0.4 MG: 0.4 CAPSULE ORAL at 08:53

## 2022-06-12 RX ADMIN — DEXAMETHASONE 2 MG: 1 TABLET ORAL at 22:10

## 2022-06-12 RX ADMIN — LABETALOL HYDROCHLORIDE 100 MG: 100 TABLET, FILM COATED ORAL at 22:11

## 2022-06-12 RX ADMIN — DEXAMETHASONE 2 MG: 1 TABLET ORAL at 06:51

## 2022-06-12 RX ADMIN — ENOXAPARIN SODIUM 30 MG: 100 INJECTION SUBCUTANEOUS at 21:11

## 2022-06-12 RX ADMIN — Medication 10 ML: at 08:53

## 2022-06-12 RX ADMIN — SODIUM CHLORIDE, PRESERVATIVE FREE 10 ML: 5 INJECTION INTRAVENOUS at 22:07

## 2022-06-12 RX ADMIN — PANTOPRAZOLE SODIUM 40 MG: 40 TABLET, DELAYED RELEASE ORAL at 08:53

## 2022-06-12 RX ADMIN — LISINOPRIL 40 MG: 20 TABLET ORAL at 08:53

## 2022-06-12 RX ADMIN — Medication 10 ML: at 22:07

## 2022-06-12 RX ADMIN — LEVETIRACETAM 500 MG: 500 TABLET, FILM COATED ORAL at 08:53

## 2022-06-12 RX ADMIN — LEVETIRACETAM 500 MG: 500 TABLET, FILM COATED ORAL at 21:11

## 2022-06-12 RX ADMIN — DEXAMETHASONE 2 MG: 1 TABLET ORAL at 14:13

## 2022-06-12 NOTE — PROGRESS NOTES
Lovenox Monitoring  Indication: DVT Prophylaxis  Recent Labs     06/12/22  0421 06/11/22  0732 06/10/22  0424   HGB 14.3 14.1 13.7    241 207   CREA 0.81 0.86 0.90     Current Weight: 110.5 kg  Est. CrCl ? 100 ml/min  Current Dose: 40 mg subcutaneously every 24 hours. Plan: Change to 30 mg subcutaneously every 12 hours.

## 2022-06-13 LAB
ANION GAP SERPL CALC-SCNC: 6 MMOL/L (ref 5–15)
BASOPHILS # BLD: 0 K/UL (ref 0–0.1)
BASOPHILS NFR BLD: 0 % (ref 0–1)
BUN SERPL-MCNC: 25 MG/DL (ref 6–20)
BUN/CREAT SERPL: 32 (ref 12–20)
CALCIUM SERPL-MCNC: 8.6 MG/DL (ref 8.5–10.1)
CHLORIDE SERPL-SCNC: 99 MMOL/L (ref 97–108)
CO2 SERPL-SCNC: 28 MMOL/L (ref 21–32)
CREAT SERPL-MCNC: 0.78 MG/DL (ref 0.7–1.3)
DIFFERENTIAL METHOD BLD: ABNORMAL
EOSINOPHIL # BLD: 0.2 K/UL (ref 0–0.4)
EOSINOPHIL NFR BLD: 1 % (ref 0–7)
ERYTHROCYTE [DISTWIDTH] IN BLOOD BY AUTOMATED COUNT: 11.9 % (ref 11.5–14.5)
GLUCOSE SERPL-MCNC: 132 MG/DL (ref 65–100)
HCT VFR BLD AUTO: 46 % (ref 36.6–50.3)
HGB BLD-MCNC: 14.9 G/DL (ref 12.1–17)
IMM GRANULOCYTES # BLD AUTO: 0 K/UL
IMM GRANULOCYTES NFR BLD AUTO: 0 %
LYMPHOCYTES # BLD: 0.9 K/UL (ref 0.8–3.5)
LYMPHOCYTES NFR BLD: 5 % (ref 12–49)
MAGNESIUM SERPL-MCNC: 2 MG/DL (ref 1.6–2.4)
MCH RBC QN AUTO: 30.3 PG (ref 26–34)
MCHC RBC AUTO-ENTMCNC: 32.4 G/DL (ref 30–36.5)
MCV RBC AUTO: 93.5 FL (ref 80–99)
METAMYELOCYTES NFR BLD MANUAL: 1 %
MONOCYTES # BLD: 1.5 K/UL (ref 0–1)
MONOCYTES NFR BLD: 8 % (ref 5–13)
NEUTS BAND NFR BLD MANUAL: 1 % (ref 0–6)
NEUTS SEG # BLD: 15.6 K/UL (ref 1.8–8)
NEUTS SEG NFR BLD: 84 % (ref 32–75)
NRBC # BLD: 0 K/UL (ref 0–0.01)
NRBC BLD-RTO: 0 PER 100 WBC
PHOSPHATE SERPL-MCNC: 3.4 MG/DL (ref 2.6–4.7)
PLATELET # BLD AUTO: 234 K/UL (ref 150–400)
PMV BLD AUTO: 10.2 FL (ref 8.9–12.9)
POTASSIUM SERPL-SCNC: 4.2 MMOL/L (ref 3.5–5.1)
RBC # BLD AUTO: 4.92 M/UL (ref 4.1–5.7)
RBC MORPH BLD: ABNORMAL
SODIUM SERPL-SCNC: 133 MMOL/L (ref 136–145)
WBC # BLD AUTO: 18.4 K/UL (ref 4.1–11.1)

## 2022-06-13 PROCEDURE — 83735 ASSAY OF MAGNESIUM: CPT

## 2022-06-13 PROCEDURE — 74011250637 HC RX REV CODE- 250/637: Performed by: NEUROLOGICAL SURGERY

## 2022-06-13 PROCEDURE — 74011250637 HC RX REV CODE- 250/637: Performed by: EMERGENCY MEDICINE

## 2022-06-13 PROCEDURE — 74011000250 HC RX REV CODE- 250: Performed by: NEUROLOGICAL SURGERY

## 2022-06-13 PROCEDURE — 97535 SELF CARE MNGMENT TRAINING: CPT

## 2022-06-13 PROCEDURE — 65270000046 HC RM TELEMETRY

## 2022-06-13 PROCEDURE — 85025 COMPLETE CBC W/AUTO DIFF WBC: CPT

## 2022-06-13 PROCEDURE — 74011250636 HC RX REV CODE- 250/636: Performed by: HOSPITALIST

## 2022-06-13 PROCEDURE — 99233 SBSQ HOSP IP/OBS HIGH 50: CPT | Performed by: INTERNAL MEDICINE

## 2022-06-13 PROCEDURE — C1758 CATHETER, URETERAL: HCPCS

## 2022-06-13 PROCEDURE — 36415 COLL VENOUS BLD VENIPUNCTURE: CPT

## 2022-06-13 PROCEDURE — 74011250637 HC RX REV CODE- 250/637: Performed by: HOSPITALIST

## 2022-06-13 PROCEDURE — 97116 GAIT TRAINING THERAPY: CPT

## 2022-06-13 PROCEDURE — 84100 ASSAY OF PHOSPHORUS: CPT

## 2022-06-13 PROCEDURE — 80048 BASIC METABOLIC PNL TOTAL CA: CPT

## 2022-06-13 PROCEDURE — 97530 THERAPEUTIC ACTIVITIES: CPT

## 2022-06-13 PROCEDURE — 74011250636 HC RX REV CODE- 250/636: Performed by: NURSE PRACTITIONER

## 2022-06-13 PROCEDURE — 74011250637 HC RX REV CODE- 250/637: Performed by: NURSE PRACTITIONER

## 2022-06-13 RX ORDER — GABAPENTIN 300 MG/1
300 CAPSULE ORAL 3 TIMES DAILY
Status: DISCONTINUED | OUTPATIENT
Start: 2022-06-13 | End: 2022-06-15 | Stop reason: HOSPADM

## 2022-06-13 RX ORDER — DEXAMETHASONE 1 MG/1
2 TABLET ORAL EVERY 12 HOURS
Status: COMPLETED | OUTPATIENT
Start: 2022-06-14 | End: 2022-06-14

## 2022-06-13 RX ORDER — DEXAMETHASONE 1 MG/1
2 TABLET ORAL DAILY
Status: COMPLETED | OUTPATIENT
Start: 2022-06-15 | End: 2022-06-15

## 2022-06-13 RX ORDER — IBUPROFEN 600 MG/1
600 TABLET ORAL 3 TIMES DAILY
Status: DISCONTINUED | OUTPATIENT
Start: 2022-06-13 | End: 2022-06-15 | Stop reason: HOSPADM

## 2022-06-13 RX ORDER — HYDROCORTISONE 25 MG/G
CREAM TOPICAL 4 TIMES DAILY
Status: DISCONTINUED | OUTPATIENT
Start: 2022-06-13 | End: 2022-06-15 | Stop reason: HOSPADM

## 2022-06-13 RX ADMIN — Medication 10 ML: at 07:07

## 2022-06-13 RX ADMIN — TAMSULOSIN HYDROCHLORIDE 0.4 MG: 0.4 CAPSULE ORAL at 09:08

## 2022-06-13 RX ADMIN — DOCUSATE SODIUM 100 MG: 100 CAPSULE, LIQUID FILLED ORAL at 18:47

## 2022-06-13 RX ADMIN — LABETALOL HYDROCHLORIDE 100 MG: 100 TABLET, FILM COATED ORAL at 09:09

## 2022-06-13 RX ADMIN — LABETALOL HYDROCHLORIDE 100 MG: 100 TABLET, FILM COATED ORAL at 20:53

## 2022-06-13 RX ADMIN — LEVETIRACETAM 500 MG: 500 TABLET, FILM COATED ORAL at 09:08

## 2022-06-13 RX ADMIN — AMLODIPINE BESYLATE 10 MG: 5 TABLET ORAL at 09:09

## 2022-06-13 RX ADMIN — HYDROCORTISONE: 25 CREAM TOPICAL at 21:00

## 2022-06-13 RX ADMIN — DEXAMETHASONE 2 MG: 1 TABLET ORAL at 15:08

## 2022-06-13 RX ADMIN — Medication 10 ML: at 15:09

## 2022-06-13 RX ADMIN — SODIUM CHLORIDE, PRESERVATIVE FREE 10 ML: 5 INJECTION INTRAVENOUS at 15:10

## 2022-06-13 RX ADMIN — DOCUSATE SODIUM 100 MG: 100 CAPSULE, LIQUID FILLED ORAL at 09:08

## 2022-06-13 RX ADMIN — ENOXAPARIN SODIUM 30 MG: 100 INJECTION SUBCUTANEOUS at 09:08

## 2022-06-13 RX ADMIN — PANTOPRAZOLE SODIUM 40 MG: 40 TABLET, DELAYED RELEASE ORAL at 16:13

## 2022-06-13 RX ADMIN — SODIUM CHLORIDE, PRESERVATIVE FREE 10 ML: 5 INJECTION INTRAVENOUS at 07:07

## 2022-06-13 RX ADMIN — LISINOPRIL 40 MG: 20 TABLET ORAL at 09:09

## 2022-06-13 RX ADMIN — IBUPROFEN 600 MG: 600 TABLET, FILM COATED ORAL at 18:47

## 2022-06-13 RX ADMIN — DEXAMETHASONE 2 MG: 1 TABLET ORAL at 06:40

## 2022-06-13 RX ADMIN — IBUPROFEN 600 MG: 600 TABLET, FILM COATED ORAL at 20:53

## 2022-06-13 RX ADMIN — LEVETIRACETAM 500 MG: 500 TABLET, FILM COATED ORAL at 20:53

## 2022-06-13 RX ADMIN — GABAPENTIN 300 MG: 300 CAPSULE ORAL at 20:53

## 2022-06-13 RX ADMIN — Medication 10 ML: at 21:00

## 2022-06-13 RX ADMIN — GABAPENTIN 300 MG: 300 CAPSULE ORAL at 18:47

## 2022-06-13 RX ADMIN — PANTOPRAZOLE SODIUM 40 MG: 40 TABLET, DELAYED RELEASE ORAL at 06:41

## 2022-06-13 RX ADMIN — HYDROCORTISONE: 25 CREAM TOPICAL at 18:47

## 2022-06-13 NOTE — PROGRESS NOTES
Transition of Care Plan: [id number] auth    RUR: 7% low    PCP F/U: Makenzie Corbett MD    Disposition: [id number] auth    Transportation: BLS    Main Contact: Spouse-wife Porfirio Setting [phone]: Message sent to liaison at [facility] to see if Catherne Yadi was started on this patient. 1056: Received message that auth had not yet been started. Facility needs updated PT/OT notes to start auth. 1411: Updated patient and spouse regarding status of discharge.  Auth is pending    Norma Pires RN, CRM No

## 2022-06-13 NOTE — PROGRESS NOTES
Problem: Mobility Impaired (Adult and Pediatric)  Goal: *Acute Goals and Plan of Care (Insert Text)  Description:   FUNCTIONAL STATUS PRIOR TO ADMISSION: Patient was independent and active without use of DME. Works full time as a  at [employer]. HOME SUPPORT PRIOR TO ADMISSION: The patient lived with his spouse but did not require assist.    Physical Therapy Goals  Initiated 6/7/2022  1. Patient will move from supine to sit and sit to supine  and scoot up and down in bed with supervision/set-up within 7 day(s). 2.  Patient will transfer from bed to chair and chair to bed with supervision/set-up using the least restrictive device within 7 day(s). 3.  Patient will perform sit to stand with supervision/set-up within 7 day(s). 4.  Patient will ambulate with supervision/set-up for 150 feet with the least restrictive device within 7 day(s). 5.  Patient will ascend/descend 14 stairs with  handrail(s) with supervision/set-up within 7 day(s). 6.  Patient will improve Shipley Balance score by 7 points within 7 days. Outcome: Progressing Towards Goal   PHYSICAL THERAPY TREATMENT  Patient: Alhaji Wing (41 y.o. male)  Date: 6/13/2022  Diagnosis: Brain mass [G93.89] Brain mass  Procedure(s) (LRB):  RIGHT  FRONTAL CRANIOTOMY WITH FLOUROCINE (Right) 7 Days Post-Op  Precautions: Fall,Skin,Seizure  Chart, physical therapy assessment, plan of care and goals were reviewed. ASSESSMENT  Patient continues with skilled PT services and is progressing towards goals. Pt received seated EOB and agreeable to therapy. Pt tolerated session well, but continues to be limited by slight L sided weakness, impaired balance and gait, decreased functional mobility, impaired cognition, increased risk for falls and dependency from baseline. Pt demonstrated improvement in transfers with RW, but required CGA and cueing for proper hand placement.  Pt tolerated gait training 8 ft x 2 with RW with min A and cues for safety and management of RW. Pt remained seated in the chair with all needs met. Pt remains below independent and active baseline, pt was working full time. Pt will benefit from inpatient rehab upon discharge to continue therapy efforts and reach highest level of independence. .     Current Level of Function Impacting Discharge (mobility/balance): min A gait training 8 ft x 2 with RW    Other factors to consider for discharge: previously independent and active, working full time as a          PLAN :  Patient continues to benefit from skilled intervention to address the above impairments. Continue treatment per established plan of care. to address goals. Recommendation for discharge: (in order for the patient to meet his/her long term goals)  Therapy 3 hours per day 5-7 days per week    This discharge recommendation:  Has been made in collaboration with the attending provider and/or case management    IF patient discharges home will need the following DME: to be determined (TBD)       SUBJECTIVE:   Patient stated I wear work out clothes, but I don't actually work out.     OBJECTIVE DATA SUMMARY:   Critical Behavior:  Neurologic State: Alert  Orientation Level: Oriented X4  Cognition: Appropriate for age attention/concentration,Follows commands  Safety/Judgement: Awareness of environment,Decreased insight into deficits,Fall prevention  Functional Mobility Training:  Bed Mobility:     Supine to Sit: Stand-by assistance     Scooting: Contact guard assistance        Transfers:  Sit to Stand: Contact guard assistance  Stand to Sit: Contact guard assistance                             Balance:  Sitting: Impaired  Sitting - Static: Good (unsupported)  Sitting - Dynamic: Good (unsupported); Fair (occasional)  Standing: Impaired; With support  Standing - Static: Constant support;Good  Standing - Dynamic : Constant support; Fair  Ambulation/Gait Training:  Distance (ft): 16 Feet (ft) (8 ft x 2)  Assistive Device: Gait belt;Walker, rolling  Ambulation - Level of Assistance: Minimal assistance        Gait Abnormalities: Decreased step clearance;Trunk sway increased        Base of Support: Widened     Speed/Susi: Shuffled;Pace decreased (<100 feet/min)  Step Length: Right shortened;Left shortened                    Stairs: Therapeutic Exercises:     Pain Rating:  Pt denied pain    Activity Tolerance:   Good    After treatment patient left in no apparent distress:   Sitting in chair, Call bell within reach, Bed / chair alarm activated, Caregiver / family present, and Side rails x 3    COMMUNICATION/COLLABORATION:   The patients plan of care was discussed with: Occupational therapist and Registered nurse.      Pedro Pena PT, DPT   Time Calculation: 27 mins

## 2022-06-13 NOTE — PROGRESS NOTES
Neurosurgery Progress Note  Dagiovany Ignacio, [facility]          Admit Date: 2022   LOS: 11 days        Daily Progress Note: 2022    POD:7 Day Post-Op    S/P: Procedure(s):  RIGHT  FRONTAL CRANIOTOMY WITH FLOUROSCEIN    Subjective: The patient reports a \"squishy\" feeling in his incision when he coughs. He also complains of numbness and pain in his left lateral thigh, which is similar to where his pain was when he had prior foraminal stenosis. Denies chest pain, nausea, vomiting, difficulty swallowing, and dyspnea. Objective:     Vital signs  Temp (24hrs), Av.5 °F (36.9 °C), Min:98.2 °F (36.8 °C), Max:98.8 °F (37.1 °C)   No intake/output data recorded.  1901 -  0700  In: -   Out: 1000 [Urine:1000]    Visit Vitals  /82   Pulse 71   Temp 98.8 °F (37.1 °C)   Resp 19   Ht 5' 6\" (1.676 m)   Wt 110.5 kg (243 lb 9.7 oz)   SpO2 96%   BMI 39.32 kg/m²    O2 Flow Rate (L/min): 4 l/min O2 Device: None (Room air)     Pain control  Pain Assessment  Pain Scale 1: Numeric (0 - 10)  Pain Intensity 1: 0  Pain Onset 1: Postop  Pain Location 1: Head  Pain Orientation 1: Anterior  Pain Description 1: Aching  Pain Intervention(s) 1: Medication (see MAR)    PT/OT  Gait     Gait  Base of Support: Widened  Speed/Susi: Shuffled,Pace decreased (<100 feet/min)  Step Length: Right shortened,Left shortened  Gait Abnormalities: Decreased step clearance,Trunk sway increased  Ambulation - Level of Assistance: Minimal assistance  Distance (ft): 16 Feet (ft) (8 ft x 2)  Assistive Device: Gait belt,Walker, rolling           Physical Exam:  Gen:NAD. Neuro: A&Ox3. Follows commands. Speech clear. Affect flat. PERRL. EOMI. Face symmetric. Tongue midline. FUNG spontaneously. Generalized weakness. Left inattention. Negative drift. Gait deferred. Skin: Right frontal incision C/D/I with sutures in place.  No fluctuance under incision    CT head without contrast on 22 at 2126 shows postoperative changes and pneumocephalus following tumor resection. No unexpected postoperative complication is identified. MRI brain with and without contrast on 06/07/22 shows recent right frontal lobe mass resection, with postoperative changes as described above. Persistent vasogenic edema, regional mass effect and right to left midline shift. Blood product within the resection cavity and mild surrounding diffusion restriction likely postoperative. No significant residual masslike enhancement though close follow-up is recommended.     24 hour results:    Recent Results (from the past 24 hour(s))   METABOLIC PANEL, BASIC    Collection Time: 06/13/22  6:52 AM   Result Value Ref Range    Sodium 133 (L) 136 - 145 mmol/L    Potassium 4.2 3.5 - 5.1 mmol/L    Chloride 99 97 - 108 mmol/L    CO2 28 21 - 32 mmol/L    Anion gap 6 5 - 15 mmol/L    Glucose 132 (H) 65 - 100 mg/dL    BUN 25 (H) 6 - 20 MG/DL    Creatinine 0.78 0.70 - 1.30 MG/DL    BUN/Creatinine ratio 32 (H) 12 - 20      GFR est AA >60 >60 ml/min/1.73m2    GFR est non-AA >60 >60 ml/min/1.73m2    Calcium 8.6 8.5 - 10.1 MG/DL   MAGNESIUM    Collection Time: 06/13/22  6:52 AM   Result Value Ref Range    Magnesium 2.0 1.6 - 2.4 mg/dL   PHOSPHORUS    Collection Time: 06/13/22  6:52 AM   Result Value Ref Range    Phosphorus 3.4 2.6 - 4.7 MG/DL   CBC WITH AUTOMATED DIFF    Collection Time: 06/13/22  6:52 AM   Result Value Ref Range    WBC 18.4 (H) 4.1 - 11.1 K/uL    RBC 4.92 4.10 - 5.70 M/uL    HGB 14.9 12.1 - 17.0 g/dL    HCT 46.0 36.6 - 50.3 %    MCV 93.5 80.0 - 99.0 FL    MCH 30.3 26.0 - 34.0 PG    MCHC 32.4 30.0 - 36.5 g/dL    RDW 11.9 11.5 - 14.5 %    PLATELET 551 442 - 056 K/uL    MPV 10.2 8.9 - 12.9 FL    NRBC 0.0 0  WBC    ABSOLUTE NRBC 0.00 0.00 - 0.01 K/uL    NEUTROPHILS 84 (H) 32 - 75 %    BAND NEUTROPHILS 1 0 - 6 %    LYMPHOCYTES 5 (L) 12 - 49 %    MONOCYTES 8 5 - 13 %    EOSINOPHILS 1 0 - 7 %    BASOPHILS 0 0 - 1 %    METAMYELOCYTES 1 (H) 0 %    IMMATURE GRANULOCYTES 0 %    ABS. NEUTROPHILS 15.6 (H) 1.8 - 8.0 K/UL    ABS. LYMPHOCYTES 0.9 0.8 - 3.5 K/UL    ABS. MONOCYTES 1.5 (H) 0.0 - 1.0 K/UL    ABS. EOSINOPHILS 0.2 0.0 - 0.4 K/UL    ABS. BASOPHILS 0.0 0.0 - 0.1 K/UL    ABS. IMM. GRANS. 0.0 K/UL    DF MANUAL      RBC COMMENTS NORMOCYTIC, NORMOCHROMIC            Assessment:     Principal Problem:    Brain mass (6/2/2022)        Plan:   1. Right frontal brain mass   - s/p crani 06/06   - cont decadron taper   - cont keppra   - PT/OT evals   - normalize and mobilize   [facility] for psychosocial support   - Med onc and rad onc evals appreciated  2. Cerebral edema with brain compression   - due to #1   - plans as above  3. Pneumocephalus   - due to surgery   - Improving  4. HTN   - SBP<140   - Cardene PRN   - Hydralazine/labetalol PRN   - Cont Norvasc, lisinopril    - Intensivist following  5. Leukocytosis   - WBC 1834 (down from 23.3)   - Afebrile   - Likely due to stress of surgery and steroids   - cont to monitor  6. Lumbar radiculopathy   - Start gabapentin 300 mg tid   - Takes diclofenac at home, which is not on formulary. Will substitute ibuprofen 600 mg tid. Activity: up with assist  DVT ppx: SCDs  Dispo: IPR    Plan d/w Dr. Menjivarmas Jacqui, daughter at bedside. Ok to go to rehab when insurance auth obtained and bed available.       Juan M Enriquez NP

## 2022-06-13 NOTE — PROGRESS NOTES
[address] [facility]  Hospitalist Group                                                                                          Hospitalist Progress Note  Meka Floyd MD  Answering service: [phone] from in house phone        Date of Service:  2022  NAME:  Clemente Capps  :  1955  MRN:  [id number]      Admission Summary:   Patient is a 72-year-old male with past medical history of seasonal allergies, spinal stenosis with chronic back pain,  who presents for evaluation of \"weakness. \"    He said that he has a spinal stenosis which affected his the left side, his left side is always weaker than right side, but lately he notes that he noticed this started in January. It is progressively worsened, particularly over the past 2 weeks. He has not seen his primary care doctor for this problem. His wife is with him and describes that he moves extremely slowly. He endorses difficulty with physically getting in and out of chairs. It seems that he has lost sense of time. For example, he started emptying their groceries from the car the other day at 4 PM and did not finish emptying the groceries until 10 PM.  His wife additionally had a primary care doctor's appointment set up for today, and gave him time to get ready. When she went upstairs to have him come to the car, he was not dressed yet and was sitting on the bed singing to himself. He denies headaches, visual changes. His wife also endorses that his sleep cycle is off. He is now getting up at 8 PM as if it were morning and stays up all night. He has not had fevers at home. He notes that in fall of last year, he noticed a weird flash of image or light out of the corner of his eye. He went to the eye doctor, who ran tests and did not see anything concerning. He was referred to a neurological ophthalmologist, who stated his work-up was normal.  He then saw a cardiologist, and had a normal work-up there.   Denies issues with hallucinations, delusions, anxiety, depression, suicidal ideation, homicidal ideation       Interval history / Subjective:   F/u Brain mass   No pain , nausea, vomiting, dizziness  No SOB  Says that he has pain left thigh that started a couple of weeks before coming to the hospital. Similar pain when he had low back surgery a few years back  Assessment & Plan:     Right frontal brain mass  Cerebral edema with brain compression  Pneumocephalus dure to surgery  - s/p crani 06/06, follow path  - cont decadron taper  - cont keppra   - PT/OT evals  - normalize and mobilize  [facility] for psychosocial support  - Appreciate Neurosurgery/Oncology/Radiation oncology    Acute encephalopathy: sec to above, now improving    Left thigh pain, spoke to NSG, no work up needed    HTN  - SBP<140  - Cardene PRN  - Hydralazine/labetalol PRN  - Cont Norvasc, lisinopril, labetalol     Leukocytosis  - WBC 21.7 (down from 23.3)  - Afebrile  - Likely due to stress of surgery and steroids    - cont to monitor    Hypophosphatemia: replace as needed. Will consider stopping oral Kphos tomorrow     Cardiac diet     Code status: FULL CODE  Prophylaxis: scd    Plan: Follow Neurosurgery, oncology, awaiting rehab at [address] discussed with: Patient  Anticipated Disposition: TBD     Hospital Problems  Date Reviewed: 6/10/2022            Codes Class Noted POA    * (Principal) Brain mass ICD-10-CM: S85.23  ICD-9-CM: 348.89  6/2/2022 Yes                  Review of Systems:   A comprehensive review of systems was negative except for that written in the HPI. Vital Signs:    Last 24hrs VS reviewed since prior progress note.  Most recent are:  Visit Vitals  /82   Pulse 69   Temp 98.8 °F (37.1 °C)   Resp 14   Ht 5' 6\" (1.676 m)   Wt 110.5 kg (243 lb 9.7 oz)   SpO2 96%   BMI 39.32 kg/m²       No intake or output data in the 24 hours ending 06/13/22 1356     Physical Examination:     I had a face to face encounter with this patient and independently examined them on 6/13/2022 as outlined below:          Constitutional:  No acute distress   ENT:  Oral mucosa moist, oropharynx benign. Resp:  CTA bilaterally. No wheezing/rhonchi/rales. No accessory muscle use. CV:  Regular rhythm, normal rate, no murmurs, gallops, rubs    GI:  Soft, non distended, non tender. normoactive bowel sounds, no hepatosplenomegaly     Musculoskeletal:  No edema, warm, 2+ pulses throughout    Neurologic:  Moves all extremities. AAOx3, CN II-XII reviewed            Data Review:    Review and/or order of clinical lab test      Labs:     Recent Labs     06/13/22 0652 06/12/22 0421   WBC 18.4* 16.0*   HGB 14.9 14.3   HCT 46.0 43.3    223     Recent Labs     06/13/22  0652 06/12/22 0421 06/11/22  0732   * 133* 135*   K 4.2 4.3 4.3   CL 99 101 99   CO2 28 27 29   BUN 25* 25* 26*   CREA 0.78 0.81 0.86   * 154* 129*   CA 8.6 8.2* 8.2*   MG 2.0 2.1 2.3   PHOS 3.4 2.9 3.0     No results for input(s): ALT, AP, TBIL, TBILI, TP, ALB, GLOB, GGT, AML, LPSE in the last 72 hours. No lab exists for component: SGOT, GPT, AMYP, HLPSE  No results for input(s): INR, PTP, APTT, INREXT, INREXT in the last 72 hours. No results for input(s): FE, TIBC, PSAT, FERR in the last 72 hours. No results found for: FOL, RBCF   No results for input(s): PH, PCO2, PO2 in the last 72 hours. No results for input(s): CPK, CKNDX, TROIQ in the last 72 hours.     No lab exists for component: CPKMB  No results found for: CHOL, CHOLX, CHLST, CHOLV, HDL, HDLP, LDL, LDLC, DLDLP, TGLX, TRIGL, TRIGP, CHHD, CHHDX  No results found for: [facility]  Lab Results   Component Value Date/Time    Color YELLOW/STRAW 06/02/2022 08:19 PM    Appearance CLEAR 06/02/2022 08:19 PM    Specific gravity 1.009 06/02/2022 08:19 PM    pH (UA) 7.5 06/02/2022 08:19 PM    Protein Negative 06/02/2022 08:19 PM    Glucose Negative 06/02/2022 08:19 PM    Ketone Negative 06/02/2022 08:19 PM Bilirubin Negative 06/02/2022 08:19 PM    Urobilinogen 1.0 06/02/2022 08:19 PM    Nitrites Negative 06/02/2022 08:19 PM    Leukocyte Esterase Negative 06/02/2022 08:19 PM    Epithelial cells FEW 06/02/2022 08:19 PM    Bacteria Negative 06/02/2022 08:19 PM    WBC 0-4 06/02/2022 08:19 PM    RBC 0-5 06/02/2022 08:19 PM         Medications Reviewed:     Current Facility-Administered Medications   Medication Dose Route Frequency    enoxaparin (LOVENOX) injection 30 mg  30 mg SubCUTAneous Q12H    dexAMETHasone (DECADRON) tablet 2 mg  2 mg Oral Q8H    hydrALAZINE (APRESOLINE) 20 mg/mL injection 10 mg  10 mg IntraVENous Q4H PRN    lisinopriL (PRINIVIL, ZESTRIL) tablet 40 mg  40 mg Oral DAILY    labetaloL (NORMODYNE) tablet 100 mg  100 mg Oral Q12H    amLODIPine (NORVASC) tablet 10 mg  10 mg Oral DAILY    levETIRAcetam (KEPPRA) tablet 500 mg  500 mg Oral Q12H    tamsulosin (FLOMAX) capsule 0.4 mg  0.4 mg Oral DAILY    labetaloL (NORMODYNE;TRANDATE) injection 10 mg  10 mg IntraVENous Q4H PRN    pantoprazole (PROTONIX) tablet 40 mg  40 mg Oral ACB&D    sodium chloride (NS) flush 5-40 mL  5-40 mL IntraVENous Q8H    sodium chloride (NS) flush 5-40 mL  5-40 mL IntraVENous PRN    docusate sodium (COLACE) capsule 100 mg  100 mg Oral BID    polyethylene glycol (MIRALAX) packet 17 g  17 g Oral DAILY PRN    sodium chloride (NS) flush 5-40 mL  5-40 mL IntraVENous Q8H    acetaminophen (TYLENOL) tablet 650 mg  650 mg Oral Q6H PRN    Or    acetaminophen (TYLENOL) suppository 650 mg  650 mg Rectal Q6H PRN    ondansetron (ZOFRAN ODT) tablet 4 mg  4 mg Oral Q8H PRN    Or    ondansetron (ZOFRAN) injection 4 mg  4 mg IntraVENous Q6H PRN    oxyCODONE IR (ROXICODONE) tablet 10 mg  10 mg Oral Q4H PRN    oxyCODONE IR (ROXICODONE) tablet 5 mg  5 mg Oral Q4H PRN     ______________________________________________________________________  EXPECTED LENGTH OF STAY: 6d 14h  ACTUAL LENGTH OF STAY:          Court Moss MD

## 2022-06-13 NOTE — PROGRESS NOTES
Problem: Falls - Risk of  Goal: *Absence of Falls  Description: Document Favian Suárez Fall Risk and appropriate interventions in the flowsheet. Outcome: Progressing Towards Goal  Note: Fall Risk Interventions:  Mobility Interventions: Communicate number of staff needed for ambulation/transfer,Utilize walker, cane, or other assistive device    Mentation Interventions: Adequate sleep, hydration, pain control    Medication Interventions: Evaluate medications/consider consulting pharmacy,Patient to call before getting OOB    Elimination Interventions:  Toileting schedule/hourly rounds,Call light in reach              Problem: Patient Education: Go to Patient Education Activity  Goal: Patient/Family Education  Outcome: Progressing Towards Goal     Problem: TIA/CVA Stroke: 0-24 hours  Goal: Off Pathway (Use only if patient is Off Pathway)  Outcome: Progressing Towards Goal  Goal: Activity/Safety  Outcome: Progressing Towards Goal  Goal: Consults, if ordered  Outcome: Progressing Towards Goal  Goal: Diagnostic Test/Procedures  Outcome: Progressing Towards Goal  Goal: Nutrition/Diet  Outcome: Progressing Towards Goal  Goal: Discharge Planning  Outcome: Progressing Towards Goal  Goal: Medications  Outcome: Progressing Towards Goal  Goal: Respiratory  Outcome: Progressing Towards Goal  Goal: Treatments/Interventions/Procedures  Outcome: Progressing Towards Goal  Goal: Minimize risk of bleeding post-thrombolytic infusion  Outcome: Progressing Towards Goal  Goal: Monitor for complications post-thrombolytic infusion  Outcome: Progressing Towards Goal  Goal: Psychosocial  Outcome: Progressing Towards Goal  Goal: *Hemodynamically stable  Outcome: Progressing Towards Goal  Goal: *Neurologically stable  Description: Absence of additional neurological deficits    Outcome: Progressing Towards Goal  Goal: *Verbalizes anxiety and depression are reduced or absent  Outcome: Progressing Towards Goal  Goal: *Absence of Signs of Aspiration on Current Diet  Outcome: Progressing Towards Goal  Goal: *Absence of deep venous thrombosis signs and symptoms(Stroke Metric)  Outcome: Progressing Towards Goal  Goal: *Ability to perform ADLs and demonstrates progressive mobility and function  Outcome: Progressing Towards Goal  Goal: *Stroke education started(Stroke Metric)  Outcome: Progressing Towards Goal  Goal: *Dysphagia screen performed(Stroke Metric)  Outcome: Progressing Towards Goal  Goal: *Rehab consulted(Stroke Metric)  Outcome: Progressing Towards Goal     Problem: Patient Education: Go to Patient Education Activity  Goal: Patient/Family Education  Outcome: Progressing Towards Goal     Problem: TIA/CVA Stroke: 0-24 hours  Goal: Off Pathway (Use only if patient is Off Pathway)  Outcome: Progressing Towards Goal  Goal: Activity/Safety  Outcome: Progressing Towards Goal  Goal: Consults, if ordered  Outcome: Progressing Towards Goal  Goal: Diagnostic Test/Procedures  Outcome: Progressing Towards Goal  Goal: Nutrition/Diet  Outcome: Progressing Towards Goal  Goal: Discharge Planning  Outcome: Progressing Towards Goal  Goal: Medications  Outcome: Progressing Towards Goal  Goal: Respiratory  Outcome: Progressing Towards Goal  Goal: Treatments/Interventions/Procedures  Outcome: Progressing Towards Goal  Goal: Minimize risk of bleeding post-thrombolytic infusion  Outcome: Progressing Towards Goal  Goal: Monitor for complications post-thrombolytic infusion  Outcome: Progressing Towards Goal  Goal: Psychosocial  Outcome: Progressing Towards Goal  Goal: *Hemodynamically stable  Outcome: Progressing Towards Goal  Goal: *Neurologically stable  Description: Absence of additional neurological deficits    Outcome: Progressing Towards Goal  Goal: *Verbalizes anxiety and depression are reduced or absent  Outcome: Progressing Towards Goal  Goal: *Absence of Signs of Aspiration on Current Diet  Outcome: Progressing Towards Goal  Goal: *Absence of deep venous thrombosis signs and symptoms(Stroke Metric)  Outcome: Progressing Towards Goal  Goal: *Ability to perform ADLs and demonstrates progressive mobility and function  Outcome: Progressing Towards Goal  Goal: *Stroke education started(Stroke Metric)  Outcome: Progressing Towards Goal  Goal: *Dysphagia screen performed(Stroke Metric)  Outcome: Progressing Towards Goal  Goal: *Rehab consulted(Stroke Metric)  Outcome: Progressing Towards Goal     Problem: TIA/CVA Stroke: Day 2 Until Discharge  Goal: Off Pathway (Use only if patient is Off Pathway)  Outcome: Progressing Towards Goal  Goal: Activity/Safety  Outcome: Progressing Towards Goal  Goal: Diagnostic Test/Procedures  Outcome: Progressing Towards Goal  Goal: Nutrition/Diet  Outcome: Progressing Towards Goal  Goal: Discharge Planning  Outcome: Progressing Towards Goal  Goal: Medications  Outcome: Progressing Towards Goal  Goal: Respiratory  Outcome: Progressing Towards Goal  Goal: Treatments/Interventions/Procedures  Outcome: Progressing Towards Goal  Goal: Psychosocial  Outcome: Progressing Towards Goal  Goal: *Verbalizes anxiety and depression are reduced or absent  Outcome: Progressing Towards Goal  Goal: *Absence of aspiration  Outcome: Progressing Towards Goal  Goal: *Absence of deep venous thrombosis signs and symptoms(Stroke Metric)  Outcome: Progressing Towards Goal  Goal: *Optimal pain control at patient's stated goal  Outcome: Progressing Towards Goal  Goal: *Tolerating diet  Outcome: Progressing Towards Goal  Goal: *Ability to perform ADLs and demonstrates progressive mobility and function  Outcome: Progressing Towards Goal  Goal: *Stroke education continued(Stroke Metric)  Outcome: Progressing Towards Goal     Problem: Ischemic Stroke: Discharge Outcomes  Goal: *Verbalizes anxiety and depression are reduced or absent  Outcome: Progressing Towards Goal  Goal: *Verbalize understanding of risk factor modification(Stroke Metric)  Outcome: Progressing Towards Goal  Goal: *Hemodynamically stable  Outcome: Progressing Towards Goal  Goal: *Absence of aspiration pneumonia  Outcome: Progressing Towards Goal  Goal: *Aware of needed dietary changes  Outcome: Progressing Towards Goal  Goal: *Verbalize understanding of prescribed medications including anti-coagulants, anti-lipid, and/or anti-platelets(Stroke Metric)  Outcome: Progressing Towards Goal  Goal: *Tolerating diet  Outcome: Progressing Towards Goal  Goal: *Aware of follow-up diagnostics related to anticoagulants  Outcome: Progressing Towards Goal  Goal: *Ability to perform ADLs and demonstrates progressive mobility and function  Outcome: Progressing Towards Goal  Goal: *Absence of DVT(Stroke Metric)  Outcome: Progressing Towards Goal  Goal: *Absence of aspiration  Outcome: Progressing Towards Goal  Goal: *Optimal pain control at patient's stated goal  Outcome: Progressing Towards Goal  Goal: *Home safety concerns addressed  Outcome: Progressing Towards Goal  Goal: *Describes available resources and support systems  Outcome: Progressing Towards Goal  Goal: *Verbalizes understanding of activation of EMS(911) for stroke symptoms(Stroke Metric)  Outcome: Progressing Towards Goal  Goal: *Understands and describes signs and symptoms to report to providers(Stroke Metric)  Outcome: Progressing Towards Goal  Goal: *Neurolgocially stable (absence of additional neurological deficits)  Outcome: Progressing Towards Goal  Goal: *Verbalizes importance of follow-up with primary care physician(Stroke Metric)  Outcome: Progressing Towards Goal  Goal: *Smoking cessation discussed,if applicable(Stroke Metric)  Outcome: Progressing Towards Goal  Goal: *Depression screening completed(Stroke Metric)  Outcome: Progressing Towards Goal

## 2022-06-13 NOTE — PROGRESS NOTES
[facility] [place] at [address], [address] [place]: [phone]  F: [phone]    Reason for Visit:   Jennifer Pak is a 61 y.o. male who is seen for fu of brain tumor. Treatment History:   22 RIGHT frontal craniotomy    History of Present Illness:     Seen today in hospital as a new patient for brain tumor. Admitted with increasing confusion. 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 MRI confirmed. Seen by neurosurgery and had crani 22. Seen today in ICU. Family with pt and pt eating sub. Pt doing ok. Limited conversation. ROS not obtainable    INTERIM HX:  Seen today for fu. Doing better overall. Speech better. Family at bedside. Path pending/ send out   pt in bed comfortable. No fevers/ chills/ chest pain/ SOB/ nausea/ vomiting/diarrhea/       Past Medical History:   Diagnosis Date    Arthritis     GERD (gastroesophageal reflux disease)     Morbid obesity (HCC)       Past Surgical History:   Procedure Laterality Date    HX BACK SURGERY      2 laminectomies and fusion    HX ORTHOPAEDIC Right     orif hand    HX SHOULDER ARTHROSCOPY      HX TONSILLECTOMY      as a child      Social History     Tobacco Use    Smoking status: Former Smoker     Quit date: 1981     Years since quittin.0    Smokeless tobacco: Never Used   Substance Use Topics    Alcohol use: Yes     Comment: occasional      History reviewed. No pertinent family history.   Current Facility-Administered Medications   Medication Dose Route Frequency    enoxaparin (LOVENOX) injection 30 mg  30 mg SubCUTAneous Q12H    dexAMETHasone (DECADRON) tablet 2 mg  2 mg Oral Q8H    hydrALAZINE (APRESOLINE) 20 mg/mL injection 10 mg  10 mg IntraVENous Q4H PRN    lisinopriL (PRINIVIL, ZESTRIL) tablet 40 mg  40 mg Oral DAILY    labetaloL (NORMODYNE) tablet 100 mg  100 mg Oral Q12H    amLODIPine (NORVASC) tablet 10 mg  10 mg Oral DAILY    levETIRAcetam (KEPPRA) tablet 500 mg  500 mg Oral Q12H    tamsulosin (FLOMAX) capsule 0.4 mg  0.4 mg Oral DAILY    labetaloL (NORMODYNE;TRANDATE) injection 10 mg  10 mg IntraVENous Q4H PRN    pantoprazole (PROTONIX) tablet 40 mg  40 mg Oral ACB&D    sodium chloride (NS) flush 5-40 mL  5-40 mL IntraVENous Q8H    sodium chloride (NS) flush 5-40 mL  5-40 mL IntraVENous PRN    docusate sodium (COLACE) capsule 100 mg  100 mg Oral BID    polyethylene glycol (MIRALAX) packet 17 g  17 g Oral DAILY PRN    sodium chloride (NS) flush 5-40 mL  5-40 mL IntraVENous Q8H    acetaminophen (TYLENOL) tablet 650 mg  650 mg Oral Q6H PRN    Or    acetaminophen (TYLENOL) suppository 650 mg  650 mg Rectal Q6H PRN    ondansetron (ZOFRAN ODT) tablet 4 mg  4 mg Oral Q8H PRN    Or    ondansetron (ZOFRAN) injection 4 mg  4 mg IntraVENous Q6H PRN    oxyCODONE IR (ROXICODONE) tablet 10 mg  10 mg Oral Q4H PRN    oxyCODONE IR (ROXICODONE) tablet 5 mg  5 mg Oral Q4H PRN      Allergies   Allergen Reactions    Keflex [Cephalexin] Rash        Review of Systems: as per HPI    Physical Exam:     Visit Vitals  /88   Pulse 70   Temp 98.2 °F (36.8 °C)   Resp 15   Ht 5' 6\" (1.676 m)   Wt 243 lb 9.7 oz (110.5 kg)   SpO2 96%   BMI 39.32 kg/m²     ECOG PS: 2  General: No distress  Eyes:  anicteric sclerae  HENT: post surgery scar  Neck: Supple  Respiratory:  normal respiratory effort  CV: Normal rate, regular rhythm on monitor  MS: in bed states can walk with walker to BR with assist  Skin: No rashes, ecchymoses, or petechiae. Normal temperature, turgor, and texture. Psych: awake, conversant    Results:     Lab Results   Component Value Date/Time    WBC 18.4 (H) 06/13/2022 06:52 AM    HGB 14.9 06/13/2022 06:52 AM    HCT 46.0 06/13/2022 06:52 AM    PLATELET 611 07/48/1365 06:52 AM    MCV 93.5 06/13/2022 06:52 AM    ABS.  NEUTROPHILS 15.6 (H) 06/13/2022 06:52 AM     Lab Results   Component Value Date/Time    Sodium 133 (L) 06/13/2022 06:52 AM Potassium 4.2 06/13/2022 06:52 AM    Chloride 99 06/13/2022 06:52 AM    CO2 28 06/13/2022 06:52 AM    Glucose 132 (H) 06/13/2022 06:52 AM    BUN 25 (H) 06/13/2022 06:52 AM    Creatinine 0.78 06/13/2022 06:52 AM    GFR est AA >60 06/13/2022 06:52 AM    GFR est non-AA >60 06/13/2022 06:52 AM    Calcium 8.6 06/13/2022 06:52 AM     Lab Results   Component Value Date/Time    Bilirubin, total 0.5 06/02/2022 02:32 PM    ALT (SGPT) 42 06/02/2022 02:32 PM    Alk. phosphatase 71 06/02/2022 02:32 PM    Protein, total 7.5 06/02/2022 02:32 PM    Albumin 3.5 06/02/2022 02:32 PM    Globulin 4.0 06/02/2022 02:32 PM       MRI Results (most recent):  Results from Hospital Encounter encounter on 06/02/22    MRI BRAIN W WO CONT    Narrative  INDICATION:  s/p crani    COMPARISON:  CT June 6, 2022, MRI June 2, 2022    TECHNIQUE:  Multiplanar multisequence acquisition without and with IV contrast  of the brain. FINDINGS:    There has been recent right frontal craniotomy with resection of previous large  right frontal lobe mass. There is susceptibility artifact in anterior frontal  region consistent with pneumocephalus. There is also susceptibility artifact  within the right frontal lobe resection cavity with heterogeneous T1 and T2  signal most consistent with blood product. No significant residual masslike  enhancement. Extensive surrounding vasogenic edema with persistent mass effect  upon the lateral ventricles, and approximately 9 mm of midline shift. Small  amount of extra-axial hemorrhage in the right lateral frontal region, as well as  small amount of intraventricular hemorrhage in the occipital horns and fourth  ventricle. No hydrocephalus. Small areas of T2 hyperintensity in the left  centrum semiovale are unchanged. Diffusion restriction around the right frontal  lobe resection cavity. Major intracranial vascular flow voids are patent. Right  frontal scalp edema and fluid.     Impression  Recent right frontal lobe mass resection, with postoperative changes as  described above. Persistent vasogenic edema, regional mass effect and right to  left midline shift. Blood product within the resection cavity and mild  surrounding diffusion restriction likely postoperative. No significant residual  masslike enhancement though close follow-up is recommended. Brain MRI:  IMPRESSION  Approximately 5 cm right anterior frontal parenchymal mass without other  associated areas of abnormal enhancement in the brain suggests primary brain  neoplasm/GBM although metastasis cannot be entirely excluded. Significant mass  Affect. .    CT Results (most recent):  Results from Hospital Encounter encounter on 06/02/22    CT HEAD WO CONT    Narrative  EXAM: CT HEAD WO CONT    INDICATION: s/p crani and tumor resection    COMPARISON: 6/2/2022. CONTRAST: None. TECHNIQUE: Unenhanced CT of the head was performed using 5 mm images. Brain and  bone windows were generated. Coronal and sagittal reformats. CT dose reduction  was achieved through use of a standardized protocol tailored for this  examination and automatic exposure control for dose modulation. FINDINGS:  The patient has undergone right frontal craniotomy in the interval.  Postoperative changes and pneumocephalus are noted following tumor resection. Subfalcine herniation persists but has improved compared to the prior exam. No  unexpected postoperative complication is identified. Impression  Postoperative changes and pneumocephalus following tumor resection. No  unexpected postoperative complication is identified. CT C/A/P  IMPRESSION  1. No evidence of primary malignancy in the chest, abdomen or pelvis. 2. Incidental findings as above    Records reviewed and summarized above. Pathology report(s) reviewed above. Radiology report(s) reviewed above.       Assessment/PLAN:     1) Brain tumor  5 cm right anterior frontal parenchymal mass without other  associated areas of abnormal enhancement in the brain suggests primary brain  Neoplasm  Post neurosurgery/ RIGHT frontal craniotomy 6/6/22. PATH:   Brain, right frontal lobe, excision:        Hypercellular glial tissue with necrosis.      Pending additional consultation for further characterization.        Results will be reported in an addendum. Seen early this am for fu. In bed. Family at bedside. Doing better overall speech better. For rehab. Path pending send out as above. Discussed treatment plan from here is pending path. Discussed possible chemo/ radiation. Discussed second opinions. Reviewed all with pt and family today. Pt will recover from surgery and we will follow for path. 2) confusion at presentation. Much improved. Monitor. 3) HTN per IM. 4) psychosocial. Confusion. Family at bedside. Has good support. 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 Gerald Hyde Phd [facility]'s care.     Signed By: Romy Jones DO

## 2022-06-13 NOTE — PROGRESS NOTES
Patient busy with staff. Will follow up as necessary.    Nicky Colin [facility], [facility] Provider

## 2022-06-13 NOTE — PROGRESS NOTES
[facility] [facility] Adult  Hospitalist Group                                                                                          Hospitalist Progress Note  Ericka Lopez MD  Answering service: [phone] from in house phone        Date of Service:  2022  NAME:  Hector Tyler  :  1955  MRN:  [id number]      Admission Summary:   Patient is a 30-year-old male with past medical history of seasonal allergies, spinal stenosis with chronic back pain,  who presents for evaluation of \"weakness. \"    He said that he has a spinal stenosis which affected his the left side, his left side is always weaker than right side, but lately he notes that he noticed this started in January.  It is progressively worsened, particularly over the past 2 weeks. [facility] has not seen his primary care doctor for this problem.  His wife is with him and describes that he moves extremely slowly. [facility] endorses difficulty with physically getting in and out of chairs. Gilson Alberto seems that he has lost sense of time.  For example, he started emptying their groceries from the car the other day at 4 PM and did not finish emptying the groceries until 10 PM.  His wife additionally had a primary care doctor's appointment set up for today, and gave him time to get ready.  When she went upstairs to have him come to the car, he was not dressed yet and was sitting on the bed singing to himself. [facility] denies headaches, visual changes.  His wife also endorses that his sleep cycle is off. [facility] is now getting up at 8 PM as if it were morning and stays up all night. [facility] has not had fevers at home. [facility] notes that in fall of last year, he noticed a weird flash of image or light out of the corner of his eye. [facility] went to the eye doctor, who ran tests and did not see anything concerning. [facility] was referred to a neurological ophthalmologist, who stated his work-up was normal.  He then saw a cardiologist, and had a normal work-up there.  Denies issues with hallucinations, delusions, anxiety, depression, suicidal ideation, homicidal ideation       Interval history / Subjective:   F/u Brain mass   No pain , nausea, vomiting, dizziness  No SOB  Says he has hemorrhoids  Assessment & Plan:     Right frontal brain mass  Cerebral edema with brain compression  Pneumocephalus dure to surgery  - s/p crani 06/06, follow path  - cont decadron taper  - cont keppra   - PT/OT evals  - normalize and mobilize  [facility] for psychosocial support  - Appreciate Neurosurgery/Oncology/Radiation oncology    HTN  - SBP<140  - Hydralazine/labetalol PRN  - Cont Norvasc, lisinopril, labetalol     Leukocytosis-improving  - Afebrile  - Likely due to stress of surgery and steroids    - cont to monitor     Acute encephalopathy: sec to above, now resolved  Left thigh pain, spoke to Bergmaia, no work up needed  Hypophosphatemia:now resolved     Cardiac diet     Code status: FULL CODE  Prophylaxis: scd    Plan: Medically stable, awaiting rehab at [address] discussed with: Patient  Anticipated Disposition: D     Hospital Problems  Date Reviewed: 6/10/2022          Codes Class Noted POA    * (Principal) Brain mass ICD-10-CM: S10.46  ICD-9-CM: 348.89  6/2/2022 Yes                Review of Systems:   A comprehensive review of systems was negative except for that written in the HPI. Vital Signs:    Last 24hrs VS reviewed since prior progress note. Most recent are:  Visit Vitals  /82   Pulse 69   Temp 98.8 °F (37.1 °C)   Resp 14   Ht 5' 6\" (1.676 m)   Wt 110.5 kg (243 lb 9.7 oz)   SpO2 96%   BMI 39.32 kg/m²       No intake or output data in the 24 hours ending 06/13/22 1358     Physical Examination:     I had a face to face encounter with this patient and independently examined them on 6/13/2022 as outlined below:          Constitutional:  No acute distress   ENT:  Oral mucosa moist, oropharynx benign. Resp:  CTA bilaterally. No wheezing/rhonchi/rales.  No accessory muscle use. CV:  Regular rhythm, normal rate, no murmurs, gallops, rubs    GI:  Soft, non distended, non tender. normoactive bowel sounds, no hepatosplenomegaly     Musculoskeletal:  No edema, warm, 2+ pulses throughout    Neurologic:  Moves all extremities. AAOx3, CN II-XII reviewed            Data Review:    Review and/or order of clinical lab test      Labs:     Recent Labs     06/13/22 0652 06/12/22 0421   WBC 18.4* 16.0*   HGB 14.9 14.3   HCT 46.0 43.3    223     Recent Labs     06/13/22  0652 06/12/22  0421 06/11/22  0732   * 133* 135*   K 4.2 4.3 4.3   CL 99 101 99   CO2 28 27 29   BUN 25* 25* 26*   CREA 0.78 0.81 0.86   * 154* 129*   CA 8.6 8.2* 8.2*   MG 2.0 2.1 2.3   PHOS 3.4 2.9 3.0     No results for input(s): ALT, AP, TBIL, TBILI, TP, ALB, GLOB, GGT, AML, LPSE in the last 72 hours. No lab exists for component: SGOT, GPT, AMYP, HLPSE  No results for input(s): INR, PTP, APTT, INREXT, INREXT in the last 72 hours. No results for input(s): FE, TIBC, PSAT, FERR in the last 72 hours. No results found for: FOL, RBCF   No results for input(s): PH, PCO2, PO2 in the last 72 hours. No results for input(s): CPK, CKNDX, TROIQ in the last 72 hours.     No lab exists for component: CPKMB  No results found for: CHOL, CHOLX, CHLST, CHOLV, HDL, HDLP, LDL, LDLC, DLDLP, TGLX, TRIGL, TRIGP, CHHD, CHHDX  No results found for: [facility]  Lab Results   Component Value Date/Time    Color YELLOW/STRAW 06/02/2022 08:19 PM    Appearance CLEAR 06/02/2022 08:19 PM    Specific gravity 1.009 06/02/2022 08:19 PM    pH (UA) 7.5 06/02/2022 08:19 PM    Protein Negative 06/02/2022 08:19 PM    Glucose Negative 06/02/2022 08:19 PM    Ketone Negative 06/02/2022 08:19 PM    Bilirubin Negative 06/02/2022 08:19 PM    Urobilinogen 1.0 06/02/2022 08:19 PM    Nitrites Negative 06/02/2022 08:19 PM    Leukocyte Esterase Negative 06/02/2022 08:19 PM    Epithelial cells FEW 06/02/2022 08:19 PM    Bacteria Negative 06/02/2022 08:19 PM    WBC 0-4 06/02/2022 08:19 PM    RBC 0-5 06/02/2022 08:19 PM         Medications Reviewed:     Current Facility-Administered Medications   Medication Dose Route Frequency    enoxaparin (LOVENOX) injection 30 mg  30 mg SubCUTAneous Q12H    dexAMETHasone (DECADRON) tablet 2 mg  2 mg Oral Q8H    hydrALAZINE (APRESOLINE) 20 mg/mL injection 10 mg  10 mg IntraVENous Q4H PRN    lisinopriL (PRINIVIL, ZESTRIL) tablet 40 mg  40 mg Oral DAILY    labetaloL (NORMODYNE) tablet 100 mg  100 mg Oral Q12H    amLODIPine (NORVASC) tablet 10 mg  10 mg Oral DAILY    levETIRAcetam (KEPPRA) tablet 500 mg  500 mg Oral Q12H    tamsulosin (FLOMAX) capsule 0.4 mg  0.4 mg Oral DAILY    labetaloL (NORMODYNE;TRANDATE) injection 10 mg  10 mg IntraVENous Q4H PRN    pantoprazole (PROTONIX) tablet 40 mg  40 mg Oral ACB&D    sodium chloride (NS) flush 5-40 mL  5-40 mL IntraVENous Q8H    sodium chloride (NS) flush 5-40 mL  5-40 mL IntraVENous PRN    docusate sodium (COLACE) capsule 100 mg  100 mg Oral BID    polyethylene glycol (MIRALAX) packet 17 g  17 g Oral DAILY PRN    sodium chloride (NS) flush 5-40 mL  5-40 mL IntraVENous Q8H    acetaminophen (TYLENOL) tablet 650 mg  650 mg Oral Q6H PRN    Or    acetaminophen (TYLENOL) suppository 650 mg  650 mg Rectal Q6H PRN    ondansetron (ZOFRAN ODT) tablet 4 mg  4 mg Oral Q8H PRN    Or    ondansetron (ZOFRAN) injection 4 mg  4 mg IntraVENous Q6H PRN    oxyCODONE IR (ROXICODONE) tablet 10 mg  10 mg Oral Q4H PRN    oxyCODONE IR (ROXICODONE) tablet 5 mg  5 mg Oral Q4H PRN     ______________________________________________________________________  EXPECTED LENGTH OF STAY: 6d 14h  ACTUAL LENGTH OF STAY:          Braxton Bowen MD

## 2022-06-13 NOTE — PROGRESS NOTES
Problem: Self Care Deficits Care Plan (Adult)  Goal: *Acute Goals and Plan of Care (Insert Text)  Description: FUNCTIONAL STATUS PRIOR TO ADMISSION: Patient was modified independent using a single point cane for functional mobility. Drives, works as a  at [employer]. HOME SUPPORT: The patient lived with wife but did not require assist.    Occupational Therapy Goals  Initiated 6/7/2022   1. Patient will perform grooming with supervision/set-up within 7 day(s). 2.  Patient will perform upper body dressing with supervision/set-up within 7 day(s). 3.  Patient will perform lower body dressing with moderate assistance  within 7 day(s). 4.  Patient will perform toilet transfers with minimal assistance/contact guard assist within 7 day(s). 5.  Patient will perform all aspects of toileting with moderate assistance  within 7 day(s). 6.  Patient will participate in upper extremity therapeutic exercise/activities with supervision/set-up within 7 day(s). 7.  Patient will utilize energy conservation techniques during functional activities with verbal, visual, and tactile cues within 7 day(s). 8.  Patient will improve their LUE Fugl Aponte score by 5 points in prep for ADLs within 7 days. Outcome: Progressing Towards Goal    OCCUPATIONAL THERAPY TREATMENT  Patient: Nohemy Guallpa (18 y.o. male)  Date: 6/13/2022  Diagnosis: Brain mass [G93.89] Brain mass  Procedure(s) (LRB):  RIGHT  FRONTAL CRANIOTOMY WITH FLOUROCINE (Right) 7 Days Post-Op  Precautions: Fall,Skin,Seizure  Chart, occupational therapy assessment, plan of care, and goals were reviewed. ASSESSMENT  Patient continues with skilled OT services and is progressing towards goals. Patient ADLs continue to be limited by impaired balance, generalized weakness, decreased functional activity and impaired cognition related to sequencing, safety awareness and insight into deficits.    Patient received attempting to get out of bed with wife at bedside - educated patient and wife on safety with OOB mobility and making sure to call staff member to get to bathroom. Patient required SBA/CGA for functional mobility using RW with cues for safe hand placement. Patient with CGA for toilet transfer and supervision for zachary care. Patient required mod A for clothing management. Patient returned to chair at end of session with call bell in reach, RN aware, wife bedside and alarm active. Patient functional IND improved from previous session, however, continue to recommend IPR once cleared for D/C. Will continue to follow. Current Level of Function Impacting Discharge (ADLs): up to mod A for ADLs    Other factors to consider for discharge: was IND and teaching at [facility]         PLAN :  Patient continues to benefit from skilled intervention to address the above impairments. Continue treatment per established plan of care to address goals. Recommend with staff: Recommend with nursing, ADLs with supervision/setup, OOB to chair 3x/day via rolling walker and toileting via functional mobility to and from bathroom. Thank you for completing as able in order to maintain patient strength, endurance and independence. Recommend next OT session: full dressing routine    Recommendation for discharge: (in order for the patient to meet his/her long term goals)  Therapy 3 hours per day 5-7 days per week    This discharge recommendation:  Has been made in collaboration with the attending provider and/or case management    IF patient discharges home will need the following DME: shower chair       SUBJECTIVE:   Patient stated I am a thinker.     OBJECTIVE DATA SUMMARY:   Cognitive/Behavioral Status:  Neurologic State: Alert  Orientation Level: Oriented X4  Cognition: Appropriate for age attention/concentration; Follows commands  Perception: Cues to attend to left side of body;Verbal  Perseveration: No perseveration noted  Safety/Judgement: Awareness of environment;Decreased insight into deficits; Fall prevention    Functional Mobility and Transfers for ADLs:  Bed Mobility:  Supine to Sit: Stand-by assistance  Scooting: Contact guard assistance    Transfers:  Sit to Stand: Contact guard assistance  Functional Transfers  Toilet Transfer : Contact guard assistance;Assist x1  Cues: Verbal cues provided  Adaptive Equipment: Grab bars; Walker (comment)     Balance:  Sitting: Impaired  Sitting - Static: Good (unsupported)  Sitting - Dynamic: Good (unsupported); Fair (occasional)  Standing: Impaired; With support  Standing - Static: Constant support;Good  Standing - Dynamic : Constant support; Fair    ADL Intervention:    Toileting  Bladder Hygiene: Stand-by assistance  Bowel Hygiene: Stand-by assistance  Clothing Management: Moderate assistance  Cues: Physical assistance for pants down; Tactile cues provided;Verbal cues provided  Adaptive Equipment: Walker;Grab bars    Cognitive Retraining  Safety/Judgement: Awareness of environment;Decreased insight into deficits; Fall prevention    Pain:  Reporting no pain    Activity Tolerance:   Good    After treatment patient left in no apparent distress:   Sitting in chair, Call bell within reach, Bed / chair alarm activated, Caregiver / family present, and RN aware    COMMUNICATION/COLLABORATION:   The patients plan of care was discussed with: Physical therapist and Registered nurse.      Molly Floyd OT  Time Calculation: 27 mins

## 2022-06-13 NOTE — ROUTINE PROCESS
Bedside and Verbal shift change report given to Hallie Boss RN (oncoming nurse) by Marylou Milton RN (offgoing nurse). Report included the following information SBAR, Kardex, MAR, Cardiac Rhythm NSR and Dual Neuro Assessment.

## 2022-06-13 NOTE — PROGRESS NOTES
POD 7  C/o radicular pain left lat thigh - history of foraminal stenosis  afeb  VSS  Keppra  WBC 18.4  Alert  oriented  FC x 4  Incision C/D/I  MRI: No residual tumor, post-surgical changes, edema  S/p: crani and resection primary glial tumor  Doing well  Awaiting rehab  Start gabapentin, ibuprofen for radicular pain - if that is insufficient in treating pain will consider lumbar MRI  Okay to wash hair  Follow up with me after d/c fromrehab

## 2022-06-13 NOTE — PROGRESS NOTES
Problem: Falls - Risk of  Goal: *Absence of Falls  Description: Document Lona Scherer Fall Risk and appropriate interventions in the flowsheet.   Outcome: Progressing Towards Goal  Note: Fall Risk Interventions:  Mobility Interventions: Communicate number of staff needed for ambulation/transfer,OT consult for ADLs,Patient to call before getting OOB,PT Consult for mobility concerns    Mentation Interventions: Adequate sleep, hydration, pain control    Medication Interventions: Patient to call before getting OOB    Elimination Interventions: Call light in reach,Patient to call for help with toileting needs,Toileting schedule/hourly rounds              Problem: Patient Education: Go to Patient Education Activity  Goal: Patient/Family Education  Outcome: Progressing Towards Goal     Problem: TIA/CVA Stroke: 0-24 hours  Goal: Off Pathway (Use only if patient is Off Pathway)  Outcome: Progressing Towards Goal  Goal: Activity/Safety  Outcome: Progressing Towards Goal  Goal: Consults, if ordered  Outcome: Progressing Towards Goal  Goal: Diagnostic Test/Procedures  Outcome: Progressing Towards Goal  Goal: Nutrition/Diet  Outcome: Progressing Towards Goal  Goal: Discharge Planning  Outcome: Progressing Towards Goal  Goal: Medications  Outcome: Progressing Towards Goal  Goal: Respiratory  Outcome: Progressing Towards Goal  Goal: Treatments/Interventions/Procedures  Outcome: Progressing Towards Goal  Goal: Minimize risk of bleeding post-thrombolytic infusion  Outcome: Progressing Towards Goal  Goal: Monitor for complications post-thrombolytic infusion  Outcome: Progressing Towards Goal  Goal: Psychosocial  Outcome: Progressing Towards Goal  Goal: *Hemodynamically stable  Outcome: Progressing Towards Goal  Goal: *Neurologically stable  Description: Absence of additional neurological deficits    Outcome: Progressing Towards Goal  Goal: *Verbalizes anxiety and depression are reduced or absent  Outcome: Progressing Towards Goal  Goal: *Absence of Signs of Aspiration on Current Diet  Outcome: Progressing Towards Goal  Goal: *Absence of deep venous thrombosis signs and symptoms(Stroke Metric)  Outcome: Progressing Towards Goal  Goal: *Ability to perform ADLs and demonstrates progressive mobility and function  Outcome: Progressing Towards Goal  Goal: *Stroke education started(Stroke Metric)  Outcome: Progressing Towards Goal  Goal: *Dysphagia screen performed(Stroke Metric)  Outcome: Progressing Towards Goal  Goal: *Rehab consulted(Stroke Metric)  Outcome: Progressing Towards Goal     Problem: Patient Education: Go to Patient Education Activity  Goal: Patient/Family Education  Outcome: Progressing Towards Goal     Problem: TIA/CVA Stroke: 0-24 hours  Goal: Off Pathway (Use only if patient is Off Pathway)  Outcome: Progressing Towards Goal  Goal: Activity/Safety  Outcome: Progressing Towards Goal  Goal: Consults, if ordered  Outcome: Progressing Towards Goal  Goal: Diagnostic Test/Procedures  Outcome: Progressing Towards Goal  Goal: Nutrition/Diet  Outcome: Progressing Towards Goal  Goal: Discharge Planning  Outcome: Progressing Towards Goal  Goal: Medications  Outcome: Progressing Towards Goal  Goal: Respiratory  Outcome: Progressing Towards Goal  Goal: Treatments/Interventions/Procedures  Outcome: Progressing Towards Goal  Goal: Minimize risk of bleeding post-thrombolytic infusion  Outcome: Progressing Towards Goal  Goal: Monitor for complications post-thrombolytic infusion  Outcome: Progressing Towards Goal  Goal: Psychosocial  Outcome: Progressing Towards Goal  Goal: *Hemodynamically stable  Outcome: Progressing Towards Goal  Goal: *Neurologically stable  Description: Absence of additional neurological deficits    Outcome: Progressing Towards Goal  Goal: *Verbalizes anxiety and depression are reduced or absent  Outcome: Progressing Towards Goal  Goal: *Absence of Signs of Aspiration on Current Diet  Outcome: Progressing Towards Goal  Goal: *Absence of deep venous thrombosis signs and symptoms(Stroke Metric)  Outcome: Progressing Towards Goal  Goal: *Ability to perform ADLs and demonstrates progressive mobility and function  Outcome: Progressing Towards Goal  Goal: *Stroke education started(Stroke Metric)  Outcome: Progressing Towards Goal  Goal: *Dysphagia screen performed(Stroke Metric)  Outcome: Progressing Towards Goal  Goal: *Rehab consulted(Stroke Metric)  Outcome: Progressing Towards Goal     Problem: TIA/CVA Stroke: Day 2 Until Discharge  Goal: Off Pathway (Use only if patient is Off Pathway)  Outcome: Progressing Towards Goal  Goal: Activity/Safety  Outcome: Progressing Towards Goal  Goal: Diagnostic Test/Procedures  Outcome: Progressing Towards Goal  Goal: Nutrition/Diet  Outcome: Progressing Towards Goal  Goal: Discharge Planning  Outcome: Progressing Towards Goal  Goal: Medications  Outcome: Progressing Towards Goal  Goal: Respiratory  Outcome: Progressing Towards Goal  Goal: Treatments/Interventions/Procedures  Outcome: Progressing Towards Goal  Goal: Psychosocial  Outcome: Progressing Towards Goal  Goal: *Verbalizes anxiety and depression are reduced or absent  Outcome: Progressing Towards Goal  Goal: *Absence of aspiration  Outcome: Progressing Towards Goal  Goal: *Absence of deep venous thrombosis signs and symptoms(Stroke Metric)  Outcome: Progressing Towards Goal  Goal: *Optimal pain control at patient's stated goal  Outcome: Progressing Towards Goal  Goal: *Tolerating diet  Outcome: Progressing Towards Goal  Goal: *Ability to perform ADLs and demonstrates progressive mobility and function  Outcome: Progressing Towards Goal  Goal: *Stroke education continued(Stroke Metric)  Outcome: Progressing Towards Goal     Problem: Ischemic Stroke: Discharge Outcomes  Goal: *Verbalizes anxiety and depression are reduced or absent  Outcome: Progressing Towards Goal  Goal: *Verbalize understanding of risk factor modification(Stroke Metric)  Outcome: Progressing Towards Goal  Goal: *Hemodynamically stable  Outcome: Progressing Towards Goal  Goal: *Absence of aspiration pneumonia  Outcome: Progressing Towards Goal  Goal: *Aware of needed dietary changes  Outcome: Progressing Towards Goal  Goal: *Verbalize understanding of prescribed medications including anti-coagulants, anti-lipid, and/or anti-platelets(Stroke Metric)  Outcome: Progressing Towards Goal  Goal: *Tolerating diet  Outcome: Progressing Towards Goal  Goal: *Aware of follow-up diagnostics related to anticoagulants  Outcome: Progressing Towards Goal  Goal: *Ability to perform ADLs and demonstrates progressive mobility and function  Outcome: Progressing Towards Goal  Goal: *Absence of DVT(Stroke Metric)  Outcome: Progressing Towards Goal  Goal: *Absence of aspiration  Outcome: Progressing Towards Goal  Goal: *Optimal pain control at patient's stated goal  Outcome: Progressing Towards Goal  Goal: *Home safety concerns addressed  Outcome: Progressing Towards Goal  Goal: *Describes available resources and support systems  Outcome: Progressing Towards Goal  Goal: *Verbalizes understanding of activation of EMS(911) for stroke symptoms(Stroke Metric)  Outcome: Progressing Towards Goal  Goal: *Understands and describes signs and symptoms to report to providers(Stroke Metric)  Outcome: Progressing Towards Goal  Goal: *Neurolgocially stable (absence of additional neurological deficits)  Outcome: Progressing Towards Goal  Goal: *Verbalizes importance of follow-up with primary care physician(Stroke Metric)  Outcome: Progressing Towards Goal  Goal: *Smoking cessation discussed,if applicable(Stroke Metric)  Outcome: Progressing Towards Goal  Goal: *Depression screening completed(Stroke Metric)  Outcome: Progressing Towards Goal

## 2022-06-14 ENCOUNTER — DOCUMENTATION ONLY (OUTPATIENT)
Dept: ONCOLOGY | Age: 67
End: 2022-06-14

## 2022-06-14 LAB
ANION GAP SERPL CALC-SCNC: 4 MMOL/L (ref 5–15)
BASOPHILS # BLD: 0 K/UL (ref 0–0.1)
BASOPHILS NFR BLD: 0 % (ref 0–1)
BUN SERPL-MCNC: 31 MG/DL (ref 6–20)
BUN/CREAT SERPL: 36 (ref 12–20)
CALCIUM SERPL-MCNC: 8.7 MG/DL (ref 8.5–10.1)
CHLORIDE SERPL-SCNC: 101 MMOL/L (ref 97–108)
CO2 SERPL-SCNC: 27 MMOL/L (ref 21–32)
CREAT SERPL-MCNC: 0.87 MG/DL (ref 0.7–1.3)
DIFFERENTIAL METHOD BLD: ABNORMAL
EOSINOPHIL # BLD: 0 K/UL (ref 0–0.4)
EOSINOPHIL NFR BLD: 0 % (ref 0–7)
ERYTHROCYTE [DISTWIDTH] IN BLOOD BY AUTOMATED COUNT: 11.9 % (ref 11.5–14.5)
FLUAV RNA SPEC QL NAA+PROBE: NOT DETECTED
FLUBV RNA SPEC QL NAA+PROBE: NOT DETECTED
GLUCOSE SERPL-MCNC: 138 MG/DL (ref 65–100)
HCT VFR BLD AUTO: 47.9 % (ref 36.6–50.3)
HGB BLD-MCNC: 15.5 G/DL (ref 12.1–17)
IMM GRANULOCYTES # BLD AUTO: 0 K/UL
IMM GRANULOCYTES NFR BLD AUTO: 0 %
LYMPHOCYTES # BLD: 1.1 K/UL (ref 0.8–3.5)
LYMPHOCYTES NFR BLD: 6 % (ref 12–49)
MAGNESIUM SERPL-MCNC: 2.2 MG/DL (ref 1.6–2.4)
MCH RBC QN AUTO: 30.1 PG (ref 26–34)
MCHC RBC AUTO-ENTMCNC: 32.4 G/DL (ref 30–36.5)
MCV RBC AUTO: 93 FL (ref 80–99)
METAMYELOCYTES NFR BLD MANUAL: 1 %
MONOCYTES # BLD: 0.8 K/UL (ref 0–1)
MONOCYTES NFR BLD: 4 % (ref 5–13)
NEUTS SEG # BLD: 16.9 K/UL (ref 1.8–8)
NEUTS SEG NFR BLD: 89 % (ref 32–75)
NRBC # BLD: 0 K/UL (ref 0–0.01)
NRBC BLD-RTO: 0 PER 100 WBC
PHOSPHATE SERPL-MCNC: 3.7 MG/DL (ref 2.6–4.7)
PLATELET # BLD AUTO: 218 K/UL (ref 150–400)
PMV BLD AUTO: 10.1 FL (ref 8.9–12.9)
POTASSIUM SERPL-SCNC: 6 MMOL/L (ref 3.5–5.1)
RBC # BLD AUTO: 5.15 M/UL (ref 4.1–5.7)
RBC MORPH BLD: ABNORMAL
SARS-COV-2, COV2: NOT DETECTED
SODIUM SERPL-SCNC: 132 MMOL/L (ref 136–145)
WBC # BLD AUTO: 19 K/UL (ref 4.1–11.1)

## 2022-06-14 PROCEDURE — 74011250637 HC RX REV CODE- 250/637: Performed by: EMERGENCY MEDICINE

## 2022-06-14 PROCEDURE — 74011000250 HC RX REV CODE- 250: Performed by: NEUROLOGICAL SURGERY

## 2022-06-14 PROCEDURE — 83735 ASSAY OF MAGNESIUM: CPT

## 2022-06-14 PROCEDURE — 85025 COMPLETE CBC W/AUTO DIFF WBC: CPT

## 2022-06-14 PROCEDURE — 74011250637 HC RX REV CODE- 250/637: Performed by: NEUROLOGICAL SURGERY

## 2022-06-14 PROCEDURE — 80048 BASIC METABOLIC PNL TOTAL CA: CPT

## 2022-06-14 PROCEDURE — 74011250637 HC RX REV CODE- 250/637: Performed by: NURSE PRACTITIONER

## 2022-06-14 PROCEDURE — 87636 SARSCOV2 & INF A&B AMP PRB: CPT

## 2022-06-14 PROCEDURE — 36415 COLL VENOUS BLD VENIPUNCTURE: CPT

## 2022-06-14 PROCEDURE — 74011250636 HC RX REV CODE- 250/636: Performed by: NURSE PRACTITIONER

## 2022-06-14 PROCEDURE — 97116 GAIT TRAINING THERAPY: CPT

## 2022-06-14 PROCEDURE — 84100 ASSAY OF PHOSPHORUS: CPT

## 2022-06-14 PROCEDURE — 97535 SELF CARE MNGMENT TRAINING: CPT

## 2022-06-14 PROCEDURE — 65270000046 HC RM TELEMETRY

## 2022-06-14 RX ORDER — LEVETIRACETAM 500 MG/1
500 TABLET ORAL EVERY 12 HOURS
Qty: 60 TABLET | Refills: 1 | Status: SHIPPED | OUTPATIENT
Start: 2022-06-14 | End: 2022-08-16

## 2022-06-14 RX ORDER — DOCUSATE SODIUM 100 MG/1
100 CAPSULE, LIQUID FILLED ORAL 2 TIMES DAILY
Qty: 60 CAPSULE | Refills: 2 | Status: SHIPPED | OUTPATIENT
Start: 2022-06-14 | End: 2022-09-12

## 2022-06-14 RX ORDER — TAMSULOSIN HYDROCHLORIDE 0.4 MG/1
0.4 CAPSULE ORAL DAILY
Qty: 30 CAPSULE | Refills: 1 | Status: SHIPPED | OUTPATIENT
Start: 2022-06-15 | End: 2022-10-27

## 2022-06-14 RX ORDER — GABAPENTIN 300 MG/1
300 CAPSULE ORAL 3 TIMES DAILY
Qty: 90 CAPSULE | Refills: 0 | Status: SHIPPED | OUTPATIENT
Start: 2022-06-14

## 2022-06-14 RX ORDER — AMLODIPINE BESYLATE 10 MG/1
10 TABLET ORAL DAILY
Qty: 30 TABLET | Refills: 1 | Status: SHIPPED | OUTPATIENT
Start: 2022-06-15 | End: 2022-10-27

## 2022-06-14 RX ORDER — LISINOPRIL 40 MG/1
40 TABLET ORAL DAILY
Qty: 30 TABLET | Refills: 1 | Status: SHIPPED | OUTPATIENT
Start: 2022-06-15 | End: 2022-10-27

## 2022-06-14 RX ORDER — OXYCODONE HYDROCHLORIDE 5 MG/1
5 TABLET ORAL
Qty: 10 TABLET | Refills: 0 | Status: SHIPPED | OUTPATIENT
Start: 2022-06-14 | End: 2022-06-17

## 2022-06-14 RX ORDER — PANTOPRAZOLE SODIUM 40 MG/1
40 TABLET, DELAYED RELEASE ORAL
Qty: 60 TABLET | Refills: 0 | Status: SHIPPED | OUTPATIENT
Start: 2022-06-14 | End: 2022-10-27

## 2022-06-14 RX ADMIN — PANTOPRAZOLE SODIUM 40 MG: 40 TABLET, DELAYED RELEASE ORAL at 07:30

## 2022-06-14 RX ADMIN — AMLODIPINE BESYLATE 10 MG: 5 TABLET ORAL at 09:18

## 2022-06-14 RX ADMIN — LABETALOL HYDROCHLORIDE 100 MG: 100 TABLET, FILM COATED ORAL at 09:20

## 2022-06-14 RX ADMIN — SODIUM CHLORIDE, PRESERVATIVE FREE 10 ML: 5 INJECTION INTRAVENOUS at 06:00

## 2022-06-14 RX ADMIN — LEVETIRACETAM 500 MG: 500 TABLET, FILM COATED ORAL at 21:08

## 2022-06-14 RX ADMIN — DOCUSATE SODIUM 100 MG: 100 CAPSULE, LIQUID FILLED ORAL at 18:37

## 2022-06-14 RX ADMIN — LABETALOL HYDROCHLORIDE 100 MG: 100 TABLET, FILM COATED ORAL at 21:07

## 2022-06-14 RX ADMIN — DEXAMETHASONE 2 MG: 1 TABLET ORAL at 09:19

## 2022-06-14 RX ADMIN — IBUPROFEN 600 MG: 600 TABLET, FILM COATED ORAL at 09:18

## 2022-06-14 RX ADMIN — SODIUM CHLORIDE, PRESERVATIVE FREE 10 ML: 5 INJECTION INTRAVENOUS at 14:12

## 2022-06-14 RX ADMIN — HYDROCORTISONE: 25 CREAM TOPICAL at 09:21

## 2022-06-14 RX ADMIN — LISINOPRIL 40 MG: 20 TABLET ORAL at 09:19

## 2022-06-14 RX ADMIN — LEVETIRACETAM 500 MG: 500 TABLET, FILM COATED ORAL at 09:18

## 2022-06-14 RX ADMIN — PANTOPRAZOLE SODIUM 40 MG: 40 TABLET, DELAYED RELEASE ORAL at 18:37

## 2022-06-14 RX ADMIN — IBUPROFEN 600 MG: 600 TABLET, FILM COATED ORAL at 18:37

## 2022-06-14 RX ADMIN — GABAPENTIN 300 MG: 300 CAPSULE ORAL at 21:07

## 2022-06-14 RX ADMIN — GABAPENTIN 300 MG: 300 CAPSULE ORAL at 09:18

## 2022-06-14 RX ADMIN — HYDROCORTISONE: 25 CREAM TOPICAL at 21:09

## 2022-06-14 RX ADMIN — IBUPROFEN 600 MG: 600 TABLET, FILM COATED ORAL at 21:07

## 2022-06-14 RX ADMIN — SODIUM CHLORIDE, PRESERVATIVE FREE 10 ML: 5 INJECTION INTRAVENOUS at 21:09

## 2022-06-14 RX ADMIN — GABAPENTIN 300 MG: 300 CAPSULE ORAL at 18:37

## 2022-06-14 RX ADMIN — HYDROCORTISONE: 25 CREAM TOPICAL at 18:37

## 2022-06-14 NOTE — PROGRESS NOTES
Problem: Falls - Risk of  Goal: *Absence of Falls  Description: Document Warden Carcamo Fall Risk and appropriate interventions in the flowsheet.   Outcome: Progressing Towards Goal  Note: Fall Risk Interventions:  Mobility Interventions: Bed/chair exit alarm,Communicate number of staff needed for ambulation/transfer,OT consult for ADLs,PT Consult for mobility concerns,Patient to call before getting OOB,PT Consult for assist device competence,Utilize walker, cane, or other assistive device    Mentation Interventions: Bed/chair exit alarm,Door open when patient unattended,Toileting rounds,Update white board    Medication Interventions: Bed/chair exit alarm,Patient to call before getting OOB,Teach patient to arise slowly    Elimination Interventions: Bed/chair exit alarm,Call light in reach,Patient to call for help with toileting needs,Stay With Me (per policy),Toilet paper/wipes in reach,Toileting schedule/hourly rounds              Problem: Patient Education: Go to Patient Education Activity  Goal: Patient/Family Education  Outcome: Progressing Towards Goal     Problem: TIA/CVA Stroke: Day 2 Until Discharge  Goal: Off Pathway (Use only if patient is Off Pathway)  Outcome: Progressing Towards Goal  Goal: Activity/Safety  Outcome: Progressing Towards Goal  Goal: Diagnostic Test/Procedures  Outcome: Progressing Towards Goal  Goal: Nutrition/Diet  Outcome: Progressing Towards Goal  Goal: Discharge Planning  Outcome: Progressing Towards Goal  Goal: Medications  Outcome: Progressing Towards Goal  Goal: Respiratory  Outcome: Progressing Towards Goal  Goal: Treatments/Interventions/Procedures  Outcome: Progressing Towards Goal  Goal: Psychosocial  Outcome: Progressing Towards Goal  Goal: *Verbalizes anxiety and depression are reduced or absent  Outcome: Progressing Towards Goal  Goal: *Absence of aspiration  Outcome: Progressing Towards Goal  Goal: *Absence of deep venous thrombosis signs and symptoms(Stroke Metric)  Outcome: Progressing Towards Goal  Goal: *Optimal pain control at patient's stated goal  Outcome: Progressing Towards Goal  Goal: *Tolerating diet  Outcome: Progressing Towards Goal  Goal: *Ability to perform ADLs and demonstrates progressive mobility and function  Outcome: Progressing Towards Goal  Goal: *Stroke education continued(Stroke Metric)  Outcome: Progressing Towards Goal     Problem: Ischemic Stroke: Discharge Outcomes  Goal: *Verbalizes anxiety and depression are reduced or absent  Outcome: Progressing Towards Goal  Goal: *Verbalize understanding of risk factor modification(Stroke Metric)  Outcome: Progressing Towards Goal  Goal: *Hemodynamically stable  Outcome: Progressing Towards Goal  Goal: *Absence of aspiration pneumonia  Outcome: Progressing Towards Goal  Goal: *Aware of needed dietary changes  Outcome: Progressing Towards Goal  Goal: *Verbalize understanding of prescribed medications including anti-coagulants, anti-lipid, and/or anti-platelets(Stroke Metric)  Outcome: Progressing Towards Goal  Goal: *Tolerating diet  Outcome: Progressing Towards Goal  Goal: *Aware of follow-up diagnostics related to anticoagulants  Outcome: Progressing Towards Goal  Goal: *Ability to perform ADLs and demonstrates progressive mobility and function  Outcome: Progressing Towards Goal  Goal: *Absence of DVT(Stroke Metric)  Outcome: Progressing Towards Goal  Goal: *Absence of aspiration  Outcome: Progressing Towards Goal  Goal: *Optimal pain control at patient's stated goal  Outcome: Progressing Towards Goal  Goal: *Home safety concerns addressed  Outcome: Progressing Towards Goal  Goal: *Describes available resources and support systems  Outcome: Progressing Towards Goal  Goal: *Verbalizes understanding of activation of EMS(911) for stroke symptoms(Stroke Metric)  Outcome: Progressing Towards Goal  Goal: *Understands and describes signs and symptoms to report to providers(Stroke Metric)  Outcome: Progressing Towards Goal  Goal: *Neurolgocially stable (absence of additional neurological deficits)  Outcome: Progressing Towards Goal  Goal: *Verbalizes importance of follow-up with primary care physician(Stroke Metric)  Outcome: Progressing Towards Goal  Goal: *Smoking cessation discussed,if applicable(Stroke Metric)  Outcome: Progressing Towards Goal  Goal: *Depression screening completed(Stroke Metric)  Outcome: Progressing Towards Goal

## 2022-06-14 NOTE — PROGRESS NOTES
Problem: Mobility Impaired (Adult and Pediatric)  Goal: *Acute Goals and Plan of Care (Insert Text)  Description:   FUNCTIONAL STATUS PRIOR TO ADMISSION: Patient was independent and active without use of DME. Works full time as a  at [employer]. HOME SUPPORT PRIOR TO ADMISSION: The patient lived with his spouse but did not require assist.    Physical Therapy Goals  Reassessed 6/14/2022 remain appropriate  Initiated 6/7/2022  1. Patient will move from supine to sit and sit to supine  and scoot up and down in bed with supervision/set-up within 7 day(s). 2.  Patient will transfer from bed to chair and chair to bed with supervision/set-up using the least restrictive device within 7 day(s). 3.  Patient will perform sit to stand with supervision/set-up within 7 day(s). 4.  Patient will ambulate with supervision/set-up for 150 feet with the least restrictive device within 7 day(s). 5.  Patient will ascend/descend 14 stairs with  handrail(s) with supervision/set-up within 7 day(s). 6.  Patient will improve Shipley Balance score by 7 points within 7 days. Outcome: Progressing Towards Goal   PHYSICAL THERAPY TREATMENT: WEEKLY REASSESSMENT  Patient: Shawnee Hodgkin (79 y.o. male)  Date: 6/14/2022  Primary Diagnosis: Brain mass [G93.89]  Procedure(s) (LRB):  RIGHT  FRONTAL CRANIOTOMY WITH FLOUROCINE (Right) 8 Days Post-Op   Precautions:   [address]      ASSESSMENT  Patient continues with skilled PT services and is progressing towards goals. Pt received supine in bed with multiple family members at bed. Pt agreeable to therapy and requesting to use the restroom. Pt continues to be limited by generalized weakness (slightly weaker LLE), decreased functional mobility, impaired balance and gait, impaired cognition s/p R frontal craniotomy, POD#8. Pt completed transfers with CGA and verbal cues for proper hand placement for safety.  Pt tolerated gait training x 10 ft with RW with min A demonstrating wide WILIAM, lateral trunk sway, impaired LLE swing phase. Pt requesting additional time to attempt to have a bowel movement and advised patient to pull the cord when he was finished. RN made aware. Pt will benefit from inpatient rehab upon discharge to reach highest level of independence for a multi-disciplinary approach to maximize outcomes. Patient's progression toward goals since last assessment: progress has been made towards all goals, but none were met    Current Level of Function Impacting Discharge (mobility/balance): min A gait training x 10 ft with RW       PLAN :  Goals have been updated based on progression since last assessment. Patient continues to benefit from skilled intervention to address the above impairments. Recommendations and Planned Interventions: bed mobility training, transfer training, gait training, therapeutic exercises, neuromuscular re-education, patient and family training/education, and therapeutic activities      Frequency/Duration: Patient will be followed by physical therapy:  5 times a week to address goals. Recommendation for discharge: (in order for the patient to meet his/her long term goals)  Therapy 3 hours per day 5-7 days per week    This discharge recommendation:  Has been made in collaboration with the attending provider and/or case management    IF patient discharges home will need the following DME: to be determined (TBD)         SUBJECTIVE:   Patient stated I was eating butter, jam, and toast this morning with my daughter.     OBJECTIVE DATA SUMMARY:   HISTORY:    Past Medical History:   Diagnosis Date    Arthritis     GERD (gastroesophageal reflux disease)     Morbid obesity ([address]      Past Surgical History:   Procedure Laterality Date    HX BACK SURGERY      2 laminectomies and fusion    HX ORTHOPAEDIC Right     orif hand    HX SHOULDER ARTHROSCOPY      HX TONSILLECTOMY      as a child       Personal factors and/or comorbidities impacting plan of care:     Home Situation  Home Environment: Private residence  # Steps to Enter: 5  Rails to Enter: Yes  Hand Rails : Bilateral  One/Two Story Residence: Two story  # of Interior Steps: 15  Interior Rails: Right  Living Alone: No  Support Systems: Spouse/Significant Other,Other Family Member(s),Friend/Neighbor (wife, adult son)  Patient Expects to be Discharged to[de-identified] Rehab hospital/unit acute  Current DME Used/Available at Home: Cane, straight,Grab bars  Tub or Shower Type: Shower (built in seat)    EXAMINATION/PRESENTATION/DECISION MAKING:   Critical Behavior:  Neurologic State: Alert  Orientation Level: Oriented X4  Cognition: Follows commands,Decreased attention/concentration  Safety/Judgement: Awareness of environment,Decreased insight into deficits,Fall prevention  Hearing: Auditory  Auditory Impairment: None  Skin:    Edema:   Range Of Motion:  AROM: Generally decreased, functional                       Strength:    Strength: Generally decreased, functional                    Tone & Sensation:                  Sensation: Impaired               Coordination:     Vision:      Functional Mobility:  Bed Mobility:     Supine to Sit: Supervision        Transfers:  Sit to Stand: Contact guard assistance  Stand to Sit: Contact guard assistance                       Balance:   Sitting: Intact  Standing: Impaired; With support (RW)  Standing - Static: Good  Standing - Dynamic : Fair  Ambulation/Gait Training:  Distance (ft): 10 Feet (ft)  Assistive Device: Gait belt;Walker, rolling  Ambulation - Level of Assistance: Minimal assistance        Gait Abnormalities: Decreased step clearance;Trunk sway increased; Shuffling gait        Base of Support: Widened     Speed/Susi: Shuffled; Slow  Step Length: Right shortened;Left shortened            Pain Rating:  Pt denied pain    Activity Tolerance:   Good    After treatment patient left in no apparent distress:   Call bell within reach, Caregiver / family present, Side rails x 3, and sitting on the toilet    COMMUNICATION/EDUCATION:   The patients plan of care was discussed with: Occupational therapist and Registered nurse. Fall prevention education was provided and the patient/caregiver indicated understanding., Patient/family have participated as able in goal setting and plan of care. , and Patient/family agree to work toward stated goals and plan of care.     Thank you for this referral.  Dayami Gaytan, PT, DPT   Time Calculation: 15 mins

## 2022-06-14 NOTE — PROGRESS NOTES
Comprehensive Nutrition Assessment    Type and Reason for Visit: Initial    Nutrition Recommendations/Plan:   Adjusted diet to Low Na per pt/family request      Malnutrition Assessment:  Malnutrition Status:  No malnutrition (06/14/22 1611)    Context:  Acute illness         Nutrition Assessment:     Pt admitted with Brain mass [G93.89]    Past Medical History:   Diagnosis Date    Arthritis     GERD (gastroesophageal reflux disease)     Morbid obesity ([address]      Pt is POD 8, crani/resection. Pt eating well, appetite appropriate, just doesn't really like the \"cold rubbery eggs. \" Family is bringing in meals when needed. Pt reports minor wt loss 2/2 low PO intake for a couple days s/p surgery. No GI complaints. No chew/swallow difficulties, No known food allergies. Pt does not eat pork \"because I'm Yazidism. \" Awaiting placement for rehab. Last BM: 06/14/22, Formed    PO intake:   Patient Vitals for the past 168 hrs:   % Diet Eaten   06/08/22 2000 26 - 50%   06/08/22 0800 26 - 50%       Wt Readings from Last 30 Encounters:   06/14/22 110.2 kg (242 lb 15.2 oz)           Nutrition Related Findings:      Wound Type: Surgical incision    Current Nutrition Intake & Therapies:  Average Meal Intake: %  Average Supplement Intake: None ordered  ADULT DIET Regular; Low Sodium (2 gm); **DOES NOT EAT PORK    Anthropometric Measures:  Height: 5' 5.98\" (167.6 cm)  Ideal Body Weight (IBW): 142 lbs (65 kg)     Current Body Wt:  109 kg (240 lb 4.8 oz), 169.2 % IBW. Bed scale  Current BMI (kg/m2): 38.8  Usual Body Weight: 111.1 kg (245 lb)  % Weight Change (Calculated): -1.9                    BMI Category: Obese class 2 (BMI 35.0-39. 9)    Estimated Daily Nutrient Needs:  Energy Requirements Based On: Formula  Weight Used for Energy Requirements: Current  Energy (kcal/day): 1097 (MSJx1.3x1.1)  Weight Used for Protein Requirements: Current  Protein (g/day): 109-131 (1.0-1.2g/kg)  Method Used for Fluid Requirements: 1 ml/kcal  Fluid (ml/day): 2500    Nutrition Diagnosis:   · Increased nutrient needs related to increased demand for energy/nutrients as evidenced by wounds      Nutrition Interventions:   Food and/or Nutrient Delivery: Modify current diet  Nutrition Education/Counseling: No recommendations at this time  Coordination of Nutrition Care: Continue to monitor while inpatient,Interdisciplinary rounds       Goals:     Goals: Meet at least 75% of estimated needs,by next RD assessment       Nutrition Monitoring and Evaluation:   Behavioral-Environmental Outcomes: None identified  Food/Nutrient Intake Outcomes: Food and nutrient intake,IVF intake  Physical Signs/Symptoms Outcomes: Biochemical data,Weight,Skin    Discharge Planning:    Continue current diet    Ade Caldwell, [address]: [phone]

## 2022-06-14 NOTE — PROGRESS NOTES
[address] [facility] Adult  Hospitalist Group                                                                                          Hospitalist Progress Note  Vinh Ruth MD  Answering service: [phone] from in house phone        Date of Service:  2022  NAME:  Lawyer Shmuel EPPERSONB:  1955  MRN:  [id number]      Admission Summary:   Patient is a 29-year-old male with past medical history of seasonal allergies, spinal stenosis with chronic back pain,  who presents for evaluation of \"weakness. \"    He said that he has a spinal stenosis which affected his the left side, his left side is always weaker than right side, but lately he notes that he noticed this started in January.  It is progressively worsened, particularly over the past 2 weeks. [facility] has not seen his primary care doctor for this problem.  His wife is with him and describes that he moves extremely slowly. [facility] endorses difficulty with physically getting in and out of chairs. Wanda Minium seems that he has lost sense of time.  For example, he started emptying their groceries from the car the other day at 4 PM and did not finish emptying the groceries until 10 PM.  His wife additionally had a primary care doctor's appointment set up for today, and gave him time to get ready.  When she went upstairs to have him come to the car, he was not dressed yet and was sitting on the bed singing to himself. [facility] denies headaches, visual changes.  His wife also endorses that his sleep cycle is off. [facility] is now getting up at 8 PM as if it were morning and stays up all night. [facility] has not had fevers at home. [facility] notes that in fall of last year, he noticed a weird flash of image or light out of the corner of his eye. [facility] went to the eye doctor, who ran tests and did not see anything concerning. [facility] was referred to a neurological ophthalmologist, who stated his work-up was normal.  He then saw a cardiologist, and had a normal work-up there.  Denies issues with hallucinations, delusions, anxiety, depression, suicidal ideation, homicidal ideation       Interval history / Subjective:   F/u Brain mass   No pain , nausea, vomiting, dizziness  No SOB  No new issues  Assessment & Plan:     Right frontal brain mass  Cerebral edema with brain compression  Pneumocephalus dure to surgery  - s/p crani 06/06, follow path  - cont decadron taper  - cont keppra   - PT/OT evals  - normalize and mobilize  [facility] for psychosocial support  - Appreciate Neurosurgery/Oncology/Radiation oncology    HTN  - SBP<140  - Hydralazine/labetalol PRN  - Cont Norvasc, lisinopril, labetalol     Leukocytosis-improving  - Afebrile  - Likely due to stress of surgery and steroids    - cont to monitor     Acute encephalopathy: sec to above, now resolved  Left thigh pain, spoke to [place], no work up needed  Hypophosphatemia:now resolved     Cardiac diet     Code status: FULL CODE  Prophylaxis: scd    Plan: Medically stable, awaiting rehab at [address] discussed with: Patient  Anticipated Disposition: D     Hospital Problems  Date Reviewed: 6/10/2022          Codes Class Noted POA    * (Principal) Brain mass ICD-10-CM: C50.52  ICD-9-CM: 348.89  6/2/2022 Yes                Review of Systems:   A comprehensive review of systems was negative except for that written in the HPI. Vital Signs:    Last 24hrs VS reviewed since prior progress note.  Most recent are:  Visit Vitals  /65 (BP 1 Location: Left upper arm, BP Patient Position: At rest)   Pulse 77   Temp 98.8 °F (37.1 °C)   Resp 19   Ht 5' 6\" (1.676 m)   Wt 110.2 kg (242 lb 15.2 oz)   SpO2 93%   BMI 39.21 kg/m²         Intake/Output Summary (Last 24 hours) at 6/14/2022 1500  Last data filed at 6/14/2022 0400  Gross per 24 hour   Intake --   Output 650 ml   Net -650 ml        Physical Examination:     I had a face to face encounter with this patient and independently examined them on 6/14/2022 as outlined below:          Constitutional:  No acute distress   ENT:  Oral mucosa moist, oropharynx benign. Resp:  CTA bilaterally. No wheezing/rhonchi/rales. No accessory muscle use. CV:  Regular rhythm, normal rate, no murmurs, gallops, rubs    GI:  Soft, non distended, non tender. normoactive bowel sounds, no hepatosplenomegaly     Musculoskeletal:  No edema, warm, 2+ pulses throughout    Neurologic:  Moves all extremities. AAOx3, CN II-XII reviewed            Data Review:    Review and/or order of clinical lab test      Labs:     Recent Labs     06/14/22 0210 06/13/22 0652   WBC 19.0* 18.4*   HGB 15.5 14.9   HCT 47.9 46.0    234     Recent Labs     06/14/22 0210 06/13/22 0652 06/12/22  0421   * 133* 133*   K 6.0* 4.2 4.3    99 101   CO2 27 28 27   BUN 31* 25* 25*   CREA 0.87 0.78 0.81   * 132* 154*   CA 8.7 8.6 8.2*   MG 2.2 2.0 2.1   PHOS 3.7 3.4 2.9     No results for input(s): ALT, AP, TBIL, TBILI, TP, ALB, GLOB, GGT, AML, LPSE in the last 72 hours. No lab exists for component: SGOT, GPT, AMYP, HLPSE  No results for input(s): INR, PTP, APTT, INREXT, INREXT in the last 72 hours. No results for input(s): FE, TIBC, PSAT, FERR in the last 72 hours. No results found for: FOL, RBCF   No results for input(s): PH, PCO2, PO2 in the last 72 hours. No results for input(s): CPK, CKNDX, TROIQ in the last 72 hours.     No lab exists for component: CPKMB  No results found for: CHOL, CHOLX, CHLST, CHOLV, HDL, HDLP, LDL, LDLC, DLDLP, TGLX, TRIGL, TRIGP, CHHD, CHHDX  No results found for: [facility]  Lab Results   Component Value Date/Time    Color YELLOW/STRAW 06/02/2022 08:19 PM    Appearance CLEAR 06/02/2022 08:19 PM    Specific gravity 1.009 06/02/2022 08:19 PM    pH (UA) 7.5 06/02/2022 08:19 PM    Protein Negative 06/02/2022 08:19 PM    Glucose Negative 06/02/2022 08:19 PM    Ketone Negative 06/02/2022 08:19 PM    Bilirubin Negative 06/02/2022 08:19 PM    Urobilinogen 1.0 06/02/2022 08:19 PM Nitrites Negative 06/02/2022 08:19 PM    Leukocyte Esterase Negative 06/02/2022 08:19 PM    Epithelial cells FEW 06/02/2022 08:19 PM    Bacteria Negative 06/02/2022 08:19 PM    WBC 0-4 06/02/2022 08:19 PM    RBC 0-5 06/02/2022 08:19 PM         Medications Reviewed:     Current Facility-Administered Medications   Medication Dose Route Frequency    hydrocortisone (ANUSOL-HC) 2.5 % rectal cream   PeriANAL QID    gabapentin (NEURONTIN) capsule 300 mg  300 mg Oral TID    [START ON 6/15/2022] dexAMETHasone (DECADRON) tablet 2 mg  2 mg Oral DAILY    ibuprofen (MOTRIN) tablet 600 mg  600 mg Oral TID    [Held by provider] enoxaparin (LOVENOX) injection 30 mg  30 mg SubCUTAneous Q12H    hydrALAZINE (APRESOLINE) 20 mg/mL injection 10 mg  10 mg IntraVENous Q4H PRN    lisinopriL (PRINIVIL, ZESTRIL) tablet 40 mg  40 mg Oral DAILY    labetaloL (NORMODYNE) tablet 100 mg  100 mg Oral Q12H    amLODIPine (NORVASC) tablet 10 mg  10 mg Oral DAILY    levETIRAcetam (KEPPRA) tablet 500 mg  500 mg Oral Q12H    tamsulosin (FLOMAX) capsule 0.4 mg  0.4 mg Oral DAILY    labetaloL (NORMODYNE;TRANDATE) injection 10 mg  10 mg IntraVENous Q4H PRN    pantoprazole (PROTONIX) tablet 40 mg  40 mg Oral ACB&D    sodium chloride (NS) flush 5-40 mL  5-40 mL IntraVENous Q8H    sodium chloride (NS) flush 5-40 mL  5-40 mL IntraVENous PRN    docusate sodium (COLACE) capsule 100 mg  100 mg Oral BID    polyethylene glycol (MIRALAX) packet 17 g  17 g Oral DAILY PRN    sodium chloride (NS) flush 5-40 mL  5-40 mL IntraVENous Q8H    acetaminophen (TYLENOL) tablet 650 mg  650 mg Oral Q6H PRN    Or    acetaminophen (TYLENOL) suppository 650 mg  650 mg Rectal Q6H PRN    ondansetron (ZOFRAN ODT) tablet 4 mg  4 mg Oral Q8H PRN    Or    ondansetron (ZOFRAN) injection 4 mg  4 mg IntraVENous Q6H PRN    oxyCODONE IR (ROXICODONE) tablet 10 mg  10 mg Oral Q4H PRN    oxyCODONE IR (ROXICODONE) tablet 5 mg  5 mg Oral Q4H PRN ______________________________________________________________________  EXPECTED LENGTH OF STAY: 6d 14h  ACTUAL LENGTH OF STAY:          Rosemary Anders MD

## 2022-06-14 NOTE — PROGRESS NOTES
Problem: Self Care Deficits Care Plan (Adult)  Goal: *Acute Goals and Plan of Care (Insert Text)  Description: FUNCTIONAL STATUS PRIOR TO ADMISSION: Patient was modified independent using a single point cane for functional mobility. Drives, works as a  at [employer]. HOME SUPPORT: The patient lived with wife but did not require assist.    Occupational Therapy Goals  Upgraded 6/14/2022  1. Patient will perform grooming standing at sink with supervision/set-up within 7 day(s). 2.  Patient will perform upper body dressing with supervision/set-up within 7 day(s). 3.  Patient will perform lower body dressing with supervision/ set-up using AE PRN within 7 day(s). 4.  Patient will perform toilet transfers with supervision/ set-up within 7 day(s). 5.  Patient will perform all aspects of toileting with supervision/ setup within 7 day(s). 6.  Patient will participate in upper extremity therapeutic exercise/activities with supervision/set-up within 7 day(s). 7.  Patient will utilize energy conservation techniques during functional activities with verbal, visual, and tactile cues within 7 day(s). 8.  Patient will improve their LUE Fugl Aponte score by 5 points in prep for ADLs within 7 days. Initiated 6/7/2022   1. Patient will perform grooming with supervision/set-up within 7 day(s). 2.  Patient will perform upper body dressing with supervision/set-up within 7 day(s). 3.  Patient will perform lower body dressing with moderate assistance  within 7 day(s). 4.  Patient will perform toilet transfers with minimal assistance/contact guard assist within 7 day(s). 5.  Patient will perform all aspects of toileting with moderate assistance  within 7 day(s). 6.  Patient will participate in upper extremity therapeutic exercise/activities with supervision/set-up within 7 day(s).     7.  Patient will utilize energy conservation techniques during functional activities with verbal, visual, and tactile cues within 7 day(s). 8.  Patient will improve their LUE Fugl Aponte score by 5 points in prep for ADLs within 7 days. Outcome: Progressing Towards Goal    OCCUPATIONAL THERAPY TREATMENT/WEEKLY RE-ASSESSMENT  Patient: Clemente Capps (69 y.o. male)  Date: 6/14/2022  Diagnosis: Brain mass [G93.89] Brain mass  Procedure(s) (LRB):  RIGHT  FRONTAL CRANIOTOMY WITH FLOUROCINE (Right) 8 Days Post-Op  Precautions: Fall,Skin,Seizure  Chart, occupational therapy assessment, plan of care, and goals were reviewed. ASSESSMENT  Patient continues with skilled OT services and is progressing towards goals (upgraded) but remains limited by mild L hemiparesis, impaired attention to task, impaired task sequencing, impaired safety awareness, and impaired balance/ gait. Patient required frequent verbal cues to remain on task. Ambulated to bathroom using RW with up to minimum assistance and required verbal cues to lower brief prior to sitting on toilet and minimum assistance for toilet transfer. Patient may benefit from LB AE training next session. Continue to recommend inpatient rehab at d/c. Current Level of Function Impacting Discharge (ADLs): set-up to moderate assistance         PLAN :  Goals have been updated based on progression since last assessment. Patient continues to benefit from skilled intervention to address the above impairments. Continue to follow patient 5 times a week to address goals. Recommendation for discharge: (in order for the patient to meet his/her long term goals)  Therapy 3 hours per day 5-7 days per week    This discharge recommendation:  Has been made in collaboration with the attending provider and/or case management         SUBJECTIVE:   Patient stated I wasn't pulling on the walker. I'm trying to only use my legs to stand.     OBJECTIVE DATA SUMMARY:   Cognitive/Behavioral Status:  Neurologic State: Alert  Orientation Level: Oriented X4  Cognition: Follows commands;Decreased attention/concentration             Functional Mobility and Transfers for ADLs:  Bed Mobility:  Supine to Sit: Supervision    Transfers:  Sit to Stand: Contact guard assistance  Functional Transfers  Toilet Transfer : Minimum assistance       Balance:  Sitting: Intact  Standing: Impaired; With support (RW)  Standing - Static: Good  Standing - Dynamic : Fair    Pain:  Patient did not report pain    Activity Tolerance:   Good    After treatment patient left in no apparent distress:   Sitting on toilet, requiring extended time for BM, instructed to pull call cord, family present to supervise, RN notified    COMMUNICATION/COLLABORATION:   The patients plan of care was discussed with: Physical therapist, Registered nurse, Physician, and Case management.      Conrad Dandy, OT  Time Calculation: 15 mins

## 2022-06-14 NOTE — PROGRESS NOTES
Transition of Care Plan: LMI-EYR-atqrtce auth-started 06/13     RUR: 8% low     PCP F/U: Vicky So MD     Disposition: XQW-ARE-rgtiznu Roxanna Coulis     Transportation: BLS     Main Contact: Rachel Galvez [phone]    3849: RADHA requesting COVID PCR. Confirmed with liaison that Roxanna Couch was started yesterday.      Tex Harvey RN, CRM

## 2022-06-14 NOTE — PROGRESS NOTES
Problem: Falls - Risk of  Goal: *Absence of Falls  Description: Document Evie Luevano Fall Risk and appropriate interventions in the flowsheet.   Outcome: Progressing Towards Goal  Note: Fall Risk Interventions:  Mobility Interventions: Bed/chair exit alarm,Communicate number of staff needed for ambulation/transfer,PT Consult for mobility concerns,PT Consult for assist device competence,Strengthening exercises (ROM-active/passive)    Mentation Interventions: Bed/chair exit alarm,Door open when patient unattended,Toileting rounds,Self-releasing belt,Room close to nurse's station,Reorient patient,More frequent rounding    Medication Interventions: Evaluate medications/consider consulting pharmacy    Elimination Interventions: Call light in reach,Elevated toilet seat,Patient to call for help with toileting needs,Toilet paper/wipes in reach              Problem: Patient Education: Go to Patient Education Activity  Goal: Patient/Family Education  Outcome: Progressing Towards Goal     Problem: TIA/CVA Stroke: 0-24 hours  Goal: Off Pathway (Use only if patient is Off Pathway)  Outcome: Progressing Towards Goal  Goal: Activity/Safety  Outcome: Progressing Towards Goal  Goal: Consults, if ordered  Outcome: Progressing Towards Goal  Goal: Diagnostic Test/Procedures  Outcome: Progressing Towards Goal  Goal: Nutrition/Diet  Outcome: Progressing Towards Goal  Goal: Discharge Planning  Outcome: Progressing Towards Goal  Goal: Medications  Outcome: Progressing Towards Goal  Goal: Respiratory  Outcome: Progressing Towards Goal  Goal: Treatments/Interventions/Procedures  Outcome: Progressing Towards Goal  Goal: Minimize risk of bleeding post-thrombolytic infusion  Outcome: Progressing Towards Goal  Goal: Monitor for complications post-thrombolytic infusion  Outcome: Progressing Towards Goal  Goal: Psychosocial  Outcome: Progressing Towards Goal  Goal: *Hemodynamically stable  Outcome: Progressing Towards Goal  Goal: *Neurologically stable  Description: Absence of additional neurological deficits    Outcome: Progressing Towards Goal  Goal: *Verbalizes anxiety and depression are reduced or absent  Outcome: Progressing Towards Goal  Goal: *Absence of Signs of Aspiration on Current Diet  Outcome: Progressing Towards Goal  Goal: *Absence of deep venous thrombosis signs and symptoms(Stroke Metric)  Outcome: Progressing Towards Goal  Goal: *Ability to perform ADLs and demonstrates progressive mobility and function  Outcome: Progressing Towards Goal  Goal: *Stroke education started(Stroke Metric)  Outcome: Progressing Towards Goal  Goal: *Dysphagia screen performed(Stroke Metric)  Outcome: Progressing Towards Goal  Goal: *Rehab consulted(Stroke Metric)  Outcome: Progressing Towards Goal     Problem: Patient Education: Go to Patient Education Activity  Goal: Patient/Family Education  Outcome: Progressing Towards Goal     Problem: TIA/CVA Stroke: 0-24 hours  Goal: Off Pathway (Use only if patient is Off Pathway)  Outcome: Progressing Towards Goal  Goal: Activity/Safety  Outcome: Progressing Towards Goal  Goal: Consults, if ordered  Outcome: Progressing Towards Goal  Goal: Diagnostic Test/Procedures  Outcome: Progressing Towards Goal  Goal: Nutrition/Diet  Outcome: Progressing Towards Goal  Goal: Discharge Planning  Outcome: Progressing Towards Goal  Goal: Medications  Outcome: Progressing Towards Goal  Goal: Respiratory  Outcome: Progressing Towards Goal  Goal: Treatments/Interventions/Procedures  Outcome: Progressing Towards Goal  Goal: Minimize risk of bleeding post-thrombolytic infusion  Outcome: Progressing Towards Goal  Goal: Monitor for complications post-thrombolytic infusion  Outcome: Progressing Towards Goal  Goal: Psychosocial  Outcome: Progressing Towards Goal  Goal: *Hemodynamically stable  Outcome: Progressing Towards Goal  Goal: *Neurologically stable  Description: Absence of additional neurological deficits    Outcome: Progressing Towards Goal  Goal: *Verbalizes anxiety and depression are reduced or absent  Outcome: Progressing Towards Goal  Goal: *Absence of Signs of Aspiration on Current Diet  Outcome: Progressing Towards Goal  Goal: *Absence of deep venous thrombosis signs and symptoms(Stroke Metric)  Outcome: Progressing Towards Goal  Goal: *Ability to perform ADLs and demonstrates progressive mobility and function  Outcome: Progressing Towards Goal  Goal: *Stroke education started(Stroke Metric)  Outcome: Progressing Towards Goal  Goal: *Dysphagia screen performed(Stroke Metric)  Outcome: Progressing Towards Goal  Goal: *Rehab consulted(Stroke Metric)  Outcome: Progressing Towards Goal     Problem: TIA/CVA Stroke: Day 2 Until Discharge  Goal: Off Pathway (Use only if patient is Off Pathway)  Outcome: Progressing Towards Goal  Goal: Activity/Safety  Outcome: Progressing Towards Goal  Goal: Diagnostic Test/Procedures  Outcome: Progressing Towards Goal  Goal: Nutrition/Diet  Outcome: Progressing Towards Goal  Goal: Discharge Planning  Outcome: Progressing Towards Goal  Goal: Medications  Outcome: Progressing Towards Goal  Goal: Respiratory  Outcome: Progressing Towards Goal  Goal: Treatments/Interventions/Procedures  Outcome: Progressing Towards Goal  Goal: Psychosocial  Outcome: Progressing Towards Goal  Goal: *Verbalizes anxiety and depression are reduced or absent  Outcome: Progressing Towards Goal  Goal: *Absence of aspiration  Outcome: Progressing Towards Goal  Goal: *Absence of deep venous thrombosis signs and symptoms(Stroke Metric)  Outcome: Progressing Towards Goal  Goal: *Optimal pain control at patient's stated goal  Outcome: Progressing Towards Goal  Goal: *Tolerating diet  Outcome: Progressing Towards Goal  Goal: *Ability to perform ADLs and demonstrates progressive mobility and function  Outcome: Progressing Towards Goal  Goal: *Stroke education continued(Stroke Metric)  Outcome: Progressing Towards Goal     Problem: Ischemic Stroke: Discharge Outcomes  Goal: *Verbalizes anxiety and depression are reduced or absent  Outcome: Progressing Towards Goal  Goal: *Verbalize understanding of risk factor modification(Stroke Metric)  Outcome: Progressing Towards Goal  Goal: *Hemodynamically stable  Outcome: Progressing Towards Goal  Goal: *Absence of aspiration pneumonia  Outcome: Progressing Towards Goal  Goal: *Aware of needed dietary changes  Outcome: Progressing Towards Goal  Goal: *Verbalize understanding of prescribed medications including anti-coagulants, anti-lipid, and/or anti-platelets(Stroke Metric)  Outcome: Progressing Towards Goal  Goal: *Tolerating diet  Outcome: Progressing Towards Goal  Goal: *Aware of follow-up diagnostics related to anticoagulants  Outcome: Progressing Towards Goal  Goal: *Ability to perform ADLs and demonstrates progressive mobility and function  Outcome: Progressing Towards Goal  Goal: *Absence of DVT(Stroke Metric)  Outcome: Progressing Towards Goal  Goal: *Absence of aspiration  Outcome: Progressing Towards Goal  Goal: *Optimal pain control at patient's stated goal  Outcome: Progressing Towards Goal  Goal: *Home safety concerns addressed  Outcome: Progressing Towards Goal  Goal: *Describes available resources and support systems  Outcome: Progressing Towards Goal  Goal: *Verbalizes understanding of activation of EMS(911) for stroke symptoms(Stroke Metric)  Outcome: Progressing Towards Goal  Goal: *Understands and describes signs and symptoms to report to providers(Stroke Metric)  Outcome: Progressing Towards Goal  Goal: *Neurolgocially stable (absence of additional neurological deficits)  Outcome: Progressing Towards Goal  Goal: *Verbalizes importance of follow-up with primary care physician(Stroke Metric)  Outcome: Progressing Towards Goal  Goal: *Smoking cessation discussed,if applicable(Stroke Metric)  Outcome: Progressing Towards Goal  Goal: *Depression screening completed(Stroke Metric)  Outcome: Progressing Towards Goal

## 2022-06-14 NOTE — DISCHARGE SUMMARY
Discharge Summary       PATIENT ID: Cherie Moreno  MRN: [id number]   YOB: 1955    DATE OF ADMISSION: 6/2/2022  1:11 PM    DATE OF DISCHARGE: 6/15/2022   PRIMARY CARE PROVIDER: Son Leblanc MD     ATTENDING PHYSICIAN: Dr Winston Hummel  DISCHARGING PROVIDER: Winston Hummel MD    To contact this individual call [phone] and ask the  to page. If unavailable ask to be transferred the Adult Hospitalist Department. CONSULTATIONS: IP CONSULT TO NEUROSURGERY  IP CONSULT TO ONCOLOGY  IP CONSULT TO RADIATION ONCOLOGY    PROCEDURES/SURGERIES: Procedure(s):  RIGHT  FRONTAL CRANIOTOMY WITH FLOUROCINE    DISCHARGE DIAGNOSES:    Right frontal brain mass  Cerebral edema with brain compression  Pneumocephalus dure to surgery  - s/p crani 06/06, follow path  - cont decadron taper  - cont keppra   - PT/OT evals  - normalize and mobilize  [facility] SURGERY for psychosocial support  - Appreciate Neurosurgery/Oncology/Radiation oncology     HTN  - SBP<140  - Hydralazine/labetalol PRN  - Cont Norvasc, lisinopril, labetalol     Leukocytosis-improving  - Afebrile  - Likely due to stress of surgery and steroids    - cont to monitor     Acute encephalopathy: sec to above, now resolved  Left thigh pain, spoke to NSG, no work up needed, neurontin added  Hypophosphatemia:now resolved        DISCHARGE DIAGNOSES / PLAN:      1.   Right frontal brain mass       PENDING TEST RESULTS:   At the time of discharge the following test results are still pending: pathology    FOLLOW UP APPOINTMENTS:    Follow-up Information     Follow up With Specialties Details Why Contact Info    Son Leblanc MD Internal Medicine Physician In 1 week  [address] [facility]      Solomon Rosario MD Neurosurgery In 2 weeks  [address] [facility] [address] [phone]      Brain Fang Miller DO Hematology and Oncology, Internal Medicine Physician, Hematology Physician, Oncology In 1 week  [address] [facility]  [phone]      Anjali Julian MD Radiation Oncology In 1 week  [facility]  [phone]             ADDITIONAL CARE RECOMMENDATIONS:   Follow up with PMD  Follow up with Oncology, neurosurgery, radiation oncology    DIET: Cardiac Diet    ACTIVITY: Activity as tolerated      DISCHARGE MEDICATIONS:   See Medication Reconciliation Form      NOTIFY YOUR PHYSICIAN FOR ANY OF THE FOLLOWING:   Fever over 101 degrees for 24 hours. Chest pain, shortness of breath, fever, chills, nausea, vomiting, diarrhea, change in mentation, falling, weakness, bleeding. Severe pain or pain not relieved by medications. Or, any other signs or symptoms that you may have questions about.     DISPOSITION:    Home With:   OT  PT  HH  RN      x SNF/Inpatient Rehab/LTAC    Independent/assisted living    Hospice    Other:       PATIENT CONDITION AT DISCHARGE:     Functional status    Poor    x Deconditioned     Independent      Cognition   x  Lucid     Forgetful     Dementia      Catheters/lines (plus indication)    Thacker     PICC     PEG    x None      Code status   x  Full code     DNR      PHYSICAL EXAMINATION AT DISCHARGE:  Please see progress note      CHRONIC MEDICAL DIAGNOSES:  Problem List as of 6/14/2022 Date Reviewed: 6/10/2022          Codes Class Noted - Resolved    * (Principal) Brain mass ICD-10-CM: Y95.55  ICD-9-CM: 348.89  6/2/2022 - Present              Greater than 39 minutes were spent with the patient on counseling and coordination of care    Signed:   Caroline Painter MD  6/14/2022  3:38 PM   .

## 2022-06-14 NOTE — PROGRESS NOTES
Seen in hospital   Brain tumor post surgery  Path still pending  Went to rehab  Will need office fu in 2 weeks

## 2022-06-14 NOTE — DISCHARGE INSTRUCTIONS
Discharge SNF/Rehab Instructions/LTAC       PATIENT ID: Sinan Petersen  MRN: [id number]   YOB: 1955    DATE OF ADMISSION: 6/2/2022  1:11 PM    DATE OF DISCHARGE: 6/14/2022    PRIMARY CARE PROVIDER: Ashanti Spring MD       ATTENDING PHYSICIAN: Kyrie Quinn MD  DISCHARGING PROVIDER: Benton Hendricks MD     To contact this individual call [phone] and ask the  to page. If unavailable ask to be transferred the Adult Hospitalist Department. CONSULTATIONS: IP CONSULT TO NEUROSURGERY  IP CONSULT TO ONCOLOGY  IP CONSULT TO RADIATION ONCOLOGY    PROCEDURES/SURGERIES: Procedure(s):  RIGHT  FRONTAL CRANIOTOMY WITH FLOUROCINE    ADMITTING [address] [facility] COURSE:    Right frontal brain mass  Cerebral edema with brain compression  Pneumocephalus dure to surgery  - s/p crani 06/06, follow path  - cont decadron taper  - cont keppra   - PT/OT evals  - normalize and mobilize  [facility] for psychosocial support  - Appreciate Neurosurgery/Oncology/Radiation oncology     HTN  - SBP<140  - Hydralazine/labetalol PRN  - Cont Norvasc, lisinopril, labetalol     Leukocytosis-improving  - Afebrile  - Likely due to stress of surgery and steroids    - cont to monitor     Acute encephalopathy: sec to above, now resolved  Left thigh pain, spoke to NSG, no work up needed, neurontin added  Hypophosphatemia:now resolved        DISCHARGE DIAGNOSES / PLAN:      1.   Right frontal brain mass       PENDING TEST RESULTS:   At the time of discharge the following test results are still pending: pathology    FOLLOW UP APPOINTMENTS:    Follow-up Information     Follow up With Specialties Details Why Contact Prince Tate MD Internal Medicine Physician In 1 week  [address] [facility] 61      Alanna Sorto MD Neurosurgery In 2 weeks  [address] [facility] [address] [facility]      Nessa Viera DO Hematology and Oncology, Internal Medicine Physician, Hematology Physician, Oncology In 1 week  [address]  [phone]      Calvin Marks MD Radiation Oncology In 1 week  [address] [facility]  [phone]             ADDITIONAL CARE RECOMMENDATIONS:   Follow up with PMD  Follow up with Oncology, neurosurgery, radiation oncology    DIET: Cardiac Diet    ACTIVITY: Activity as tolerated      DISCHARGE MEDICATIONS:   See Medication Reconciliation Form      NOTIFY YOUR PHYSICIAN FOR ANY OF THE FOLLOWING:   Fever over 101 degrees for 24 hours. Chest pain, shortness of breath, fever, chills, nausea, vomiting, diarrhea, change in mentation, falling, weakness, bleeding. Severe pain or pain not relieved by medications. Or, any other signs or symptoms that you may have questions about.     DISPOSITION:    Home With:   OT  PT  HH  RN      x SNF/Inpatient Rehab/LTAC    Independent/assisted living    Hospice    Other:       PATIENT CONDITION AT DISCHARGE:     Functional status    Poor    x Deconditioned     Independent      Cognition   x  Lucid     Forgetful     Dementia      Catheters/lines (plus indication)    Thacker     PICC     PEG    x None      Code status   x  Full code     DNR      PHYSICAL EXAMINATION AT DISCHARGE:  Please see progress note      CHRONIC MEDICAL DIAGNOSES:  Problem List as of 6/14/2022 Date Reviewed: 6/10/2022          Codes Class Noted - Resolved    * (Principal) Brain mass ICD-10-CM: F62.67  ICD-9-CM: 348.89  6/2/2022 - Present                CDMP Checked:   Yes x     PROBLEM LIST Updated:  Yes x         Signed:   Reymundo Galindo MD  6/14/2022  3:35 PM

## 2022-06-15 VITALS
WEIGHT: 242.51 LBS | TEMPERATURE: 98.2 F | DIASTOLIC BLOOD PRESSURE: 63 MMHG | BODY MASS INDEX: 38.97 KG/M2 | HEIGHT: 66 IN | HEART RATE: 70 BPM | SYSTOLIC BLOOD PRESSURE: 110 MMHG | OXYGEN SATURATION: 95 % | RESPIRATION RATE: 20 BRPM

## 2022-06-15 LAB
ANION GAP SERPL CALC-SCNC: 4 MMOL/L (ref 5–15)
BASOPHILS # BLD: 0 K/UL (ref 0–0.1)
BASOPHILS NFR BLD: 0 % (ref 0–1)
BUN SERPL-MCNC: 33 MG/DL (ref 6–20)
BUN/CREAT SERPL: 36 (ref 12–20)
CALCIUM SERPL-MCNC: 7.9 MG/DL (ref 8.5–10.1)
CHLORIDE SERPL-SCNC: 103 MMOL/L (ref 97–108)
CO2 SERPL-SCNC: 28 MMOL/L (ref 21–32)
CREAT SERPL-MCNC: 0.92 MG/DL (ref 0.7–1.3)
DIFFERENTIAL METHOD BLD: ABNORMAL
EOSINOPHIL # BLD: 0 K/UL (ref 0–0.4)
EOSINOPHIL NFR BLD: 0 % (ref 0–7)
ERYTHROCYTE [DISTWIDTH] IN BLOOD BY AUTOMATED COUNT: 11.9 % (ref 11.5–14.5)
GLUCOSE SERPL-MCNC: 131 MG/DL (ref 65–100)
HCT VFR BLD AUTO: 42.9 % (ref 36.6–50.3)
HGB BLD-MCNC: 13.5 G/DL (ref 12.1–17)
IMM GRANULOCYTES # BLD AUTO: 0 K/UL
IMM GRANULOCYTES NFR BLD AUTO: 0 %
LYMPHOCYTES # BLD: 2.4 K/UL (ref 0.8–3.5)
LYMPHOCYTES NFR BLD: 11 % (ref 12–49)
MAGNESIUM SERPL-MCNC: 2 MG/DL (ref 1.6–2.4)
MCH RBC QN AUTO: 30.3 PG (ref 26–34)
MCHC RBC AUTO-ENTMCNC: 31.5 G/DL (ref 30–36.5)
MCV RBC AUTO: 96.2 FL (ref 80–99)
METAMYELOCYTES NFR BLD MANUAL: 1 %
MONOCYTES # BLD: 1.9 K/UL (ref 0–1)
MONOCYTES NFR BLD: 9 % (ref 5–13)
MYELOCYTES NFR BLD MANUAL: 1 %
NEUTS SEG # BLD: 16.8 K/UL (ref 1.8–8)
NEUTS SEG NFR BLD: 78 % (ref 32–75)
NRBC # BLD: 0 K/UL (ref 0–0.01)
NRBC BLD-RTO: 0 PER 100 WBC
PHOSPHATE SERPL-MCNC: 2.5 MG/DL (ref 2.6–4.7)
PLATELET # BLD AUTO: 233 K/UL (ref 150–400)
PMV BLD AUTO: 10.3 FL (ref 8.9–12.9)
POTASSIUM SERPL-SCNC: 4 MMOL/L (ref 3.5–5.1)
RBC # BLD AUTO: 4.46 M/UL (ref 4.1–5.7)
RBC MORPH BLD: ABNORMAL
SODIUM SERPL-SCNC: 135 MMOL/L (ref 136–145)
WBC # BLD AUTO: 21.6 K/UL (ref 4.1–11.1)

## 2022-06-15 PROCEDURE — 84100 ASSAY OF PHOSPHORUS: CPT

## 2022-06-15 PROCEDURE — 80048 BASIC METABOLIC PNL TOTAL CA: CPT

## 2022-06-15 PROCEDURE — 74011250637 HC RX REV CODE- 250/637: Performed by: EMERGENCY MEDICINE

## 2022-06-15 PROCEDURE — 74011250637 HC RX REV CODE- 250/637: Performed by: NURSE PRACTITIONER

## 2022-06-15 PROCEDURE — 74011250637 HC RX REV CODE- 250/637: Performed by: NEUROLOGICAL SURGERY

## 2022-06-15 PROCEDURE — 74011250636 HC RX REV CODE- 250/636: Performed by: NURSE PRACTITIONER

## 2022-06-15 PROCEDURE — 83735 ASSAY OF MAGNESIUM: CPT

## 2022-06-15 PROCEDURE — 85025 COMPLETE CBC W/AUTO DIFF WBC: CPT

## 2022-06-15 PROCEDURE — 36415 COLL VENOUS BLD VENIPUNCTURE: CPT

## 2022-06-15 PROCEDURE — 74011000250 HC RX REV CODE- 250: Performed by: NEUROLOGICAL SURGERY

## 2022-06-15 RX ADMIN — SODIUM CHLORIDE, PRESERVATIVE FREE 10 ML: 5 INJECTION INTRAVENOUS at 06:06

## 2022-06-15 RX ADMIN — IBUPROFEN 600 MG: 600 TABLET, FILM COATED ORAL at 09:15

## 2022-06-15 RX ADMIN — LEVETIRACETAM 500 MG: 500 TABLET, FILM COATED ORAL at 09:18

## 2022-06-15 RX ADMIN — LISINOPRIL 40 MG: 20 TABLET ORAL at 09:15

## 2022-06-15 RX ADMIN — DOCUSATE SODIUM 100 MG: 100 CAPSULE, LIQUID FILLED ORAL at 09:15

## 2022-06-15 RX ADMIN — HYDROCORTISONE: 25 CREAM TOPICAL at 09:00

## 2022-06-15 RX ADMIN — PANTOPRAZOLE SODIUM 40 MG: 40 TABLET, DELAYED RELEASE ORAL at 06:06

## 2022-06-15 RX ADMIN — GABAPENTIN 300 MG: 300 CAPSULE ORAL at 09:14

## 2022-06-15 RX ADMIN — AMLODIPINE BESYLATE 10 MG: 5 TABLET ORAL at 09:14

## 2022-06-15 RX ADMIN — LABETALOL HYDROCHLORIDE 100 MG: 100 TABLET, FILM COATED ORAL at 09:15

## 2022-06-15 RX ADMIN — DEXAMETHASONE 2 MG: 1 TABLET ORAL at 09:15

## 2022-06-15 RX ADMIN — TAMSULOSIN HYDROCHLORIDE 0.4 MG: 0.4 CAPSULE ORAL at 09:14

## 2022-06-15 NOTE — PROGRESS NOTES
Physician Progress Note      PATIENT:               Sharon Taylor  CSN #:                  [id number]  :                       1955  ADMIT DATE:       2022 1:11 PM  [address] [place] DATE:  RESPONDING  PROVIDER #:        Otto Boykin MD        QUERY TEXT:    Type of Encephalopathy: Please provide further specificity, if known. Clinical indicators include: fevers, cerebral edema, acute, encephalopathy  Options provided:  -- Anoxic/hypoxic encephalopathy  -- Metabolic encephalopathy  -- Toxic encephalopathy  -- Hepatic encephalopathy  -- Hypertensive encephalopathy  -- Other - I will add my own diagnosis  -- Disagree - Not applicable / Not valid  -- Disagree - Clinically Unable to determine / Unknown        PROVIDER RESPONSE TEXT:    The patient has metabolic encephalopathy.       Electronically signed by:  Otto Boykin MD 6/15/2022 12:18 PM

## 2022-06-15 NOTE — PROGRESS NOTES
Neurosurgery    Pt transferring to rehab today. Ok to get head wet. F/U with Dr. Genna Ferreira after discharge from rehab. Please continue keppra 500 mg bid. F/U with med onc/rad onc after out of rehab. Cont gabapentin and ibuprofen for now (though ibuprofen could be changed back to diclofenac if on formulary at rehab) for lumbar radiculopathy pain.     Antonio Logan, NP

## 2022-06-15 NOTE — PROGRESS NOTES
Spiritual Care Assessment/Progress Note  [facility] [place]      NAME: Amanda Parisi      MRN: [id number]  AGE: 61 y.o. SEX: male  Caodaism Affiliation: Moravian   Language: English     6/15/2022     Total Time (in minutes): 5     Spiritual Assessment begun in [facility] [place] [address] [facility] through conversation with:         [x]Patient        [x] Family    [] Friend(s)        Reason for Consult: Initial/Spiritual assessment, patient floor     Spiritual beliefs: (Please include comment if needed)     [x] Identifies with a jethro tradition:         [x] Supported by a jethro community:            [] Claims no spiritual orientation:           [] Seeking spiritual identity:                [x] Adheres to an individual form of spirituality:           [] Not able to assess:                           Identified resources for coping:      [x] Prayer                               [] Music                  [] Guided Imagery     [] Family/friends                 [] Pet visits     [] Devotional reading                         [] Unknown     [] Other: *                                              Interventions offered during this visit: (See comments for more details)    Patient Interventions: Initial visit           Plan of Care:     [] Support spiritual and/or cultural needs    [] Support AMD and/or advance care planning process      [] Support grieving process   [] Coordinate Rites and/or Rituals    [] Coordination with community clergy   Pashto prayer and words of comfort offered. Advised of continuous availability. Nothing further at this time.     [x] No spiritual needs identified at this time   [] Detailed Plan of Care below (See Comments)  [] Make referral to Music Therapy  [] Make referral to Pet Therapy     [] Make referral to Addiction services  [] Make referral to [facility]  [] Make referral to Spiritual Care Partner  [] No future visits requested        [] Contact Spiritual Care for further referrals     Comments: Patient appreciated visit. Accompanied by wife. Surprised by these medical issues. Discussed family and Yarsanism history. Connected with [employer]. Polish prayer and words of comfort offered. Advised of continuous availability. Nothing further at this time.       Isacc Clayton, [facility], Nondenominational Provider

## 2022-06-15 NOTE — PROGRESS NOTES
Transition of Care Plan: [id number] bed availability, auth-received 06/15  RN to call report to [phone], EMTALA report     RUR: 9% low     PCP F/U: John Reyes MD     Disposition: [id number]     Transportation: [facility]-3pm     Main Contact: Eleonora Betancourt [phone]    1628: Facility has obtained auth. Liaison is checking for bed availability. 1133: [facility] can accept today after 3pm. [facility] confirmed 3pm transport. RN and MD notified. EMTALA pending     [phone]: Notified patient of 3pm discharge    1218: EMTALA information received.      Hugh Devries RN, CRM

## 2022-06-16 NOTE — ADT AUTH CERT NOTES
Comments  Comment            Patient Demographics    Patient Name   Jerome Perez   [id number] Legal Sex   Male    1955 Address   [address] [place] [address] Phone   [phone] (Work)   [phone] (Mobile) *Preferred*     Patient Demographics    Patient Name   Manuela Camacho   [id number] Legal Sex   Male    1955 Address   [address] [place] [address] Phone   [phone] (Work)   [phone] (Mobile) *Preferred*   CSN:   [id number]     Admit Date: Admit Time Room Bed   2022  1:11 PM [phone]       Attending Providers    Provider Pager From To   Evie Borrego MD  22   Devan Wray MD  22   Lluvia Arevalo MD  22   Kayla Solis MD  06/06/22 06/10/22   Dorothy Brittle, MD  06/10/22 06/15/22     Emergency Contact(s)    Name Relation Home Work Mobile   Vandana Reeves Spouse   [phone]     Utilization Reviews         Neurology [address] [place] [address] Day 13 (2022) by Jared Caban RN       Review Entered Review Status   2022 11:38 Completed      Criteria Review      Care Day: 13 Care Date: 2022 Level of Care: Inpatient Floor    Guideline Day 3    Level Of Care    (X) * Activity level acceptable    ( ) * Complete discharge planning    Clinical Status    ( ) * No infection, or status acceptable    ( ) * Isolation not needed, or status acceptable    (X) * Respiratory status acceptable    2022 11:38:14 EDT by Thao Rasheed      spo2 95% on room air    (X) * Pain and nausea absent or adequately managed    (X) * Ventilatory status acceptable    2022 11:38:14 EDT by Thao Rasheed      on room air    ( ) * Neurologic problems absent or stabilized    ( ) * Muscle or nerve damage absent or stable    ( ) * General Discharge Criteria met    Interventions    (X) * Intake acceptable    ( ) * No inpatient interventions needed    * Milestone   Additional Notes   22 INTERNAL MEDICINE:   Assessment & Plan:       Right frontal brain mass   Cerebral edema with brain compression   Pneumocephalus dure to surgery   - s/p crani 06/06, follow path   - cont decadron taper   - cont keppra    - PT/OT evals   - normalize and mobilize   [facility] for psychosocial support   - Appreciate Neurosurgery/Oncology/Radiation oncology       HTN   - SBP<140   - Hydralazine/labetalol PRN   - Cont Norvasc, lisinopril, labetalol       Leukocytosis-improving   - Afebrile   - Likely due to stress of surgery and steroids     - cont to monitor       Acute encephalopathy: sec to above, now resolved   Left thigh pain, spoke to NSG, no work up needed   Hypophosphatemia:now resolved       Cardiac diet       Code status: FULL CODE   Prophylaxis: scd       Plan: Medically stable, awaiting rehab at [address] [place] [address] discussed with: Patient   Anticipated Disposition: TBD          OT:   Patient continues with skilled OT services and is progressing towards goals (upgraded) but remains limited by mild L hemiparesis, impaired attention to task, impaired task sequencing, impaired safety awareness, and impaired balance/ gait.  Patient required frequent verbal cues to remain on task.  Ambulated to bathroom using RW with up to minimum assistance and required verbal cues to lower brief prior to sitting on toilet and minimum assistance for toilet transfer. Chen Calle may benefit from LB AE training next session.  Continue to recommend inpatient rehab at d/c.         PT:   Patient continues with skilled PT services and is progressing towards goals. Pt received supine in bed with multiple family members at bed. Pt agreeable to therapy and requesting to use the restroom. Pt continues to be limited by generalized weakness (slightly weaker LLE), decreased functional mobility, impaired balance and gait, impaired cognition s/p R frontal craniotomy, POD#8.  Pt completed transfers with CGA and verbal cues for proper hand placement for safety. Pt tolerated gait training x 10 ft with RW with min A demonstrating wide WILIAM, lateral trunk sway, impaired LLE swing phase. Pt requesting additional time to attempt to have a bowel movement and advised patient to pull the cord when he was finished. RN made aware. Pt will benefit from inpatient rehab upon discharge to reach highest level of independence for a multi-disciplinary approach to maximize outcomes.          MEDS:   dexAMETHasone (DECADRON) tablet 2 mg   Dose: 2 mg   Freq: EVERY 12 HOURS Route: PO      amLODIPine (NORVASC) tablet 10 mg   Dose: 10 mg   Freq: DAILY Route: PO      docusate sodium (COLACE) capsule 100 mg   Dose: 100 mg   Freq: 2 TIMES DAILY Route: PO      gabapentin (NEURONTIN) capsule 300 mg   Dose: 300 mg   Freq: 3 TIMES DAILY Route: PO      ibuprofen (MOTRIN) tablet 600 mg   Dose: 600 mg   Freq: 3 TIMES DAILY Route: PO      labetaloL (NORMODYNE) tablet 100 mg   Dose: 100 mg   Freq: EVERY 12 HOURS Route: PO      levETIRAcetam (KEPPRA) tablet 500 mg   Dose: 500 mg   Freq: EVERY 12 HOURS Route: PO      lisinopriL (PRINIVIL, ZESTRIL) tablet 40 mg   Dose: 40 mg   Freq: DAILY Route: PO      pantoprazole (PROTONIX) tablet 40 mg   Dose: 40 mg   Freq: 2 TIMES DAILY BEFORE MEALS Route: PO         CM NOTES:   Transition of Care Plan: IRE-ESW-pyvxyem auth-started 06/13       RUR: 8% low       PCP F/U: Paco Reyes MD       Disposition: [id number]-qezz[facility] [place]       Transportation: [facility]       Main ContactJeralcasie Lomeli [phone]       1044: RADHA requesting COVID PCR.  Confirmed with liaison that [facility] [place] was started yesterday.        Magalis Cheema, RN, CRM        ADD 6/13 PN by Nohemy Hastings       Review Entered Review Status   6/13/2022 16:18 In Primary      Criteria Review     Hospitalist Progress Note  Oziel Carolina MD  Answering service: [phone] OR [phone] from in house phone         Date of Service:  6/13/2022  NAME:  Leonel HUERTA Phd Marek Us  [facility]:  88/4/4773  KFD:  [id number]        Admission Summary:   Patient is a 62-year-old male with past medical history of seasonal allergies, spinal stenosis with chronic back pain,  who presents for evaluation of \"weakness. \"    He said that he has a spinal stenosis which affected his the left side, his left side is always weaker than right side, but lately he notes that he noticed this started in January.  It is progressively worsened, particularly over the past 2 weeks. [facility] has not seen his primary care doctor for this problem.  His wife is with him and describes that he moves extremely slowly. [facility] endorses difficulty with physically getting in and out of chairs. Yony Thompson seems that he has lost sense of time.  For example, he started emptying their groceries from the car the other day at 4 PM and did not finish emptying the groceries until 10 PM.  His wife additionally had a primary care doctor's appointment set up for today, and gave him time to get ready.  When she went upstairs to have him come to the car, he was not dressed yet and was sitting on the bed singing to himself. [facility] denies headaches, visual changes.  His wife also endorses that his sleep cycle is off. [facility] is now getting up at 8 PM as if it were morning and stays up all night. [facility] has not had fevers at home. [facility] notes that in fall of last year, he noticed a weird flash of image or light out of the corner of his eye. [facility] went to the eye doctor, who ran tests and did not see anything concerning. [facility] was referred to a neurological ophthalmologist, who stated his work-up was normal.  He then saw a cardiologist, and had a normal work-up there.  Denies issues with hallucinations, delusions, anxiety, depression, suicidal ideation, homicidal ideation        Interval history / Subjective:   F/u Brain mass   No pain , nausea, vomiting, dizziness  No SOB  Says he has hemorrhoids  Assessment & Plan:      Right frontal brain mass  Cerebral edema with brain compression  Pneumocephalus dure to surgery  - s/p crani 06/06, follow path  - cont decadron taper  - cont keppra   - PT/OT evals  - normalize and mobilize  [facility] for psychosocial support  - Appreciate Neurosurgery/Oncology/Radiation oncology     HTN  - SBP<140  - Hydralazine/labetalol PRN  - Cont Norvasc, lisinopril, labetalol     Leukocytosis-improving  - Afebrile  - Likely due to stress of surgery and steroids    - cont to monitor     Acute encephalopathy: sec to above, now resolved  Left thigh pain, spoke to NSG, no work up needed  Hypophosphatemia:now resolved     Cardiac diet     Code status: FULL CODE  Prophylaxis: scd     Plan: Medically stable, awaiting rehab at [address] discussed with: Patient  Anticipated Disposition: TBD                                               Hospital Problems  Date Reviewed: 6/10/2022           Codes Class Noted POA     * (Principal) Brain mass ICD-10-CM: G93.89  ICD-9-CM: 348.89   6/2/2022 Yes                      Review of Systems:   A comprehensive review of systems was negative except for that written in the HPI.         Vital Signs:    Last 24hrs VS reviewed since prior progress note. Most recent are:  Visit Vitals  /82   Pulse 69   Temp 98.8 °F (37.1 °C)   Resp 14   Ht 5' 6\" (1.676 m)   Wt 110.5 kg (243 lb 9.7 oz)   SpO2 96%   BMI 39.32 kg/m²         No intake or output data in the 24 hours ending 06/13/22 1358      Physical Examination:      I had a face to face encounter with this patient and independently examined them on 6/13/2022 as outlined below:                                                   Constitutional:  No acute distress   ENT:  Oral mucosa moist, oropharynx benign. Resp:  CTA bilaterally. No wheezing/rhonchi/rales. No accessory muscle use. CV:  Regular rhythm, normal rate, no murmurs, gallops, rubs    GI:  Soft, non distended, non tender.  normoactive bowel sounds, no hepatosplenomegaly     Musculoskeletal:  No edema, warm, 2+ pulses throughout    Neurologic:  Moves all extremities.  AAOx3, CN II-XII reviewed                                  Data Review:    Review and/or order of clinical lab test        Labs:              Recent Labs     06/13/22 0652 06/12/22 0421   WBC 18.4* 16.0*   HGB 14.9 14.3   HCT 46.0 43.3    223                Recent Labs     06/13/22  0652 06/12/22  0421 06/11/22  0732   * 133* 135*   K 4.2 4.3 4.3   CL 99 101 99   CO2 28 27 29   BUN 25* 25* 26*   CREA 0.78 0.81 0.86   * 154* 129*   CA 8.6 8.2* 8.2*   MG 2.0 2.1 2.3   PHOS 3.4 2.9 3.0      No results for input(s): ALT, AP, TBIL, TBILI, TP, ALB, GLOB, GGT, AML, LPSE in the last 72 hours.     No lab exists for component: SGOT, GPT, AMYP, HLPSE  No results for input(s): INR, PTP, APTT, INREXT, INREXT in the last 72 hours.   No results for input(s): FE, TIBC, PSAT, FERR in the last 72 hours.   No results found for: FOL, RBCF   No results for input(s): PH, PCO2, PO2 in the last 72 hours.   No results for input(s): CPK, CKNDX, TROIQ in the last 72 hours.     No lab exists for component: CPKMB  No results found for: CHOL, CHOLX, CHLST, CHOLV, HDL, HDLP, LDL, LDLC, DLDLP, TGLX, TRIGL, TRIGP, CHHD, CHHDX  No results found for: [facility]            Lab Results   Component Value Date/Time     Color YELLOW/STRAW 06/02/2022 08:19 PM     Appearance CLEAR 06/02/2022 08:19 PM     Specific gravity 1.009 06/02/2022 08:19 PM     pH (UA) 7.5 06/02/2022 08:19 PM     Protein Negative 06/02/2022 08:19 PM     Glucose Negative 06/02/2022 08:19 PM     Ketone Negative 06/02/2022 08:19 PM     Bilirubin Negative 06/02/2022 08:19 PM     Urobilinogen 1.0 06/02/2022 08:19 PM     Nitrites Negative 06/02/2022 08:19 PM     Leukocyte Esterase Negative 06/02/2022 08:19 PM     Epithelial cells FEW 06/02/2022 08:19 PM     Bacteria Negative 06/02/2022 08:19 PM     WBC 0-4 06/02/2022 08:19 PM     RBC 0-5 06/02/2022 08:19 PM            Medications Reviewed:                  Current Facility-Administered Medications   Medication Dose Route Frequency    enoxaparin (LOVENOX) injection 30 mg  30 mg SubCUTAneous Q12H    dexAMETHasone (DECADRON) tablet 2 mg  2 mg Oral Q8H    hydrALAZINE (APRESOLINE) 20 mg/mL injection 10 mg  10 mg IntraVENous Q4H PRN    lisinopriL (PRINIVIL, ZESTRIL) tablet 40 mg  40 mg Oral DAILY    labetaloL (NORMODYNE) tablet 100 mg  100 mg Oral Q12H    amLODIPine (NORVASC) tablet 10 mg  10 mg Oral DAILY    levETIRAcetam (KEPPRA) tablet 500 mg  500 mg Oral Q12H    tamsulosin (FLOMAX) capsule 0.4 mg  0.4 mg Oral DAILY    labetaloL (NORMODYNE;TRANDATE) injection 10 mg  10 mg IntraVENous Q4H PRN    pantoprazole (PROTONIX) tablet 40 mg  40 mg Oral ACB&D    sodium chloride (NS) flush 5-40 mL  5-40 mL IntraVENous Q8H    sodium chloride (NS) flush 5-40 mL  5-40 mL IntraVENous PRN    docusate sodium (COLACE) capsule 100 mg  100 mg Oral BID    polyethylene glycol (MIRALAX) packet 17 g  17 g Oral DAILY PRN    sodium chloride (NS) flush 5-40 mL  5-40 mL IntraVENous Q8H    acetaminophen (TYLENOL) tablet 650 mg  650 mg Oral Q6H PRN     Or    acetaminophen (TYLENOL) suppository 650 mg  650 mg Rectal Q6H PRN    ondansetron (ZOFRAN ODT) tablet 4 mg  4 mg Oral Q8H PRN     Or    ondansetron (ZOFRAN) injection 4 mg  4 mg IntraVENous Q6H PRN    oxyCODONE IR (ROXICODONE) tablet 10 mg  10 mg Oral Q4H PRN    oxyCODONE IR (ROXICODONE) tablet 5 mg  5 mg Oral Q4H PRN      ______________________________________________________________________  EXPECTED LENGTH OF STAY: 6d 14h  ACTUAL LENGTH OF STAY:          11           Neurology [facility] - Care Day 12 (6/13/2022) by Samia Slade       Review Entered Review Status   6/13/2022 11:43 Completed      Criteria Review      Care Day: 12 Care Date: 6/13/2022 Level of Care: Inpatient Floor    Guideline Day 3    Level Of Care    (X) * Activity level acceptable    6/13/2022 11:43:57 EDT by Samia Slade      gait improving w PT    ( ) * Complete discharge planning    Clinical Status    ( ) * No infection, or status acceptable    ( ) * Isolation not needed, or status acceptable    (X) * Respiratory status acceptable    6/13/2022 11:43:57 EDT by Shell Sanders      RR 14  96% RA    (X) * Pain and nausea absent or adequately managed    6/13/2022 11:43:57 EDT by Carole Swenson      ABSENT, NO MEDS GIVEN    (X) * Ventilatory status acceptable    6/13/2022 11:43:57 EDT by Shell Sanders      RR 16  96% RA    ( ) * Neurologic problems absent or stabilized    6/13/2022 11:43:57 EDT by Mendel Bares neurosurgery/ RIGHT frontal craniotomy 6/6/22. PATH:   Brain, right frontal lobe, excision:        Hypercellular glial tissue with necrosis.      Pending additional consultation for further characterization.        Results will be reported    ( ) * Muscle or nerve damage absent or stable    ( ) * General Discharge Criteria met    Interventions    (X) * Intake acceptable    6/13/2022 11:43:57 EDT by Shell Sanders      REGULAR DIET    ( ) * No inpatient interventions needed    6/13/2022 11:43:57 EDT by Eun Mueller, PT/OT,    * Milestone   Additional Notes    DATE: 6/13/2022   IP         Pertinent Updates:   Doing better overall. Speech better. Family at bedside. Path pending/ send out    pt in bed comfortable. No fevers/ chills/ chest pain/ SOB/ nausea/ vomiting/diarrhea/             Vitals:   134/82   98.8F   72   14   96% RA            Abnl/Pertinent Labs/Radiology/Diagnostic Studies:      WBC: 18.4 (H)   NRBC: 0.0   RBC: 4.92   HGB: 14.9   HCT: 46.0   MCV: 93.5   MCH: 30.3   MCHC: 32.4   RDW: 11.9   PLATELET: 902   MPV: 10.2   NEUTROPHILS: 84 (H)   BAND NEUTROPHILS: 1   LYMPHOCYTES: 5 (L)   MONOCYTES: 8   EOSINOPHILS: 1   BASOPHILS: 0   IMMATURE GRANULOCYTES: 0   METAMYELOCYTES: 1 (H)   DF: MANUAL   ABSOLUTE NRBC: 0.00   ABS. NEUTROPHILS: 15.6 (H)   ABS. IMM. GRANS.: 0.0   ABS. LYMPHOCYTES: 0.9   ABS. MONOCYTES: 1.5 (H)   ABS. EOSINOPHILS: 0.2   ABS. BASOPHILS: 0.0   RBC COMMENTS: NORMOCYTIC, NORMOCHROMIC      Sodium: 133 (L)   Potassium: 4.2   Chloride: 99   CO2: 28   Anion gap: 6   Glucose: 132 (H)   BUN: 25 (H)   Creatinine: 0.78   BUN/Creatinine ratio: 32 (H)   Calcium: 8.6   Phosphorus: 3.4   Magnesium: 2.0   GFR est non-AA: >60   GFR est AA: >60            Physical Exam:   General: No distress   Eyes:  anicteric sclerae   HENT: post surgery scar   Neck: Supple   Respiratory:  normal respiratory effort   CV: Normal rate, regular rhythm on monitor   MS: in bed states can walk with walker to BR with assist   Skin: No rashes, ecchymoses, or petechiae. Normal temperature, turgor, and texture. Psych: awake, conversant            HEM/ONC NOTE:   ) Brain tumor   5 cm right anterior frontal parenchymal mass without other   associated areas of abnormal enhancement in the brain suggests primary brain   Neoplasm   Post neurosurgery/ RIGHT frontal craniotomy 6/6/22. PATH:    Brain, right frontal lobe, excision:         Hypercellular glial tissue with necrosis.      Pending additional consultation for further characterization.         Results will be reported in an addendum.       Seen early this am for fu. In bed. Family at bedside. Doing better overall speech better. For rehab. Path pending send out as above. Discussed treatment plan from here is pending path. Discussed possible chemo/ radiation. Discussed second opinions. Reviewed all with pt and family today. Pt will recover from surgery and we will follow for path.        2) confusion at presentation. Much improved. Monitor.     3) HTN per IM.        4) psychosocial. Confusion. Family at bedside. Has good support. May need rehab   Professor at [employer]              Medications:   lisinopriL (PRINIVIL, ZESTRIL) tablet 40 mg   Dose: 40 mg   Freq: DAILY Route: PO      levETIRAcetam (KEPPRA) tablet 500 mg   Dose: 500 mg   Freq: EVERY 12 HOURS Route: PO      labetaloL (NORMODYNE) tablet 100 mg   Dose: 100 mg   Freq: EVERY 12 HOURS Route: PO      dexAMETHasone (DECADRON) tablet 2 mg   Dose: 2 mg   Freq: EVERY 8 HOURS Route: PO      amLODIPine (NORVASC) tablet 10 mg   Dose: 10 mg   Freq: DAILY Route: PO            Orders:   NEURO CHECKS Q4 HRS            PT NOTE:   Patient continues with skilled PT services and is progressing towards goals. Pt received seated EOB and agreeable to therapy. Pt tolerated session well, but continues to be limited by slight L sided weakness, impaired balance and gait, decreased functional mobility, impaired cognition, increased risk for falls and dependency from baseline. Pt demonstrated improvement in transfers with RW, but required CGA and cueing for proper hand placement. Pt tolerated gait training 8 ft x 2 with RW with min A and cues for safety and management of RW. Pt remained seated in the chair with all needs met. Pt remains below independent and active baseline, pt was working full time. Pt will benefit from inpatient rehab upon discharge to continue therapy efforts and reach highest level of independence. .        Current Level of Function Impacting Discharge (mobility/balance): min A gait training 8 ft x 2 with RW       Other factors to consider for discharge: previously independent and active, working full time as a             PLAN :   Patient continues to benefit from skilled intervention to address the above impairments.  Continue treatment per established plan of care. to address goals.       Recommendation for discharge: (in order for the patient to meet his/her long term goals)   Therapy 3 hours per day 5-7 days per week                  OT NOTE:   ASSESSMENT   Patient continues with skilled OT services and is progressing towards goals.  Patient ADLs continue to be limited by impaired balance, generalized weakness, decreased functional activity and impaired cognition related to sequencing, safety awareness and insight into deficits.   Patient received attempting to get out of bed with wife at bedside - educated patient and wife on safety with OOB mobility and making sure to call staff member to get to bathroom. Patient required SBA/CGA for functional mobility using RW with cues for safe hand placement. Patient with CGA for toilet transfer and supervision for zachary care. Patient required mod A for clothing management. Patient returned to chair at end of session with call bell in reach, RN aware, wife bedside and alarm active. Patient functional IND improved from previous session, however, continue to recommend IPR once cleared for D/C. Will continue to follow.        Current Level of Function Impacting Discharge (ADLs): up to mod A for ADLs       Other factors to consider for discharge: was IND and teaching at [employer]            PLAN :   Patient continues to benefit from skilled intervention to address the above impairments.  Continue treatment per established plan of care to address goals.       Recommend with staff: Recommend with nursing, ADLs with supervision/setup, OOB to chair 3x/day via rolling walker and toileting via functional mobility to and from bathroom. Thank you for completing as able in order to maintain patient strength, endurance and independence.            Recommend next OT session: full dressing routine       Recommendation for discharge: (in order for the patient to meet his/her long term goals)   Therapy 3 hours per day 5-7 days per week                CM NOTE:   Transition of Care Plan: IDH-GOI-rlduyau auth   RUR: 7% low   PCP F/U: Bruce Kebede MD   Disposition: Hien Fuelling   Transportation: BLS   Main Contact: Spouse-wife Ky Ripa [phone]: Message sent to liaison at [facility] to see if Fariba Passe was started on this patient. 1056: Received message that auth had not yet been started.  Facility needs updated PT/OT notes to start auth.

## 2022-06-22 ENCOUNTER — TELEPHONE (OUTPATIENT)
Dept: ONCOLOGY | Age: 67
End: 2022-06-22

## 2022-06-27 ENCOUNTER — HOSPITAL ENCOUNTER (OUTPATIENT)
Dept: RADIATION THERAPY | Age: 67
Discharge: HOME | End: 2022-06-27

## 2022-06-28 ENCOUNTER — TELEPHONE (OUTPATIENT)
Dept: ONCOLOGY | Age: 67
End: 2022-06-28

## 2022-06-28 NOTE — TELEPHONE ENCOUNTER
Call to [facility], spoke with Toby Patel. Patient fully identified. Inquired if [facility] had completed the additional testing on [id number]. Per Shanita, she will contact [facility] for update. 12N  Update from Toby Paetl in [facility] [place] Path Dept. Contacted [employer] regarding MGMT and was informed testing should be complete around 7/6/22. Update to Provider.

## 2022-06-28 NOTE — TELEPHONE ENCOUNTER
Pt's wife said the patient saw Dr Melania Fish yesterday and wants to know if he needs to be seen sooner.     CB# is [phone]

## 2022-07-05 ENCOUNTER — OFFICE VISIT (OUTPATIENT)
Dept: ONCOLOGY | Age: 67
End: 2022-07-05
Payer: COMMERCIAL

## 2022-07-05 ENCOUNTER — DOCUMENTATION ONLY (OUTPATIENT)
Dept: ONCOLOGY | Age: 67
End: 2022-07-05

## 2022-07-05 VITALS
HEART RATE: 96 BPM | WEIGHT: 257.7 LBS | HEIGHT: 66 IN | TEMPERATURE: 97.4 F | DIASTOLIC BLOOD PRESSURE: 71 MMHG | OXYGEN SATURATION: 97 % | SYSTOLIC BLOOD PRESSURE: 131 MMHG | BODY MASS INDEX: 41.42 KG/M2

## 2022-07-05 DIAGNOSIS — C71.9 GLIOBLASTOMA (HCC): Primary | ICD-10-CM

## 2022-07-05 PROCEDURE — 99215 OFFICE O/P EST HI 40 MIN: CPT | Performed by: INTERNAL MEDICINE

## 2022-07-05 RX ORDER — TEMOZOLOMIDE 180 MG/1
180 CAPSULE ORAL DAILY
Qty: 42 CAPSULE | Refills: 0 | Status: SHIPPED | OUTPATIENT
Start: 2022-07-05 | End: 2022-10-27

## 2022-07-05 RX ORDER — SULFAMETHOXAZOLE AND TRIMETHOPRIM 400; 80 MG/1; MG/1
1 TABLET ORAL 2 TIMES DAILY
Qty: 42 TABLET | Refills: 0 | Status: SHIPPED | OUTPATIENT
Start: 2022-07-05 | End: 2022-10-27

## 2022-07-05 RX ORDER — ONDANSETRON 4 MG/1
4-8 TABLET, ORALLY DISINTEGRATING ORAL
Qty: 60 TABLET | Refills: 1 | Status: SHIPPED | OUTPATIENT
Start: 2022-07-05

## 2022-07-05 NOTE — PROGRESS NOTES
Opportunity to meet with patient, spouse, son during new patient visit with Provider. Patient has Glioblastoma, will be starting Temozolomide/Concurrent XRT as outpatient. Information on medication given and briefly reviewed. Aric[employer]si. php/sheet-library/24-available/generic/218-temozolomide     Briefly reviewed schedule of medication administration, as well as common side effects. Understands that prescription will be sent to [facility] for processing and they will be contacted to arrange payment, delivery. Also updated that will need baseline labs prior to starting medications, as well as weekly labs. Wife updated RN that XRT location has not been determined at this time, but understands that medication will not begin until Day 1 of XRT. Standing lab orders written but not submitted until determined where XRT will be performed. Encouraged patient/spouse to sign up for [employer] account, listed as pending at this time. PSR gave the family information regarding [employer].

## 2022-07-05 NOTE — LETTER
7/5/2022    Patient: Brianna Torres   YOB: 1955   Date of Visit: 7/5/2022     Milo Tyler MD  [address] [address]  Via Fax: [phone]    Dear Milo Tyler MD,      Thank you for referring Mr. Je Moncada to [address] [place] [address] for evaluation. My notes for this consultation are attached. If you have questions, please do not hesitate to call me. I look forward to following your patient along with you.       Sincerely,    Renetta Villa, DO

## 2022-07-05 NOTE — PROGRESS NOTES
[facility] [place] [address] Tamra Haji, [address], [place]: [phone]  F: [phone]    Reason for Visit:   Megan Garrett is a 61 y.o. male who is seen for fu of brain tumor. Treatment History:   6/6/22 RIGHT frontal craniotomy    History of Present Illness:     Seen today for GBM post hospital / neurosurgery fu. Path GBM   Brain, right frontal, resection:        Glioblastoma, IDH-wild-type, CNS WHO grade 4.        Further studies are pending.        See comment. Addendum Comment   This diagnosis was reached in consultation with Dr. Eliane Castellano at   [facility] Department of Pathology. The detailed report with   interpretation is available patient's chart. MGMT hypermethylation studies   have been ordered and will be reported in an addendum. Seen today for first office visit. Reviewed path and data for brain tumor today. Reviewed overall difficult prognosis. Discussed clinical trials. Pt is being evaluated for trial at [facility]. Will be a delay to trial.   Here to set up temodar/ XRT. Has seen Rad/onc already and to have sim. In w/c. Here with wife for discussion . 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 No fevers/ chills/ chest pain/ SOB/ nausea/ vomiting/diarrhea/ pain/          Last visit:  Seen today in hospital as a new patient for brain tumor. Admitted with increasing confusion. 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 MRI confirmed. Seen by neurosurgery and had crani 6/6/22. Seen today in ICU. Family with pt and pt eating sub. Pt doing ok. Limited conversation. ROS not obtainable    INTERIM HX:  Seen today for fu. Doing better overall. Speech better. Family at bedside. Path pending/ send out   pt in bed comfortable.    No fevers/ chills/ chest pain/ SOB/ nausea/ vomiting/diarrhea/       Past Medical History:   Diagnosis Date    Arthritis     GERD (gastroesophageal reflux disease)  Morbid obesity ([facility] [address]       Past Surgical History:   Procedure Laterality Date    HX BACK SURGERY      2 laminectomies and fusion    HX ORTHOPAEDIC Right     orif hand    HX SHOULDER ARTHROSCOPY      HX TONSILLECTOMY      as a child      Social History     Tobacco Use    Smoking status: Former Smoker     Quit date: 1981     Years since quittin.1    Smokeless tobacco: Never Used   Substance Use Topics    Alcohol use: Yes     Comment: occasional      History reviewed. No pertinent family history. Current Outpatient Medications   Medication Sig    amLODIPine (NORVASC) 10 mg tablet Take 1 Tablet by mouth daily.  docusate sodium (COLACE) 100 mg capsule Take 1 Capsule by mouth two (2) times a day for 90 days.  gabapentin (NEURONTIN) 300 mg capsule Take 1 Capsule by mouth three (3) times daily. Max Daily Amount: 900 mg.  levETIRAcetam (KEPPRA) 500 mg tablet Take 1 Tablet by mouth every twelve (12) hours.  lisinopriL (PRINIVIL, ZESTRIL) 40 mg tablet Take 1 Tablet by mouth daily. Indications: high blood pressure    tamsulosin (FLOMAX) 0.4 mg capsule Take 1 Capsule by mouth daily.  pantoprazole (PROTONIX) 40 mg tablet Take 1 Tablet by mouth Before breakfast and dinner.  azelastine (ASTELIN) 137 mcg (0.1 %) nasal spray 2 Sprays by Both Nostrils route two (2) times a day. Use in each nostril as directed     baclofen (LIORESAL) 10 mg tablet Take 10 mg by mouth three (3) times daily.  esomeprazole (NEXIUM) 40 mg capsule Take 40 mg by mouth two (2) times a day.  fluticasone propionate (FLOVENT HFA) 44 mcg/actuation inhaler Take  by inhalation.  levocetirizine (XYZAL) 5 mg tablet Take 5 mg by mouth.  metroNIDAZOLE (METROLOTION) 0.75 % lotion Apply  to affected area two (2) times a day.  B.animalis,bifid,infantis,long (Probiotic 4X) 10-15 mg TbEC Take  by mouth.  solifenacin (VESICARE) 10 mg tablet Take 10 mg by mouth daily.     budesonide-formoteroL (Symbicort) 160-4.5 mcg/actuation HFAA Take 2 Puffs by inhalation two (2) times a day.  solriamfetoL (Sunosi) 75 mg tab Take 75 mg by mouth daily. No current facility-administered medications for this visit. Allergies   Allergen Reactions    Keflex [Cephalexin] Rash        Review of Systems: A complete review of systems was obtained, negative except as described above and as reported on ROS sheet scanned into system. Physical Exam:     Visit Vitals  /71 (BP 1 Location: Left upper arm, BP Patient Position: Sitting)   Pulse 96   Temp 97.4 °F (36.3 °C) (Temporal)   Ht 5' 5.98\" (1.676 m)   Wt 257 lb 11.2 oz (116.9 kg)   SpO2 97%   BMI 41.62 kg/m²     ECOG PS: 1-2  General: No distress  Eyes:  anicteric sclerae  HENT: post surgery scar  Neck: Supple  Respiratory:  normal respiratory effort   CV: Normal rate, regular rhythm on monitor  MS: in bed states can walk with walker to BR with assist  Skin: No rashes, ecchymoses, or petechiae. Normal temperature, turgor, and texture. Psych: awake, conversant    Results:     Lab Results   Component Value Date/Time    WBC 21.6 (H) 06/15/2022 12:23 AM    HGB 13.5 06/15/2022 12:23 AM    HCT 42.9 06/15/2022 12:23 AM    PLATELET 819 34/49/1113 12:23 AM    MCV 96.2 06/15/2022 12:23 AM    ABS. NEUTROPHILS 16.8 (H) 06/15/2022 12:23 AM     Lab Results   Component Value Date/Time    Sodium 135 (L) 06/15/2022 12:23 AM    Potassium 4.0 06/15/2022 12:23 AM    Chloride 103 06/15/2022 12:23 AM    CO2 28 06/15/2022 12:23 AM    Glucose 131 (H) 06/15/2022 12:23 AM    BUN 33 (H) 06/15/2022 12:23 AM    Creatinine 0.92 06/15/2022 12:23 AM    GFR est AA >60 06/15/2022 12:23 AM    GFR est non-AA >60 06/15/2022 12:23 AM    Calcium 7.9 (L) 06/15/2022 12:23 AM     Lab Results   Component Value Date/Time    Bilirubin, total 0.5 06/02/2022 02:32 PM    ALT (SGPT) 42 06/02/2022 02:32 PM    Alk.  phosphatase 71 06/02/2022 02:32 PM    Protein, total 7.5 06/02/2022 02:32 PM    Albumin 3.5 06/02/2022 02:32 PM Globulin 4.0 06/02/2022 02:32 PM       MRI Results (most recent):  Results from Hospital Encounter encounter on 06/02/22    MRI BRAIN W WO CONT    Narrative  INDICATION:  s/p crani    COMPARISON:  CT June 6, 2022, MRI June 2, 2022    TECHNIQUE:  Multiplanar multisequence acquisition without and with IV contrast  of the brain. FINDINGS:    There has been recent right frontal craniotomy with resection of previous large  right frontal lobe mass. There is susceptibility artifact in anterior frontal  region consistent with pneumocephalus. There is also susceptibility artifact  within the right frontal lobe resection cavity with heterogeneous T1 and T2  signal most consistent with blood product. No significant residual masslike  enhancement. Extensive surrounding vasogenic edema with persistent mass effect  upon the lateral ventricles, and approximately 9 mm of midline shift. Small  amount of extra-axial hemorrhage in the right lateral frontal region, as well as  small amount of intraventricular hemorrhage in the occipital horns and fourth  ventricle. No hydrocephalus. Small areas of T2 hyperintensity in the left  centrum semiovale are unchanged. Diffusion restriction around the right frontal  lobe resection cavity. Major intracranial vascular flow voids are patent. Right  frontal scalp edema and fluid. Impression  Recent right frontal lobe mass resection, with postoperative changes as  described above. Persistent vasogenic edema, regional mass effect and right to  left midline shift. Blood product within the resection cavity and mild  surrounding diffusion restriction likely postoperative. No significant residual  masslike enhancement though close follow-up is recommended. Brain MRI:  IMPRESSION  Approximately 5 cm right anterior frontal parenchymal mass without other  associated areas of abnormal enhancement in the brain suggests primary brain  neoplasm/GBM although metastasis cannot be entirely excluded. Significant mass  Affect. .    CT Results (most recent):  Results from Hospital Encounter encounter on 06/02/22    CT HEAD WO CONT    Narrative  EXAM: CT HEAD WO CONT    INDICATION: s/p crani and tumor resection    COMPARISON: 6/2/2022. CONTRAST: None. TECHNIQUE: Unenhanced CT of the head was performed using 5 mm images. Brain and  bone windows were generated. Coronal and sagittal reformats. CT dose reduction  was achieved through use of a standardized protocol tailored for this  examination and automatic exposure control for dose modulation. FINDINGS:  The patient has undergone right frontal craniotomy in the interval.  Postoperative changes and pneumocephalus are noted following tumor resection. Subfalcine herniation persists but has improved compared to the prior exam. No  unexpected postoperative complication is identified. Impression  Postoperative changes and pneumocephalus following tumor resection. No  unexpected postoperative complication is identified. CT C/A/P  IMPRESSION  1. No evidence of primary malignancy in the chest, abdomen or pelvis. 2. Incidental findings as above    Records reviewed and summarized above. Pathology report(s) reviewed above. Radiology report(s) reviewed above. Assessment/PLAN:     1) Glioblastoma  5 cm right anterior frontal parenchymal mass without other  associated areas of abnormal enhancement in the brain suggests primary brain  Neoplasm  Post neurosurgery/ RIGHT frontal craniotomy 6/6/22. PATH:   Brain, right frontal, resection:        Glioblastoma, IDH-wild-type, CNS WHO grade 4.        Further studies are pending.        See comment. Addendum Comment   This diagnosis was reached in consultation with Dr. Kayley Holbrook at   [facility] Department of Pathology. The detailed report with   interpretation is available patient's chart. MGMT hypermethylation studies   have been ordered and will be reported in an addendum.      Seen today for first time office visit post hospital stay. Reviewed path and data for brain tumor today. Reviewed overall difficult prognosis. Discussed clinical trials. Pt is being evaluated for trial at [facility]. Will be a delay to trial.   Here to set up temodar/ XRT. Has seen Rad/onc already and to have sim. Doing better overall speech better/ went to rehab. .   Discussed treatment plan from here  Discussed chemo/ radiation. Discussed chemo with temodar today. Reviewed risks, benefits and alternatives of temodar today. Pt/ wife agreeable to temodar. Will order. Clinically stable today in w/c. Await [facility] trial option. Will need to d/w [facility]/ Rad/onc. 2) confusion at presentation. Much improved. Monitor. 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 3) HTN per IM. 4) psychosocial. Mood good. Coping well. Wife here. Has good support. 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 Fu in 2 weeks  Call if questions    I appreciate the opportunity to participate in Mr. Leland garcia.     Signed By: Fox Kwong DO

## 2022-07-05 NOTE — PROGRESS NOTES
Temodar 180 mg PO daily x 42 days sent electronically to [facility]. Patient will need to notify us when Temodar received/not to start until first day of XRT and pending [facility] Clinical Trial eval.    Zofran and Bactrim sent to local [facility] listed in chart. He will need baseline CBC w/diff and CMP and then weekly CBC w/ diff and CMP while on concurrent Temodar+XRT. Please fax standing orders to his [employer] of choice.

## 2022-07-05 NOTE — PROGRESS NOTES
Chilo Gilbert is a 61 y.o. male  Chief Complaint   Patient presents with    New Patient     Brain Tumor    . 1. Have you been to the ER, urgent care clinic since your last visit? Hospitalized since your last visit? No.    2. Have you seen or consulted any other health care providers outside of the [address] [place] [address] since your last visit? Include any pap smears or colon screening.  No.    Patient states he still has Edema

## 2022-07-08 ENCOUNTER — DOCUMENTATION ONLY (OUTPATIENT)
Dept: ONCOLOGY | Age: 67
End: 2022-07-08

## 2022-07-08 ENCOUNTER — HOSPITAL ENCOUNTER (OUTPATIENT)
Dept: RADIATION THERAPY | Age: 67
Discharge: HOME | End: 2022-07-08
Payer: COMMERCIAL

## 2022-07-08 PROCEDURE — 77470 SPECIAL RADIATION TREATMENT: CPT

## 2022-07-08 NOTE — PROGRESS NOTES
UPDATE:    MA contacted Gio Barry to try and get in touch with the [facility]. MA was given fax number for their designated Referral person who works with intake of new patients. MA has faxed Referral/recent records to Fabi Hong fax number: [phone]! Fax confirmation was received back! Their office will be calling patient once records are reviewed. Referral in progress. ..   Jacki Warner

## 2022-07-11 ENCOUNTER — TELEPHONE (OUTPATIENT)
Dept: ONCOLOGY | Age: 67
End: 2022-07-11

## 2022-07-11 NOTE — TELEPHONE ENCOUNTER
Brittni @ Dr Endy Potts office Augustine Luna) is calling to find out if we are managing pt's TMZ?  If so, has it been authorized    CB# is [phone]

## 2022-07-11 NOTE — TELEPHONE ENCOUNTER
Returned call to Ryan Evans at Dr Shireen Carter, [facility], [phone]. ID verified X 2. Confirmed for Brittni that per patient's wife, TMZ has arrived at home. Per Ryan Evans, patient is scheduled to be seen on 7/12/22 for labs at 1 PM, with an MRI at 3 PM.  Has F/U with Dr. Anju Gamboa at 21 [phone] on 7/18/22, trial consent to be signed then, patient will then return to [address] [place] [address] to begin XRT with Dr. Antonio Lindsey - time TBD. Update to Provider/Pharmacy.

## 2022-07-14 ENCOUNTER — HOSPITAL ENCOUNTER (OUTPATIENT)
Dept: RADIATION THERAPY | Age: 67
Discharge: HOME | End: 2022-07-14

## 2022-07-15 ENCOUNTER — TELEPHONE (OUTPATIENT)
Dept: ONCOLOGY | Age: 67
End: 2022-07-15

## 2022-07-15 NOTE — TELEPHONE ENCOUNTER
Oral Chemotherapy     Katie Hicks is a  61 y.o.male  diagnosed with glioblastoma . Mr. Susan Rodriguez is being treated with temozolomide. Education and consent obtained with spouse Marisol Forte. Medication name: temozolomide    Dose:  180 mg   Frequency: daily   Administration schedule: for 42 days with radiation  Ordering provider: Araseli Bojorquez DO  Start date: 7/18/22 (pending)        Expected follow up date: Labs/office visit weekly. Side effects of chemotherapy reviewed included s/s infection, anemia, appetite changes, thrombocytopenia, fatigue, hair loss/alopecia, bone pain, skin and nail changes, and diarrhea/constipation.     Patient given ways to manage these side effects and when to contact office.      Oral Chemotherapy Education Handout of medications provided to patient.      Reviewed Bactrim one time daily for 42 days.     Take temozolomide on an empty stomach 1 hour prior to radiation on radiation days.    Patient provided with an Oral Chemotherapy Journal.     Mr. Susan Rodriguez verbalized understanding of the information presented and all of the patient's questions were answered.        Jannie Ramirez, PHARMD, BCOP, BCPS    For Pharmacy Admin Tracking Only     CPA in place: No   Recommendation Provided To: Patient/Caregiver: 1 via Telephone      Intervention Accepted By: Patient/Caregiver: 1   Time Spent (min): 15

## 2022-07-15 NOTE — TELEPHONE ENCOUNTER
1400 Follow up call to Rosana Clay at Dr Horne Bring office at [facility], [phone]. Updated Brittni that patient's wife is speaking with Pharmacy for consent today. Per Rosana Clay, patient will have baseline labs at [facility] on 7/18/22 including a CBC and CMP, results to post to [facility]. Will need weekly [facility] labs at [facility], with results sent to [facility] - standing orders faxed to [address] [place] [address] w/Dr Antonio Luna) is requesting a returned call from you to discuss a mutual pt who sent an email saying they are starting chemo Monday and have not done chemo teaching. No patient info left.      CB# is [phone]

## 2022-07-18 ENCOUNTER — HOSPITAL ENCOUNTER (OUTPATIENT)
Dept: RADIATION THERAPY | Age: 67
Discharge: HOME | End: 2022-07-18
Payer: COMMERCIAL

## 2022-07-18 PROCEDURE — 77386 HC IMRT TRMT DLVR COMPL: CPT

## 2022-07-19 ENCOUNTER — HOSPITAL ENCOUNTER (OUTPATIENT)
Dept: RADIATION THERAPY | Age: 67
Discharge: HOME | End: 2022-07-19
Payer: COMMERCIAL

## 2022-07-19 ENCOUNTER — OFFICE VISIT (OUTPATIENT)
Dept: ONCOLOGY | Age: 67
End: 2022-07-19
Payer: COMMERCIAL

## 2022-07-19 VITALS
HEIGHT: 66 IN | HEART RATE: 85 BPM | OXYGEN SATURATION: 96 % | TEMPERATURE: 97.1 F | SYSTOLIC BLOOD PRESSURE: 150 MMHG | DIASTOLIC BLOOD PRESSURE: 90 MMHG | WEIGHT: 245.1 LBS | BODY MASS INDEX: 39.39 KG/M2

## 2022-07-19 DIAGNOSIS — C71.9 GLIOBLASTOMA (HCC): Primary | ICD-10-CM

## 2022-07-19 PROCEDURE — 99215 OFFICE O/P EST HI 40 MIN: CPT | Performed by: INTERNAL MEDICINE

## 2022-07-19 PROCEDURE — 77386 HC IMRT TRMT DLVR COMPL: CPT

## 2022-07-19 NOTE — PROGRESS NOTES
Mercy Wolf is a 61 y.o. male  Chief Complaint   Patient presents with    Follow-up      brain tumor. 1. Have you been to the ER, urgent care clinic since your last visit? Hospitalized since your last visit? No.    2. Have you seen or consulted any other health care providers outside of the [address] [place] [address] since your last visit? Include any pap smears or colon screening. Yes, patient went to see [facility]. Patient states he has 2-4 falls since last visit.

## 2022-07-19 NOTE — LETTER
7/19/2022    Patient: Cara Rachel   YOB: 1955   Date of Visit: 7/19/2022     Polina Coyle MD  [address] [address]  Via Fax: [phone]    Dear Polina Coyle MD,      Thank you for referring Mr. Jaya Tellez to [address] [place] [address] for evaluation. My notes for this consultation are attached. If you have questions, please do not hesitate to call me. I look forward to following your patient along with you.       Sincerely,    Sary Contreras, DO

## 2022-07-19 NOTE — PROGRESS NOTES
[facility] [place] [address] [address]: [phone]  F: [phone]    Reason for Visit:   Anders Bautista is a 61 y.o. male seen today in office for follow up of Glioblastoma. Treatment History:   · Brain MRI 6/2/22: Approximately 5 cm right anterior frontal parenchymal mass without other associated areas of abnormal enhancement in the brain suggests primary brain neoplasm/GBM although metastasis cannot be entirely excluded. 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 Persistent vasogenic edema, regional mass effect and right to left midline shift. Blood product within the resection cavity and mild surrounding diffusion restriction likely postoperative. No significant residual masslike enhancement though close follow-up is recommended  · Brain MRI 7/12/22 at [facility]: Significant increase in nodular enhancing tumor surrounding the right frontal resection cavity as compared to baseline postoperative exam June 7, 2022. Findings concerning for early tumor recurrence  · Concurrent Temodar (75 mg/m2) + XRT 7/18/22 - Current       History of Present Illness:   Anders Bautista is a 61 y.o. male seen today in office for follow up of GBM. He started concurrent Temodar (75 mg/m2) + XRT on 7/18/22. After concurrent therapy, he will begin the AGILE clinical trial at [facility]. He reports that he feels well overall today. His appetite and energy levels are good. Keppra was discontinued per [facility] but he was re-started on 2 mg Dex BID. He denies fever, chills, mouth sores, cough, SOB, CP, nausea, vomiting, diarrhea, and constipation. He denies pain today. CBC and CMP from 7/18/22 at [facility] reviewed today.  His supportive wife and daughter are here today. Past Medical History:   Diagnosis Date    Arthritis     GERD (gastroesophageal reflux disease)     Morbid obesity ([address]       Past Surgical History:   Procedure Laterality Date    HX BACK SURGERY      2 laminectomies and fusion    HX ORTHOPAEDIC Right     orif hand    HX SHOULDER ARTHROSCOPY      HX TONSILLECTOMY      as a child      Social History     Tobacco Use    Smoking status: Former Smoker     Quit date: 1981     Years since quittin.1    Smokeless tobacco: Never Used   Substance Use Topics    Alcohol use: Yes     Comment: occasional      History reviewed. No pertinent family history. Current Outpatient Medications   Medication Sig    temozolomide (TEMODAR) 180 mg capsule 1 Capsule daily. Take 1 capsule by mouth daily for 42 days    ondansetron (ZOFRAN ODT) 4 mg disintegrating tablet Take 1-2 Tablets by mouth every eight (8) hours as needed for Nausea or Nausea or Vomiting.  trimethoprim-sulfamethoxazole (BACTRIM, SEPTRA)  mg per tablet Take 1 Tablet by mouth two (2) times a day.  amLODIPine (NORVASC) 10 mg tablet Take 1 Tablet by mouth daily.  docusate sodium (COLACE) 100 mg capsule Take 1 Capsule by mouth two (2) times a day for 90 days.  gabapentin (NEURONTIN) 300 mg capsule Take 1 Capsule by mouth three (3) times daily. Max Daily Amount: 900 mg.  levETIRAcetam (KEPPRA) 500 mg tablet Take 1 Tablet by mouth every twelve (12) hours.  lisinopriL (PRINIVIL, ZESTRIL) 40 mg tablet Take 1 Tablet by mouth daily. Indications: high blood pressure    tamsulosin (FLOMAX) 0.4 mg capsule Take 1 Capsule by mouth daily.  pantoprazole (PROTONIX) 40 mg tablet Take 1 Tablet by mouth Before breakfast and dinner.  azelastine (ASTELIN) 137 mcg (0.1 %) nasal spray 2 Sprays by Both Nostrils route two (2) times a day. Use in each nostril as directed     baclofen (LIORESAL) 10 mg tablet Take 10 mg by mouth three (3) times daily.     esomeprazole (NEXIUM) 40 mg capsule Take 40 mg by mouth two (2) times a day.  fluticasone propionate (FLOVENT HFA) 44 mcg/actuation inhaler Take  by inhalation.  levocetirizine (XYZAL) 5 mg tablet Take 5 mg by mouth.  metroNIDAZOLE (METROLOTION) 0.75 % lotion Apply  to affected area two (2) times a day.  B.animalis,bifid,infantis,long (Probiotic 4X) 10-15 mg TbEC Take  by mouth.  solifenacin (VESICARE) 10 mg tablet Take 10 mg by mouth daily.  budesonide-formoteroL (Symbicort) 160-4.5 mcg/actuation HFAA Take 2 Puffs by inhalation two (2) times a day.  solriamfetoL (Sunosi) 75 mg tab Take 75 mg by mouth daily. No current facility-administered medications for this visit. Allergies   Allergen Reactions    Keflex [Cephalexin] Rash      Review of Systems: A complete review of systems was obtained, negative except as described above and as reported on ROS sheet scanned into system. Physical Exam:     Visit Vitals  BP (!) 150/90 (BP 1 Location: Left upper arm, BP Patient Position: Sitting)   Pulse 85   Temp 97.1 °F (36.2 °C) (Temporal)   Ht 5' 5.98\" (1.676 m)   Wt 245 lb 1.6 oz (111.2 kg)   SpO2 96%   BMI 39.58 kg/m²     ECOG PS: 1-2  General: No distress  Eyes: Anicteric sclerae  HENT: post surgery scar, wearing a mask  Neck: Supple  Respiratory:  CTAB, normal respiratory effort   CV: Normal rate, regular rhythm on monitor  MS: Ambulatory with cane  Skin: No rashes, ecchymoses, or petechiae. Normal temperature, turgor, and texture. Psych: awake, conversant  Neuro: Alert and oriented    Results:     Lab Results   Component Value Date/Time    WBC 21.6 (H) 06/15/2022 12:23 AM    HGB 13.5 06/15/2022 12:23 AM    HCT 42.9 06/15/2022 12:23 AM    PLATELET 765 98/93/6647 12:23 AM    MCV 96.2 06/15/2022 12:23 AM    ABS.  NEUTROPHILS 16.8 (H) 06/15/2022 12:23 AM     Lab Results   Component Value Date/Time    Sodium 135 (L) 06/15/2022 12:23 AM    Potassium 4.0 06/15/2022 12:23 AM    Chloride 103 06/15/2022 12:23 AM    CO2 28 06/15/2022 12:23 AM    Glucose 131 (H) 06/15/2022 12:23 AM    BUN 33 (H) 06/15/2022 12:23 AM    Creatinine 0.92 06/15/2022 12:23 AM    GFR est AA >60 06/15/2022 12:23 AM    GFR est non-AA >60 06/15/2022 12:23 AM    Calcium 7.9 (L) 06/15/2022 12:23 AM     Lab Results   Component Value Date/Time    Bilirubin, total 0.5 06/02/2022 02:32 PM    ALT (SGPT) 42 06/02/2022 02:32 PM    Alk. phosphatase 71 06/02/2022 02:32 PM    Protein, total 7.5 06/02/2022 02:32 PM    Albumin 3.5 06/02/2022 02:32 PM    Globulin 4.0 06/02/2022 02:32 PM     Brain MRI 6/2/22  FINDINGS:  Enhancing right anterior frontal mass noted as described above. There is central  nonenhancement/necrosis. Minimal blood products. Some associated central  restricted diffusion is nonspecific. The mass measures approximately 4.1 x 5.1 x  3.4 cm. There is surrounding vasogenic edema and mass effect with significant  effacement and displacement of the right ventricle, effacement of adjacent sulci  and right to left midline shift measuring approximately 12 mm at the level of  the septum pellucidum. There are no other foci of abnormal enhancement demonstrated in the brain. There  is slight asymmetric prominence of the left lateral ventricle and temporal horn  with some ependymal asymmetric T2 hyperintensity which may represent developing  hydrocephalus. Flow voids are present in vertebral basilar and carotid artery systems. There is  some vascularity in the tumor which does not appear particularly hypervascular. No abnormal extra-axial fluid collections. No other findings of acute  intracranial hemorrhage. Structures of the skull base including orbits,  paranasal sinuses and temporal bones are unremarkable.     IMPRESSION:  Approximately 5 cm right anterior frontal parenchymal mass without other  associated areas of abnormal enhancement in the brain suggests primary brain  neoplasm/GBM although metastasis cannot be entirely excluded.  Significant mass  affect. 6/6/22  FINAL PATHOLOGIC DIAGNOSIS   Brain, right frontal lobe, excision:   Hypercellular glial tissue with necrosis. Pending additional consultation for further characterization. Addendum Diagnosis   The purpose of this addendum is to report the results of outside consultation.   1-3. 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 Further studies are pending. Brain MRI 6/7/22  FINDINGS:    There has been recent right frontal craniotomy with resection of previous large  right frontal lobe mass. There is susceptibility artifact in anterior frontal  region consistent with pneumocephalus. There is also susceptibility artifact  within the right frontal lobe resection cavity with heterogeneous T1 and T2  signal most consistent with blood product. No significant residual masslike  enhancement. Extensive surrounding vasogenic edema with persistent mass effect  upon the lateral ventricles, and approximately 9 mm of midline shift. Small  amount of extra-axial hemorrhage in the right lateral frontal region, as well as  small amount of intraventricular hemorrhage in the occipital horns and fourth  ventricle. No hydrocephalus. Small areas of T2 hyperintensity in the left  centrum semiovale are unchanged. Diffusion restriction around the right frontal  lobe resection cavity. Major intracranial vascular flow voids are patent. Right  frontal scalp edema and fluid.     IMPRESSION:  Recent right frontal lobe mass resection, with postoperative changes as  described above. Persistent vasogenic edema, regional mass effect and right to  left midline shift. Blood product within the resection cavity and mild  surrounding diffusion restriction likely postoperative. No significant residual  masslike enhancement though close follow-up is recommended.     CT Results (most recent):  Results from Hospital Encounter encounter on 06/02/22    CT HEAD WO CONT    Narrative  EXAM: CT HEAD WO CONT    INDICATION: s/p crani and tumor resection    COMPARISON: 6/2/2022. CONTRAST: None. TECHNIQUE: Unenhanced CT of the head was performed using 5 mm images. Brain and  bone windows were generated. Coronal and sagittal reformats. CT dose reduction  was achieved through use of a standardized protocol tailored for this  examination and automatic exposure control for dose modulation. FINDINGS:  The patient has undergone right frontal craniotomy in the interval.  Postoperative changes and pneumocephalus are noted following tumor resection. Subfalcine herniation persists but has improved compared to the prior exam. No  unexpected postoperative complication is identified. Impression  Postoperative changes and pneumocephalus following tumor resection. No  unexpected postoperative complication is identified. CT C/A/P 6/3/22  IMPRESSION:  1. No evidence of primary malignancy in the chest, abdomen or pelvis. 2. Incidental findings as above    Brain MRI 7/12/22 at [facility]  IMPRESSION:   Significant increase in nodular enhancing tumor surrounding the right frontal resection cavity as compared to baseline postoperative exam June 7, 2022. Findings concerning for early tumor recurrence. Records reviewed and summarized above. Pathology report(s) reviewed above. Radiology report(s) reviewed above. Assessment/PLAN:     1) Glioblastoma WHO Grade 4  Brain MRI 6/3/22 showed a 5 cm right anterior frontal parenchymal mass without other associated areas of abnormal enhancement in the brain suggests primary brain neoplasm  Post neurosurgery/ RIGHT frontal craniotomy 6/6/22. 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 Reviewed overall difficult prognosis. Discussed clinical trials. Patient will be doing the AGILE Clinical Trial trial at [facility] - records in [facility]. He was randomized into infusion arm of the study and may want a port placed eventually.      He is here today for follow up. He is clinically stable today. Back on 2 mg Dex BID per [facility]. He started concurrent Temodar (75 mg/m2) + XRT on 7/18/22. He is tolerating current regiment well overall thus far. Labs (CBC and CMP) from 7/18/22 reviewed today. Continue concurrent Temodar+XRT. Continue weekly CBC and CMP. Labs will need to be faxed to [facility] Clinical Trial team.  Follow up in 2 weeks in office. Patient/family agree with plan. 2) Confusion at Presentation  Much improved. 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 3) HTN  Management per PCP. 4) Management of High Risk Medication - Temodar  No toxicities noted thus far. Labs (CBC and CMP) from 7/18/22 at [facility] reviewed. No dose adjustments needed. Will monitor for side effects. 5) Psychosocial  Mood good. Coping well. He has good family support - wife and daugher here. 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 SW/NN support as needed. Call if questions. Follow up in 2 weeks. I personally saw and evaluated the patient and performed the key components of medical decision making. The history, physical exam, and documentation were performed by Tashi Vargas NP. I reviewed and verified the above documentation and modified it as needed. Doing well overall  Started temodar/ XRT and seeing [facility]  Lab monitoring  For trial at [facility] and pt wants a port but not needed. Will d/w [facility]. Reviewed plan overall today with pt and family. I appreciate the opportunity to participate in Mr. Marcy Leon jose.     Signed By: Mishel Hanson,

## 2022-07-20 ENCOUNTER — HOSPITAL ENCOUNTER (OUTPATIENT)
Dept: RADIATION THERAPY | Age: 67
Discharge: HOME | End: 2022-07-20
Payer: COMMERCIAL

## 2022-07-20 PROCEDURE — 77386 HC IMRT TRMT DLVR COMPL: CPT

## 2022-07-21 ENCOUNTER — HOSPITAL ENCOUNTER (OUTPATIENT)
Dept: RADIATION THERAPY | Age: 67
Discharge: HOME | End: 2022-07-21
Payer: COMMERCIAL

## 2022-07-21 PROCEDURE — 77386 HC IMRT TRMT DLVR COMPL: CPT

## 2022-07-22 ENCOUNTER — HOSPITAL ENCOUNTER (OUTPATIENT)
Dept: RADIATION THERAPY | Age: 67
Discharge: HOME | End: 2022-07-22
Payer: COMMERCIAL

## 2022-07-22 PROCEDURE — 77386 HC IMRT TRMT DLVR COMPL: CPT

## 2022-07-26 ENCOUNTER — HOSPITAL ENCOUNTER (OUTPATIENT)
Dept: RADIATION THERAPY | Age: 67
Discharge: HOME | End: 2022-07-26
Payer: COMMERCIAL

## 2022-07-26 ENCOUNTER — TELEPHONE (OUTPATIENT)
Dept: ONCOLOGY | Age: 67
End: 2022-07-26

## 2022-07-26 PROCEDURE — 77386 HC IMRT TRMT DLVR COMPL: CPT

## 2022-07-26 NOTE — TELEPHONE ENCOUNTER
Elijah grissom/[facility] called to request labs be faxed over for this patient. No fax number given on [employer].     CB# is [phone] Problem: Self Care Deficits Care Plan (Adult) Goal: *Acute Goals and Plan of Care (Insert Text) Description Occupational Therapy Goals Initiated 4/23/2019 1. Patient will perform grooming in standing VSS with supervision/set-up within 7 day(s). 2.  Patient will perform bathing with minimal assistance/contact guard assist within 7 day(s). 3.  Patient will perform upper body dressing and lower body dressing with supervision/set-up within 7 day(s). 4.  Patient will perform toilet transfers with supervision/set-up within 7 day(s). 5.  Patient will perform all aspects of toileting with supervision/set-up within 7 day(s). 6.  Patient will participate in upper extremity therapeutic exercise/activities with supervision/set-up for 5 minutes within 7 day(s). 7.  Patient will utilize energy conservation techniques during functional activities with verbal and visual cues within 7 day(s). Outcome: Progressing Towards Goal 
 
OCCUPATIONAL THERAPY TREATMENT Patient: Silvia Cerna (18 y.o. female) Date: 4/29/2019 Diagnosis: Small bowel obstruction ([address] [F00.994] Small bowel obstruction ([address] [K56.609] SBO (small bowel obstruction) ([address] Procedure(s) (LRB): 
LAPAROTOMY EXPLORATORY, ILEOSTOMY; KASIA DRAIN PLACEMENT (N/A) 13 Days Post-Op Precautions: Fall, DNR(max encouragement needed; multiple lines) Chart, occupational therapy assessment, plan of care, and goals were reviewed. ASSESSMENT:  
The patient presents with Supervision upper body ADLs, Minimum assistance lower body ADLs, and Contact guard assistance, Additional time, Assist x1 and (Other) comment assist functional mobility. With rolling walker The following are barriers to ADL independence while in acute care:  
- Cognitive and/or behavioral: attention to task and safety awareness - Medical condition: new ostomy and wound vac management    
- Other:    
 
Prior level of function: independent PLAN: 
 Patient continues to benefit from skilled intervention to address the above impairments. Continue treatment per established plan of care. Recommend with staff: Patient instructed and indicated understanding the benefits of maintaining activity tolerance, functional mobility, and independence with self care tasks during acute stay  to ensure safe return home and to baseline. Encouraged patient to increase frequency and duration OOB, be out of bed for all meals, perform daily ADLs (as approved by RN/MD regarding bathing etc), and performing functional mobility to/from bathroom. Recommend next OT session: re-ed on management of wound vac, ADL's standing at the sink Discharge recommendations: Rehab at skilled nursing facility (SNF) (to regain functional baseline patient requires rehab) If above is not an option then recommend: 24 supervision Barriers to discharging home, in addition to above listed impairments: lives alone 
family availability for education/training to then follow up at home. Needs to independently manage wound vac and ostomy care Equipment recommendations for successful discharge (if) home: rolling walker issues SUBJECTIVE:  
Patient stated ? they didn't know how sick I was, they know now so they'll be there. ? re: her son or  staying with her at discharge OBJECTIVE DATA SUMMARY:  
Cognitive/Behavioral Status: 
Neurologic State: Alert; Appropriate for age Orientation Level: Oriented X4;Appropriate for age Cognition: Follows commands;Decreased attention/concentration Perception: Appears intact Perseveration: No perseveration noted Safety/Judgement: Awareness of environment; Fall prevention;Home safety;Decreased insight into deficits Functional Mobility and Transfers for ADLs: 
Bed Mobility: 
Rolling: Supervision Supine to Sit: Supervision(Simultaneous filing. User may not have seen previous data.) Sit to Supine: Modified independent(Simultaneous filing. User may not have seen previous data.) Scooting: Modified independent(use of bedrails and head of bed elevated) Transfers: 
Sit to Stand: Supervision(Simultaneous filing. User may not have seen previous data.) Functional Transfers Bathroom Mobility: Contact guard assistance Toilet Transfer : Supervision;Stand-by assistance Adaptive Equipment: Walker (comment)(rolling) Bed to Chair: Supervision; Adaptive equipment Balance: 
Sitting: Intact Standing: Intact Standing - Static: Good(Simultaneous filing. User may not have seen previous data.) Standing - Dynamic : Good;Constant support(Simultaneous filing. User may not have seen previous data.) ADL Intervention: 
Educated on management of wound vac, positioning as cross body bag during mobility, demonstrated unplug with increased time and plug back in after mobility with increased time, cue to don glasses. Educated on set up of plug at home near chair and need to go to wall with rolling walker to unplug and move prn. Educated on fall prevention, potential shower chair in walk in shower or transfer tub bench in tub, instructed to sponge bathe until works with home health therapy Feeding Feeding Assistance: Independent Grooming Grooming Assistance: Modified independent Washing Face: Modified independent Washing Hands: Modified independent Brushing Teeth: Modified independent Upper Body Dressing Assistance Dressing Assistance: Modified independent Hospital Gown: Modified independent Lower Body Dressing Assistance Dressing Assistance: Minimum assistance Socks: Minimum assistance Slip on Shoes Without Back: (educated on need to wear shoes with back and flat) Leg Crossed Method Used: No 
 
Toileting Toileting Assistance: Supervision(to void on toilet) Bladder Hygiene: Modified independent Bowel Hygiene: Maximum assistance; Moderate assistance(per wound RN unable to manage ostomy herself) Clothing Management: Supervision/set-up Cognitive Retraining Safety/Judgement: Awareness of environment; Fall prevention;Home safety;Decreased insight into deficits Pain: 
Reports she may be loopy during tx as she had 2 pain pills prior, RN reported she had 2 last dose also Activity Tolerance:  
Fair Please refer to the flowsheet for vital signs taken during this treatment. After treatment patient left:  
Supine in bed Bed alarm/tab alert on Bed in low position Call light within reach RN notified COMMUNICATION/COLLABORATION:  
The patient?s plan of care was discussed with: Physical Therapist and Registered Nurse Miguel Hung OTR/L Time Calculation: 31 mins

## 2022-07-26 NOTE — TELEPHONE ENCOUNTER
This is a non working #. Will have to wait for return call although we do not have labs from this office.

## 2022-07-27 ENCOUNTER — HOSPITAL ENCOUNTER (OUTPATIENT)
Dept: RADIATION THERAPY | Age: 67
Discharge: HOME | End: 2022-07-27
Payer: COMMERCIAL

## 2022-07-27 PROCEDURE — 77386 HC IMRT TRMT DLVR COMPL: CPT

## 2022-07-27 NOTE — TELEPHONE ENCOUNTER
[place] Verified. Called patient wife off mobile phone number listed. She advised Niurka RIVERA, that patient went to have his labs drawn this past Monday at 12:15AM and is aware that they need patient doing these weekly.  (Done at the [facility])  Cecile Sierra

## 2022-07-27 NOTE — TELEPHONE ENCOUNTER
Refaxed standing orders from [facility]. [facility] has orders from [facility] as well and was uncertain if results are going to [facility] only? Patient has only had labs done on 7/25/2022.

## 2022-07-28 ENCOUNTER — HOSPITAL ENCOUNTER (OUTPATIENT)
Dept: RADIATION THERAPY | Age: 67
Discharge: HOME | End: 2022-07-28
Payer: COMMERCIAL

## 2022-07-28 PROCEDURE — 77386 HC IMRT TRMT DLVR COMPL: CPT

## 2022-07-29 ENCOUNTER — HOSPITAL ENCOUNTER (OUTPATIENT)
Dept: RADIATION THERAPY | Age: 67
Discharge: HOME | End: 2022-07-29
Payer: COMMERCIAL

## 2022-07-29 PROCEDURE — 77386 HC IMRT TRMT DLVR COMPL: CPT

## 2022-07-29 NOTE — TELEPHONE ENCOUNTER
[facility] has now faxed [facility] results or blood work repot to the [address] [place] [address] team's fax number: [phone]! Fax confirmation was received back. This was done!   Ruth Ann Garcia

## 2022-07-31 NOTE — PROGRESS NOTES
[facility] at [address], [id number] [place] [address] Road, [place]: [phone]  F: [phone]    Reason for Visit:   Kevin Crane is a 61 y.o. male seen today in office for follow up of Glioblastoma. Treatment History:   Brain MRI 6/2/22: Approximately 5 cm right anterior frontal parenchymal mass without other associated areas of abnormal enhancement in the brain suggests primary brain neoplasm/GBM although metastasis cannot be entirely excluded. 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 Persistent vasogenic edema, regional mass effect and right to left midline shift. Blood product within the resection cavity and mild surrounding diffusion restriction likely postoperative. No significant residual masslike enhancement though close follow-up is recommended  Brain MRI 7/12/22 at [facility]: Significant increase in nodular enhancing tumor surrounding the right frontal resection cavity as compared to baseline postoperative exam June 7, 2022. Findings concerning for early tumor recurrence  Concurrent Temodar (75 mg/m2) + XRT 7/18/22 - Current       History of Present Illness:   Kevin Crane is a 61 y.o. male seen today in office for follow up of GBM. He started concurrent Temodar (75 mg/m2) + XRT on 7/18/22. After concurrent therapy, he will begin the AGILE clinical trial at [facility]. He reports that he feels well overall today. Keppra was discontinued per [facility] but he was re-started on 2 mg Dex BID. Pt was put on lasix per [facility]. Wonders how he is going to get this now. Has visiting nurses at home. Chronic problem prior to brain tumor.    He denies fever, chills, mouth sores, cough, SOB, CP, nausea, vomiting, diarrhea, and constipation. He denies pain today. No fevers/ chills/ chest pain/ SOB/ nausea/ vomiting/diarrhea/ pain      Past Medical History:   Diagnosis Date    Arthritis     GERD (gastroesophageal reflux disease)     Morbid obesity ([address]       Past Surgical History:   Procedure Laterality Date    HX BACK SURGERY      2 laminectomies and fusion    HX ORTHOPAEDIC Right     orif hand    HX SHOULDER ARTHROSCOPY      HX TONSILLECTOMY      as a child      Social History     Tobacco Use    Smoking status: Former     Types: Cigarettes     Quit date: 1981     Years since quittin.1    Smokeless tobacco: Never   Substance Use Topics    Alcohol use: Yes     Comment: occasional      No family history on file. Current Outpatient Medications   Medication Sig    furosemide (LASIX PO) Take  by mouth. 2x a day    temozolomide (TEMODAR) 180 mg capsule 1 Capsule daily. Take 1 capsule by mouth daily for 42 days    ondansetron (ZOFRAN ODT) 4 mg disintegrating tablet Take 1-2 Tablets by mouth every eight (8) hours as needed for Nausea or Nausea or Vomiting.    trimethoprim-sulfamethoxazole (BACTRIM, SEPTRA)  mg per tablet Take 1 Tablet by mouth two (2) times a day. amLODIPine (NORVASC) 10 mg tablet Take 1 Tablet by mouth daily. docusate sodium (COLACE) 100 mg capsule Take 1 Capsule by mouth two (2) times a day for 90 days. gabapentin (NEURONTIN) 300 mg capsule Take 1 Capsule by mouth three (3) times daily. Max Daily Amount: 900 mg.    lisinopriL (PRINIVIL, ZESTRIL) 40 mg tablet Take 1 Tablet by mouth daily. Indications: high blood pressure    tamsulosin (FLOMAX) 0.4 mg capsule Take 1 Capsule by mouth daily. pantoprazole (PROTONIX) 40 mg tablet Take 1 Tablet by mouth Before breakfast and dinner. azelastine (ASTELIN) 137 mcg (0.1 %) nasal spray 2 Sprays by Both Nostrils route two (2) times a day. Use in each nostril as directed     baclofen (LIORESAL) 10 mg tablet Take 10 mg by mouth three (3) times daily. esomeprazole (NEXIUM) 40 mg capsule Take 40 mg by mouth two (2) times a day. fluticasone propionate (FLOVENT HFA) 44 mcg/actuation inhaler Take  by inhalation. levocetirizine (XYZAL) 5 mg tablet Take 5 mg by mouth.    metroNIDAZOLE (METROLOTION) 0.75 % lotion Apply  to affected area two (2) times a day. B.animalis,bifid,infantis,long (Probiotic 4X) 10-15 mg TbEC Take  by mouth.    solifenacin (VESICARE) 10 mg tablet Take 10 mg by mouth daily. budesonide-formoteroL (SYMBICORT) 160-4.5 mcg/actuation HFAA Take 2 Puffs by inhalation two (2) times a day. solriamfetoL (Sunosi) 75 mg tab Take 75 mg by mouth daily. levETIRAcetam (KEPPRA) 500 mg tablet Take 1 Tablet by mouth every twelve (12) hours. No current facility-administered medications for this visit. Allergies   Allergen Reactions    Keflex [Cephalexin] Rash      Review of Systems: A complete review of systems was obtained, negative except as described above and as reported on ROS sheet scanned into system. Physical Exam:     Visit Vitals  /80 (BP 1 Location: Left upper arm, BP Patient Position: Sitting)   Pulse 86   Temp 97.1 °F (36.2 °C)   Ht 5' 5.98\" (1.676 m)   Wt 245 lb (111.1 kg)   SpO2 93%   BMI 39.57 kg/m²       ECOG PS: 1-2  General: No distress  Eyes: Anicteric sclerae  HENT: post surgery scar, wearing a mask  Neck: Supple  Respiratory:  CTAB, normal respiratory effort   CV: Normal rate, regular rhythm on monitor  MS: Ambulatory with cane  Skin: No rashes, ecchymoses, or petechiae. Normal temperature, turgor, and texture. Psych: awake, conversant  Neuro: Alert and oriented    Results:     Lab Results   Component Value Date/Time    WBC 21.6 (H) 06/15/2022 12:23 AM    HGB 13.5 06/15/2022 12:23 AM    HCT 42.9 06/15/2022 12:23 AM    PLATELET 854 19/18/9102 12:23 AM    MCV 96.2 06/15/2022 12:23 AM    ABS.  NEUTROPHILS 16.8 (H) 06/15/2022 12:23 AM     Lab Results   Component Value Date/Time    Sodium 135 (L) 06/15/2022 12:23 AM    Potassium 4.0 06/15/2022 12:23 AM    Chloride 103 06/15/2022 12:23 AM    CO2 28 06/15/2022 12:23 AM    Glucose 131 (H) 06/15/2022 12:23 AM    BUN 33 (H) 06/15/2022 12:23 AM    Creatinine 0.92 06/15/2022 12:23 AM    GFR est AA >60 06/15/2022 12:23 AM    GFR est non-AA >60 06/15/2022 12:23 AM    Calcium 7.9 (L) 06/15/2022 12:23 AM     Lab Results   Component Value Date/Time    Bilirubin, total 0.5 06/02/2022 02:32 PM    ALT (SGPT) 42 06/02/2022 02:32 PM    Alk. phosphatase 71 06/02/2022 02:32 PM    Protein, total 7.5 06/02/2022 02:32 PM    Albumin 3.5 06/02/2022 02:32 PM    Globulin 4.0 06/02/2022 02:32 PM     Brain MRI 6/2/22  FINDINGS:  Enhancing right anterior frontal mass noted as described above. There is central  nonenhancement/necrosis. Minimal blood products. Some associated central  restricted diffusion is nonspecific. The mass measures approximately 4.1 x 5.1 x  3.4 cm. There is surrounding vasogenic edema and mass effect with significant  effacement and displacement of the right ventricle, effacement of adjacent sulci  and right to left midline shift measuring approximately 12 mm at the level of  the septum pellucidum. There are no other foci of abnormal enhancement demonstrated in the brain. There  is slight asymmetric prominence of the left lateral ventricle and temporal horn  with some ependymal asymmetric T2 hyperintensity which may represent developing  hydrocephalus. Flow voids are present in vertebral basilar and carotid artery systems. There is  some vascularity in the tumor which does not appear particularly hypervascular. No abnormal extra-axial fluid collections. No other findings of acute  intracranial hemorrhage. Structures of the skull base including orbits,  paranasal sinuses and temporal bones are unremarkable.      IMPRESSION:  Approximately 5 cm right anterior frontal parenchymal mass without other  associated areas of abnormal enhancement in the brain suggests primary brain  neoplasm/GBM although metastasis cannot be entirely excluded. Significant mass  affect. 6/6/22  FINAL PATHOLOGIC DIAGNOSIS   Brain, right frontal lobe, excision:   Hypercellular glial tissue with necrosis. Pending additional consultation for further characterization. Addendum Diagnosis   The purpose of this addendum is to report the results of outside consultation.   1-3. 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 Further studies are pending. Brain MRI 6/7/22  FINDINGS:    There has been recent right frontal craniotomy with resection of previous large  right frontal lobe mass. There is susceptibility artifact in anterior frontal  region consistent with pneumocephalus. There is also susceptibility artifact  within the right frontal lobe resection cavity with heterogeneous T1 and T2  signal most consistent with blood product. No significant residual masslike  enhancement. Extensive surrounding vasogenic edema with persistent mass effect  upon the lateral ventricles, and approximately 9 mm of midline shift. Small  amount of extra-axial hemorrhage in the right lateral frontal region, as well as  small amount of intraventricular hemorrhage in the occipital horns and fourth  ventricle. No hydrocephalus. Small areas of T2 hyperintensity in the left  centrum semiovale are unchanged. Diffusion restriction around the right frontal  lobe resection cavity. Major intracranial vascular flow voids are patent. Right  frontal scalp edema and fluid. IMPRESSION:  Recent right frontal lobe mass resection, with postoperative changes as  described above. Persistent vasogenic edema, regional mass effect and right to  left midline shift. Blood product within the resection cavity and mild  surrounding diffusion restriction likely postoperative. No significant residual  masslike enhancement though close follow-up is recommended.     CT Results (most recent):  Results from Hospital Encounter encounter on 06/02/22    CT HEAD WO CONT    Narrative  EXAM: CT HEAD WO CONT    INDICATION: s/p crani and tumor resection    COMPARISON: 6/2/2022. CONTRAST: None. TECHNIQUE: Unenhanced CT of the head was performed using 5 mm images. Brain and  bone windows were generated. Coronal and sagittal reformats. CT dose reduction  was achieved through use of a standardized protocol tailored for this  examination and automatic exposure control for dose modulation. FINDINGS:  The patient has undergone right frontal craniotomy in the interval.  Postoperative changes and pneumocephalus are noted following tumor resection. Subfalcine herniation persists but has improved compared to the prior exam. No  unexpected postoperative complication is identified. Impression  Postoperative changes and pneumocephalus following tumor resection. No  unexpected postoperative complication is identified. CT C/A/P 6/3/22  IMPRESSION:  1. No evidence of primary malignancy in the chest, abdomen or pelvis. 2. Incidental findings as above    Brain MRI 7/12/22 at [facility]  IMPRESSION:   Significant increase in nodular enhancing tumor surrounding the right frontal resection cavity as compared to baseline postoperative exam June 7, 2022. Findings concerning for early tumor recurrence. Records reviewed and summarized above. Pathology report(s) reviewed above. Radiology report(s) reviewed above. Assessment/PLAN:     1) Glioblastoma WHO Grade 4  Brain MRI 6/3/22 showed a 5 cm right anterior frontal parenchymal mass without other associated areas of abnormal enhancement in the brain suggests primary brain neoplasm  Post neurosurgery/ RIGHT frontal craniotomy 6/6/22. 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 Reviewed overall difficult prognosis. Discussed clinical trials. Patient will be doing the AGILE Clinical Trial trial at [facility] - records in [facility].   He was randomized into infusion arm of the study and may want a port placed eventually. He is here today for follow up. He is clinically stable today. on Dex per [facility]. He started concurrent Temodar (75 mg/m2) + XRT on 7/18/22. He is tolerating current regiment well overall thus far. In a w/c. Labs (CBC and CMP) from 7/18/22 reviewed today. Creat up and ? Due to increased lasix. Pt needs to fu with PCP for LE edema chronically. If creat still up, would back off lasix if possible. Pt states he is hydrating. Continue concurrent Temodar+XRT. AGILE clinical trial at [facility]. post concurrent therapy. Continue weekly CBC and CMP. Labs will need to be faxed to [facility] Clinical Trial team.  For labs today. Continue treatment. Next MRI brain likely soon but per [facility] trial needs. Follow up in 2 weeks in office. 2) Confusion at Presentation  Much improved. 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 But backing off some. Has support at  of .     3) HTN/ LE edema. Management per PCP. 4) Management of High Risk Medication - Temodar  No toxicities noted thus far except fatigue. Labs (CBC and CMP) from 7/18/22 at [facility] reviewed. No dose adjustments needed. Will monitor for side effects. 5) Psychosocial  Mood good. Coping well. He has good family support - wife and daugher here. 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 Slowing down from work. SW/NN support as needed. Call if questions. Follow up in 2 weeks. I appreciate the opportunity to participate in Mr. Jax garcia.     Signed By: Lela Recinos, DO

## 2022-08-01 ENCOUNTER — OFFICE VISIT (OUTPATIENT)
Dept: ONCOLOGY | Age: 67
End: 2022-08-01
Payer: COMMERCIAL

## 2022-08-01 ENCOUNTER — HOSPITAL ENCOUNTER (OUTPATIENT)
Dept: RADIATION THERAPY | Age: 67
Discharge: HOME | End: 2022-08-01
Payer: COMMERCIAL

## 2022-08-01 VITALS
HEART RATE: 86 BPM | WEIGHT: 245 LBS | DIASTOLIC BLOOD PRESSURE: 80 MMHG | TEMPERATURE: 97.1 F | HEIGHT: 66 IN | BODY MASS INDEX: 39.37 KG/M2 | OXYGEN SATURATION: 93 % | SYSTOLIC BLOOD PRESSURE: 128 MMHG

## 2022-08-01 DIAGNOSIS — C71.9 GLIOBLASTOMA (HCC): Primary | ICD-10-CM

## 2022-08-01 PROCEDURE — 77386 HC IMRT TRMT DLVR COMPL: CPT

## 2022-08-01 PROCEDURE — 99214 OFFICE O/P EST MOD 30 MIN: CPT | Performed by: INTERNAL MEDICINE

## 2022-08-01 PROCEDURE — 77336 RADIATION PHYSICS CONSULT: CPT

## 2022-08-01 RX ORDER — FUROSEMIDE 20 MG/1
20 TABLET ORAL DAILY
COMMUNITY

## 2022-08-01 NOTE — LETTER
8/1/2022 12:26 PM    Patient:  Geoff Baeza   YOB: 1955  Date of Visit: 8/1/2022      Dear   No Recipients: Thank you for referring Mr. Griselda Mcdaniel to me for evaluation/treatment. Below are the relevant portions of my assessment and plan of care. If you have questions, please do not hesitate to call me. I look forward to following Mr. Tia Lisa along with you.         Sincerely,      Jhon Corley, DO

## 2022-08-01 NOTE — PROGRESS NOTES
Tami Escobar is a 61 y.o. male  Chief Complaint   Patient presents with    Follow-up     Glioblastoma. 1. Have you been to the ER, urgent care clinic since your last visit? Hospitalized since your last visit? No    2. Have you seen or consulted any other health care providers outside of the [address] [place] [address] since your last visit? Include any pap smears or colon screening.  No

## 2022-08-01 NOTE — LETTER
8/1/2022    Patient: Good Woody   YOB: 1955   Date of Visit: 8/1/2022     Lane [address] MD  [address] [address]  Via Fax: [phone]    Dear Lane Street MD,      Thank you for referring Mr. Ildefonso Guevara to [address] [place] [address] for evaluation. My notes for this consultation are attached. If you have questions, please do not hesitate to call me. I look forward to following your patient along with you.       Sincerely,    Coty Hodge, DO

## 2022-08-02 ENCOUNTER — HOSPITAL ENCOUNTER (OUTPATIENT)
Dept: RADIATION THERAPY | Age: 67
Discharge: HOME | End: 2022-08-02
Payer: COMMERCIAL

## 2022-08-02 PROCEDURE — 77386 HC IMRT TRMT DLVR COMPL: CPT

## 2022-08-03 ENCOUNTER — HOSPITAL ENCOUNTER (OUTPATIENT)
Dept: RADIATION THERAPY | Age: 67
Discharge: HOME | End: 2022-08-03
Payer: COMMERCIAL

## 2022-08-03 PROCEDURE — 77386 HC IMRT TRMT DLVR COMPL: CPT

## 2022-08-04 ENCOUNTER — HOSPITAL ENCOUNTER (OUTPATIENT)
Dept: RADIATION THERAPY | Age: 67
Discharge: HOME | End: 2022-08-04
Payer: COMMERCIAL

## 2022-08-04 PROCEDURE — 77386 HC IMRT TRMT DLVR COMPL: CPT

## 2022-08-05 ENCOUNTER — HOSPITAL ENCOUNTER (OUTPATIENT)
Dept: RADIATION THERAPY | Age: 67
Discharge: HOME | End: 2022-08-05
Payer: COMMERCIAL

## 2022-08-05 PROCEDURE — 77386 HC IMRT TRMT DLVR COMPL: CPT

## 2022-08-08 ENCOUNTER — HOSPITAL ENCOUNTER (OUTPATIENT)
Dept: RADIATION THERAPY | Age: 67
Discharge: HOME | End: 2022-08-08
Payer: COMMERCIAL

## 2022-08-08 PROCEDURE — 77336 RADIATION PHYSICS CONSULT: CPT

## 2022-08-08 PROCEDURE — 77386 HC IMRT TRMT DLVR COMPL: CPT

## 2022-08-09 ENCOUNTER — HOSPITAL ENCOUNTER (OUTPATIENT)
Dept: RADIATION THERAPY | Age: 67
Discharge: HOME | End: 2022-08-09
Payer: COMMERCIAL

## 2022-08-09 PROCEDURE — 77386 HC IMRT TRMT DLVR COMPL: CPT

## 2022-08-10 ENCOUNTER — HOSPITAL ENCOUNTER (OUTPATIENT)
Dept: RADIATION THERAPY | Age: 67
Discharge: HOME | End: 2022-08-10
Payer: COMMERCIAL

## 2022-08-10 PROCEDURE — 77386 HC IMRT TRMT DLVR COMPL: CPT

## 2022-08-11 ENCOUNTER — HOSPITAL ENCOUNTER (OUTPATIENT)
Dept: RADIATION THERAPY | Age: 67
Discharge: HOME | End: 2022-08-11
Payer: COMMERCIAL

## 2022-08-11 PROCEDURE — 77386 HC IMRT TRMT DLVR COMPL: CPT

## 2022-08-12 ENCOUNTER — HOSPITAL ENCOUNTER (OUTPATIENT)
Dept: RADIATION THERAPY | Age: 67
Discharge: HOME | End: 2022-08-12
Payer: COMMERCIAL

## 2022-08-12 ENCOUNTER — NURSE NAVIGATOR (OUTPATIENT)
Dept: CASE MANAGEMENT | Age: 67
End: 2022-08-12

## 2022-08-12 PROCEDURE — 77386 HC IMRT TRMT DLVR COMPL: CPT

## 2022-08-12 NOTE — NURSE NAVIGATOR
[address] [facility]   Gyn/Onc and Brain Navigator Encounter    Name:    Ronny Ozuna  Age:    61 y.o. Diagnosis: GBM      Encounter type:  []Patient Initiated  []Navigator Follow-up []Pre-op  []Post-op  []Check-in Prior to First Treatment []Treatment Modality Change  [x]1st point of Contact []Referral []Other:       Narrative:      Sent message to [employer], introduced self and role. Followed up with their most recent doctors appointment/surgery to see if they had any questions about that and if they felt comfortable with all the information given. I let them know I am here to support them and to help in any way possible and I will follow them for the duration of their care. Thank you for allowing me to be a part of your care. Yamileth POWERSN, RN.   Gyn Oncology/ Primary Brain Tumor [address].  Office: [phone]  Cell: [phone]  F: [phone]  [email or web address]  Good Help to Those in [facility]

## 2022-08-15 ENCOUNTER — HOSPITAL ENCOUNTER (OUTPATIENT)
Dept: RADIATION THERAPY | Age: 67
Discharge: HOME | End: 2022-08-15
Payer: COMMERCIAL

## 2022-08-15 ENCOUNTER — NURSE NAVIGATOR (OUTPATIENT)
Dept: CASE MANAGEMENT | Age: 67
End: 2022-08-15

## 2022-08-15 PROCEDURE — 77386 HC IMRT TRMT DLVR COMPL: CPT

## 2022-08-15 NOTE — NURSE NAVIGATOR
[address] [place] [address]   Gyn/Onc and Brain Navigator Encounter    Name:    Cara Rachel  Age:    61 y.o. Diagnosis: GBM      Encounter type:  []Patient Initiated  [x]Navigator Follow-up []Pre-op  []Post-op  []Check-in Prior to First Treatment []Treatment Modality Change  []1st point of Contact []Referral []Other:       Narrative:    Spoke with wife Cathrine Dandy and she said they have no questions or needs at this time. They only thing they have been trying to figure out is Russell lost his tablet between the hospital and [facility] transport. I told her I would see if I could find it or figure something out regarding that and if they have any questions or needs moving forward to please reach out. [place] POWERSN, RN.   Gyn Oncology/ Primary Brain Tumor [address] [place] [address], [facility] 22.  Office: [phone]  Cell: [phone]  F: [phone]  [email or web address]  Good Help to Those in [facility]

## 2022-08-16 ENCOUNTER — OFFICE VISIT (OUTPATIENT)
Dept: ONCOLOGY | Age: 67
End: 2022-08-16
Payer: COMMERCIAL

## 2022-08-16 ENCOUNTER — HOSPITAL ENCOUNTER (OUTPATIENT)
Dept: RADIATION THERAPY | Age: 67
Discharge: HOME | End: 2022-08-16
Payer: COMMERCIAL

## 2022-08-16 VITALS
DIASTOLIC BLOOD PRESSURE: 79 MMHG | TEMPERATURE: 97.1 F | SYSTOLIC BLOOD PRESSURE: 129 MMHG | WEIGHT: 257.6 LBS | HEART RATE: 83 BPM | BODY MASS INDEX: 41.4 KG/M2 | HEIGHT: 66 IN | OXYGEN SATURATION: 95 %

## 2022-08-16 DIAGNOSIS — C71.9 GLIOBLASTOMA (HCC): Primary | ICD-10-CM

## 2022-08-16 DIAGNOSIS — Z09 CHEMOTHERAPY FOLLOW-UP EXAMINATION: ICD-10-CM

## 2022-08-16 PROCEDURE — 99214 OFFICE O/P EST MOD 30 MIN: CPT | Performed by: INTERNAL MEDICINE

## 2022-08-16 PROCEDURE — 77336 RADIATION PHYSICS CONSULT: CPT

## 2022-08-16 PROCEDURE — 77386 HC IMRT TRMT DLVR COMPL: CPT

## 2022-08-16 RX ORDER — DEXAMETHASONE 2 MG/1
2 TABLET ORAL 2 TIMES DAILY
Status: ON HOLD | COMMUNITY
Start: 2022-08-13 | End: 2022-10-31 | Stop reason: SDUPTHER

## 2022-08-16 RX ORDER — LANOLIN ALCOHOL/MO/W.PET/CERES
400 CREAM (GRAM) TOPICAL DAILY
COMMUNITY

## 2022-08-16 NOTE — PROGRESS NOTES
Brianna Torres is a 61 y.o. male  Chief Complaint   Patient presents with    Follow-up     Glioblastoma    1. Have you been to the ER, urgent care clinic since your last visit? Hospitalized since your last visit? No    2. Have you seen or consulted any other health care providers outside of the [address] [place] [address] since your last visit? Include any pap smears or colon screening.  No

## 2022-08-16 NOTE — PROGRESS NOTES
[facility] [place] [address] [address], [place]: [phone]  F: [phone]    Reason for Visit:   Jarad Kelly is a 61 y.o. male seen today in office for follow up of Glioblastoma. Treatment History:   Brain MRI 6/2/22: Approximately 5 cm right anterior frontal parenchymal mass without other associated areas of abnormal enhancement in the brain suggests primary brain neoplasm/GBM although metastasis cannot be entirely excluded. 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 Persistent vasogenic edema, regional mass effect and right to left midline shift. Blood product within the resection cavity and mild surrounding diffusion restriction likely postoperative. No significant residual masslike enhancement though close follow-up is recommended  Brain MRI 7/12/22 at [facility]: Significant increase in nodular enhancing tumor surrounding the right frontal resection cavity as compared to baseline postoperative exam June 7, 2022. Findings concerning for early tumor recurrence  Concurrent Temodar (75 mg/m2) + XRT 7/18/22 - Current     History of Present Illness:   Jarad Kelly is a 61 y.o. male seen today in office for follow up of GBM. He started concurrent Temodar (75 mg/m2) + XRT on 7/18/22. After concurrent therapy, he will begin the AGILE clinical trial at [facility]. He reports that he feels well overall today but is fatigued. Keppra was discontinued per [facility]. He is now on 2 mg Dex in the am and 1 mg in the pm. His appetite is good. He denies fever, chills, mouth sores, cough, SOB, CP, nausea, vomiting, diarrhea, and constipation. He denies pain today. CBC and CMP per [facility]. His supportive wife is here today.      Past Medical History: Diagnosis Date    Arthritis     GERD (gastroesophageal reflux disease)     Morbid obesity ([facility] [address]       Past Surgical History:   Procedure Laterality Date    HX BACK SURGERY      2 laminectomies and fusion    HX ORTHOPAEDIC Right     orif hand    HX SHOULDER ARTHROSCOPY      HX TONSILLECTOMY      as a child      Social History     Tobacco Use    Smoking status: Former     Types: Cigarettes     Quit date: 1981     Years since quittin.2    Smokeless tobacco: Never   Substance Use Topics    Alcohol use: Yes     Comment: occasional      History reviewed. No pertinent family history. Current Outpatient Medications   Medication Sig    dexAMETHasone (DECADRON) 2 mg tablet Take 2 mg by mouth two (2) times a day. magnesium oxide (MAG-OX) 400 mg tablet Take 400 mg by mouth in the morning. furosemide (LASIX PO) Take  by mouth. 2x a day    temozolomide (TEMODAR) 180 mg capsule 1 Capsule daily. Take 1 capsule by mouth daily for 42 days    ondansetron (ZOFRAN ODT) 4 mg disintegrating tablet Take 1-2 Tablets by mouth every eight (8) hours as needed for Nausea or Nausea or Vomiting.    trimethoprim-sulfamethoxazole (BACTRIM, SEPTRA)  mg per tablet Take 1 Tablet by mouth two (2) times a day. amLODIPine (NORVASC) 10 mg tablet Take 1 Tablet by mouth daily. docusate sodium (COLACE) 100 mg capsule Take 1 Capsule by mouth two (2) times a day for 90 days. gabapentin (NEURONTIN) 300 mg capsule Take 1 Capsule by mouth three (3) times daily. Max Daily Amount: 900 mg.    lisinopriL (PRINIVIL, ZESTRIL) 40 mg tablet Take 1 Tablet by mouth daily. Indications: high blood pressure    tamsulosin (FLOMAX) 0.4 mg capsule Take 1 Capsule by mouth daily. pantoprazole (PROTONIX) 40 mg tablet Take 1 Tablet by mouth Before breakfast and dinner. azelastine (ASTELIN) 137 mcg (0.1 %) nasal spray 2 Sprays by Both Nostrils route two (2) times a day.  Use in each nostril as directed     baclofen (LIORESAL) 10 mg tablet Take 10 mg by mouth three (3) times daily. esomeprazole (NEXIUM) 40 mg capsule Take 40 mg by mouth two (2) times a day. fluticasone propionate (FLOVENT HFA) 44 mcg/actuation inhaler Take  by inhalation. levocetirizine (XYZAL) 5 mg tablet Take 5 mg by mouth.    metroNIDAZOLE (METROLOTION) 0.75 % lotion Apply  to affected area two (2) times a day. B.animalis,bifid,infantis,long (Probiotic 4X) 10-15 mg TbEC Take  by mouth.    solifenacin (VESICARE) 10 mg tablet Take 10 mg by mouth daily. budesonide-formoteroL (SYMBICORT) 160-4.5 mcg/actuation HFAA Take 2 Puffs by inhalation two (2) times a day. solriamfetoL (Sunosi) 75 mg tab Take 75 mg by mouth daily. No current facility-administered medications for this visit. Allergies   Allergen Reactions    Keflex [Cephalexin] Rash      Review of Systems:   A complete review of systems was obtained, negative except as described above and as reported on ROS sheet scanned into system. Physical Exam:     Visit Vitals  /79 (BP 1 Location: Right arm, BP Patient Position: Sitting)   Pulse 83   Temp 97.1 °F (36.2 °C) (Temporal)   Ht 5' 5.98\" (1.676 m)   Wt 257 lb 9.6 oz (116.8 kg)   SpO2 95%   BMI 41.60 kg/m²     ECOG PS: 1-2  General: No distress  Eyes: Anicteric sclerae  HENT: post surgery scar, wearing a mask  Neck: Supple  Respiratory:  CTAB, normal respiratory effort   CV: Normal rate, regular rhythm on monitor  MS: In wheelchair   Skin: No rashes, ecchymoses, or petechiae. Normal temperature, turgor, and texture. Psych: Awake, conversant  Neuro: Alert and oriented    Results:     Lab Results   Component Value Date/Time    WBC 21.6 (H) 06/15/2022 12:23 AM    HGB 13.5 06/15/2022 12:23 AM    HCT 42.9 06/15/2022 12:23 AM    PLATELET 296 07/30/1229 12:23 AM    MCV 96.2 06/15/2022 12:23 AM    ABS.  NEUTROPHILS 16.8 (H) 06/15/2022 12:23 AM     Lab Results   Component Value Date/Time    Sodium 135 (L) 06/15/2022 12:23 AM    Potassium 4.0 06/15/2022 12:23 AM Chloride 103 06/15/2022 12:23 AM    CO2 28 06/15/2022 12:23 AM    Glucose 131 (H) 06/15/2022 12:23 AM    BUN 33 (H) 06/15/2022 12:23 AM    Creatinine 0.92 06/15/2022 12:23 AM    GFR est AA >60 06/15/2022 12:23 AM    GFR est non-AA >60 06/15/2022 12:23 AM    Calcium 7.9 (L) 06/15/2022 12:23 AM     Lab Results   Component Value Date/Time    Bilirubin, total 0.5 06/02/2022 02:32 PM    ALT (SGPT) 42 06/02/2022 02:32 PM    Alk. phosphatase 71 06/02/2022 02:32 PM    Protein, total 7.5 06/02/2022 02:32 PM    Albumin 3.5 06/02/2022 02:32 PM    Globulin 4.0 06/02/2022 02:32 PM     Brain MRI 6/2/22  FINDINGS:  Enhancing right anterior frontal mass noted as described above. There is central  nonenhancement/necrosis. Minimal blood products. Some associated central  restricted diffusion is nonspecific. The mass measures approximately 4.1 x 5.1 x  3.4 cm. There is surrounding vasogenic edema and mass effect with significant  effacement and displacement of the right ventricle, effacement of adjacent sulci  and right to left midline shift measuring approximately 12 mm at the level of  the septum pellucidum. There are no other foci of abnormal enhancement demonstrated in the brain. There  is slight asymmetric prominence of the left lateral ventricle and temporal horn  with some ependymal asymmetric T2 hyperintensity which may represent developing  hydrocephalus. Flow voids are present in vertebral basilar and carotid artery systems. There is  some vascularity in the tumor which does not appear particularly hypervascular. No abnormal extra-axial fluid collections. No other findings of acute  intracranial hemorrhage. Structures of the skull base including orbits,  paranasal sinuses and temporal bones are unremarkable.      IMPRESSION:  Approximately 5 cm right anterior frontal parenchymal mass without other  associated areas of abnormal enhancement in the brain suggests primary brain  neoplasm/GBM although metastasis cannot be entirely excluded. Significant mass  affect. 6/6/22  FINAL PATHOLOGIC DIAGNOSIS   Brain, right frontal lobe, excision:   Hypercellular glial tissue with necrosis. Pending additional consultation for further characterization. Addendum Diagnosis   The purpose of this addendum is to report the results of outside consultation.   1-3. 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 Further studies are pending. Brain MRI 6/7/22  FINDINGS:    There has been recent right frontal craniotomy with resection of previous large  right frontal lobe mass. There is susceptibility artifact in anterior frontal  region consistent with pneumocephalus. There is also susceptibility artifact  within the right frontal lobe resection cavity with heterogeneous T1 and T2  signal most consistent with blood product. No significant residual masslike  enhancement. Extensive surrounding vasogenic edema with persistent mass effect  upon the lateral ventricles, and approximately 9 mm of midline shift. Small  amount of extra-axial hemorrhage in the right lateral frontal region, as well as  small amount of intraventricular hemorrhage in the occipital horns and fourth  ventricle. No hydrocephalus. Small areas of T2 hyperintensity in the left  centrum semiovale are unchanged. Diffusion restriction around the right frontal  lobe resection cavity. Major intracranial vascular flow voids are patent. Right  frontal scalp edema and fluid. IMPRESSION:  Recent right frontal lobe mass resection, with postoperative changes as  described above. Persistent vasogenic edema, regional mass effect and right to  left midline shift. Blood product within the resection cavity and mild  surrounding diffusion restriction likely postoperative. No significant residual  masslike enhancement though close follow-up is recommended.     CT Results (most recent):  Results from Hospital Encounter encounter on 06/02/22    CT HEAD WO CONT    Narrative  EXAM: CT HEAD WO CONT    INDICATION: s/p crani and tumor resection    COMPARISON: 6/2/2022. CONTRAST: None. TECHNIQUE: Unenhanced CT of the head was performed using 5 mm images. Brain and  bone windows were generated. Coronal and sagittal reformats. CT dose reduction  was achieved through use of a standardized protocol tailored for this  examination and automatic exposure control for dose modulation. FINDINGS:  The patient has undergone right frontal craniotomy in the interval.  Postoperative changes and pneumocephalus are noted following tumor resection. Subfalcine herniation persists but has improved compared to the prior exam. No  unexpected postoperative complication is identified. Impression  Postoperative changes and pneumocephalus following tumor resection. No  unexpected postoperative complication is identified. CT C/A/P 6/3/22  IMPRESSION:  1. No evidence of primary malignancy in the chest, abdomen or pelvis. 2. Incidental findings as above    Brain MRI 7/12/22 at [facility]  IMPRESSION:   Significant increase in nodular enhancing tumor surrounding the right frontal resection cavity as compared to baseline postoperative exam June 7, 2022. Findings concerning for early tumor recurrence. Records reviewed and summarized above. Pathology report(s) reviewed above. Radiology report(s) reviewed above. Assessment/PLAN:     1) Glioblastoma WHO Grade 4  Brain MRI 6/3/22 showed a 5 cm right anterior frontal parenchymal mass without other associated areas of abnormal enhancement in the brain suggests primary brain neoplasm  Post neurosurgery/ RIGHT frontal craniotomy 6/6/22. 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 Reviewed overall difficult prognosis. Discussed clinical trials. Patient will be doing the AGILE Clinical Trial trial at [facility] - records in [facility].   He was randomized into infusion arm of the study and may want a port placed eventually. He is here today for 2 week follow up. He started concurrent Temodar (75 mg/m2) + XRT on 7/18/22 and will finish on 9/29/22. He is clinically stable today. He remains on Dex per [facility] 2 mg in AM and 1 mg in PM.  He is tolerating current regimen well overall thus far with some fatigue. Ambulatory with a cane at home and in a w/c today in office. Labs (CBC and CMP) per [facility] reviewed today. Continue concurrent Temodar+XRT. AGILE clinical trial at [facility] post concurrent therapy  Continue weekly CBC and CMP per [facility]. Next MRI Brain is on 925/22 per wife at [facility]. Trial is due to start on 9/16/22 at [facility]. Follow up in 4 weeks in office. 2) Confusion at Presentation  Much improved. 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 Has support at [facility].     3) HTN/LE Edema  Management per PCP. 4) Management of High Risk Medication - Temodar  Toxicities include Grade 1 Fatigue. Labs (CBC and CMP) per [facility] reviewed. No dose adjustments needed. Will monitor for side effects. 5) Psychosocial  Mood good. Coping well. He has good family support - wife and daugher here. 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 Slowing down from work. SW/NN support as needed. Call if questions. Follow up in 2 weeks. I personally saw and evaluated the patient and performed the key components of medical decision making. The history, physical exam, and documentation were performed by Gallo [place] NP. I reviewed and verified the above documentation and modified it as needed. Doing well overall/ here with wife  Tolerating temodar/ XRT well  For AGILE trial at [facility] reviewed  Continue with concurrent therapy  Fu will be at [facility] and us in 4 weeks or as needed. I appreciate the opportunity to participate in Mr. Eric Escamilla care.     Signed By: Alice Schlatter, DO

## 2022-08-16 NOTE — LETTER
8/17/2022    Patient: Kalia Arellano   YOB: 1955   Date of Visit: 8/16/2022     Stacia Kelly MD  [address] [place] [id number]  Via Fax: [phone]    Dear Stacia Kelly MD,      Thank you for referring Mr. Tesfaye Vivas to [address] [place] [address] for evaluation. My notes for this consultation are attached. If you have questions, please do not hesitate to call me. I look forward to following your patient along with you.       Sincerely,    Mishel Hanson, DO

## 2022-08-17 ENCOUNTER — HOSPITAL ENCOUNTER (OUTPATIENT)
Dept: RADIATION THERAPY | Age: 67
Discharge: HOME | End: 2022-08-17
Payer: COMMERCIAL

## 2022-08-17 PROCEDURE — 77386 HC IMRT TRMT DLVR COMPL: CPT

## 2022-08-18 ENCOUNTER — HOSPITAL ENCOUNTER (OUTPATIENT)
Dept: RADIATION THERAPY | Age: 67
Discharge: HOME | End: 2022-08-18
Payer: COMMERCIAL

## 2022-08-18 PROCEDURE — 77386 HC IMRT TRMT DLVR COMPL: CPT

## 2022-08-19 ENCOUNTER — HOSPITAL ENCOUNTER (OUTPATIENT)
Dept: RADIATION THERAPY | Age: 67
Discharge: HOME | End: 2022-08-19
Payer: COMMERCIAL

## 2022-08-19 PROCEDURE — 77386 HC IMRT TRMT DLVR COMPL: CPT

## 2022-08-22 ENCOUNTER — HOSPITAL ENCOUNTER (OUTPATIENT)
Dept: RADIATION THERAPY | Age: 67
Discharge: HOME | End: 2022-08-22
Payer: COMMERCIAL

## 2022-08-22 PROCEDURE — 77386 HC IMRT TRMT DLVR COMPL: CPT

## 2022-08-23 ENCOUNTER — HOSPITAL ENCOUNTER (OUTPATIENT)
Dept: RADIATION THERAPY | Age: 67
Discharge: HOME | End: 2022-08-23
Payer: COMMERCIAL

## 2022-08-23 PROCEDURE — 77336 RADIATION PHYSICS CONSULT: CPT

## 2022-08-23 PROCEDURE — 77386 HC IMRT TRMT DLVR COMPL: CPT

## 2022-08-24 ENCOUNTER — HOSPITAL ENCOUNTER (OUTPATIENT)
Dept: RADIATION THERAPY | Age: 67
Discharge: HOME | End: 2022-08-24
Payer: COMMERCIAL

## 2022-08-24 PROCEDURE — 77386 HC IMRT TRMT DLVR COMPL: CPT

## 2022-08-25 ENCOUNTER — HOSPITAL ENCOUNTER (OUTPATIENT)
Dept: RADIATION THERAPY | Age: 67
Discharge: HOME | End: 2022-08-25
Payer: COMMERCIAL

## 2022-08-25 PROCEDURE — 77386 HC IMRT TRMT DLVR COMPL: CPT

## 2022-08-26 ENCOUNTER — HOSPITAL ENCOUNTER (OUTPATIENT)
Dept: RADIATION THERAPY | Age: 67
Discharge: HOME | End: 2022-08-26
Payer: COMMERCIAL

## 2022-08-26 PROCEDURE — 77386 HC IMRT TRMT DLVR COMPL: CPT

## 2022-08-29 ENCOUNTER — HOSPITAL ENCOUNTER (OUTPATIENT)
Dept: RADIATION THERAPY | Age: 67
Discharge: HOME | End: 2022-08-29
Payer: COMMERCIAL

## 2022-08-29 PROCEDURE — 77336 RADIATION PHYSICS CONSULT: CPT

## 2022-08-29 PROCEDURE — 77386 HC IMRT TRMT DLVR COMPL: CPT

## 2022-09-13 ENCOUNTER — HOSPITAL ENCOUNTER (INPATIENT)
Age: 67
LOS: 3 days | Discharge: HOME | DRG: 164 | End: 2022-09-16
Attending: EMERGENCY MEDICINE | Admitting: FAMILY MEDICINE
Payer: COMMERCIAL

## 2022-09-13 ENCOUNTER — APPOINTMENT (OUTPATIENT)
Dept: GENERAL RADIOLOGY | Age: 67
DRG: 164 | End: 2022-09-13
Attending: EMERGENCY MEDICINE
Payer: COMMERCIAL

## 2022-09-13 ENCOUNTER — APPOINTMENT (OUTPATIENT)
Dept: CT IMAGING | Age: 67
DRG: 164 | End: 2022-09-13
Attending: EMERGENCY MEDICINE
Payer: COMMERCIAL

## 2022-09-13 DIAGNOSIS — E11.65 TYPE 2 DIABETES MELLITUS WITH HYPERGLYCEMIA, WITHOUT LONG-TERM CURRENT USE OF INSULIN (HCC): ICD-10-CM

## 2022-09-13 DIAGNOSIS — I26.09 ACUTE PULMONARY EMBOLISM WITH ACUTE COR PULMONALE, UNSPECIFIED PULMONARY EMBOLISM TYPE (HCC): Primary | ICD-10-CM

## 2022-09-13 PROBLEM — I26.99 PULMONARY EMBOLUS (HCC): Status: ACTIVE | Noted: 2022-09-13

## 2022-09-13 LAB
ALBUMIN SERPL-MCNC: 3.1 G/DL (ref 3.5–5)
ALBUMIN/GLOB SERPL: 0.8 {RATIO} (ref 1.1–2.2)
ALP SERPL-CCNC: 57 U/L (ref 45–117)
ALT SERPL-CCNC: 43 U/L (ref 12–78)
ANION GAP SERPL CALC-SCNC: 6 MMOL/L (ref 5–15)
APTT PPP: 24.6 SEC (ref 22.1–31)
AST SERPL-CCNC: 15 U/L (ref 15–37)
BASOPHILS # BLD: 0 K/UL (ref 0–0.1)
BASOPHILS NFR BLD: 0 % (ref 0–1)
BILIRUB SERPL-MCNC: 0.3 MG/DL (ref 0.2–1)
BNP SERPL-MCNC: 2602 PG/ML
BUN SERPL-MCNC: 25 MG/DL (ref 6–20)
BUN/CREAT SERPL: 27 (ref 12–20)
CALCIUM SERPL-MCNC: 9 MG/DL (ref 8.5–10.1)
CHLORIDE SERPL-SCNC: 108 MMOL/L (ref 97–108)
CO2 SERPL-SCNC: 27 MMOL/L (ref 21–32)
COMMENT, HOLDF: NORMAL
CREAT SERPL-MCNC: 0.93 MG/DL (ref 0.7–1.3)
DIFFERENTIAL METHOD BLD: ABNORMAL
EOSINOPHIL # BLD: 0 K/UL (ref 0–0.4)
EOSINOPHIL NFR BLD: 0 % (ref 0–7)
ERYTHROCYTE [DISTWIDTH] IN BLOOD BY AUTOMATED COUNT: 13 % (ref 11.5–14.5)
GLOBULIN SER CALC-MCNC: 4 G/DL (ref 2–4)
GLUCOSE SERPL-MCNC: 155 MG/DL (ref 65–100)
HCT VFR BLD AUTO: 43.2 % (ref 36.6–50.3)
HGB BLD-MCNC: 14.8 G/DL (ref 12.1–17)
IMM GRANULOCYTES # BLD AUTO: 0.2 K/UL (ref 0–0.04)
IMM GRANULOCYTES NFR BLD AUTO: 2 % (ref 0–0.5)
LYMPHOCYTES # BLD: 0.5 K/UL (ref 0.8–3.5)
LYMPHOCYTES NFR BLD: 7 % (ref 12–49)
MCH RBC QN AUTO: 32.2 PG (ref 26–34)
MCHC RBC AUTO-ENTMCNC: 34.3 G/DL (ref 30–36.5)
MCV RBC AUTO: 93.9 FL (ref 80–99)
MONOCYTES # BLD: 0.5 K/UL (ref 0–1)
MONOCYTES NFR BLD: 7 % (ref 5–13)
NEUTS SEG # BLD: 6.5 K/UL (ref 1.8–8)
NEUTS SEG NFR BLD: 84 % (ref 32–75)
NRBC # BLD: 0 K/UL (ref 0–0.01)
NRBC BLD-RTO: 0 PER 100 WBC
PLATELET # BLD AUTO: 179 K/UL (ref 150–400)
PMV BLD AUTO: 9.9 FL (ref 8.9–12.9)
POTASSIUM SERPL-SCNC: 3.8 MMOL/L (ref 3.5–5.1)
PROT SERPL-MCNC: 7.1 G/DL (ref 6.4–8.2)
RBC # BLD AUTO: 4.6 M/UL (ref 4.1–5.7)
RBC MORPH BLD: ABNORMAL
SAMPLES BEING HELD,HOLD: NORMAL
SODIUM SERPL-SCNC: 141 MMOL/L (ref 136–145)
THERAPEUTIC RANGE,PTTT: NORMAL SECS (ref 58–77)
TROPONIN-HIGH SENSITIVITY: 178 NG/L (ref 0–76)
WBC # BLD AUTO: 7.7 K/UL (ref 4.1–11.1)

## 2022-09-13 PROCEDURE — 71046 X-RAY EXAM CHEST 2 VIEWS: CPT

## 2022-09-13 PROCEDURE — 74011000250 HC RX REV CODE- 250: Performed by: FAMILY MEDICINE

## 2022-09-13 PROCEDURE — 85025 COMPLETE CBC W/AUTO DIFF WBC: CPT

## 2022-09-13 PROCEDURE — 36415 COLL VENOUS BLD VENIPUNCTURE: CPT

## 2022-09-13 PROCEDURE — 80053 COMPREHEN METABOLIC PANEL: CPT

## 2022-09-13 PROCEDURE — 94760 N-INVAS EAR/PLS OXIMETRY 1: CPT

## 2022-09-13 PROCEDURE — 74011250636 HC RX REV CODE- 250/636: Performed by: EMERGENCY MEDICINE

## 2022-09-13 PROCEDURE — 93005 ELECTROCARDIOGRAM TRACING: CPT

## 2022-09-13 PROCEDURE — 83880 ASSAY OF NATRIURETIC PEPTIDE: CPT

## 2022-09-13 PROCEDURE — 85730 THROMBOPLASTIN TIME PARTIAL: CPT

## 2022-09-13 PROCEDURE — 71275 CT ANGIOGRAPHY CHEST: CPT

## 2022-09-13 PROCEDURE — 84484 ASSAY OF TROPONIN QUANT: CPT

## 2022-09-13 PROCEDURE — 99285 EMERGENCY DEPT VISIT HI MDM: CPT

## 2022-09-13 PROCEDURE — 65660000001 HC RM ICU INTERMED STEPDOWN

## 2022-09-13 PROCEDURE — 74011000636 HC RX REV CODE- 636: Performed by: EMERGENCY MEDICINE

## 2022-09-13 RX ORDER — SODIUM CHLORIDE 0.9 % (FLUSH) 0.9 %
5-40 SYRINGE (ML) INJECTION EVERY 8 HOURS
Status: DISCONTINUED | OUTPATIENT
Start: 2022-09-13 | End: 2022-09-16 | Stop reason: HOSPADM

## 2022-09-13 RX ORDER — HEPARIN SODIUM 1000 [USP'U]/ML
80 INJECTION, SOLUTION INTRAVENOUS; SUBCUTANEOUS ONCE
Status: COMPLETED | OUTPATIENT
Start: 2022-09-13 | End: 2022-09-13

## 2022-09-13 RX ORDER — HEPARIN SODIUM 10000 [USP'U]/100ML
12-36 INJECTION, SOLUTION INTRAVENOUS
Status: DISCONTINUED | OUTPATIENT
Start: 2022-09-13 | End: 2022-09-15

## 2022-09-13 RX ORDER — ACETAMINOPHEN 650 MG/1
650 SUPPOSITORY RECTAL
Status: DISCONTINUED | OUTPATIENT
Start: 2022-09-13 | End: 2022-09-16 | Stop reason: HOSPADM

## 2022-09-13 RX ORDER — ACETAMINOPHEN 325 MG/1
650 TABLET ORAL
Status: DISCONTINUED | OUTPATIENT
Start: 2022-09-13 | End: 2022-09-16 | Stop reason: HOSPADM

## 2022-09-13 RX ORDER — ONDANSETRON 4 MG/1
4 TABLET, ORALLY DISINTEGRATING ORAL
Status: DISCONTINUED | OUTPATIENT
Start: 2022-09-13 | End: 2022-09-16 | Stop reason: HOSPADM

## 2022-09-13 RX ORDER — SODIUM CHLORIDE 0.9 % (FLUSH) 0.9 %
5-40 SYRINGE (ML) INJECTION AS NEEDED
Status: DISCONTINUED | OUTPATIENT
Start: 2022-09-13 | End: 2022-09-16 | Stop reason: HOSPADM

## 2022-09-13 RX ORDER — ONDANSETRON 2 MG/ML
4 INJECTION INTRAMUSCULAR; INTRAVENOUS
Status: DISCONTINUED | OUTPATIENT
Start: 2022-09-13 | End: 2022-09-16 | Stop reason: HOSPADM

## 2022-09-13 RX ADMIN — IOPAMIDOL 80 ML: 755 INJECTION, SOLUTION INTRAVENOUS at 19:44

## 2022-09-13 RX ADMIN — Medication 10 ML: at 22:00

## 2022-09-13 RX ADMIN — HEPARIN SODIUM AND DEXTROSE 12 UNITS/KG/HR: 10000; 5 INJECTION INTRAVENOUS at 21:13

## 2022-09-13 RX ADMIN — HEPARIN SODIUM 9500 UNITS: 1000 INJECTION INTRAVENOUS; SUBCUTANEOUS at 21:11

## 2022-09-13 NOTE — ED PROVIDER NOTES
The history is provided by the patient and a caregiver. Shortness of Breath  This is a new problem. The problem occurs continuously. The current episode started more than 1 week ago. The problem has been gradually worsening. Associated symptoms include cough, wheezing and leg swelling. Pertinent negatives include no fever, no headaches, no coryza, no rhinorrhea, no sore throat, no swollen glands, no ear pain, no neck pain, no sputum production, no hemoptysis, no PND, no orthopnea, no chest pain, no syncope, no vomiting, no abdominal pain, no rash, no leg pain and no claudication. He has tried leukotriene antagonists for the symptoms. The treatment provided no relief. Associated medical issues do not include asthma, COPD, pneumonia, chronic lung disease, PE, CAD, heart failure, past MI or DVT. Past Medical History:   Diagnosis Date    Arthritis     GERD (gastroesophageal reflux disease)     Morbid obesity ([address]        Past Surgical History:   Procedure Laterality Date    HX BACK SURGERY      2 laminectomies and fusion    HX ORTHOPAEDIC Right     orif hand    HX SHOULDER ARTHROSCOPY      HX TONSILLECTOMY      as a child         No family history on file.     Social History     Socioeconomic History    Marital status:      Spouse name: Not on file    Number of children: Not on file    Years of education: Not on file    Highest education level: Not on file   Occupational History    Not on file   Tobacco Use    Smoking status: Former     Types: Cigarettes     Quit date: 1981     Years since quittin.3    Smokeless tobacco: Never   Vaping Use    Vaping Use: Never used   Substance and Sexual Activity    Alcohol use: Yes     Comment: occasional    Drug use: Never    Sexual activity: Not on file   Other Topics Concern    Not on file   Social History Narrative    Not on file     Social Determinants of Health     Financial Resource Strain: Not on file   Food Insecurity: Not on file Transportation Needs: Not on file   Physical Activity: Not on file   Stress: Not on file   Social Connections: Not on file   Intimate Partner Violence: Not on file   Housing Stability: Not on file         ALLERGIES: Keflex [cephalexin]    Review of Systems   Constitutional:  Negative for activity change, chills and fever. HENT:  Negative for ear pain, nosebleeds, rhinorrhea, sore throat, trouble swallowing and voice change. Eyes:  Negative for visual disturbance. Respiratory:  Positive for cough, shortness of breath and wheezing. Negative for hemoptysis and sputum production. Cardiovascular:  Positive for leg swelling. Negative for chest pain, palpitations, orthopnea, claudication, syncope and PND. Gastrointestinal:  Negative for abdominal pain, constipation, diarrhea, nausea and vomiting. Genitourinary:  Negative for difficulty urinating, dysuria, hematuria and urgency. Musculoskeletal:  Negative for back pain, neck pain and neck stiffness. Skin:  Negative for color change and rash. Allergic/Immunologic: Negative for immunocompromised state. Neurological:  Negative for dizziness, seizures, syncope, weakness, light-headedness, numbness and headaches. Psychiatric/Behavioral:  Negative for behavioral problems, confusion, hallucinations, self-injury and suicidal ideas. Vitals:    09/13/22 1700   BP: (!) 139/90   Pulse: (!) 113   Resp: 20   Temp: 98 °F (36.7 °C)   SpO2: 95%            Physical Exam  Vitals and nursing note reviewed. Constitutional:       General: He is not in acute distress. Appearance: He is well-developed. He is not diaphoretic. HENT:      Head: Normocephalic and atraumatic. Eyes:      Pupils: Pupils are equal, round, and reactive to light. Cardiovascular:      Rate and Rhythm: Regular rhythm. Tachycardia present. Heart sounds: Normal heart sounds. No murmur heard. No friction rub. No gallop.    Pulmonary:      Effort: Pulmonary effort is normal. No respiratory distress. Breath sounds: Normal breath sounds. Decreased air movement present. No wheezing. Abdominal:      General: Bowel sounds are normal. There is no distension. Palpations: Abdomen is soft. Tenderness: There is no abdominal tenderness. There is no guarding or rebound. Musculoskeletal:         General: Normal range of motion. Cervical back: Normal range of motion and neck supple. Right lower le+ Pitting Edema present. Left lower le+ Pitting Edema present. Skin:     General: Skin is warm. Findings: No rash. Neurological:      Mental Status: He is alert and oriented to person, place, and time. Psychiatric:         Behavior: Behavior normal.         Thought Content: Thought content normal.         Judgment: Judgment normal.        MDM    Is a 82-year-old male with past medical history, review of systems, physical exam as above, presenting with complaints of shortness of breath. I was contacted by the patient's oncology service at [facility] with concern for pulmonary embolism. Patient is undergoing treatment, under experimental trial for glioblastoma that was diagnosed in . Patient states leg swelling since his surgery in , and shortness of breath over the course of the last month. He endorses a history of previous tobacco use, states he was evaluated by an allergist who initially diagnosed with asthma, and then changed the diagnosis to say he did not have asthma. He endorses recent cardiac work-up including chemical stress and echo, that was unremarkable. He denies a history of coronary disease or congestive heart failure. He states he uses a daily controller for his previous diagnosis of asthma, but no rescue inhalers. Physical exam is remarkable for a dyspneic appearing elderly male, in no acute distress noted to be mildly hypertensive, afebrile mildly tachycardic, satting well on room air.   He has some diminished breath sounds to auscultation, without wheezes, rales or rhonchi, 2+ pitting edema in the bilateral lower extremities. Differential includes PE, pneumonia, DVT, new onset CHF. Plan to obtain CMP, CBC, EKG, cardiac enzymes, BNP and CTA of the chest.  We will reassess, and make a disposition. Procedures    Perfect Serve Consult for Admission  8:36 PM    ED Room Number: PV61/62  Patient Name and age:  Hollie Jimenez 61 y.o.  male  Working Diagnosis:   1.  Acute pulmonary embolism with acute cor pulmonale, unspecified pulmonary embolism type ([address]        COVID-19 Suspicion:  no  Sepsis present:  no  Reassessment needed: yes  Code Status:  Full Code  Readmission: no  Isolation Requirements:  no  Recommended Level of Care:  step down  Department:[facility] Adult ED - 21   Other:  d/w Dr. Cristobal Valadez

## 2022-09-14 ENCOUNTER — APPOINTMENT (OUTPATIENT)
Dept: NON INVASIVE DIAGNOSTICS | Age: 67
DRG: 164 | End: 2022-09-14
Attending: EMERGENCY MEDICINE
Payer: COMMERCIAL

## 2022-09-14 ENCOUNTER — APPOINTMENT (OUTPATIENT)
Dept: INTERVENTIONAL RADIOLOGY/VASCULAR | Age: 67
DRG: 164 | End: 2022-09-14
Attending: ANESTHESIOLOGY
Payer: COMMERCIAL

## 2022-09-14 ENCOUNTER — APPOINTMENT (OUTPATIENT)
Dept: VASCULAR SURGERY | Age: 67
DRG: 164 | End: 2022-09-14
Attending: FAMILY MEDICINE
Payer: COMMERCIAL

## 2022-09-14 LAB
ACT BLD: 121 SECS (ref 79–138)
ACT BLD: 237 SECS (ref 79–138)
ANION GAP SERPL CALC-SCNC: 4 MMOL/L (ref 5–15)
APTT PPP: 27 SEC (ref 22.1–31)
APTT PPP: 80.2 SEC (ref 22.1–31)
APTT PPP: >130 SEC (ref 22.1–31)
ATRIAL RATE: 113 BPM
BASOPHILS # BLD: 0.1 K/UL (ref 0–0.1)
BASOPHILS NFR BLD: 1 % (ref 0–1)
BUN SERPL-MCNC: 24 MG/DL (ref 6–20)
BUN/CREAT SERPL: 24 (ref 12–20)
CALCIUM SERPL-MCNC: 8.6 MG/DL (ref 8.5–10.1)
CALCULATED P AXIS, ECG09: 48 DEGREES
CALCULATED R AXIS, ECG10: -27 DEGREES
CALCULATED T AXIS, ECG11: 29 DEGREES
CHLORIDE SERPL-SCNC: 110 MMOL/L (ref 97–108)
CO2 SERPL-SCNC: 28 MMOL/L (ref 21–32)
COMMENT, HOLDF: NORMAL
CREAT SERPL-MCNC: 1.01 MG/DL (ref 0.7–1.3)
DIAGNOSIS, 93000: NORMAL
DIFFERENTIAL METHOD BLD: ABNORMAL
ECHO AO ROOT DIAM: 3.3 CM
ECHO AO ROOT INDEX: 1.49 CM/M2
ECHO AV AREA PEAK VELOCITY: 3.4 CM2
ECHO AV AREA PEAK VELOCITY: 3.5 CM2
ECHO AV PEAK GRADIENT: 6 MMHG
ECHO AV PEAK VELOCITY: 1.2 M/S
ECHO EST RA PRESSURE: 3 MMHG
ECHO LV E' LATERAL VELOCITY: 8 CM/S
ECHO LV E' SEPTAL VELOCITY: 7 CM/S
ECHO LV FRACTIONAL SHORTENING: 24 % (ref 28–44)
ECHO LV GLOBAL LONGITUDINAL STRAIN (GLS): -4.4 %
ECHO LV GLOBAL LONGITUDINAL STRAIN (GLS): -4.5 %
ECHO LV GLOBAL LONGITUDINAL STRAIN (GLS): -9.2 %
ECHO LV GLOBAL LONGITUDINAL STRAIN (GLS): 0 %
ECHO LV INTERNAL DIMENSION DIASTOLE INDEX: 1.71 CM/M2
ECHO LV INTERNAL DIMENSION DIASTOLIC: 3.8 CM (ref 4.2–5.9)
ECHO LV INTERNAL DIMENSION SYSTOLIC INDEX: 1.31 CM/M2
ECHO LV INTERNAL DIMENSION SYSTOLIC: 2.9 CM
ECHO LV IVSD: 0.8 CM (ref 0.6–1)
ECHO LV MASS 2D: 93.4 G (ref 88–224)
ECHO LV MASS INDEX 2D: 42.1 G/M2 (ref 49–115)
ECHO LV POSTERIOR WALL DIASTOLIC: 0.9 CM (ref 0.6–1)
ECHO LV RELATIVE WALL THICKNESS RATIO: 0.47
ECHO LVOT AREA: 4.5 CM2
ECHO LVOT DIAM: 2.4 CM
ECHO LVOT MEAN GRADIENT: 2 MMHG
ECHO LVOT PEAK GRADIENT: 3 MMHG
ECHO LVOT PEAK GRADIENT: 3 MMHG
ECHO LVOT PEAK VELOCITY: 0.9 M/S
ECHO LVOT PEAK VELOCITY: 0.9 M/S
ECHO LVOT STROKE VOLUME INDEX: 39.3 ML/M2
ECHO LVOT SV: 87.3 ML
ECHO LVOT VTI: 19.3 CM
ECHO MV A VELOCITY: 0.95 M/S
ECHO MV A VELOCITY: 0.99 M/S
ECHO MV AREA PHT: 5.3 CM2
ECHO MV E DECELERATION TIME (DT): 142.1 MS
ECHO MV E VELOCITY: 0.56 M/S
ECHO MV E VELOCITY: 0.56 M/S
ECHO MV PRESSURE HALF TIME (PHT): 41.2 MS
ECHO MV REGURGITANT PEAK GRADIENT: 13 MMHG
ECHO MV REGURGITANT PEAK VELOCITY: 1.8 M/S
ECHO RIGHT VENTRICULAR SYSTOLIC PRESSURE (RVSP): 13 MMHG
ECHO RV FREE WALL PEAK S': 15 CM/S
ECHO RV TAPSE: 1.9 CM (ref 1.7–?)
ECHO TV REGURGITANT MAX VELOCITY: 1.61 M/S
ECHO TV REGURGITANT PEAK GRADIENT: 11 MMHG
EOSINOPHIL # BLD: 0 K/UL (ref 0–0.4)
EOSINOPHIL NFR BLD: 0 % (ref 0–7)
ERYTHROCYTE [DISTWIDTH] IN BLOOD BY AUTOMATED COUNT: 13.2 % (ref 11.5–14.5)
EST. AVERAGE GLUCOSE BLD GHB EST-MCNC: 148 MG/DL
GLUCOSE BLD STRIP.AUTO-MCNC: 126 MG/DL (ref 65–117)
GLUCOSE BLD STRIP.AUTO-MCNC: 166 MG/DL (ref 65–117)
GLUCOSE SERPL-MCNC: 214 MG/DL (ref 65–100)
HBA1C MFR BLD: 6.8 % (ref 4–5.6)
HCT VFR BLD AUTO: 41.3 % (ref 36.6–50.3)
HGB BLD-MCNC: 13.9 G/DL (ref 12.1–17)
IMM GRANULOCYTES # BLD AUTO: 0.1 K/UL (ref 0–0.04)
IMM GRANULOCYTES NFR BLD AUTO: 2 % (ref 0–0.5)
LYMPHOCYTES # BLD: 0.9 K/UL (ref 0.8–3.5)
LYMPHOCYTES NFR BLD: 13 % (ref 12–49)
MCH RBC QN AUTO: 31.9 PG (ref 26–34)
MCHC RBC AUTO-ENTMCNC: 33.7 G/DL (ref 30–36.5)
MCV RBC AUTO: 94.7 FL (ref 80–99)
MONOCYTES # BLD: 0.5 K/UL (ref 0–1)
MONOCYTES NFR BLD: 7 % (ref 5–13)
NEUTS SEG # BLD: 5.4 K/UL (ref 1.8–8)
NEUTS SEG NFR BLD: 77 % (ref 32–75)
NRBC # BLD: 0 K/UL (ref 0–0.01)
NRBC BLD-RTO: 0 PER 100 WBC
P-R INTERVAL, ECG05: 148 MS
PLATELET # BLD AUTO: 181 K/UL (ref 150–400)
PMV BLD AUTO: 10.3 FL (ref 8.9–12.9)
POTASSIUM SERPL-SCNC: 3.6 MMOL/L (ref 3.5–5.1)
Q-T INTERVAL, ECG07: 338 MS
QRS DURATION, ECG06: 84 MS
QTC CALCULATION (BEZET), ECG08: 463 MS
RBC # BLD AUTO: 4.36 M/UL (ref 4.1–5.7)
RBC MORPH BLD: ABNORMAL
SAMPLES BEING HELD,HOLD: NORMAL
SERVICE CMNT-IMP: ABNORMAL
SERVICE CMNT-IMP: ABNORMAL
SODIUM SERPL-SCNC: 142 MMOL/L (ref 136–145)
THERAPEUTIC RANGE,PTTT: ABNORMAL SECS (ref 58–77)
THERAPEUTIC RANGE,PTTT: ABNORMAL SECS (ref 58–77)
THERAPEUTIC RANGE,PTTT: NORMAL SECS (ref 58–77)
TROPONIN-HIGH SENSITIVITY: 116 NG/L (ref 0–76)
VENTRICULAR RATE, ECG03: 113 BPM
WBC # BLD AUTO: 7 K/UL (ref 4.1–11.1)

## 2022-09-14 PROCEDURE — 74011250636 HC RX REV CODE- 250/636: Performed by: STUDENT IN AN ORGANIZED HEALTH CARE EDUCATION/TRAINING PROGRAM

## 2022-09-14 PROCEDURE — 85025 COMPLETE CBC W/AUTO DIFF WBC: CPT

## 2022-09-14 PROCEDURE — 93970 EXTREMITY STUDY: CPT

## 2022-09-14 PROCEDURE — 2709999900 HC NON-CHARGEABLE SUPPLY

## 2022-09-14 PROCEDURE — 76937 US GUIDE VASCULAR ACCESS: CPT

## 2022-09-14 PROCEDURE — 93356 MYOCRD STRAIN IMG SPCKL TRCK: CPT

## 2022-09-14 PROCEDURE — B31S1ZZ FLUOROSCOPY OF RIGHT PULMONARY ARTERY USING LOW OSMOLAR CONTRAST: ICD-10-PCS | Performed by: STUDENT IN AN ORGANIZED HEALTH CARE EDUCATION/TRAINING PROGRAM

## 2022-09-14 PROCEDURE — C1894 INTRO/SHEATH, NON-LASER: HCPCS

## 2022-09-14 PROCEDURE — 74011250636 HC RX REV CODE- 250/636: Performed by: FAMILY MEDICINE

## 2022-09-14 PROCEDURE — 84484 ASSAY OF TROPONIN QUANT: CPT

## 2022-09-14 PROCEDURE — 74011000250 HC RX REV CODE- 250: Performed by: HOSPITALIST

## 2022-09-14 PROCEDURE — 77030004558 HC CATH ANGI DX SUPR TORQ CARD -A

## 2022-09-14 PROCEDURE — 74011250636 HC RX REV CODE- 250/636: Performed by: EMERGENCY MEDICINE

## 2022-09-14 PROCEDURE — 80048 BASIC METABOLIC PNL TOTAL CA: CPT

## 2022-09-14 PROCEDURE — C1769 GUIDE WIRE: HCPCS

## 2022-09-14 PROCEDURE — 74011000636 HC RX REV CODE- 636: Performed by: STUDENT IN AN ORGANIZED HEALTH CARE EDUCATION/TRAINING PROGRAM

## 2022-09-14 PROCEDURE — 74011250636 HC RX REV CODE- 250/636

## 2022-09-14 PROCEDURE — 65660000001 HC RM ICU INTERMED STEPDOWN

## 2022-09-14 PROCEDURE — C1887 CATHETER, GUIDING: HCPCS

## 2022-09-14 PROCEDURE — 74011250636 HC RX REV CODE- 250/636: Performed by: HOSPITALIST

## 2022-09-14 PROCEDURE — 85730 THROMBOPLASTIN TIME PARTIAL: CPT

## 2022-09-14 PROCEDURE — 74011000250 HC RX REV CODE- 250: Performed by: FAMILY MEDICINE

## 2022-09-14 PROCEDURE — 74011250637 HC RX REV CODE- 250/637: Performed by: FAMILY MEDICINE

## 2022-09-14 PROCEDURE — B31T1ZZ FLUOROSCOPY OF LEFT PULMONARY ARTERY USING LOW OSMOLAR CONTRAST: ICD-10-PCS | Performed by: STUDENT IN AN ORGANIZED HEALTH CARE EDUCATION/TRAINING PROGRAM

## 2022-09-14 PROCEDURE — 82962 GLUCOSE BLOOD TEST: CPT

## 2022-09-14 PROCEDURE — 77030009378 HC DEV TORQ OLCT F/GWIRE MANIP COOK -A

## 2022-09-14 PROCEDURE — 36415 COLL VENOUS BLD VENIPUNCTURE: CPT

## 2022-09-14 PROCEDURE — 02CP3ZZ EXTIRPATION OF MATTER FROM PULMONARY TRUNK, PERCUTANEOUS APPROACH: ICD-10-PCS | Performed by: STUDENT IN AN ORGANIZED HEALTH CARE EDUCATION/TRAINING PROGRAM

## 2022-09-14 PROCEDURE — 83036 HEMOGLOBIN GLYCOSYLATED A1C: CPT

## 2022-09-14 PROCEDURE — 85347 COAGULATION TIME ACTIVATED: CPT

## 2022-09-14 PROCEDURE — 94760 N-INVAS EAR/PLS OXIMETRY 1: CPT

## 2022-09-14 PROCEDURE — 74011000250 HC RX REV CODE- 250: Performed by: STUDENT IN AN ORGANIZED HEALTH CARE EDUCATION/TRAINING PROGRAM

## 2022-09-14 RX ORDER — NALOXONE HYDROCHLORIDE 0.4 MG/ML
0.4 INJECTION, SOLUTION INTRAMUSCULAR; INTRAVENOUS; SUBCUTANEOUS
Status: ACTIVE | OUTPATIENT
Start: 2022-09-14 | End: 2022-09-14

## 2022-09-14 RX ORDER — SODIUM CHLORIDE 9 MG/ML
25 INJECTION, SOLUTION INTRAVENOUS
Status: COMPLETED | OUTPATIENT
Start: 2022-09-14 | End: 2022-09-14

## 2022-09-14 RX ORDER — INSULIN LISPRO 100 [IU]/ML
INJECTION, SOLUTION INTRAVENOUS; SUBCUTANEOUS
Status: DISCONTINUED | OUTPATIENT
Start: 2022-09-14 | End: 2022-09-16 | Stop reason: HOSPADM

## 2022-09-14 RX ORDER — LIDOCAINE HYDROCHLORIDE 20 MG/ML
20 INJECTION, SOLUTION INFILTRATION; PERINEURAL
Status: COMPLETED | OUTPATIENT
Start: 2022-09-14 | End: 2022-09-14

## 2022-09-14 RX ORDER — KETOROLAC TROMETHAMINE 30 MG/ML
30 INJECTION, SOLUTION INTRAMUSCULAR; INTRAVENOUS
Status: COMPLETED | OUTPATIENT
Start: 2022-09-14 | End: 2022-09-14

## 2022-09-14 RX ORDER — HEPARIN SODIUM 1000 [USP'U]/ML
8000 INJECTION, SOLUTION INTRAVENOUS; SUBCUTANEOUS
Status: COMPLETED | OUTPATIENT
Start: 2022-09-14 | End: 2022-09-14

## 2022-09-14 RX ORDER — MAGNESIUM SULFATE 100 %
4 CRYSTALS MISCELLANEOUS AS NEEDED
Status: DISCONTINUED | OUTPATIENT
Start: 2022-09-14 | End: 2022-09-16 | Stop reason: HOSPADM

## 2022-09-14 RX ORDER — HEPARIN SODIUM 1000 [USP'U]/ML
5000 INJECTION, SOLUTION INTRAVENOUS; SUBCUTANEOUS
Status: COMPLETED | OUTPATIENT
Start: 2022-09-14 | End: 2022-09-14

## 2022-09-14 RX ORDER — FENTANYL CITRATE 50 UG/ML
200 INJECTION, SOLUTION INTRAMUSCULAR; INTRAVENOUS
Status: DISCONTINUED | OUTPATIENT
Start: 2022-09-14 | End: 2022-09-14

## 2022-09-14 RX ADMIN — FENTANYL CITRATE 25 MCG: 50 INJECTION, SOLUTION INTRAMUSCULAR; INTRAVENOUS at 12:22

## 2022-09-14 RX ADMIN — Medication 10 ML: at 14:23

## 2022-09-14 RX ADMIN — LIDOCAINE HYDROCHLORIDE 20 ML: 20 INJECTION, SOLUTION INFILTRATION; PERINEURAL at 13:03

## 2022-09-14 RX ADMIN — Medication 10 ML: at 06:12

## 2022-09-14 RX ADMIN — HEPARIN SODIUM AND DEXTROSE 10 UNITS/KG/HR: 10000; 5 INJECTION INTRAVENOUS at 14:55

## 2022-09-14 RX ADMIN — FENTANYL CITRATE 25 MCG: 50 INJECTION, SOLUTION INTRAMUSCULAR; INTRAVENOUS at 12:44

## 2022-09-14 RX ADMIN — HEPARIN SODIUM 5000 UNITS: 1000 INJECTION INTRAVENOUS; SUBCUTANEOUS at 13:02

## 2022-09-14 RX ADMIN — IOPAMIDOL 148 ML: 612 INJECTION, SOLUTION INTRAVENOUS at 13:42

## 2022-09-14 RX ADMIN — FENTANYL CITRATE 25 MCG: 50 INJECTION, SOLUTION INTRAMUSCULAR; INTRAVENOUS at 12:53

## 2022-09-14 RX ADMIN — HEPARIN SODIUM 8000 UNITS: 1000 INJECTION INTRAVENOUS; SUBCUTANEOUS at 12:32

## 2022-09-14 RX ADMIN — FENTANYL CITRATE 25 MCG: 50 INJECTION, SOLUTION INTRAMUSCULAR; INTRAVENOUS at 12:15

## 2022-09-14 RX ADMIN — KETOROLAC TROMETHAMINE 30 MG: 30 INJECTION, SOLUTION INTRAMUSCULAR; INTRAVENOUS at 03:29

## 2022-09-14 RX ADMIN — SODIUM CHLORIDE 25 ML/HR: 9 INJECTION, SOLUTION INTRAVENOUS at 12:55

## 2022-09-14 RX ADMIN — PERFLUTREN 1 ML: 6.52 INJECTION, SUSPENSION INTRAVENOUS at 10:10

## 2022-09-14 RX ADMIN — ACETAMINOPHEN 650 MG: 325 TABLET, FILM COATED ORAL at 16:09

## 2022-09-14 RX ADMIN — Medication 4000 UNITS: at 12:39

## 2022-09-14 NOTE — PROCEDURES
Interventional Radiology  Procedure Note        9/14/2022 2:57 PM    Patient: Jarad Kelly     Informed consent obtained    Diagnosis: Pulmonary embolism with right heart strain    Procedure(s): Pulmonary angiography  with thrombectomy of the bilateral lower lobes and bilateral main pulmonary arteries, producing a large amount of acute to subacute thrombus  -Prethrombectomy PA MAP 32  -Postthrombectomy PA MAP 27    Specimens removed:  large amount of acute to subacute thrombus    Complications: None    Primary Physician: Renny Bermeo MD    Recommendations: Continue therapeutic anticoagulation.  OK to remove right groin pursestring suture closure device tomorrow    Discharge Disposition: return to floor    Full dictated report to follow    Renny Bermeo MD  Interventional Radiology  [facility]  2:57 PM, 9/14/2022

## 2022-09-14 NOTE — PROGRESS NOTES
TRANSITION OF CARE  RUR--13%  Disposition--Home with family assist.  PT and OT have not yet been ordered. Transport--Family    Patient's primary family contact: spouse Dulce Bolaños    Note: patient and wife had significant concern regarding [employer] bill from last hospitalization with wife stating that Loreta velasquez said that [employer] were not in network. Wife also said that Loreta velasquez is in negotiations with [employer] with regard to this matter. CM suggested that wife contact the [facility] Patient Advocate for assistance. Wife indicated that she will call the Patient Advocate. Care Management Interventions  PCP Verified by CM: Yes  Transition of Care Consult (CM Consult): Other (None)  Physical Therapy Consult: No  Occupational Therapy Consult: No  Speech Therapy Consult: No  Support Systems: Spouse/Significant Other  Confirm Follow Up Transport: Family  The Plan for Transition of Care is Related to the Following Treatment Goals : Home with family assistance.   Discharge Location  Patient Expects to be Discharged to[de-identified] Home with family assistance    Reason for Admission:  Submassive pulmonary emboli                      S/P 9/14/22 mechanical PE thrombectomy           Right heart strain                      Glioblastoma with s/p  resection and radiation           GERD                   RUR Score:             13%           Plan for utilizing home health:      None    PCP: First and Last name:  Maria Dolores Gardner MD   Name of Practice:    Are you a current patient: Yes    Approximate date of last visit: about 4 weeks ago   Can you participate in a virtual visit with your PCP:                     Current Advanced Directive/Advance Care Plan: Full Code    Healthcare Decision Maker:   Click here to complete [address] including selection of the Healthcare Decision Maker Relationship (ie \"Primary\")                         Transition of Care Plan:  CM met with patient and wife with whom he lives. Their son Leticia uV lives with them. They live in a 2 story house with 5 entry steps and 14 interior steps. Patient is ambulatory and expects return to independent functioning and employment. Patient's wife confirmed PCP, health insurance, and prescription coverage. Previous home health :  [address] [place] [address]  July 2022 (after IPR)  Previous IPR/ SNF rehab : [facility] June 2022  DME cane, walker, wheelchair

## 2022-09-14 NOTE — PROGRESS NOTES
TRANSFER - IN REPORT:    Verbal report received from Nicky(name) on Kevin Crane  being received from ED(unit) for routine progression of care      Report consisted of patients Situation, Background, Assessment and   Recommendations(SBAR). Information from the following report(s) SBAR, Kardex, MAR, and Cardiac Rhythm NSR  was reviewed with the receiving nurse. Opportunity for questions and clarification was provided. Assessment completed upon patients arrival to unit and care assumed.

## 2022-09-14 NOTE — CONSULTS
INTERVENTIONAL RADIOLOGY  Consult Note      Patient:  Cara Rachel  :  1955  Age:  61 y.o. MRN:  [id number]    Today's Date:  2022  Admission Date:  2022  Hospital Day:  1  Consult requested by:  Tony Suarez MD      CC / HPI   Cara Rachel is a 61 y.o. male seen in consultation for mechanical PE thrombectomy. The patient has a history of glioblastoma s/p resection and radiation who presented with shortness of breath. He was found to have pulmonary emboli in vessels extending to both the right and left lung. Pulmonary emboli in pulmonary artery and lobar and segmental pulmonary vessels. There is right ventricular strain. Heparin gtt initiated in ED overnight.         PAST MEDICAL HISTORY  Past Medical History:   Diagnosis Date    Arthritis     GERD (gastroesophageal reflux disease)     Morbid obesity ([facility] [address]        PAST SURGICAL HISTORY  Past Surgical History:   Procedure Laterality Date    HX BACK SURGERY      2 laminectomies and fusion    HX ORTHOPAEDIC Right     orif hand    HX SHOULDER ARTHROSCOPY      HX TONSILLECTOMY      as a child       SOCIAL HISTORY  Social History     Socioeconomic History    Marital status:      Spouse name: Not on file    Number of children: Not on file    Years of education: Not on file    Highest education level: Not on file   Occupational History    Not on file   Tobacco Use    Smoking status: Former     Types: Cigarettes     Quit date: 1981     Years since quittin.3    Smokeless tobacco: Never   Vaping Use    Vaping Use: Never used   Substance and Sexual Activity    Alcohol use: Yes     Comment: occasional    Drug use: Never    Sexual activity: Not on file   Other Topics Concern    Not on file   Social History Narrative    Not on file     Social Determinants of Health     Financial Resource Strain: Not on file   Food Insecurity: Not on file   Transportation Needs: Not on file   Physical Activity: Not on file   Stress: Not on file   Social Connections: Not on file   Intimate Partner Violence: Not on file   Housing Stability: Not on file       FAMILY HISTORY  No family history on file.     CURRENT MEDICATIONS  Current Facility-Administered Medications   Medication Dose Route Frequency Provider Last Rate Last Admin    glucose chewable tablet 16 g  4 Tablet Oral PRN Verdene Runner, MD        glucagon (GLUCAGEN) injection 1 mg  1 mg IntraMUSCular PRN Verdene Runner, MD        dextrose 10 % infusion 0-250 mL  0-250 mL IntraVENous PRN Verdene Runner, MD        insulin lispro (HUMALOG) injection   SubCUTAneous TIDAC Verdene Runner, MD        lidocaine (XYLOCAINE) 20 mg/mL (2 %) injection 400 mg  20 mL SubCUTAneous RAD Briana Fernandes MD        iopamidoL (ISOVUE 300) 61 % contrast injection 100 mL  100 mL IntraCATHeter RAD PRN Venita Goetz MD        heparin 25,000 units in D5W 250 ml infusion  12-36 Units/kg/hr IntraVENous TITRATE DiskBrenda dominguez MD 11.9 mL/hr at 09/14/22 0442 10 Units/kg/hr at 09/14/22 0442    sodium chloride (NS) flush 5-40 mL  5-40 mL IntraVENous Q8H Verdene Runner, MD   10 mL at 09/14/22 0612    sodium chloride (NS) flush 5-40 mL  5-40 mL IntraVENous PRN Verdene Runner, MD        acetaminophen (TYLENOL) tablet 650 mg  650 mg Oral Q6H PRN Verdene Runner, MD        Or    acetaminophen (TYLENOL) suppository 650 mg  650 mg Rectal Q6H PRN Verdene Runner, MD        ondansetron (ZOFRAN ODT) tablet 4 mg  4 mg Oral Q8H PRN Verdene Runner, MD        Or    ondansetron [facility]) injection 4 mg  4 mg IntraVENous Q6H PRN Verdene Runner, MD           ALLERGIES  Allergies   Allergen Reactions    Keflex [Cephalexin] Rash       DIAGNOSTIC STUDIES   IMAGING STUDIES  I personally reviewed the following imaging studies:  CT Results (most recent):  Results from Hospital Encounter encounter on 09/13/22    CTA CHEST W OR W WO CONT    Narrative  Clinical history: History of brain tumor, dyspnea  INDICATION:   eval for PE  COMPARISON: 6/3/2022      CONTRAST: 100 ml Isovue 370  TECHNIQUE: CT of the chest with  IV contrast , Isovue-370 is performed. Axial  images from the thoracic inlet to the level of the upper abdomen are obtained. Manual post-processing of the images and coronal reformatting is also performed. CT dose reduction was achieved through use of a standardized protocol tailored  for this examination and automatic exposure control for dose modulation. Multiplanar reformatted imaging was performed. Sagittal and coronal reformatting. 3-D Postprocessing of imaging was performed. 3-D MIP reconstructed images were obtained in the coronal plane. FINDINGS:  There are pulmonary emboli in vessels extending to both the right and left lung. Pulmonary emboli in pulmonary artery and lobar and segmental pulmonary vessels. There is right ventricular strain. There is no significant pericardial effusion. Hepatic steatosis. Coronary vascular calcifications are minimal. There are left  renal hypodensities likely representing simple cysts. There is no pleural or  pericardial effusion. There is no mediastinal, axillary or hilar  lymphadenopathy. The aorta is normal in course and caliber. The peripheral  parenchymal opacities base likely related to infarct. No lytic or blastic  lesions are identified. The remainder of the upper abdomen visualized is  unremarkable. Impression  Moderate to large acute pulmonary emboli. There is right ventricular strain. Hepatic steatosis. There is no aortic aneurysm or dissection. The findings were called to [address]. [address] on 9/13/2022 at 8:08 PM by Dr. Nicolas Martinez.   [id number] Results   Component Value Date/Time    WBC 7.0 09/14/2022 03:13 AM    HGB 13.9 09/14/2022 03:13 AM    HCT 41.3 09/14/2022 03:13 AM    PLATELET 893 44/86/6577 03:13 AM    MCV 94.7 09/14/2022 03:13 AM     Lab Results   Component Value Date/Time    Sodium 142 09/14/2022 03:13 AM    Potassium 3.6 09/14/2022 03:13 AM    Chloride 110 (H) 09/14/2022 03:13 AM    CO2 28 09/14/2022 03:13 AM    Anion gap 4 (L) 09/14/2022 03:13 AM    Glucose 214 (H) 09/14/2022 03:13 AM    BUN 24 (H) 09/14/2022 03:13 AM    Creatinine 1.01 09/14/2022 03:13 AM    BUN/Creatinine ratio 24 (H) 09/14/2022 03:13 AM    GFR est AA >60 09/14/2022 03:13 AM    GFR est non-AA >60 09/14/2022 03:13 AM    Calcium 8.6 09/14/2022 03:13 AM     Lab Results   Component Value Date/Time    INR 1.1 06/04/2022 04:16 AM    Prothrombin time 11.9 (H) 06/04/2022 04:16 AM       REVIEW OF SYSTEMS   (Positive findings are bolded, all others negative)  Constitutional:  Fever, Chills, Night sweats, Unintentional weight loss, Weight gain, Anorexia, Fatigue, Somnolence  Eyes:  Vision loss, Vision change, Eye pain, Redness, Discharge  ENT:  Otalgia, Otorrhea, Hearing loss, Tinnitus, Epistaxis, Rhinorrhea, Sinus Congestion, Sore throat, Oral lesions, Tooth pain, Bleeding gums, Dysphonia, Dysphagia, Neck pain  Cardiovascular:  Chest pain, Palpitations, Dyspnea on exertion, Orthopnea, Paroxysmal nocturnal dyspnea, Leg swelling, Claudication  Respiratory:  Cough, Sputum production, Hemoptysis, Wheezing, Snoring, Shortness of breath  Gastrointestinal:  Nausea, Vomiting, Abdominal pain, Dyspepsia, Diarrhea, Constipation, Hematochezia, Melena, Encopresis  Genitourinary:  Pelvic pain, Dysuria, Frequency, Urgency, Hematuria, Retention, Incontinence, Testicular pain, Scrotal mass / Swelling, Erectile dysfunction, Menorrhagia, Metrorrhagia, Postmenopausal bleeding, Dysmenorrhea, Discharge  Musculoskeletal:  Back pain, Joint pain, Joint swelling, Muscle pain, Muscle spasm  Integumentary:  Rash, Pruritus, Dryness, Discoloration, Non-healing sores / Open wounds, Breast lumps, Mastalgia, Galactorrhea, Alopecia  Neurological:  Headache, Weakness, Paresthesias, Memory loss, Seizures, Dizziness, Syncope, Ataxia, Tremor  Psychiatric:  Anxiety, Depression, Irritability, Insomnia, Paranoia, Hallucinations, Suicidal ideations  Endocrine:  Heat / Cold intolerance, Polydipsia, Polyphagia  Hematologic / Lymphatic:  Lymphadenopathy, Bleeding disorder  Allergic / Immunologic / Infectious:  Urticaria, Seasonal / Environmental allergies, Persistent / Recurrent infection    PHYSICAL EXAM   BP (!) 168/103 (BP 1 Location: Left upper arm, BP Patient Position: Lying)   Pulse 93   Temp 97.6 °F (36.4 °C)   Resp 17   Ht 5' 6\" (1.676 m)   Wt 115.9 kg (255 lb 8.2 oz)   SpO2 96%   BMI 41.24 kg/m²   General:  Calm, cooperative, NAD  HEENT:  NCAT, EOMI, conjunctiva clear, MMM  Heart:  RRR, S1S2 normal  Lungs:  CTAB, NWOB  Abdomen:  Soft, NT, ND  Extremities:  MAEW, no cyanosis or BLE edema (2+)  Skin:  Warm and dry, color normal, no rashes  Neurological:  AAOX3, speech clear and coherent    ASSESSMENT   This is a 61 y.o. male with pulmonary emboli in vessels extending to both the right and left lung. Pulmonary emboli in pulmonary artery and lobar and segmental pulmonary vessels. There is right ventricular strain. Patient is a good candidate for mechanical PE thrombectomy. PLAN   Procedure to be performed:  Mechanical PE thrombectomy with fluoroscopy  Patient to remain NPO  Plan for sedation:  moderate  Post procedure plan:  observation per protocol  Informed consent:  risks, benefits, and alternatives reviewed with the patient / family who agree to proceed  Code status:  Full Code      Case discussed with Sindy Lobato MD.    Thank you for asking us to participate in the care of this patient.       MARIANA Martinez  [facility], Marissa Christianson.      CC:  Cherylene Shipper, MD

## 2022-09-14 NOTE — PROGRESS NOTES
0115 Pt arrived on unit via stretcher and assisted to bed with x1 assistance. Call bell within reach and no needs expressed. Primary Nurse Quang Contreras and Macarena Dela Cruz, RN performed a dual skin assessment on this patient No impairment noted  Daniel score is 21    0300 RN walked in room for rounds and pt stated he was having chest pain which was aching from his shoulder, VSS. MD notified and toradol ordered. Pt notified to tell staff when in pain and he verbalized understanding. 3633 Bedside shift change report given to [facility], RN by Zach Ramos RN. Report included the following information SBAR, Kardex, MAR, Accordion, and Recent Results and Cardiac Rhythm NSR. Problem: Falls - Risk of  Goal: *Absence of Falls  Description: Document Louis Lyons Fall Risk and appropriate interventions in the flowsheet.   Outcome: Progressing Towards Goal  Note: Fall Risk Interventions:  Mobility Interventions: Patient to call before getting OOB, Utilize walker, cane, or other assistive device      Medication Interventions: Evaluate medications/consider consulting pharmacy, Patient to call before getting OOB, Teach patient to arise slowly       Problem: Pulmonary Embolism Care Plan (Adult)  Goal: *Improvement of existing pulmonary embolism  Outcome: Progressing Towards Goal

## 2022-09-14 NOTE — PROGRESS NOTES
The patient and the wife asked me to not see because last time when the patient was admitted, according to [facility]". I told them that I will ask CM to talk to you. Informed CM. [address] [facility]                                                                                          Hospitalist Progress Note  Tina Pollard MD  Answering service: [phone] from in house phone        Date of Service:  2022  NAME:  Anderson Alvarez  :  1955  MRN:  [id number]      Admission Summary:   Anderson Alvarez is a 61 y.o. male with a pmhx GERD, glioblastoma s/p resection and radiation, and pending start of clinical ta, and morbid obesity who presents with shortness of breath, and is being admitted for PE. He endorses LE edema since , and dyspnea for the past month that was evaluated with stress test. Which was negative       In the ED, HR was elevated to 113, and max . Other VSS. Labs showed probnp 2,602, troponin 178. CTA thoarx showed bilateral pulmonary emboli in pulmonary artery and lobar and segmental vessels with right ventricular strain     In the ED, he was started on a heparin gtt. IR was consulted, and recommended heparin gtt with NPO status and echo in the AM.           Interval history / Subjective: Follow PE  S/p thrombectomy  Comfortably laying in bed     Assessment & Plan:     #Submassive Pulmonary Emboili  #Right heart strain  -hemodynamically stable, spo2 95% on RA  -s/p thrombectomy by IR   -Echo unremarkable  -continue heparin gtt  -Appreciate IR     #Glioblastoma  -s/p resection and radiation.   Pending initiation of clinical trial at [facility]   -next brain MRI  at [facility]     #GERD  -not on PPI     Regular diet     Code status: FULL CODE  Prophylaxis: heparin gtt    Plan: Discharge to home in 1-2 days  Care Plan discussed with:   Anticipated Disposition:      Hospital Problems  Date Reviewed: 8/17/2022            Codes Class Noted POA    Pulmonary embolus [facility]) ICD-10-CM: I26.99  ICD-9-CM: 415.19  9/13/2022 Unknown             Review of Systems:   A comprehensive review of systems was negative except for that written in the HPI. Vital Signs:    Last 24hrs VS reviewed since prior progress note. Most recent are:  Visit Vitals  BP (!) 134/99   Pulse 95   Temp 97.8 °F (36.6 °C)   Resp 18   Ht 5' 6\" (1.676 m)   Wt 115.9 kg (255 lb 8.2 oz)   SpO2 96%   BMI 41.24 kg/m²         Intake/Output Summary (Last 24 hours) at 9/14/2022 1624  Last data filed at 9/14/2022 0331  Gross per 24 hour   Intake 90.1 ml   Output --   Net 90.1 ml        Physical Examination:     I had a face to face encounter with this patient and independently examined them on 9/14/2022 as outlined below:          Constitutional:  No acute distress   ENT:  Oral mucosa moist   Resp:  CTA bilaterally. No wheezing/rhonchi/rales. No accessory muscle use. CV:  Regular rhythm, normal rate, no murmurs, gallops, rubs    GI:  Soft, non distended, non tender. normoactive bowel sounds, no hepatosplenomegaly     Musculoskeletal:  No edema, warm, 2+ pulses throughout    Neurologic:  Moves all extremities. AAOx3, CN II-XII reviewed            Data Review:    Review and/or order of clinical lab test      Labs:     Recent Labs     09/14/22 0313 09/13/22 1832   WBC 7.0 7.7   HGB 13.9 14.8   HCT 41.3 43.2    179     Recent Labs     09/14/22 0313 09/13/22 1832    141   K 3.6 3.8   * 108   CO2 28 27   BUN 24* 25*   CREA 1.01 0.93   * 155*   CA 8.6 9.0     Recent Labs     09/13/22 1832   ALT 43   AP 57   TBILI 0.3   TP 7.1   ALB 3.1*   GLOB 4.0     Recent Labs     09/14/22  1014 09/14/22 0313 09/13/22 2045   APTT >130.0* 80.2* 24.6      No results for input(s): FE, TIBC, PSAT, FERR in the last 72 hours.    No results found for: FOL, RBCF   No results for input(s): PH, PCO2, PO2 in the last 72 hours. No results for input(s): CPK, CKNDX, TROIQ in the last 72 hours.     No lab exists for component: CPKMB  No results found for: CHOL, CHOLX, CHLST, CHOLV, HDL, HDLP, LDL, LDLC, DLDLP, TGLX, TRIGL, TRIGP, CHHD, CHHDX  Lab Results   Component Value Date/Time    Glucose (POC) 126 (H) 09/14/2022 08:14 AM     Lab Results   Component Value Date/Time    Color YELLOW/STRAW 06/02/2022 08:19 PM    Appearance CLEAR 06/02/2022 08:19 PM    Specific gravity 1.009 06/02/2022 08:19 PM    pH (UA) 7.5 06/02/2022 08:19 PM    Protein Negative 06/02/2022 08:19 PM    Glucose Negative 06/02/2022 08:19 PM    Ketone Negative 06/02/2022 08:19 PM    Bilirubin Negative 06/02/2022 08:19 PM    Urobilinogen 1.0 06/02/2022 08:19 PM    Nitrites Negative 06/02/2022 08:19 PM    Leukocyte Esterase Negative 06/02/2022 08:19 PM    Epithelial cells FEW 06/02/2022 08:19 PM    Bacteria Negative 06/02/2022 08:19 PM    WBC 0-4 06/02/2022 08:19 PM    RBC 0-5 06/02/2022 08:19 PM         Medications Reviewed:     Current Facility-Administered Medications   Medication Dose Route Frequency    glucose chewable tablet 16 g  4 Tablet Oral PRN    glucagon (GLUCAGEN) injection 1 mg  1 mg IntraMUSCular PRN    dextrose 10 % infusion 0-250 mL  0-250 mL IntraVENous PRN    insulin lispro (HUMALOG) injection   SubCUTAneous TIDAC    iopamidoL (ISOVUE 300) 61 % contrast injection 100 mL  100 mL IntraCATHeter RAD PRN    naloxone (NARCAN) injection 0.4 mg  0.4 mg IntraVENous RAD ONCE    heparin 25,000 units in D5W 250 ml infusion  12-36 Units/kg/hr IntraVENous TITRATE    sodium chloride (NS) flush 5-40 mL  5-40 mL IntraVENous Q8H    sodium chloride (NS) flush 5-40 mL  5-40 mL IntraVENous PRN    acetaminophen (TYLENOL) tablet 650 mg  650 mg Oral Q6H PRN    Or    acetaminophen (TYLENOL) suppository 650 mg  650 mg Rectal Q6H PRN    ondansetron (ZOFRAN ODT) tablet 4 mg  4 mg Oral Q8H PRN    Or    ondansetron (ZOFRAN) injection 4 mg  4 mg IntraVENous Q6H PRN ______________________________________________________________________  EXPECTED LENGTH OF STAY: 2d 14h  ACTUAL LENGTH OF STAY:          Pan Tan MD

## 2022-09-14 NOTE — H&P
[address] [facility] Adult  Hospitalist Group  History and Physical    Date of Service:  9/13/2022  Primary Care Provider: Courtney Oconnor MD  Source of information: The patient and Chart review    Chief Complaint: Shortness of Breath      History of Presenting Illness:   Patt Eaton is a 61 y.o. male with a pmhx GERD, glioblastoma s/p resection and radiation, and pending start of clinical ta, and morbid obesity who presents with shortness of breath, and is being admitted for PE. He endorses LE edema since June, and dyspnea for the past month that was evaluated with stress test. Which was negative      In the ED, HR was elevated to 113, and max . Other VSS. Labs showed probnp 2,602, troponin 178. CTA thoarx showed bilateral pulmonary emboli in pulmonary artery and lobar and segmental vessels with right ventricular strain    In the ED, he was started on a heparin gtt. IR was consulted, and recommended heparin gtt with NPO status and echo in the AM.           REVIEW OF SYSTEMS:  A comprehensive review of systems was negative except for that written in the History of Present Illness. Past Medical History:   Diagnosis Date    Arthritis     GERD (gastroesophageal reflux disease)     Morbid obesity ([address]       Past Surgical History:   Procedure Laterality Date    HX BACK SURGERY      2 laminectomies and fusion    HX ORTHOPAEDIC Right     orif hand    HX SHOULDER ARTHROSCOPY      HX TONSILLECTOMY      as a child     Prior to Admission medications    Medication Sig Start Date End Date Taking? Authorizing Provider   dexAMETHasone (DECADRON) 2 mg tablet Take 2 mg by mouth two (2) times a day. 8/13/22   Provider, Historical   magnesium oxide (MAG-OX) 400 mg tablet Take 400 mg by mouth in the morning. Provider, Historical   furosemide (LASIX PO) Take  by mouth. 2x a day    Provider, Historical   temozolomide (TEMODAR) 180 mg capsule 1 Capsule daily.  Take 1 capsule by mouth daily for 42 days 7/5/22 Lambert Mitcamille, NP   ondansetron (ZOFRAN ODT) 4 mg disintegrating tablet Take 1-2 Tablets by mouth every eight (8) hours as needed for Nausea or Nausea or Vomiting. 7/5/22   Lambert Mitcamille, JADON   trimethoprim-sulfamethoxazole (BACTRIM, SEPTRA)  mg per tablet Take 1 Tablet by mouth two (2) times a day. 7/5/22   Lambert Tim, JADON   amLODIPine (NORVASC) 10 mg tablet Take 1 Tablet by mouth daily. 6/15/22   Varun Morales MD   docusate sodium (COLACE) 100 mg capsule Take 1 Capsule by mouth two (2) times a day for 90 days. 6/14/22 9/12/22  Varun Morales MD   gabapentin (NEURONTIN) 300 mg capsule Take 1 Capsule by mouth three (3) times daily. Max Daily Amount: 900 mg. 6/14/22   Varun Morales MD   lisinopriL (PRINIVIL, ZESTRIL) 40 mg tablet Take 1 Tablet by mouth daily. Indications: high blood pressure 6/15/22   Varun Morales MD   tamsulosin [facility]) 0.4 mg capsule Take 1 Capsule by mouth daily. 6/15/22   Varun Morales MD   pantoprazole (PROTONIX) 40 mg tablet Take 1 Tablet by mouth Before breakfast and dinner. 6/14/22   Varun Morales MD   azelastine (ASTELIN) 137 mcg (0.1 %) nasal spray 2 Sprays by Both Nostrils route two (2) times a day. Use in each nostril as directed     Provider, Historical   baclofen (LIORESAL) 10 mg tablet Take 10 mg by mouth three (3) times daily. Provider, Historical   esomeprazole (NEXIUM) 40 mg capsule Take 40 mg by mouth two (2) times a day. Provider, Historical   fluticasone propionate (FLOVENT HFA) 44 mcg/actuation inhaler Take  by inhalation. Provider, Historical   levocetirizine (XYZAL) 5 mg tablet Take 5 mg by mouth. Provider, Historical   metroNIDAZOLE (METROLOTION) 0.75 % lotion Apply  to affected area two (2) times a day. Provider, Historical   B.animalis,bifid,infantis,long (Probiotic 4X) 10-15 mg TbEC Take  by mouth. Provider, Historical   solifenacin (VESICARE) 10 mg tablet Take 10 mg by mouth daily.     Provider, Historical   budesonide-formoteroL (SYMBICORT) 160-4.5 mcg/actuation HFAA Take 2 Puffs by inhalation two (2) times a day. Provider, Historical   solriamfetoL (Sunosi) 75 mg tab Take 75 mg by mouth daily. Provider, Historical     Allergies   Allergen Reactions    Keflex [Cephalexin] Rash      No family history on file. Social History:  reports that he quit smoking about 41 years ago. His smoking use included cigarettes. He has never used smokeless tobacco. He reports current alcohol use. He reports that he does not use drugs. Family and social history were personally reviewed, all pertinent and relevant details are outlined as above. Objective:   Visit Vitals  BP (!) 170/136   Pulse (!) 107   Temp 98 °F (36.7 °C)   Resp 26   Ht 5' 6\" (1.676 m)   Wt 118.8 kg (262 lb)   SpO2 95%   BMI 42.29 kg/m²      O2 Device: None (Room air)    PHYSICAL EXAM:   General: Alert x oriented x 3, awake, no acute distress, resting in bed, pleasant male, appears to be stated age  HEENT: PEERL, EOMI, moist mucus membranes  Neck: Supple, no JVD, no meningeal signs  Chest: Clear to auscultation bilaterally   CVS: RRR, S1 S2 heard, no murmurs/rubs/gallops  Abd: Soft, non-tender, non-distended, +bowel sounds   Ext: No clubbing, no cyanosis, b/l LE edema  Neuro/Psych: Pleasant mood and affect, CN 2-12 grossly intact, sensory grossly within normal limit, Strength 5/5 in all extremities  Cap refill: Brisk, less than 3 seconds  Pulses: 2+radial pulses  Skin: Warm, dry, without rashes or lesions    Data Review: All diagnostic labs and studies have been reviewed. Abnormal Labs Reviewed   CBC WITH AUTOMATED DIFF - Abnormal; Notable for the following components:       Result Value    NEUTROPHILS 84 (*)     LYMPHOCYTES 7 (*)     IMMATURE GRANULOCYTES 2 (*)     ABS. LYMPHOCYTES 0.5 (*)     ABS. IMM.  GRANS. 0.2 (*)     All other components within normal limits   METABOLIC PANEL, COMPREHENSIVE - Abnormal; Notable for the following components:    Glucose 155 (*)     BUN 25 (*)     BUN/Creatinine ratio 27 (*)     Albumin 3.1 (*)     A-G Ratio 0.8 (*)     All other components within normal limits   NT-PRO BNP - Abnormal; Notable for the following components:    NT pro-BNP 2,602 (*)     All other components within normal limits   TROPONIN-HIGH SENSITIVITY - Abnormal; Notable for the following components:    Troponin-High Sensitivity 178 (*)     All other components within normal limits       All Micro Results       None            IMAGING:   CTA CHEST W OR W WO CONT   Final Result   Moderate to large acute pulmonary emboli. There is right ventricular strain. Hepatic steatosis. There is no aortic aneurysm or dissection. The findings were called to Dr. Tyrone Isabel on 9/13/2022 at 8:08 PM by Dr. Phuong Her. [phone]      XR CHEST PA LAT   Final Result   Mild left basilar atelectasis. ECG/ECHO:    Results for orders placed or performed during the hospital encounter of 09/13/22   EKG, 12 LEAD, INITIAL   Result Value Ref Range    Ventricular Rate 113 BPM    Atrial Rate 113 BPM    P-R Interval 148 ms    QRS Duration 84 ms    Q-T Interval 338 ms    QTC Calculation (Bezet) 463 ms    Calculated P Axis 48 degrees    Calculated R Axis -27 degrees    Calculated T Axis 29 degrees    Diagnosis       Sinus tachycardia  Anterolateral infarct , age undetermined  Abnormal ECG  When compared with ECG of 05-JUN-2022 17:45,  Vent. rate has increased BY  40 BPM  Anterolateral infarct is now present          Assessment:   Given the patient's current clinical presentation, there is a high level of concern for decompensation if discharged from the emergency department. Complex decision making was performed, which includes reviewing the patient's available past medical records, laboratory results, and imaging studies.     Active Problems:    Pulmonary embolus ([address] (9/13/2022)      Plan:     #Submassive Pulmonary Emboili  #Right heart strain  -hemodynamically stable, spo2 95% on RA  -continue heparin gtt  -NPO for poss thrombectomy in the AM  -echo in the AM (ordered as stat, but can be done in the AM)  -will check b/l LE venous duplex to see if LE edema 2/2 dvt    #Glioblastoma  -s/p resection and radiation. [facility]    #GERD  -not on PPI    DIET: DIET NPO  ADULT DIET Regular; 5 carb choices (75 gm/meal); Low Fat/Low Chol/High Fiber/2 gm Na; Low Sodium (2 gm)   ISOLATION PRECAUTIONS: There are currently no Active Isolations  CODE STATUS: Full Code   DVT PROPHYLAXIS: Heparin  ANTICIPATED DISCHARGE: 24-48 hours. ANTICIPATED DISPOSITION: Home  EMERGENCY CONTACT/SURROGATE DECISION MAKER: Myriamkennedy Jonathan (spouse)    Signed By: Luis Lowery MD     September 13, 2022         Please note that this dictation may have been completed with Dragon, the [employer] voice recognition software. Quite often unanticipated grammatical, syntax, homophones, and other interpretive errors are inadvertently transcribed by the computer software. Please disregard these errors. Please excuse any errors that have escaped final proofreading.

## 2022-09-14 NOTE — H&P
Radiology History and Physical    Patient: Augustine Machuca 61 y.o. male       Chief Complaint: Shortness of Breath      History of Present Illness: 61year old male who had onset of wheezing several weeks ago and shortness of breath. History of GBM. History:    Past Medical History:   Diagnosis Date    Arthritis     GERD (gastroesophageal reflux disease)     Morbid obesity ([facility]      No family history on file. Social History     Socioeconomic History    Marital status:      Spouse name: Not on file    Number of children: Not on file    Years of education: Not on file    Highest education level: Not on file   Occupational History    Not on file   Tobacco Use    Smoking status: Former     Types: Cigarettes     Quit date: 1981     Years since quittin.3    Smokeless tobacco: Never   Vaping Use    Vaping Use: Never used   Substance and Sexual Activity    Alcohol use: Yes     Comment: occasional    Drug use: Never    Sexual activity: Not on file   Other Topics Concern    Not on file   Social History Narrative    Not on file     Social Determinants of Health     Financial Resource Strain: Not on file   Food Insecurity: Not on file   Transportation Needs: Not on file   Physical Activity: Not on file   Stress: Not on file   Social Connections: Not on file   Intimate Partner Violence: Not on file   Housing Stability: Not on file       Allergies:    Allergies   Allergen Reactions    Keflex [Cephalexin] Rash       Current Medications:  Current Facility-Administered Medications   Medication Dose Route Frequency    glucose chewable tablet 16 g  4 Tablet Oral PRN    glucagon (GLUCAGEN) injection 1 mg  1 mg IntraMUSCular PRN    dextrose 10 % infusion 0-250 mL  0-250 mL IntraVENous PRN    insulin lispro (HUMALOG) injection   SubCUTAneous TIDAC    iopamidoL (ISOVUE 300) 61 % contrast injection 100 mL  100 mL IntraCATHeter RAD PRN    naloxone (NARCAN) injection 0.4 mg  0.4 mg IntraVENous RAD ONCE    heparin 25,000 units in D5W 250 ml infusion  12-36 Units/kg/hr IntraVENous TITRATE    sodium chloride (NS) flush 5-40 mL  5-40 mL IntraVENous Q8H    sodium chloride (NS) flush 5-40 mL  5-40 mL IntraVENous PRN    acetaminophen (TYLENOL) tablet 650 mg  650 mg Oral Q6H PRN    Or    acetaminophen (TYLENOL) suppository 650 mg  650 mg Rectal Q6H PRN    ondansetron (ZOFRAN ODT) tablet 4 mg  4 mg Oral Q8H PRN    Or    ondansetron (ZOFRAN) injection 4 mg  4 mg IntraVENous Q6H PRN        Physical Exam:  Blood pressure (!) 139/91, pulse 89, temperature 97.7 °F (36.5 °C), resp. rate 14, height 5' 6\" (1.676 m), weight 115.9 kg (255 lb 8.2 oz), SpO2 94 %. GENERAL: alert, cooperative, no distress, appears stated age,   LUNG: Nonlabored respiration on 2L O2 NC  HEART: regular rate and rhythm    Alerts:    Hospital Problems  Date Reviewed: 8/17/2022            Codes Class Noted POA    Pulmonary embolus ([facility] ICD-10-CM: I26.99  ICD-9-CM: 415.19  9/13/2022 Unknown           Laboratory:      Recent Labs     09/14/22  0313   HGB 13.9   HCT 41.3   WBC 7.0      BUN 24*   CREA 1.01   K 3.6         Plan of Care/Planned Procedure:  Risks, benefits, and alternatives reviewed with patient and he agrees to proceed with the procedure. Pulmonary angiography with thrombectomy    Deemed appropriate for moderate sedation with versed and fentanyl.     Mike Child MD  Interventional Radiology  [facility]  2:55 PM, 9/14/2022

## 2022-09-14 NOTE — CONSULTS
INTERVENTIONAL RADIOLOGY CONSULT NOTE        Consulted for acute pulmonary embolism. CTA imaging review which demonstrates moderate bilateral acute pulmonary emboli with at least mild right heart strain. Cardiac enzymes are elevated. Patient is borderline hypertensive and tachycardic on 2 L oxygen nasal cannula. ASSESSMENT AND PLAN: Intermediate risk submassive pulmonary embolism. Recommend starting a heparin drip and admitting to a telemetry floor or ICU. Keep the patient NPO after midnight tonight. Please obtain a stat echocardiogram when possible. Patient will be evaluated tomorrow morning for possible catheter directed thrombectomy. If the echocardiogram demonstrates at least moderate right heart strain or the patient has not significantly improved on systemic anticoagulation, intervention will be considered. If the patient clinically worsens tonight for overnight, we can perform endovascular intervention and thrombectomy at the time. Please call with any questions. Plan discussed with Dr. Shi Suero.          Fior Salazar MD   [phone]

## 2022-09-14 NOTE — PROGRESS NOTES
Patient not available. Spoke with wife. Will follow up as necessary. No need at this time.    Marlon Nuñez, [facility], [facility] Provider

## 2022-09-14 NOTE — PROGRESS NOTES
1053: TRANSFER - IN REPORT:    Verbal report received from Marissa BRYANT (name) on Geoff Baeza  being received from [facility] (unit) for ordered procedure      Report consisted of patients Situation, Background, Assessment and   Recommendations(SBAR). Information from the following report(s) SBAR was reviewed with the receiving nurse. Opportunity for questions and clarification was provided. Assessment completed upon patients arrival to unit and care assumed. 1320: Wife updated on procedure. 1350 TRANSFER - OUT REPORT:    Verbal report given to Marissa BRYANT (name) on Geoff Baeza  being transferred to [facility] (unit) for routine post - op       Report consisted of patients Situation, Background, Assessment and   Recommendations(SBAR). Information from the following report(s) SBAR was reviewed with the receiving nurse. Bedrest orders. Neurovascular checks with vital signs. Lines:   Peripheral IV 09/13/22 Right (Active)   Site Assessment Clean, dry, & intact 09/14/22 0800   Phlebitis Assessment 0 09/14/22 0800   Infiltration Assessment 0 09/14/22 0800   Dressing Status Clean, dry, & intact 09/14/22 0800   Dressing Type Transparent;Tape 09/14/22 0800   Hub Color/Line Status Pink; Infusing 09/14/22 0800   Action Taken Open ports on tubing capped 09/14/22 0800   Alcohol Cap Used Yes 09/14/22 0800        Opportunity for questions and clarification was provided.       Patient transported with:   Monitor  O2 @ 2L liters  Registered Nurse

## 2022-09-14 NOTE — DIABETES MGMT
[facility] SPECIALIST CONSULT     Initial Presentation   Augustine Machuca is a 61 y.o. male who presented to the ED 9/13/22 with a c/o shortness of breathe and bilateral lower leg swelling. CTA in the ED was significant for moderate bilateral acute pulmonary emboli with at least mild right heart strain. Labs were significant for proBNP 2602, trop 178. He was started on a heparin gtt and admitted to unit. HX:   Past Medical History:   Diagnosis Date    Arthritis     GERD (gastroesophageal reflux disease)     Morbid obesity (HCC)     BRAYDON  Glioblastoma  Spinal Stenosis    INITIAL DX:   Pulmonary embolus ([address] [I26.99]     Current Treatment     TX: Heparin    Consulted by  Marta Sifuentes MD   for advanced diabetes nursing assessment and care for:   [x] Home management assessment    Hospital Course   Clinical progress has been uncomplicated.    9/13: Admission, Heparin gtt, ECHO  9/14: Mechanical thrombectomy with fluoroscopy   Diabetes History   Diagnosed with Steroid induced diabetes 2 wks ago    Diabetes Medication History  Metformin 500mg BID    Diabetes self-management practices:   Eating pattern   [x] Eating a carbohydrate-controlled mealplan  [x] Breakfast Bagel/cream cheese or egg sandwich or cereal with milk  [x] Lunch  [place] or sandwich or sushi  [x] Dinner  Chicken, veggies  [x] Bedtime Every once in a while, something sweet  [x] Snacks  Limited  [x] Beverages Hot/Iced tea with equal, coffee, Diet soda  Physical activity pattern   Wheelchair   Progressive decline in mobility  Monitoring pattern   Getting labs drawn weekly at [facility]  Taking medications pattern  [x] Consistent administration  [x] Affordable  Social determinants of health impacting diabetes self-management practices   Concerned that you need to know more about how to stay healthy with diabetes  Overall evaluation:    [] Achieving A1c target with drug therapy & self-care practices    Subjective   I was told I had mild elevations in glucose 2-3 weeks ago and they started me on metformin.      Currently receiving care through the [place] for glioblastoma (Dx ) with experimental trial.  S/P craniotomy in  (tumor resection) and radiation. Previous tobacco smoker     Several months of slowed cognition and mobility. Long steroid taper since . Most recently on Decadron 2mg daily  Objective   Physical exam  General Obese white male in no acute distress/ill-appearing. Conversant and cooperative  Neuro  Alert, oriented   Vital Signs Visit Vitals  BP (!) 168/103 (BP 1 Location: Left upper arm, BP Patient Position: Lying)   Pulse 93   Temp 97.6 °F (36.4 °C)   Resp 17   Ht 5' 6\" (1.676 m)   Wt 115.9 kg (255 lb 8.2 oz)   SpO2 96%   BMI 41.24 kg/m²     Skin  Warm and dry. No acanthosis noted along neckline. No lipohypertrophy or lipoatrophy noted at injection sites   Heart   Regular rate and rhythm.  No murmurs, rubs or gallops  Lungs  Clear to auscultation without rales or rhonchi  Extremities No foot wounds       Laboratory  Recent Labs     22  0313 22  1832   * 155*   AGAP 4* 6   WBC 7.0 7.7   CREA 1.01 0.93   GFRNA >60 >60   AST  --  15   ALT  --  43       Blood glucose pattern      Significant diabetes-related events over the past 24-72 hours  A1C 6.8%   Fasting B  Pre-prandial:   Basal: None   Bolus: None  Correction: None      Assessment and Plan   Nursing Diagnosis Risk for unstable blood glucose pattern   Nursing Intervention Domain 7673 Decision-making Support   Nursing Interventions Examined current inpatient diabetes/blood glucose control   Explored factors facilitating and impeding inpatient management  Explored corrective strategies with patient and responsible inpatient provider   Informed patient of rational for insulin strategy while hospitalized     Nursing Diagnosis 87281 Ineffective Health Management   Nursing Intervention Domain 12 Decision-makingSupport   Nursing Interventions Identified diabetes self-management practices impeding diabetes control  Discussed diabetes survival skills related to  Healthy Plate eating plan; given handouts  Role of physical activity in improving insulin sensitivity and action  Procedure for blood glucose monitoring & options for low-cost products available from [employer]   Medications plan at discharge     Evaluation   Bryan Lovell is a 61year old gentleman, with steroid induced diabetes, who was admitted with bilateral pulmonary emboli with right heart strain who is s/p pulmonary angiography with thrombectomy. He was diagnosed with a Glioblastoma this past June and subsequently started on high dose prednisone along with chemotherapy. Prednisone dose has been tapered over the last 3 months now on decadron 2mg daily. His oncology started him on metformin 500mg BID 2 wks ago when A1C was 6.8%. Glucose this admission has ranged from 113-214 without the use of antihyperglycemic agents. Recommendations   POC glucose ACHS    Consistent carbohydrate diet (60 grams CHO/meal)    If BG sustained over 180mg/dl, start the subcutaneous Insulin Order set (1600): Insulin Dosing Specific recommendation   Basal                                      (Based on weight, BMI & GFR) [x]        0.2 units/kg/D      Corrective                                       (Useful in adjusting insulin dosing) [x] Normal sensitivity          Diabetes Management Team to sign off at this point as patient's blood glucose remains stable. Please re-consult us if patient needs change. Thank you for including us in their care. Discharge Recommendations   Will need a FUV with PCP within 1-2 weeks after hospital discharge for ongoing diabetes management     On Discharge, please place an outpatient order for \"diabetes education\" (enter as REF20).   This will trigger a referral for the Program for Diabetes Health which includes outpatient diabetes self management training with a certified diabetes educator. Metformin  mg: Start with 500 mg BID; monitor for GI side effects. Side effects should resolve after 5-7 days. If no GI side effects- advance dose by 1 tablet every 5-7 days to a total dose of 1000 mg BID. Billing Code(s)   [x] 67084     Before making these care recommendations, I personally reviewed the hospitalization record, including notes, laboratory & diagnostic data and current medications, and examined the patient at the bedside (circumstances permitting) before making care recommendations. More than fifty (50) percent of the time was spent in patient counseling and/or care coordination.   Total minutes: 7664 [facility]  Board Certified Advanced Diabetes Manager  Clinical Nurse Specialist  Program for Diabetes Health  Access via [facility]

## 2022-09-15 LAB
ANION GAP SERPL CALC-SCNC: 5 MMOL/L (ref 5–15)
APTT PPP: 34.5 SEC (ref 22.1–31)
BASOPHILS # BLD: 0 K/UL (ref 0–0.1)
BASOPHILS NFR BLD: 0 % (ref 0–1)
BUN SERPL-MCNC: 25 MG/DL (ref 6–20)
BUN/CREAT SERPL: 27 (ref 12–20)
CALCIUM SERPL-MCNC: 8.8 MG/DL (ref 8.5–10.1)
CHLORIDE SERPL-SCNC: 109 MMOL/L (ref 97–108)
CO2 SERPL-SCNC: 27 MMOL/L (ref 21–32)
CREAT SERPL-MCNC: 0.92 MG/DL (ref 0.7–1.3)
DIFFERENTIAL METHOD BLD: ABNORMAL
EOSINOPHIL # BLD: 0 K/UL (ref 0–0.4)
EOSINOPHIL NFR BLD: 0 % (ref 0–7)
ERYTHROCYTE [DISTWIDTH] IN BLOOD BY AUTOMATED COUNT: 13 % (ref 11.5–14.5)
GLUCOSE BLD STRIP.AUTO-MCNC: 237 MG/DL (ref 65–117)
GLUCOSE SERPL-MCNC: 113 MG/DL (ref 65–100)
HCT VFR BLD AUTO: 38.3 % (ref 36.6–50.3)
HGB BLD-MCNC: 12.6 G/DL (ref 12.1–17)
IMM GRANULOCYTES # BLD AUTO: 0 K/UL
IMM GRANULOCYTES NFR BLD AUTO: 0 %
LYMPHOCYTES # BLD: 0.3 K/UL (ref 0.8–3.5)
LYMPHOCYTES NFR BLD: 6 % (ref 12–49)
MCH RBC QN AUTO: 31.7 PG (ref 26–34)
MCHC RBC AUTO-ENTMCNC: 32.9 G/DL (ref 30–36.5)
MCV RBC AUTO: 96.2 FL (ref 80–99)
MONOCYTES # BLD: 0.3 K/UL (ref 0–1)
MONOCYTES NFR BLD: 5 % (ref 5–13)
MYELOCYTES NFR BLD MANUAL: 1 %
NEUTS BAND NFR BLD MANUAL: 1 % (ref 0–6)
NEUTS SEG # BLD: 4.8 K/UL (ref 1.8–8)
NEUTS SEG NFR BLD: 87 % (ref 32–75)
NRBC # BLD: 0 K/UL (ref 0–0.01)
NRBC BLD-RTO: 0 PER 100 WBC
PLATELET # BLD AUTO: 153 K/UL (ref 150–400)
PMV BLD AUTO: 10 FL (ref 8.9–12.9)
POTASSIUM SERPL-SCNC: 3.8 MMOL/L (ref 3.5–5.1)
RBC # BLD AUTO: 3.98 M/UL (ref 4.1–5.7)
RBC MORPH BLD: ABNORMAL
SERVICE CMNT-IMP: ABNORMAL
SODIUM SERPL-SCNC: 141 MMOL/L (ref 136–145)
THERAPEUTIC RANGE,PTTT: ABNORMAL SECS (ref 58–77)
WBC # BLD AUTO: 5.4 K/UL (ref 4.1–11.1)

## 2022-09-15 PROCEDURE — 74011250636 HC RX REV CODE- 250/636: Performed by: EMERGENCY MEDICINE

## 2022-09-15 PROCEDURE — 74011000250 HC RX REV CODE- 250: Performed by: FAMILY MEDICINE

## 2022-09-15 PROCEDURE — 80048 BASIC METABOLIC PNL TOTAL CA: CPT

## 2022-09-15 PROCEDURE — 74011250637 HC RX REV CODE- 250/637: Performed by: HOSPITALIST

## 2022-09-15 PROCEDURE — 74011250636 HC RX REV CODE- 250/636: Performed by: HOSPITALIST

## 2022-09-15 PROCEDURE — 65660000001 HC RM ICU INTERMED STEPDOWN

## 2022-09-15 PROCEDURE — 74011000250 HC RX REV CODE- 250: Performed by: HOSPITALIST

## 2022-09-15 PROCEDURE — 85025 COMPLETE CBC W/AUTO DIFF WBC: CPT

## 2022-09-15 PROCEDURE — 94640 AIRWAY INHALATION TREATMENT: CPT

## 2022-09-15 PROCEDURE — 99233 SBSQ HOSP IP/OBS HIGH 50: CPT | Performed by: CLINICAL NURSE SPECIALIST

## 2022-09-15 PROCEDURE — 85730 THROMBOPLASTIN TIME PARTIAL: CPT

## 2022-09-15 PROCEDURE — 36415 COLL VENOUS BLD VENIPUNCTURE: CPT

## 2022-09-15 PROCEDURE — 82962 GLUCOSE BLOOD TEST: CPT

## 2022-09-15 RX ORDER — HEPARIN SODIUM 1000 [USP'U]/ML
9504 INJECTION, SOLUTION INTRAVENOUS; SUBCUTANEOUS ONCE
Status: COMPLETED | OUTPATIENT
Start: 2022-09-15 | End: 2022-09-15

## 2022-09-15 RX ORDER — AMLODIPINE BESYLATE 5 MG/1
10 TABLET ORAL DAILY
Status: DISCONTINUED | OUTPATIENT
Start: 2022-09-15 | End: 2022-09-16 | Stop reason: HOSPADM

## 2022-09-15 RX ORDER — DEXAMETHASONE 1 MG/1
2 TABLET ORAL DAILY
Status: DISCONTINUED | OUTPATIENT
Start: 2022-09-15 | End: 2022-09-16 | Stop reason: HOSPADM

## 2022-09-15 RX ORDER — GABAPENTIN 300 MG/1
300 CAPSULE ORAL 3 TIMES DAILY
Status: DISCONTINUED | OUTPATIENT
Start: 2022-09-15 | End: 2022-09-16 | Stop reason: HOSPADM

## 2022-09-15 RX ORDER — BACLOFEN 10 MG/1
10 TABLET ORAL 3 TIMES DAILY
Status: DISCONTINUED | OUTPATIENT
Start: 2022-09-15 | End: 2022-09-16 | Stop reason: HOSPADM

## 2022-09-15 RX ORDER — LISINOPRIL 20 MG/1
40 TABLET ORAL DAILY
Status: DISCONTINUED | OUTPATIENT
Start: 2022-09-15 | End: 2022-09-16 | Stop reason: HOSPADM

## 2022-09-15 RX ADMIN — DEXAMETHASONE 2 MG: 1 TABLET ORAL at 18:09

## 2022-09-15 RX ADMIN — BACLOFEN 10 MG: 10 TABLET ORAL at 10:55

## 2022-09-15 RX ADMIN — AMLODIPINE BESYLATE 10 MG: 5 TABLET ORAL at 10:56

## 2022-09-15 RX ADMIN — Medication 10 ML: at 00:16

## 2022-09-15 RX ADMIN — HEPARIN SODIUM AND DEXTROSE 15 UNITS/KG/HR: 10000; 5 INJECTION INTRAVENOUS at 12:56

## 2022-09-15 RX ADMIN — LISINOPRIL 40 MG: 20 TABLET ORAL at 10:56

## 2022-09-15 RX ADMIN — Medication 10 ML: at 18:14

## 2022-09-15 RX ADMIN — BACLOFEN 10 MG: 10 TABLET ORAL at 21:28

## 2022-09-15 RX ADMIN — Medication 10 ML: at 21:28

## 2022-09-15 RX ADMIN — HEPARIN SODIUM 9504 UNITS: 1000 INJECTION INTRAVENOUS; SUBCUTANEOUS at 00:15

## 2022-09-15 RX ADMIN — GABAPENTIN 300 MG: 300 CAPSULE ORAL at 10:56

## 2022-09-15 RX ADMIN — HEPARIN SODIUM 9504 UNITS: 1000 INJECTION INTRAVENOUS; SUBCUTANEOUS at 07:55

## 2022-09-15 RX ADMIN — APIXABAN 10 MG: 5 TABLET, FILM COATED ORAL at 18:09

## 2022-09-15 RX ADMIN — GABAPENTIN 300 MG: 300 CAPSULE ORAL at 18:09

## 2022-09-15 RX ADMIN — GABAPENTIN 300 MG: 300 CAPSULE ORAL at 21:28

## 2022-09-15 RX ADMIN — Medication 10 ML: at 06:24

## 2022-09-15 RX ADMIN — BACLOFEN 10 MG: 10 TABLET ORAL at 18:09

## 2022-09-15 RX ADMIN — ARFORMOTEROL TARTRATE: 15 SOLUTION RESPIRATORY (INHALATION) at 21:18

## 2022-09-15 NOTE — PROGRESS NOTES
Consult received, chart reviewed, and note that pt was diagnosed with a new DVT and is on a heparin drip with most recent aPTT resulting as 34.5 and is subtherapeutic (therapeutic range for rehab services is 58-92). PT will hold, follow and see as able and appropriate.  Thank you, Everton Tran, PT

## 2022-09-15 NOTE — PROGRESS NOTES
Occupational Therapy    Orders acknowledged, chart reviewed. Noted pt diagnosed with DVT and PE s/p thrombectomy on 9/14; pt currently on heparin drip with most recent aPTT resulting as 34.5 and is subtherapeutic (therapeutic range for therapy 58-92). OT to hold at this time. Will follow-up later as able and medically appropriate.     Thanks,    Larina Hammans, MS, OTR/L

## 2022-09-15 NOTE — PROGRESS NOTES
Problem: Falls - Risk of  Goal: *Absence of Falls  Description: Document [place] Reason Fall Risk and appropriate interventions in the flowsheet.   Outcome: Progressing Towards Goal  Note: Fall Risk Interventions:  Mobility Interventions: Communicate number of staff needed for ambulation/transfer         Medication Interventions: Patient to call before getting OOB, Teach patient to arise slowly                   Problem: Patient Education: Go to Patient Education Activity  Goal: Patient/Family Education  Outcome: Progressing Towards Goal     Problem: Patient Education: Go to Patient Education Activity  Goal: Patient/Family Education  Outcome: Progressing Towards Goal

## 2022-09-15 NOTE — PROGRESS NOTES
[address] [facility] Adult  Hospitalist Group                                                                                          Hospitalist Progress Note  Teresa Phillip MD  Answering service: [phone] from in house phone        Date of Service:  9/15/2022  NAME:  Shanice Conrad  :  1955  MRN:  [id number]      Admission Summary:   Shanice Conrad is a 61 y.o. male with a pmhx GERD, glioblastoma s/p resection and radiation, and pending start of clinical ta, and morbid obesity who presents with shortness of breath, and is being admitted for PE. He endorses LE edema since , and dyspnea for the past month that was evaluated with stress test. Which was negative       In the ED, HR was elevated to 113, and max . Other VSS. Labs showed probnp 2,602, troponin 178. CTA thoarx showed bilateral pulmonary emboli in pulmonary artery and lobar and segmental vessels with right ventricular strain     In the ED, he was started on a heparin gtt. IR was consulted, and recommended heparin gtt with NPO status and echo in the AM.           Interval history / Subjective: Follow PE  S/p thrombectomy  Comfortably laying in bed     Assessment & Plan:     #Submassive Pulmonary Emboli  Acute DVT  #Right heart strain  -hemodynamically stable, spo2 95% on RA  -s/p thrombectomy by IR   -Echo unremarkable  -Dopplers positive for Acute DVT  -continue heparin gtt  -Appreciate IR     #Glioblastoma  -s/p resection and radiation.   Pending initiation of clinical trial at [facility]   -next brain MRI  at [facility]    HTN: restart home meds     #GERD  -not on PPI     Regular diet     Code status: FULL CODE  Prophylaxis: heparin gtt    Plan: Discharge to home in 1-2 days  Care Plan discussed with:   Anticipated Disposition:      Hospital Problems  Date Reviewed: 2022            Codes Class Noted POA    Pulmonary embolus ([facility] [address] ICD-10-CM: B98.42  ICD-9-CM: 415.19  2022 Unknown           Review of Systems:   A comprehensive review of systems was negative except for that written in the HPI. Vital Signs:    Last 24hrs VS reviewed since prior progress note. Most recent are:  Visit Vitals  BP (!) 139/99 (BP 1 Location: Left lower arm, BP Patient Position: Lying)   Pulse (!) 104   Temp 98.2 °F (36.8 °C)   Resp 18   Ht 5' 6\" (1.676 m)   Wt 115.8 kg (255 lb 4.7 oz)   SpO2 95%   BMI 41.21 kg/m²         Intake/Output Summary (Last 24 hours) at 9/15/2022 0946  Last data filed at 9/15/2022 0459  Gross per 24 hour   Intake 1504.01 ml   Output 350 ml   Net 1154.01 ml          Physical Examination:     I had a face to face encounter with this patient and independently examined them on 9/15/2022 as outlined below:          Constitutional:  No acute distress   ENT:  Oral mucosa moist   Resp:  CTA bilaterally. No wheezing/rhonchi/rales. No accessory muscle use. CV:  Regular rhythm, normal rate, no murmurs, gallops, rubs    GI:  Soft, non distended, non tender. normoactive bowel sounds, no hepatosplenomegaly     Musculoskeletal:  No edema, warm, 2+ pulses throughout    Neurologic:  Moves all extremities. AAOx3, CN II-XII reviewed            Data Review:    Review and/or order of clinical lab test      Labs:     Recent Labs     09/15/22  [facility] 09/14/22 0313   WBC 5.4 7.0   HGB 12.6 13.9   HCT 38.3 41.3    181       Recent Labs     09/15/22  0622 09/14/22  0313 09/13/22  1832    142 141   K 3.8 3.6 3.8   * 110* 108   CO2 27 28 27   BUN 25* 24* 25*   CREA 0.92 1.01 0.93   * 214* 155*   CA 8.8 8.6 9.0       Recent Labs     09/13/22  1832   ALT 43   AP 57   TBILI 0.3   TP 7.1   ALB 3.1*   GLOB 4.0       Recent Labs     09/15/22  0622 09/14/22  2325 09/14/22  1014   APTT 34.5* 27.0 >130.0*        No results for input(s): FE, TIBC, PSAT, FERR in the last 72 hours. No results found for: FOL, RBCF   No results for input(s): PH, PCO2, PO2 in the last 72 hours.   No results for input(s): CPK, CKNDX, TROIQ in the last 72 hours.     No lab exists for component: CPKMB  No results found for: CHOL, CHOLX, CHLST, CHOLV, HDL, HDLP, LDL, LDLC, DLDLP, TGLX, TRIGL, TRIGP, CHHD, CHHDX  Lab Results   Component Value Date/Time    Glucose (POC) 166 (H) 09/14/2022 05:23 PM    Glucose (POC) 126 (H) 09/14/2022 08:14 AM     Lab Results   Component Value Date/Time    Color YELLOW/STRAW 06/02/2022 08:19 PM    Appearance CLEAR 06/02/2022 08:19 PM    Specific gravity 1.009 06/02/2022 08:19 PM    pH (UA) 7.5 06/02/2022 08:19 PM    Protein Negative 06/02/2022 08:19 PM    Glucose Negative 06/02/2022 08:19 PM    Ketone Negative 06/02/2022 08:19 PM    Bilirubin Negative 06/02/2022 08:19 PM    Urobilinogen 1.0 06/02/2022 08:19 PM    Nitrites Negative 06/02/2022 08:19 PM    Leukocyte Esterase Negative 06/02/2022 08:19 PM    Epithelial cells FEW 06/02/2022 08:19 PM    Bacteria Negative 06/02/2022 08:19 PM    WBC 0-4 06/02/2022 08:19 PM    RBC 0-5 06/02/2022 08:19 PM         Medications Reviewed:     Current Facility-Administered Medications   Medication Dose Route Frequency    glucose chewable tablet 16 g  4 Tablet Oral PRN    glucagon (GLUCAGEN) injection 1 mg  1 mg IntraMUSCular PRN    dextrose 10 % infusion 0-250 mL  0-250 mL IntraVENous PRN    insulin lispro (HUMALOG) injection   SubCUTAneous TIDAC    iopamidoL (ISOVUE 300) 61 % contrast injection 100 mL  100 mL IntraCATHeter RAD PRN    heparin 25,000 units in D5W 250 ml infusion  12-36 Units/kg/hr IntraVENous TITRATE    sodium chloride (NS) flush 5-40 mL  5-40 mL IntraVENous Q8H    sodium chloride (NS) flush 5-40 mL  5-40 mL IntraVENous PRN    acetaminophen (TYLENOL) tablet 650 mg  650 mg Oral Q6H PRN    Or    acetaminophen (TYLENOL) suppository 650 mg  650 mg Rectal Q6H PRN    ondansetron (ZOFRAN ODT) tablet 4 mg  4 mg Oral Q8H PRN    Or    ondansetron (ZOFRAN) injection 4 mg  4 mg IntraVENous Q6H PRN     ______________________________________________________________________  EXPECTED LENGTH OF STAY: 2d 14h  ACTUAL LENGTH OF STAY:          2                 Indu Robison MD

## 2022-09-15 NOTE — PROGRESS NOTES
INTERVENTIONAL RADIOLOGY  Progress Note      Patient:  Solitario Cornelius  :  1955  Age:  61 y.o. MRN:  [id number]    Today's Date:  9/15/2022  Admission Date:  2022  Hospital Day:  2      SUBJECTIVE   Solitario Cornelius is a 61 y.o. male with a history of bilateral pulmonary emboli with right heart strain who is s/p pulmonary angiography  with thrombectomy of the bilateral lower lobes and bilateral main pulmonary arteries, producing a large amount of acute to subacute thrombus. Patient reports feeling better today. He has noticed an improvement in his shortness of breath. He denies chest pain. OBJECTIVE   IMAGING STUDIES  I personally reviewed the following imaging studies:    CT Results (most recent):  Results from Hospital Encounter encounter on 22    CTA CHEST W OR W WO CONT    Narrative  Clinical history: History of brain tumor, dyspnea  INDICATION:   eval for PE  COMPARISON: 6/3/2022      CONTRAST: 100 ml Isovue 370  TECHNIQUE: CT of the chest with  IV contrast , Isovue-370 is performed. Axial  images from the thoracic inlet to the level of the upper abdomen are obtained. Manual post-processing of the images and coronal reformatting is also performed. CT dose reduction was achieved through use of a standardized protocol tailored  for this examination and automatic exposure control for dose modulation. Multiplanar reformatted imaging was performed. Sagittal and coronal reformatting. 3-D Postprocessing of imaging was performed. 3-D MIP reconstructed images were obtained in the coronal plane. FINDINGS:  There are pulmonary emboli in vessels extending to both the right and left lung. Pulmonary emboli in pulmonary artery and lobar and segmental pulmonary vessels. There is right ventricular strain. There is no significant pericardial effusion. Hepatic steatosis. Coronary vascular calcifications are minimal. There are left  renal hypodensities likely representing simple cysts.  There is no pleural or  pericardial effusion. There is no mediastinal, axillary or hilar  lymphadenopathy. The aorta is normal in course and caliber. The peripheral  parenchymal opacities base likely related to infarct. No lytic or blastic  lesions are identified. The remainder of the upper abdomen visualized is  unremarkable. Impression  Moderate to large acute pulmonary emboli. There is right ventricular strain. Hepatic steatosis. There is no aortic aneurysm or dissection. The findings were called to Dr. Gilbert Chen on 9/13/2022 at 8:08 PM by Dr. Dayana Slater. 789    XR Results (most recent):  Results from Hospital Encounter encounter on 09/13/22    XR CHEST PA LAT    Narrative  Indication:  SOB    Exam: PA and lateral views of the chest.    Direct comparison is made to prior CT dated June 2022. Findings: Cardiomediastinal silhouette is within normal limits. There is mild  left basilar atelectasis. Lungs are otherwise clear. Pleural spaces are normal.  Thoracic stabilization hardware overlies the lower thoracic spine and upper  lumbar spine. Impression  Mild left basilar atelectasis.          LABS  Lab Results   Component Value Date/Time    WBC 5.4 09/15/2022 06:22 AM    HGB 12.6 09/15/2022 06:22 AM    HCT 38.3 09/15/2022 06:22 AM    PLATELET 638 57/67/1451 06:22 AM    MCV 96.2 09/15/2022 06:22 AM     Lab Results   Component Value Date/Time    Sodium 141 09/15/2022 06:22 AM    Potassium 3.8 09/15/2022 06:22 AM    Chloride 109 (H) 09/15/2022 06:22 AM    CO2 27 09/15/2022 06:22 AM    Anion gap 5 09/15/2022 06:22 AM    Glucose 113 (H) 09/15/2022 06:22 AM    BUN 25 (H) 09/15/2022 06:22 AM    Creatinine 0.92 09/15/2022 06:22 AM    BUN/Creatinine ratio 27 (H) 09/15/2022 06:22 AM    GFR est AA >60 09/15/2022 06:22 AM    GFR est non-AA >60 09/15/2022 06:22 AM    Calcium 8.8 09/15/2022 06:22 AM     Lab Results   Component Value Date/Time    INR 1.1 06/04/2022 04:16 AM    Prothrombin time 11.9 (H) 06/04/2022 04:16 AM DRAIN OUTPUT  Output by Drain (mL) 09/13/22 0701 - 09/13/22 1900 09/13/22 1901 - 09/14/22 0700 09/14/22 0701 - 09/14/22 1900 09/14/22 1901 - 09/15/22 0700 09/15/22 0701 - 09/15/22 1249   Patient has no LDAs of requested type attached. PHYSICAL EXAM  BP (!) 151/95   Pulse (!) 107   Temp 98.2 °F (36.8 °C)   Resp 18   Ht 5' 6\" (1.676 m)   Wt 115.8 kg (255 lb 4.7 oz)   SpO2 95%   BMI 41.21 kg/m²   General:  NAD  Heart:  RRR  Lungs:  NWOB  Extremities: bilateral pedal edema; moves all  Skin: Right access site is nontender; Mild bruising noted at access site. Flowstasis device with pursestring in tact. Neurological:  AAOX3    ASSESSMENT / PLAN   This is a 61 y.o. male with a history of history of bilateral pulmonary emboli with right heart strain who is s/p pulmonary angiography with thrombectomy of the bilateral lower lobes and bilateral main pulmonary arteries, producing a large amount of acute to subacute thrombus; doing well. Pursestring suture removed in its entirety. Right groin access dry and intact. Sterile dressing applied. Continue medical management. Call IR with any questions or concerns.        Miguelito Baker, [address] [facility]

## 2022-09-15 NOTE — PROGRESS NOTES
Physician Progress Note      PATIENT:               Laurie Walsh  CSN #:                  [id number]  :                       1955  ADMIT DATE:       2022 4:59 PM  [address] DATE:  RESPONDING  PROVIDER #:        SOLITARIO Leija MD          QUERY TEXT:    Pt admitted with  pulmonary embolism with right heart strain . If possible, please document in the progress notes and discharge summary if you are evaluating and / or treating any of the following: The medical record reflects the following:  Risk Factors: PE  Clinical Indicators:  ? H/P  In the ED, HR was elevated to 113, and max . Other VSS. Labs showed probnp 2,602, troponin 178. CTA thoarx showed bilateral pulmonary emboli in pulmonary artery and lobar and segmental vessels with right ventricular strain    Treatment: heparin gtt, IR consult  Thank you,  Umu Bustamante RN, CDI, CRCR, CCDS  Certified Clinical Documentation   [phone]  You can also contact me via Collections provided:  -- Pulmonary Embolism with Acute Cor Pulmonale  -- Pulmonary Embolism without Acute Cor Pulmonale  -- Other - I will add my own diagnosis  -- Disagree - Not applicable / Not valid  -- Disagree - Clinically unable to determine / Unknown  -- Refer to Clinical Documentation Reviewer    PROVIDER RESPONSE TEXT:    This patient has Pulmonary Embolism without acute cor pulmonale. Query created by: Sophie Collins on 2022 11:29 AM      QUERY TEXT:    Patient admitted with BMI 41.24 kg/m 2. If possible, please document in progress notes and discharge summary if you are evaluating and /or treating any of the following:       The medical record reflects the following:  Risk Factors: elevated BMI  Clinical Indicators:  BMI: 41.24 kg/m 2  Ht: 5' 6\" (1.676 m)  Wt: 115.9 kg (255 lb 8.2 oz)    Treatment: NPO    Thank you,  Umu Bustamante RN, CDI, CRCR, CCDS  Certified Clinical Documentation Integrity Specialist  [phone]  You can also contact me via [facility]    ? Specificity of obesity and morbid obesity should be reported based on physician documentation, as there are several published classifications and definitions?  MS-DRG Training Guide. CDC: https://trinity-fuchs.info/. WHO: http://corbin.gennaro/. NIH: LargeFood.be  Options provided:  -- Morbid obesity  with BMI 41.24 kg/m 2  -- Severe obesity  with BMI 41.24 kg/m 2  -- Other - I will add my own diagnosis  -- Disagree - Not applicable / Not valid  -- Disagree - Clinically unable to determine / Unknown  -- Refer to Clinical Documentation Reviewer    PROVIDER RESPONSE TEXT:    This patient has morbid obesity with BMI 41.24 kg/m 2.     Query created by: Kashmir Sánchez on 9/14/2022 11:33 AM      Electronically signed by:  Obdulia Olvera MD 9/15/2022 12:16 PM

## 2022-09-15 NOTE — PROGRESS NOTES
2000 Report received from ,RN. Patient alert and oriented, resting in bed and family present. Pulses good in the right foot and no bleeding or hematoma noted at the procedure site. 2230 Received call from lab for clotted PTT. Labs redrawn. 0400 Midline noted to be infiltrated and edematous, heparin gtt changed to PIV. Pt stated there is some pain when site is touched. Pt didn't want line removed as yet as it was still painful. 2587 Bedside shift change report given to Cynthia Rosa by Malik Elliott RN. Report included the following information SBAR, Kardex, MAR, Accordion, and Recent Results and Cardiac Rhythm NSR. Problem: Falls - Risk of  Goal: *Absence of Falls  Description: Document Sasha Minium Fall Risk and appropriate interventions in the flowsheet. Outcome: Progressing Towards Goal  Note: Fall Risk Interventions:  Mobility Interventions: Communicate number of staff needed for ambulation/transfer, Patient to call before getting OOB    Medication Interventions: Teach patient to arise slowly, Patient to call before getting OOB    Problem: Pulmonary Embolism Care Plan (Adult)  Goal: *Improvement of existing pulmonary embolism  Outcome: Progressing Towards Goal     Problem: Diabetes Self-Management  Goal: *Disease process and treatment process  Description: Define diabetes and identify own type of diabetes; list 3 options for treating diabetes.   Outcome: Progressing Towards Goal

## 2022-09-16 VITALS
DIASTOLIC BLOOD PRESSURE: 78 MMHG | SYSTOLIC BLOOD PRESSURE: 128 MMHG | BODY MASS INDEX: 41.03 KG/M2 | WEIGHT: 255.29 LBS | HEART RATE: 78 BPM | OXYGEN SATURATION: 94 % | RESPIRATION RATE: 16 BRPM | HEIGHT: 66 IN | TEMPERATURE: 97.3 F

## 2022-09-16 LAB
ANION GAP SERPL CALC-SCNC: 3 MMOL/L (ref 5–15)
APTT PPP: 30.1 SEC (ref 22.1–31)
BUN SERPL-MCNC: 20 MG/DL (ref 6–20)
BUN/CREAT SERPL: 23 (ref 12–20)
CALCIUM SERPL-MCNC: 8.8 MG/DL (ref 8.5–10.1)
CHLORIDE SERPL-SCNC: 109 MMOL/L (ref 97–108)
CO2 SERPL-SCNC: 27 MMOL/L (ref 21–32)
CREAT SERPL-MCNC: 0.88 MG/DL (ref 0.7–1.3)
ERYTHROCYTE [DISTWIDTH] IN BLOOD BY AUTOMATED COUNT: 13 % (ref 11.5–14.5)
GLUCOSE BLD STRIP.AUTO-MCNC: 129 MG/DL (ref 65–117)
GLUCOSE BLD STRIP.AUTO-MCNC: 145 MG/DL (ref 65–117)
GLUCOSE SERPL-MCNC: 158 MG/DL (ref 65–100)
HCT VFR BLD AUTO: 37.2 % (ref 36.6–50.3)
HGB BLD-MCNC: 12.4 G/DL (ref 12.1–17)
MCH RBC QN AUTO: 31.6 PG (ref 26–34)
MCHC RBC AUTO-ENTMCNC: 33.3 G/DL (ref 30–36.5)
MCV RBC AUTO: 94.9 FL (ref 80–99)
NRBC # BLD: 0 K/UL (ref 0–0.01)
NRBC BLD-RTO: 0 PER 100 WBC
PLATELET # BLD AUTO: 175 K/UL (ref 150–400)
PMV BLD AUTO: 10.2 FL (ref 8.9–12.9)
POTASSIUM SERPL-SCNC: 4 MMOL/L (ref 3.5–5.1)
RBC # BLD AUTO: 3.92 M/UL (ref 4.1–5.7)
SERVICE CMNT-IMP: ABNORMAL
SERVICE CMNT-IMP: ABNORMAL
SODIUM SERPL-SCNC: 139 MMOL/L (ref 136–145)
THERAPEUTIC RANGE,PTTT: NORMAL SECS (ref 58–77)
WBC # BLD AUTO: 6.3 K/UL (ref 4.1–11.1)

## 2022-09-16 PROCEDURE — 36415 COLL VENOUS BLD VENIPUNCTURE: CPT

## 2022-09-16 PROCEDURE — 80048 BASIC METABOLIC PNL TOTAL CA: CPT

## 2022-09-16 PROCEDURE — 74011000250 HC RX REV CODE- 250: Performed by: HOSPITALIST

## 2022-09-16 PROCEDURE — 74011250637 HC RX REV CODE- 250/637: Performed by: HOSPITALIST

## 2022-09-16 PROCEDURE — 82962 GLUCOSE BLOOD TEST: CPT

## 2022-09-16 PROCEDURE — 94640 AIRWAY INHALATION TREATMENT: CPT

## 2022-09-16 PROCEDURE — 85730 THROMBOPLASTIN TIME PARTIAL: CPT

## 2022-09-16 PROCEDURE — 74011250636 HC RX REV CODE- 250/636: Performed by: HOSPITALIST

## 2022-09-16 PROCEDURE — 85027 COMPLETE CBC AUTOMATED: CPT

## 2022-09-16 RX ADMIN — AMLODIPINE BESYLATE 10 MG: 5 TABLET ORAL at 09:14

## 2022-09-16 RX ADMIN — GABAPENTIN 300 MG: 300 CAPSULE ORAL at 09:14

## 2022-09-16 RX ADMIN — LISINOPRIL 40 MG: 20 TABLET ORAL at 09:14

## 2022-09-16 RX ADMIN — BACLOFEN 10 MG: 10 TABLET ORAL at 09:14

## 2022-09-16 RX ADMIN — ARFORMOTEROL TARTRATE: 15 SOLUTION RESPIRATORY (INHALATION) at 08:05

## 2022-09-16 RX ADMIN — DEXAMETHASONE 2 MG: 1 TABLET ORAL at 09:14

## 2022-09-16 RX ADMIN — APIXABAN 10 MG: 5 TABLET, FILM COATED ORAL at 07:30

## 2022-09-16 NOTE — PROGRESS NOTES
[address] [facility] Adult  Hospitalist Group                                                                                          Hospitalist Progress Note  Bebeto Gomez MD  Answering service: [phone] from in house phone        Date of Service:  2022  NAME:  Patt Eaton  :  1955  MRN:  [id number]      Admission Summary:   Patt Eaton is a 61 y.o. male with a pmhx GERD, glioblastoma s/p resection and radiation, and pending start of clinical ta, and morbid obesity who presents with shortness of breath, and is being admitted for PE. He endorses LE edema since , and dyspnea for the past month that was evaluated with stress test. Which was negative       In the ED, HR was elevated to 113, and max . Other VSS. Labs showed probnp 2,602, troponin 178. CTA thoarx showed bilateral pulmonary emboli in pulmonary artery and lobar and segmental vessels with right ventricular strain     In the ED, he was started on a heparin gtt. IR was consulted, and recommended heparin gtt with NPO status and echo in the AM.           Interval history / Subjective: Follow PE  S/p thrombectomy   Comfortably laying in bed  Had good sleep last night     Assessment & Plan:     #Submassive Pulmonary Emboli  Acute DVT  #Right heart strain  -hemodynamically stable, spo2 95% on RA  -s/p thrombectomy by IR   -Echo unremarkable  -Dopplers positive for Acute DVT  -switched to Eliquis 9/15 (after speaking to the patient's oncologist)  -discharge today     #Glioblastoma  -s/p resection and radiation.   Pending initiation of clinical trial at [facility]   -next brain MRI  at [facility]    HTN: restart home meds     #GERD  -not on PPI     Regular diet     Code status: FULL CODE  Prophylaxis: Eliquis    Plan: Discharge to home today  Care Plan discussed with:   Anticipated Disposition:      Hospital Problems  Date Reviewed: 2022            Codes Class Noted POA    Pulmonary embolus ([address] ICD-10-CM: I26.99  ICD-9-CM: 415.19  9/13/2022 Unknown         Review of Systems:   A comprehensive review of systems was negative except for that written in the HPI. Vital Signs:    Last 24hrs VS reviewed since prior progress note. Most recent are:  Visit Vitals  /86 (BP 1 Location: Right upper arm, BP Patient Position: At rest;Lying)   Pulse 96   Temp 97.8 °F (36.6 °C)   Resp 19   Ht 5' 6\" (1.676 m)   Wt 115.8 kg (255 lb 4.7 oz)   SpO2 94%   BMI 41.21 kg/m²         Intake/Output Summary (Last 24 hours) at 9/16/2022 1145  Last data filed at 9/16/2022 0600  Gross per 24 hour   Intake --   Output 300 ml   Net -300 ml          Physical Examination:     I had a face to face encounter with this patient and independently examined them on 9/16/2022 as outlined below:          Constitutional:  No acute distress   ENT:  Oral mucosa moist   Resp:  CTA bilaterally. No wheezing/rhonchi/rales. No accessory muscle use. CV:  Regular rhythm, normal rate, no murmurs, gallops, rubs    GI:  Soft, non distended, non tender. normoactive bowel sounds, no hepatosplenomegaly     Musculoskeletal:  No edema, warm, 2+ pulses throughout    Neurologic:  Moves all extremities. AAOx3, CN II-XII reviewed            Data Review:    Review and/or order of clinical lab test      Labs:     Recent Labs     09/16/22  0451 09/15/22  0622   WBC 6.3 5.4   HGB 12.4 12.6   HCT 37.2 38.3    153       Recent Labs     09/16/22  0451 09/15/22  0622 09/14/22  0313    141 142   K 4.0 3.8 3.6   * 109* 110*   CO2 27 27 28   BUN 20 25* 24*   CREA 0.88 0.92 1.01   * 113* 214*   CA 8.8 8.8 8.6       Recent Labs     09/13/22  1832   ALT 43   AP 57   TBILI 0.3   TP 7.1   ALB 3.1*   GLOB 4.0       Recent Labs     09/16/22  0451 09/15/22  0622 09/14/22  2325   APTT 30.1 34.5* 27.0        No results for input(s): FE, TIBC, PSAT, FERR in the last 72 hours.    No results found for: FOL, RBCF   No results for input(s): PH, PCO2, PO2 in the last 72 hours. No results for input(s): CPK, CKNDX, TROIQ in the last 72 hours.     No lab exists for component: CPKMB  No results found for: CHOL, CHOLX, CHLST, CHOLV, HDL, HDLP, LDL, LDLC, DLDLP, TGLX, TRIGL, TRIGP, CHHD, CHHDX  Lab Results   Component Value Date/Time    Glucose (POC) 129 (H) 09/16/2022 09:43 AM    Glucose (POC) 237 (H) 09/15/2022 09:49 PM    Glucose (POC) 166 (H) 09/14/2022 05:23 PM    Glucose (POC) 126 (H) 09/14/2022 08:14 AM     Lab Results   Component Value Date/Time    Color YELLOW/STRAW 06/02/2022 08:19 PM    Appearance CLEAR 06/02/2022 08:19 PM    Specific gravity 1.009 06/02/2022 08:19 PM    pH (UA) 7.5 06/02/2022 08:19 PM    Protein Negative 06/02/2022 08:19 PM    Glucose Negative 06/02/2022 08:19 PM    Ketone Negative 06/02/2022 08:19 PM    Bilirubin Negative 06/02/2022 08:19 PM    Urobilinogen 1.0 06/02/2022 08:19 PM    Nitrites Negative 06/02/2022 08:19 PM    Leukocyte Esterase Negative 06/02/2022 08:19 PM    Epithelial cells FEW 06/02/2022 08:19 PM    Bacteria Negative 06/02/2022 08:19 PM    WBC 0-4 06/02/2022 08:19 PM    RBC 0-5 06/02/2022 08:19 PM         Medications Reviewed:     Current Facility-Administered Medications   Medication Dose Route Frequency    amLODIPine (NORVASC) tablet 10 mg  10 mg Oral DAILY    baclofen (LIORESAL) tablet 10 mg  10 mg Oral TID    arformoterol 15 mcg/budesonide 0.25 mg neb solution   Nebulization BID RT    gabapentin (NEURONTIN) capsule 300 mg  300 mg Oral TID    lisinopriL (PRINIVIL, ZESTRIL) tablet 40 mg  40 mg Oral DAILY    apixaban (ELIQUIS) tablet 10 mg  10 mg Oral BID    dexAMETHasone (DECADRON) tablet 2 mg  2 mg Oral DAILY    [START ON 9/22/2022] apixaban (ELIQUIS) tablet 5 mg  5 mg Oral BID    glucose chewable tablet 16 g  4 Tablet Oral PRN    glucagon (GLUCAGEN) injection 1 mg  1 mg IntraMUSCular PRN    dextrose 10 % infusion 0-250 mL  0-250 mL IntraVENous PRN    insulin lispro (HUMALOG) injection   SubCUTAneous TIDAC    iopamidoL (ISOVUE 300) 61 % contrast injection 100 mL  100 mL IntraCATHeter RAD PRN    sodium chloride (NS) flush 5-40 mL  5-40 mL IntraVENous Q8H    sodium chloride (NS) flush 5-40 mL  5-40 mL IntraVENous PRN    acetaminophen (TYLENOL) tablet 650 mg  650 mg Oral Q6H PRN    Or    acetaminophen (TYLENOL) suppository 650 mg  650 mg Rectal Q6H PRN    ondansetron (ZOFRAN ODT) tablet 4 mg  4 mg Oral Q8H PRN    Or    ondansetron (ZOFRAN) injection 4 mg  4 mg IntraVENous Q6H PRN     ______________________________________________________________________  EXPECTED LENGTH OF STAY: 4d 9h  ACTUAL LENGTH OF STAY:          Mj Marion MD

## 2022-09-16 NOTE — ROUTINE PROCESS
Chart checked, and note that the most recent aPTT resulted (at 0451 this morning) as 30.1. This level remains subtherapeutic (therapeutic range ranges 58-92).  Will hold, follow, and see as able and appropriate for the PT eval. Thank you, Vesna Gaspar, PT

## 2022-09-16 NOTE — DISCHARGE INSTRUCTIONS
Discharge Instructions       PATIENT ID: Maria D Mayo  MRN: [id number]   YOB: 1955    DATE OF ADMISSION: [unfilled]    DATE OF DISCHARGE: 9/16/2022    PRIMARY CARE PROVIDER: @PCP@     ATTENDING PHYSICIAN: [unfilled]  DISCHARGING PROVIDER: Nicolás Osorio MD    To contact this individual call [phone] and ask the  to page. If unavailable ask to be transferred the Adult Hospitalist Department. DISCHARGE DIAGNOSES Acute PE/Acute DVT    CONSULTATIONS: [unfilled]    PROCEDURES/SURGERIES: * No surgery found *    PENDING TEST RESULTS:   At the time of discharge the following test results are still pending: none    FOLLOW UP APPOINTMENTS:   PCP  Oncologist    ADDITIONAL CARE RECOMMENDATIONS: as above    DIET: Cardiac Diet    ACTIVITY: Activity as tolerated    DISCHARGE MEDICATIONS:   See Medication Reconciliation Form    It is important that you take the medication exactly as they are prescribed. Keep your medication in the bottles provided by the pharmacist and keep a list of the medication names, dosages, and times to be taken in your wallet. Do not take other medications without consulting your doctor. NOTIFY YOUR PHYSICIAN FOR ANY OF THE FOLLOWING:   Fever over 101 degrees for 24 hours. Chest pain, shortness of breath, fever, chills, nausea, vomiting, diarrhea, change in mentation, falling, weakness, bleeding. Severe pain or pain not relieved by medications. Or, any other signs or symptoms that you may have questions about.       DISPOSITION:  x  Home With:   OT  PT  HH  RN       SNF/Inpatient Rehab/LTAC    Independent/assisted living    Hospice    Other:     CDMP Checked:   Yes x     PROBLEM LIST Updated:  Yes x       Signed:   Nicolás Osorio MD  9/16/2022  11:49 AM

## 2022-09-16 NOTE — DISCHARGE SUMMARY
Discharge Summary       PATIENT ID: Ronny Ozuna  MRN: [id number]   YOB: 1955    DATE OF ADMISSION: 9/13/2022  4:59 PM    DATE OF DISCHARGE: 9/16/2022   PRIMARY CARE PROVIDER: Brayan Owen MD     ATTENDING PHYSICIAN: Dr Yaneli Liriano  DISCHARGING PROVIDER: Yaneli Liriano MD    To contact this individual call [phone] and ask the  to page. If unavailable ask to be transferred the Adult Hospitalist Department. CONSULTATIONS: IP CONSULT TO INTERVENTIONAL RADIOLOGY    PROCEDURES/SURGERIES: * No surgery found *    DISCHARGE DIAGNOSES:   #Submassive Pulmonary Emboli  Acute DVT  #Right heart strain  -hemodynamically stable, spo2 95% on RA  -s/p thrombectomy by IR 9/14  -Echo unremarkable  -Dopplers positive for Acute DVT  -switched to Eliquis 9/15 (after speaking to the patient's oncologist)  -discharge today     #Glioblastoma  -s/p resection and radiation. Pending initiation of clinical trial at [facility]   -next brain MRI 9/25 at [facility]    HTN: restart home meds     #GERD  -not on PPI      ADDITIONAL CARE RECOMMENDATIONS:   Follow up with PMD  Follow up with Oncology     NOTIFY YOUR PHYSICIAN FOR ANY OF THE FOLLOWING:   Fever over 101 degrees for 24 hours. Chest pain, shortness of breath, fever, chills, nausea, vomiting, diarrhea, change in mentation, falling, weakness, bleeding. Severe pain or pain not relieved by medications, as well as any other signs or symptoms that you may have questions about.     FOLLOW UP APPOINTMENTS:    Follow-up Information       Follow up With Specialties Details Why Albert Dutta MD Internal Medicine Physician Follow up in 1 week(s)  [address] [id number] [facility] [address] [address]  [phone]      Lydia Benavidez MD Internal Medicine Physician Follow up  [facility] [phone]                 DIET: Cardiac Diet    ACTIVITY: Activity as tolerated    DISCHARGE MEDICATIONS:  Current Discharge Medication List START taking these medications    Details   apixaban (ELIQUIS) 5 mg tablet Take 2 Tablets by mouth two (2) times a day for 12 doses. Then 1 tab PO BID  Qty: 70 Tablet, Refills: 2  Start date: 9/16/2022, End date: 9/22/2022           CONTINUE these medications which have NOT CHANGED    Details   dexAMETHasone (DECADRON) 2 mg tablet Take 2 mg by mouth two (2) times a day. magnesium oxide (MAG-OX) 400 mg tablet Take 400 mg by mouth in the morning. furosemide (LASIX PO) Take  by mouth. 2x a day      temozolomide (TEMODAR) 180 mg capsule 1 Capsule daily. Take 1 capsule by mouth daily for 42 days  Qty: 42 Capsule, Refills: 0    Associated Diagnoses: Glioblastoma (HCC)      ondansetron (ZOFRAN ODT) 4 mg disintegrating tablet Take 1-2 Tablets by mouth every eight (8) hours as needed for Nausea or Nausea or Vomiting. Qty: 60 Tablet, Refills: 1    Associated Diagnoses: Glioblastoma ([address]      trimethoprim-sulfamethoxazole (BACTRIM, SEPTRA)  mg per tablet Take 1 Tablet by mouth two (2) times a day. Qty: 42 Tablet, Refills: 0    Associated Diagnoses: Glioblastoma ([address]      amLODIPine (NORVASC) 10 mg tablet Take 1 Tablet by mouth daily. Qty: 30 Tablet, Refills: 1      gabapentin (NEURONTIN) 300 mg capsule Take 1 Capsule by mouth three (3) times daily. Max Daily Amount: 900 mg. Qty: 90 Capsule, Refills: 0    Associated Diagnoses: Right frontal lobe mass      lisinopriL (PRINIVIL, ZESTRIL) 40 mg tablet Take 1 Tablet by mouth daily. Indications: high blood pressure  Qty: 30 Tablet, Refills: 1      tamsulosin (FLOMAX) 0.4 mg capsule Take 1 Capsule by mouth daily. Qty: 30 Capsule, Refills: 1      pantoprazole (PROTONIX) 40 mg tablet Take 1 Tablet by mouth Before breakfast and dinner. Qty: 60 Tablet, Refills: 0      azelastine (ASTELIN) 137 mcg (0.1 %) nasal spray 2 Sprays by Both Nostrils route two (2) times a day.  Use in each nostril as directed       baclofen (LIORESAL) 10 mg tablet Take 10 mg by mouth three (3) times daily. esomeprazole (NEXIUM) 40 mg capsule Take 40 mg by mouth two (2) times a day. fluticasone propionate (FLOVENT HFA) 44 mcg/actuation inhaler Take  by inhalation. levocetirizine (XYZAL) 5 mg tablet Take 5 mg by mouth.      metroNIDAZOLE (METROLOTION) 0.75 % lotion Apply  to affected area two (2) times a day. B.animalis,bifid,infantis,long (Probiotic 4X) 10-15 mg TbEC Take  by mouth.      solifenacin (VESICARE) 10 mg tablet Take 10 mg by mouth daily. budesonide-formoteroL (SYMBICORT) 160-4.5 mcg/actuation HFAA Take 2 Puffs by inhalation two (2) times a day. solriamfetoL (Sunosi) 75 mg tab Take 75 mg by mouth daily.            STOP taking these medications       docusate sodium (COLACE) 100 mg capsule Comments:   Reason for Stopping:               DISPOSITION:   x Home With:   OT  PT  HH  RN       Long term SNF/Inpatient Rehab    Independent/assisted living    Hospice    Other:       PATIENT CONDITION AT DISCHARGE:     Functional status    Poor     Deconditioned    x Independent      Cognition    x Lucid     Forgetful     Dementia      Catheters/lines (plus indication)    Thacker     PICC     PEG    x None      Code status    x Full code     DNR      PHYSICAL EXAMINATION AT DISCHARGE:  Please see progress note      CHRONIC MEDICAL DIAGNOSES:  Problem List as of 9/16/2022 Date Reviewed: 8/17/2022            Codes Class Noted - Resolved    Pulmonary embolus ([facility] [address] ICD-10-CM: I26.99  ICD-9-CM: 415.19  9/13/2022 - Present        Chemotherapy follow-up examination ICD-10-CM: M88  ICD-9-CM: V67.2  8/16/2022 - Present        Glioblastoma ([facility] [address] ICD-10-CM: C71.9  ICD-9-CM: 191.9  7/19/2022 - Present        Brain mass ICD-10-CM: G93.89  ICD-9-CM: 348.89  6/2/2022 - Present           Greater than 37 minutes were spent with the patient on counseling and coordination of care    Signed:   Sophie Guevara MD  9/16/2022  11:50 AM    .

## 2022-09-16 NOTE — PROGRESS NOTES
Occupational Therapy    Chart reviewed, most recent aPTT resulted as 30.1, remains subtherapeutic for OT evaluation. Will hold and continued to follow up as able.      Woody Saleh MS, OTR/L

## 2022-10-24 ENCOUNTER — HOSPITAL ENCOUNTER (EMERGENCY)
Age: 67
Discharge: HOME | DRG: 054 | End: 2022-10-24
Attending: EMERGENCY MEDICINE
Payer: COMMERCIAL

## 2022-10-24 ENCOUNTER — APPOINTMENT (OUTPATIENT)
Dept: GENERAL RADIOLOGY | Age: 67
DRG: 054 | End: 2022-10-24
Attending: EMERGENCY MEDICINE
Payer: COMMERCIAL

## 2022-10-24 VITALS
SYSTOLIC BLOOD PRESSURE: 135 MMHG | DIASTOLIC BLOOD PRESSURE: 79 MMHG | OXYGEN SATURATION: 95 % | RESPIRATION RATE: 15 BRPM | HEART RATE: 72 BPM | TEMPERATURE: 98.7 F

## 2022-10-24 DIAGNOSIS — R06.02 SOB (SHORTNESS OF BREATH): Primary | ICD-10-CM

## 2022-10-24 LAB
ALBUMIN SERPL-MCNC: 3.1 G/DL (ref 3.5–5)
ALBUMIN/GLOB SERPL: 0.9 {RATIO} (ref 1.1–2.2)
ALP SERPL-CCNC: 47 U/L (ref 45–117)
ALT SERPL-CCNC: 66 U/L (ref 12–78)
ANION GAP SERPL CALC-SCNC: 5 MMOL/L (ref 5–15)
AST SERPL-CCNC: 15 U/L (ref 15–37)
ATRIAL RATE: 80 BPM
BASOPHILS # BLD: 0 K/UL (ref 0–0.1)
BASOPHILS NFR BLD: 0 % (ref 0–1)
BILIRUB SERPL-MCNC: 0.4 MG/DL (ref 0.2–1)
BNP SERPL-MCNC: 65 PG/ML
BUN SERPL-MCNC: 24 MG/DL (ref 6–20)
BUN/CREAT SERPL: 23 (ref 12–20)
CALCIUM SERPL-MCNC: 8.7 MG/DL (ref 8.5–10.1)
CALCULATED P AXIS, ECG09: 57 DEGREES
CALCULATED R AXIS, ECG10: -8 DEGREES
CALCULATED T AXIS, ECG11: 40 DEGREES
CHLORIDE SERPL-SCNC: 105 MMOL/L (ref 97–108)
CO2 SERPL-SCNC: 28 MMOL/L (ref 21–32)
CREAT SERPL-MCNC: 1.04 MG/DL (ref 0.7–1.3)
D DIMER PPP FEU-MCNC: 0.25 MG/L FEU (ref 0–0.65)
DIAGNOSIS, 93000: NORMAL
DIFFERENTIAL METHOD BLD: ABNORMAL
EOSINOPHIL # BLD: 0 K/UL (ref 0–0.4)
EOSINOPHIL NFR BLD: 0 % (ref 0–7)
ERYTHROCYTE [DISTWIDTH] IN BLOOD BY AUTOMATED COUNT: 14.1 % (ref 11.5–14.5)
GLOBULIN SER CALC-MCNC: 3.4 G/DL (ref 2–4)
GLUCOSE SERPL-MCNC: 183 MG/DL (ref 65–100)
HCT VFR BLD AUTO: 33.4 % (ref 36.6–50.3)
HGB BLD-MCNC: 10.8 G/DL (ref 12.1–17)
IMM GRANULOCYTES # BLD AUTO: 0.2 K/UL (ref 0–0.04)
IMM GRANULOCYTES NFR BLD AUTO: 3 % (ref 0–0.5)
INR PPP: 1.1 (ref 0.9–1.1)
LYMPHOCYTES # BLD: 0.3 K/UL (ref 0.8–3.5)
LYMPHOCYTES NFR BLD: 5 % (ref 12–49)
MAGNESIUM SERPL-MCNC: 2.1 MG/DL (ref 1.6–2.4)
MCH RBC QN AUTO: 32.7 PG (ref 26–34)
MCHC RBC AUTO-ENTMCNC: 32.3 G/DL (ref 30–36.5)
MCV RBC AUTO: 101.2 FL (ref 80–99)
MONOCYTES # BLD: 0.1 K/UL (ref 0–1)
MONOCYTES NFR BLD: 2 % (ref 5–13)
NEUTS SEG # BLD: 5.4 K/UL (ref 1.8–8)
NEUTS SEG NFR BLD: 90 % (ref 32–75)
NRBC # BLD: 0 K/UL (ref 0–0.01)
NRBC BLD-RTO: 0 PER 100 WBC
P-R INTERVAL, ECG05: 156 MS
PLATELET # BLD AUTO: 92 K/UL (ref 150–400)
PMV BLD AUTO: 10.6 FL (ref 8.9–12.9)
POTASSIUM SERPL-SCNC: 3.9 MMOL/L (ref 3.5–5.1)
PROT SERPL-MCNC: 6.5 G/DL (ref 6.4–8.2)
PROTHROMBIN TIME: 11.6 SEC (ref 9–11.1)
Q-T INTERVAL, ECG07: 392 MS
QRS DURATION, ECG06: 82 MS
QTC CALCULATION (BEZET), ECG08: 452 MS
RBC # BLD AUTO: 3.3 M/UL (ref 4.1–5.7)
RBC MORPH BLD: ABNORMAL
SODIUM SERPL-SCNC: 138 MMOL/L (ref 136–145)
TROPONIN-HIGH SENSITIVITY: 11 NG/L (ref 0–76)
VENTRICULAR RATE, ECG03: 80 BPM
WBC # BLD AUTO: 6 K/UL (ref 4.1–11.1)

## 2022-10-24 PROCEDURE — 83735 ASSAY OF MAGNESIUM: CPT

## 2022-10-24 PROCEDURE — 84484 ASSAY OF TROPONIN QUANT: CPT

## 2022-10-24 PROCEDURE — 99285 EMERGENCY DEPT VISIT HI MDM: CPT

## 2022-10-24 PROCEDURE — 85025 COMPLETE CBC W/AUTO DIFF WBC: CPT

## 2022-10-24 PROCEDURE — 93005 ELECTROCARDIOGRAM TRACING: CPT

## 2022-10-24 PROCEDURE — 80053 COMPREHEN METABOLIC PANEL: CPT

## 2022-10-24 PROCEDURE — 83880 ASSAY OF NATRIURETIC PEPTIDE: CPT

## 2022-10-24 PROCEDURE — 85610 PROTHROMBIN TIME: CPT

## 2022-10-24 PROCEDURE — 36415 COLL VENOUS BLD VENIPUNCTURE: CPT

## 2022-10-24 PROCEDURE — 36600 WITHDRAWAL OF ARTERIAL BLOOD: CPT

## 2022-10-24 PROCEDURE — 85379 FIBRIN DEGRADATION QUANT: CPT

## 2022-10-24 PROCEDURE — 71046 X-RAY EXAM CHEST 2 VIEWS: CPT

## 2022-10-24 NOTE — ED TRIAGE NOTES
Pt c/o increasing sob and wheezing x 3-5 weeks. Was seen previously diagnosed with PE. Started on eliquis, which he has been taking regularly.

## 2022-10-24 NOTE — ED PROVIDER NOTES
68-year-old male with a history of glioblastoma multiforme, recent PE presents with a chief complaint of shortness of breath. Patient reports that he was recently diagnosed with a PE several weeks ago and started on Eliquis. He has been compliant with that. His family seems to think that his breathing has gotten more \"raspy\". He has had a minor nonproductive cough. He denies chest pain, abdominal pain, GI or urinary symptoms. Patient has noticed lower extremity swelling which is not new. Past Medical History:   Diagnosis Date    Arthritis     GERD (gastroesophageal reflux disease)     Morbid obesity ([address]        Past Surgical History:   Procedure Laterality Date    HX BACK SURGERY      2 laminectomies and fusion    HX ORTHOPAEDIC Right     orif hand    HX SHOULDER ARTHROSCOPY      HX TONSILLECTOMY      as a child    IR THROMBECTOMY MECH ART PRIMARY NON AWAIS OR INTRACRANIAL  2022         No family history on file.     Social History     Socioeconomic History    Marital status:      Spouse name: Not on file    Number of children: Not on file    Years of education: Not on file    Highest education level: Not on file   Occupational History    Not on file   Tobacco Use    Smoking status: Former     Types: Cigarettes     Quit date: 1981     Years since quittin.4    Smokeless tobacco: Never   Vaping Use    Vaping Use: Never used   Substance and Sexual Activity    Alcohol use: Yes     Comment: occasional    Drug use: Never    Sexual activity: Not on file   Other Topics Concern    Not on file   Social History Narrative    Not on file     Social Determinants of Health     Financial Resource Strain: Not on file   Food Insecurity: Not on file   Transportation Needs: Not on file   Physical Activity: Not on file   Stress: Not on file   Social Connections: Not on file   Intimate Partner Violence: Not on file   Housing Stability: Not on file         ALLERGIES: Keflex [cephalexin]    Review of Systems Constitutional:  Negative for fever. HENT:  Negative for rhinorrhea. Respiratory:  Positive for shortness of breath. Cardiovascular:  Positive for leg swelling. Negative for chest pain. Gastrointestinal:  Negative for abdominal pain. Genitourinary:  Negative for dysuria. Musculoskeletal:  Negative for back pain. Skin:  Negative for wound. Neurological:  Negative for headaches. Psychiatric/Behavioral:  Negative for confusion. Vitals:    10/24/22 1138   BP: (!) 140/82   Pulse: 85   Resp: 24   Temp: 98 °F (36.7 °C)   SpO2: 90%            Physical Exam  Vitals and nursing note reviewed. Constitutional:       General: He is not in acute distress. Appearance: Normal appearance. He is not ill-appearing, toxic-appearing or diaphoretic. HENT:      Head: Normocephalic. Eyes:      Extraocular Movements: Extraocular movements intact. Cardiovascular:      Rate and Rhythm: Normal rate. Pulses: Normal pulses. Pulmonary:      Effort: Pulmonary effort is normal. No respiratory distress. Breath sounds: Normal breath sounds. Abdominal:      General: There is no distension. Palpations: Abdomen is soft. Musculoskeletal:         General: Normal range of motion. Cervical back: Normal range of motion. Right lower leg: Edema present. Left lower leg: Edema present. Skin:     General: Skin is warm and dry. Capillary Refill: Capillary refill takes less than 2 seconds. Neurological:      Mental Status: He is alert and oriented to person, place, and time. Psychiatric:         Mood and Affect: Mood normal.        MDM  Number of Diagnoses or Management Options  SOB (shortness of breath)  Diagnosis management comments: Patient presents with shortness of breath. Differentials include but are not limited to worsening PE burden, pneumonia, heart failure among many others. D-dimer is normal.  Patient is currently on Eliquis. Chest x-ray shows no pneumonia.   BNP is normal.  Discussed my clinical impression(s), any labs and/or radiology results with the patient. I answered any questions and addressed any concerns. Discussed the importance of following up with their primary care physician and/or specialist(s). Discussed signs or symptoms that would warrant return back to the ER for further evaluation. The patient is agreeable with discharge. ED Course as of 10/24/22 1612   Mon Oct 24, 2022   1202 EKG shows sinus rhythm at a rate of 80, normal intervals, normal axis, poor baseline EKG with no ischemic changes.  [RD]      ED Course User Index  [RD] Marah Stockton MD       Procedures

## 2022-10-24 NOTE — DISCHARGE INSTRUCTIONS
Thank you for allowing us to provide you with medical care today. We realize that you have many choices for your emergency care needs. We thank you for choosing [facility]. Please choose us in the future for any continued health care needs. The exam and treatment you received in the Emergency Department were for an emergent problem and are not intended as complete care. It is important that you follow up with a doctor, nurse practitioner, or physician's assistant for ongoing care. If your symptoms worsen or you do not improve as expected and you are unable to reach your usual health care provider, you should return to the Emergency Department. We are available 24 hours a day. Please make an appointment with your health care provider(s) for follow up of your Emergency Department visit. Take this sheet with you when you go to your follow-up visit.

## 2022-10-27 ENCOUNTER — HOSPITAL ENCOUNTER (INPATIENT)
Age: 67
LOS: 4 days | Discharge: HOME HEALTH | DRG: 054 | End: 2022-10-31
Attending: STUDENT IN AN ORGANIZED HEALTH CARE EDUCATION/TRAINING PROGRAM | Admitting: HOSPITALIST
Payer: COMMERCIAL

## 2022-10-27 ENCOUNTER — APPOINTMENT (OUTPATIENT)
Dept: MRI IMAGING | Age: 67
DRG: 054 | End: 2022-10-27
Attending: STUDENT IN AN ORGANIZED HEALTH CARE EDUCATION/TRAINING PROGRAM
Payer: COMMERCIAL

## 2022-10-27 ENCOUNTER — APPOINTMENT (OUTPATIENT)
Dept: CT IMAGING | Age: 67
DRG: 054 | End: 2022-10-27
Attending: STUDENT IN AN ORGANIZED HEALTH CARE EDUCATION/TRAINING PROGRAM
Payer: COMMERCIAL

## 2022-10-27 ENCOUNTER — APPOINTMENT (OUTPATIENT)
Dept: GENERAL RADIOLOGY | Age: 67
DRG: 054 | End: 2022-10-27
Attending: STUDENT IN AN ORGANIZED HEALTH CARE EDUCATION/TRAINING PROGRAM
Payer: COMMERCIAL

## 2022-10-27 DIAGNOSIS — R53.1 GENERALIZED WEAKNESS: Primary | ICD-10-CM

## 2022-10-27 DIAGNOSIS — C71.9 GLIOBLASTOMA MULTIFORME (HCC): ICD-10-CM

## 2022-10-27 DIAGNOSIS — R26.2 AMBULATORY DYSFUNCTION: ICD-10-CM

## 2022-10-27 LAB
ALBUMIN SERPL-MCNC: 3.6 G/DL (ref 3.5–5)
ALBUMIN/GLOB SERPL: 0.9 {RATIO} (ref 1.1–2.2)
ALP SERPL-CCNC: 54 U/L (ref 45–117)
ALT SERPL-CCNC: 67 U/L (ref 12–78)
ANION GAP SERPL CALC-SCNC: 6 MMOL/L (ref 5–15)
AST SERPL-CCNC: 16 U/L (ref 15–37)
ATRIAL RATE: 75 BPM
BASOPHILS # BLD: 0 K/UL (ref 0–0.1)
BASOPHILS NFR BLD: 0 % (ref 0–1)
BILIRUB SERPL-MCNC: 0.5 MG/DL (ref 0.2–1)
BUN SERPL-MCNC: 24 MG/DL (ref 6–20)
BUN/CREAT SERPL: 21 (ref 12–20)
CALCIUM SERPL-MCNC: 9.6 MG/DL (ref 8.5–10.1)
CALCULATED P AXIS, ECG09: 47 DEGREES
CALCULATED R AXIS, ECG10: -3 DEGREES
CALCULATED T AXIS, ECG11: 47 DEGREES
CHLORIDE SERPL-SCNC: 103 MMOL/L (ref 97–108)
CO2 SERPL-SCNC: 31 MMOL/L (ref 21–32)
COMMENT, HOLDF: NORMAL
CREAT SERPL-MCNC: 1.13 MG/DL (ref 0.7–1.3)
DIAGNOSIS, 93000: NORMAL
DIFFERENTIAL METHOD BLD: ABNORMAL
EOSINOPHIL # BLD: 0 K/UL (ref 0–0.4)
EOSINOPHIL NFR BLD: 0 % (ref 0–7)
ERYTHROCYTE [DISTWIDTH] IN BLOOD BY AUTOMATED COUNT: 14.5 % (ref 11.5–14.5)
GLOBULIN SER CALC-MCNC: 4 G/DL (ref 2–4)
GLUCOSE SERPL-MCNC: 96 MG/DL (ref 65–100)
HCT VFR BLD AUTO: 38.5 % (ref 36.6–50.3)
HGB BLD-MCNC: 12.6 G/DL (ref 12.1–17)
IMM GRANULOCYTES # BLD AUTO: 0 K/UL (ref 0–0.04)
IMM GRANULOCYTES NFR BLD AUTO: 1 % (ref 0–0.5)
LYMPHOCYTES # BLD: 0.5 K/UL (ref 0.8–3.5)
LYMPHOCYTES NFR BLD: 12 % (ref 12–49)
MCH RBC QN AUTO: 33.2 PG (ref 26–34)
MCHC RBC AUTO-ENTMCNC: 32.7 G/DL (ref 30–36.5)
MCV RBC AUTO: 101.6 FL (ref 80–99)
MONOCYTES # BLD: 0.3 K/UL (ref 0–1)
MONOCYTES NFR BLD: 6 % (ref 5–13)
NEUTS SEG # BLD: 3.6 K/UL (ref 1.8–8)
NEUTS SEG NFR BLD: 81 % (ref 32–75)
NRBC # BLD: 0 K/UL (ref 0–0.01)
NRBC BLD-RTO: 0 PER 100 WBC
P-R INTERVAL, ECG05: 160 MS
PLATELET # BLD AUTO: 114 K/UL (ref 150–400)
PMV BLD AUTO: 10.7 FL (ref 8.9–12.9)
POTASSIUM SERPL-SCNC: 3.6 MMOL/L (ref 3.5–5.1)
PROT SERPL-MCNC: 7.6 G/DL (ref 6.4–8.2)
Q-T INTERVAL, ECG07: 404 MS
QRS DURATION, ECG06: 82 MS
QTC CALCULATION (BEZET), ECG08: 451 MS
RBC # BLD AUTO: 3.79 M/UL (ref 4.1–5.7)
RBC MORPH BLD: ABNORMAL
SAMPLES BEING HELD,HOLD: NORMAL
SODIUM SERPL-SCNC: 140 MMOL/L (ref 136–145)
VENTRICULAR RATE, ECG03: 75 BPM
WBC # BLD AUTO: 4.4 K/UL (ref 4.1–11.1)

## 2022-10-27 PROCEDURE — 74011250636 HC RX REV CODE- 250/636: Performed by: STUDENT IN AN ORGANIZED HEALTH CARE EDUCATION/TRAINING PROGRAM

## 2022-10-27 PROCEDURE — 74011250636 HC RX REV CODE- 250/636

## 2022-10-27 PROCEDURE — 70450 CT HEAD/BRAIN W/O DYE: CPT

## 2022-10-27 PROCEDURE — 74011000250 HC RX REV CODE- 250: Performed by: HOSPITALIST

## 2022-10-27 PROCEDURE — G0378 HOSPITAL OBSERVATION PER HR: HCPCS

## 2022-10-27 PROCEDURE — 96374 THER/PROPH/DIAG INJ IV PUSH: CPT

## 2022-10-27 PROCEDURE — 70553 MRI BRAIN STEM W/O & W/DYE: CPT

## 2022-10-27 PROCEDURE — 71046 X-RAY EXAM CHEST 2 VIEWS: CPT

## 2022-10-27 PROCEDURE — 80053 COMPREHEN METABOLIC PANEL: CPT

## 2022-10-27 PROCEDURE — 85025 COMPLETE CBC W/AUTO DIFF WBC: CPT

## 2022-10-27 PROCEDURE — 93005 ELECTROCARDIOGRAM TRACING: CPT

## 2022-10-27 PROCEDURE — 99285 EMERGENCY DEPT VISIT HI MDM: CPT

## 2022-10-27 PROCEDURE — 36415 COLL VENOUS BLD VENIPUNCTURE: CPT

## 2022-10-27 PROCEDURE — 74011250637 HC RX REV CODE- 250/637: Performed by: HOSPITALIST

## 2022-10-27 PROCEDURE — A9576 INJ PROHANCE MULTIPACK: HCPCS

## 2022-10-27 PROCEDURE — 65270000046 HC RM TELEMETRY

## 2022-10-27 RX ORDER — ONDANSETRON 2 MG/ML
4 INJECTION INTRAMUSCULAR; INTRAVENOUS
Status: DISCONTINUED | OUTPATIENT
Start: 2022-10-27 | End: 2022-10-31 | Stop reason: HOSPADM

## 2022-10-27 RX ORDER — PROMETHAZINE HYDROCHLORIDE 25 MG/1
12.5 TABLET ORAL
Status: DISCONTINUED | OUTPATIENT
Start: 2022-10-27 | End: 2022-10-31 | Stop reason: HOSPADM

## 2022-10-27 RX ORDER — FUROSEMIDE 20 MG/1
20 TABLET ORAL DAILY
Status: DISCONTINUED | OUTPATIENT
Start: 2022-10-28 | End: 2022-10-31 | Stop reason: HOSPADM

## 2022-10-27 RX ORDER — IBUPROFEN 600 MG/1
600 TABLET ORAL 3 TIMES DAILY
Status: DISCONTINUED | OUTPATIENT
Start: 2022-10-27 | End: 2022-10-27

## 2022-10-27 RX ORDER — PANTOPRAZOLE SODIUM 40 MG/1
40 TABLET, DELAYED RELEASE ORAL
Status: DISCONTINUED | OUTPATIENT
Start: 2022-10-28 | End: 2022-10-31 | Stop reason: HOSPADM

## 2022-10-27 RX ORDER — SULFAMETHOXAZOLE AND TRIMETHOPRIM 400; 80 MG/1; MG/1
1 TABLET ORAL DAILY
Status: DISCONTINUED | OUTPATIENT
Start: 2022-10-28 | End: 2022-10-31 | Stop reason: HOSPADM

## 2022-10-27 RX ORDER — SODIUM CHLORIDE 0.9 % (FLUSH) 0.9 %
10 SYRINGE (ML) INJECTION
Status: COMPLETED | OUTPATIENT
Start: 2022-10-27 | End: 2022-10-27

## 2022-10-27 RX ORDER — DEXAMETHASONE SODIUM PHOSPHATE 10 MG/ML
10 INJECTION INTRAMUSCULAR; INTRAVENOUS ONCE
Status: COMPLETED | OUTPATIENT
Start: 2022-10-27 | End: 2022-10-27

## 2022-10-27 RX ORDER — LEVETIRACETAM 500 MG/5ML
500 INJECTION, SOLUTION, CONCENTRATE INTRAVENOUS EVERY 12 HOURS
Status: DISCONTINUED | OUTPATIENT
Start: 2022-10-27 | End: 2022-10-29

## 2022-10-27 RX ORDER — ONDANSETRON 4 MG/1
4-8 TABLET, ORALLY DISINTEGRATING ORAL
Status: DISCONTINUED | OUTPATIENT
Start: 2022-10-27 | End: 2022-10-31 | Stop reason: HOSPADM

## 2022-10-27 RX ORDER — DEXAMETHASONE 1 MG/1
2 TABLET ORAL 2 TIMES DAILY
Status: DISCONTINUED | OUTPATIENT
Start: 2022-10-27 | End: 2022-10-27

## 2022-10-27 RX ORDER — POLYETHYLENE GLYCOL 3350 17 G/17G
17 POWDER, FOR SOLUTION ORAL DAILY PRN
Status: DISCONTINUED | OUTPATIENT
Start: 2022-10-27 | End: 2022-10-31 | Stop reason: HOSPADM

## 2022-10-27 RX ORDER — SODIUM CHLORIDE 0.9 % (FLUSH) 0.9 %
5-40 SYRINGE (ML) INJECTION EVERY 8 HOURS
Status: DISCONTINUED | OUTPATIENT
Start: 2022-10-27 | End: 2022-10-31 | Stop reason: HOSPADM

## 2022-10-27 RX ORDER — BACLOFEN 10 MG/1
10 TABLET ORAL 3 TIMES DAILY
Status: DISCONTINUED | OUTPATIENT
Start: 2022-10-27 | End: 2022-10-31 | Stop reason: HOSPADM

## 2022-10-27 RX ORDER — GABAPENTIN 300 MG/1
300 CAPSULE ORAL 3 TIMES DAILY
Status: DISCONTINUED | OUTPATIENT
Start: 2022-10-27 | End: 2022-10-29

## 2022-10-27 RX ORDER — DICLOFENAC SODIUM 75 MG/1
75 TABLET, DELAYED RELEASE ORAL 2 TIMES DAILY
COMMUNITY

## 2022-10-27 RX ORDER — CETIRIZINE HYDROCHLORIDE 10 MG/1
10 TABLET ORAL DAILY
Status: DISCONTINUED | OUTPATIENT
Start: 2022-10-28 | End: 2022-10-31 | Stop reason: HOSPADM

## 2022-10-27 RX ORDER — DEXAMETHASONE SODIUM PHOSPHATE 4 MG/ML
4 INJECTION, SOLUTION INTRA-ARTICULAR; INTRALESIONAL; INTRAMUSCULAR; INTRAVENOUS; SOFT TISSUE EVERY 6 HOURS
Status: DISCONTINUED | OUTPATIENT
Start: 2022-10-28 | End: 2022-10-31 | Stop reason: HOSPADM

## 2022-10-27 RX ORDER — METFORMIN HYDROCHLORIDE 500 MG/1
500 TABLET ORAL
COMMUNITY

## 2022-10-27 RX ORDER — SULFAMETHOXAZOLE AND TRIMETHOPRIM 400; 80 MG/1; MG/1
1 TABLET ORAL DAILY
COMMUNITY

## 2022-10-27 RX ORDER — ACETAMINOPHEN 650 MG/1
650 SUPPOSITORY RECTAL
Status: DISCONTINUED | OUTPATIENT
Start: 2022-10-27 | End: 2022-10-31 | Stop reason: HOSPADM

## 2022-10-27 RX ORDER — AZELASTINE 1 MG/ML
2 SPRAY, METERED NASAL 2 TIMES DAILY
Status: DISCONTINUED | OUTPATIENT
Start: 2022-10-27 | End: 2022-10-31 | Stop reason: HOSPADM

## 2022-10-27 RX ORDER — ACETAMINOPHEN 325 MG/1
650 TABLET ORAL
Status: DISCONTINUED | OUTPATIENT
Start: 2022-10-27 | End: 2022-10-31 | Stop reason: HOSPADM

## 2022-10-27 RX ORDER — SODIUM CHLORIDE 0.9 % (FLUSH) 0.9 %
5-40 SYRINGE (ML) INJECTION AS NEEDED
Status: DISCONTINUED | OUTPATIENT
Start: 2022-10-27 | End: 2022-10-31 | Stop reason: HOSPADM

## 2022-10-27 RX ORDER — LANOLIN ALCOHOL/MO/W.PET/CERES
400 CREAM (GRAM) TOPICAL DAILY
Status: DISCONTINUED | OUTPATIENT
Start: 2022-10-28 | End: 2022-10-31 | Stop reason: HOSPADM

## 2022-10-27 RX ADMIN — GADOTERIDOL 20 ML: 279.3 INJECTION, SOLUTION INTRAVENOUS at 18:46

## 2022-10-27 RX ADMIN — SODIUM CHLORIDE, PRESERVATIVE FREE 10 ML: 5 INJECTION INTRAVENOUS at 22:20

## 2022-10-27 RX ADMIN — GABAPENTIN 300 MG: 300 CAPSULE ORAL at 22:20

## 2022-10-27 RX ADMIN — DEXAMETHASONE SODIUM PHOSPHATE 10 MG: 10 INJECTION INTRAMUSCULAR; INTRAVENOUS at 15:32

## 2022-10-27 RX ADMIN — BACLOFEN 10 MG: 10 TABLET ORAL at 22:20

## 2022-10-27 RX ADMIN — SODIUM CHLORIDE, PRESERVATIVE FREE 10 ML: 5 INJECTION INTRAVENOUS at 18:48

## 2022-10-27 NOTE — PROGRESS NOTES
Admission Medication Reconciliation:    Information obtained from:  patient, patient's son  RxQuery data available¹:  YES    Comments/Recommendations: All medications/allergies have been reviewed and updated. The patient and son were a reliable historians. Patient was able to confirm/deny current medications, frequencies, and some doses. Doses were confirmed using insurance claim information. Of note, patient had been instructed to resume dexamethasone 2 mg BID yesterday. (had been reduced to dexamethasone 2 mg daily). Pt has history of chronic cough which he uses inhaler steroids to treat; however, chronic cough has recently improves, so not using inhaler steroids. Changes made to Prior to Admission (PTA) Medication List:   ?   Medications Added:   - Eliquis, diclofenac, metformin  ? Medications Changed:   - furosemide, added dose and frequency  - pt reports taking Bactrim once daily, instead of BID  ? Medications Removed:   - amlodipine, fluticasone inhaler, lisinopril, pantoprazole, solifenacin, solriamfetol, tamsulosin, temozolomide,      ¹RxQuery pharmacy benefit data reflects medications filled and processed through the patient's insurance, however   this data does NOT capture whether the medication was picked up or is currently being taken by the patient. Allergies:  Keflex [cephalexin]    Significant PMH/Disease States:   Past Medical History:   Diagnosis Date    Arthritis     GERD (gastroesophageal reflux disease)     Morbid obesity [address] [address]        Chief Complaint for this Admission:    Chief Complaint   Patient presents with    Fatigue       Prior to Admission Medications:   Prior to Admission Medications   Prescriptions Last Dose Informant Patient Reported? Taking? B.animalis,bifid,infantis,long (Probiotic 4X) 10-15 mg TbEC   Yes No   Sig: Take  by mouth. apixaban (Eliquis) 5 mg tablet   Yes Yes   Sig: Take 5 mg by mouth two (2) times a day.    azelastine (ASTELIN) 137 mcg (0.1 %) nasal spray   Yes No   Si Sprays by Both Nostrils route two (2) times a day. Use in each nostril as directed    baclofen (LIORESAL) 10 mg tablet   Yes No   Sig: Take 10 mg by mouth three (3) times daily. budesonide-formoteroL (SYMBICORT) 160-4.5 mcg/actuation HFAA   Yes No   Sig: Take 2 Puffs by inhalation two (2) times a day. dexAMETHasone (DECADRON) 2 mg tablet   Yes No   Sig: Take 2 mg by mouth two (2) times a day. diclofenac EC (VOLTAREN) 75 mg EC tablet   Yes Yes   Sig: Take 75 mg by mouth two (2) times a day. esomeprazole (NEXIUM) 40 mg capsule   Yes No   Sig: Take 40 mg by mouth two (2) times a day. furosemide (LASIX) 20 mg tablet   Yes No   Sig: Take 20 mg by mouth daily. 2x a day   gabapentin (NEURONTIN) 300 mg capsule   No No   Sig: Take 1 Capsule by mouth three (3) times daily. Max Daily Amount: 900 mg.   levocetirizine (XYZAL) 5 mg tablet   Yes No   Sig: Take 5 mg by mouth.   magnesium oxide (MAG-OX) 400 mg tablet   Yes No   Sig: Take 400 mg by mouth in the morning. metFORMIN (GLUCOPHAGE) 500 mg tablet   Yes Yes   Sig: Take 500 mg by mouth daily (with breakfast). metroNIDAZOLE (METROLOTION) 0.75 % lotion   Yes No   Sig: Apply  to affected area two (2) times a day. ondansetron (ZOFRAN ODT) 4 mg disintegrating tablet   No No   Sig: Take 1-2 Tablets by mouth every eight (8) hours as needed for Nausea or Nausea or Vomiting.   trimethoprim-sulfamethoxazole (Bactrim)  mg per tablet   Yes Yes   Sig: Take 1 Tablet by mouth daily. Facility-Administered Medications: None         Thank you for allowing pharmacy to participate in the coordination of this patient's care. If you have any other questions, please contact the medication reconciliation pharmacist at [phone]. Silas Gunter, Pharm. D., [facility]

## 2022-10-27 NOTE — ED PROVIDER NOTES
EMERGENCY DEPARTMENT HISTORY AND PHYSICAL EXAM      Date: 10/27/2022  Patient Name: Elif Mistry    History of Presenting Illness     HPI: Elif Mistry, 61 y.o. male with pmhx of Glioblastoma s/p resection (follows with Dr. Dennis Pittman at [facility]), PE, presents to the ED with cc of increased weakness, fatigue, mildly increased confusion x1 day. Per wife, patient normally able to ambulate with a cane, and logroll himself out of bed. Over the past day, patient has been weak to the point of no longer being able to ambulate or get out of bed on his own. States that he \"slipped\" out of bed yesterday. Patient currently has no professional help at home. His wife as well as son were unable to lift him on their own, and had to call EMS with lift assist.  They discussed the symptoms with Dr. Triston Garcia at [facility], who advised for patient to come to the ER for workup, and felt patient will likely require admission due to safety concerns. PCP: Dae Allen MD    No current facility-administered medications on file prior to encounter. Current Outpatient Medications on File Prior to Encounter   Medication Sig Dispense Refill    dexAMETHasone (DECADRON) 2 mg tablet Take 2 mg by mouth two (2) times a day. magnesium oxide (MAG-OX) 400 mg tablet Take 400 mg by mouth in the morning. furosemide (LASIX PO) Take  by mouth. 2x a day      temozolomide (TEMODAR) 180 mg capsule 1 Capsule daily. Take 1 capsule by mouth daily for 42 days 42 Capsule 0    ondansetron (ZOFRAN ODT) 4 mg disintegrating tablet Take 1-2 Tablets by mouth every eight (8) hours as needed for Nausea or Nausea or Vomiting. 60 Tablet 1    trimethoprim-sulfamethoxazole (BACTRIM, SEPTRA)  mg per tablet Take 1 Tablet by mouth two (2) times a day. 42 Tablet 0    amLODIPine (NORVASC) 10 mg tablet Take 1 Tablet by mouth daily. 30 Tablet 1    gabapentin (NEURONTIN) 300 mg capsule Take 1 Capsule by mouth three (3) times daily.  Max Daily Amount: 900 mg. 90 Capsule 0    lisinopriL (PRINIVIL, ZESTRIL) 40 mg tablet Take 1 Tablet by mouth daily. Indications: high blood pressure 30 Tablet 1    tamsulosin (FLOMAX) 0.4 mg capsule Take 1 Capsule by mouth daily. 30 Capsule 1    pantoprazole (PROTONIX) 40 mg tablet Take 1 Tablet by mouth Before breakfast and dinner. 60 Tablet 0    azelastine (ASTELIN) 137 mcg (0.1 %) nasal spray 2 Sprays by Both Nostrils route two (2) times a day. Use in each nostril as directed       baclofen (LIORESAL) 10 mg tablet Take 10 mg by mouth three (3) times daily. esomeprazole (NEXIUM) 40 mg capsule Take 40 mg by mouth two (2) times a day. fluticasone propionate (FLOVENT HFA) 44 mcg/actuation inhaler Take  by inhalation. levocetirizine (XYZAL) 5 mg tablet Take 5 mg by mouth.      metroNIDAZOLE (METROLOTION) 0.75 % lotion Apply  to affected area two (2) times a day. B.animalis,bifid,infantis,long (Probiotic 4X) 10-15 mg TbEC Take  by mouth.      solifenacin (VESICARE) 10 mg tablet Take 10 mg by mouth daily. budesonide-formoteroL (SYMBICORT) 160-4.5 mcg/actuation HFAA Take 2 Puffs by inhalation two (2) times a day. solriamfetoL (Sunosi) 75 mg tab Take 75 mg by mouth daily. Past History     Past Medical History:  Past Medical History:   Diagnosis Date    Arthritis     GERD (gastroesophageal reflux disease)     Morbid obesity ([facility] [address]        Past Surgical History:  Past Surgical History:   Procedure Laterality Date    HX BACK SURGERY      2 laminectomies and fusion    HX ORTHOPAEDIC Right     orif hand    HX SHOULDER ARTHROSCOPY      HX TONSILLECTOMY      as a child    IR THROMBECTOMY [facility] ART PRIMARY NON AWAIS OR INTRACRANIAL  2022       Family History:  No family history on file.     Social History:  Social History     Tobacco Use    Smoking status: Former     Types: Cigarettes     Quit date: 1981     Years since quittin.4    Smokeless tobacco: Never   Vaping Use    Vaping Use: Never used   Substance Use Topics    Alcohol use: Yes     Comment: occasional    Drug use: Never       Allergies: Allergies   Allergen Reactions    Keflex [Cephalexin] Rash         Review of Systems   Review of Systems   Constitutional:  Positive for fatigue. Negative for chills and fever. HENT:  Negative for congestion and rhinorrhea. Eyes:  Negative for visual disturbance. Respiratory:  Negative for chest tightness and shortness of breath. Cardiovascular:  Negative for chest pain and palpitations. Gastrointestinal:  Negative for abdominal pain, diarrhea, nausea and vomiting. Genitourinary:  Negative for dysuria, flank pain and hematuria. Musculoskeletal:  Negative for back pain and neck pain. Skin:  Negative for rash. Allergic/Immunologic: Negative for immunocompromised state. Neurological:  Positive for weakness. Negative for dizziness, speech difficulty and headaches. Hematological:  Negative for adenopathy. Psychiatric/Behavioral:  Positive for confusion. Negative for dysphoric mood and suicidal ideas. Physical Exam   Physical Exam  Vitals and nursing note reviewed. Constitutional:       General: He is not in acute distress. Appearance: Normal appearance. He is not ill-appearing or toxic-appearing. Comments: Chronically ill in appearance   HENT:      Head: Normocephalic and atraumatic. Nose: Nose normal.      Mouth/Throat:      Mouth: Mucous membranes are moist.   Eyes:      Extraocular Movements: Extraocular movements intact. Pupils: Pupils are equal, round, and reactive to light. Cardiovascular:      Rate and Rhythm: Normal rate and regular rhythm. Pulses: Normal pulses. Pulmonary:      Effort: Pulmonary effort is normal. No respiratory distress. Breath sounds: Normal breath sounds. No stridor. No wheezing or rhonchi. Abdominal:      General: Abdomen is flat. There is no distension. Palpations: There is no mass. Tenderness: There is no abdominal tenderness. Musculoskeletal:         General: Normal range of motion. Cervical back: Normal range of motion and neck supple. Right lower leg: Edema present. Left lower leg: Edema present. Comments: Bilateral lower extremity edema- equal- baseline. Skin:     General: Skin is warm and dry. Neurological:      General: No focal deficit present. Mental Status: He is alert. Mental status is at baseline. He is disoriented and confused. Sensory: No sensory deficit. Motor: No weakness. Comments: Slow to respond- baseline. No new focal neuro deficits       Diagnostic Study Results     Labs -     Recent Results (from the past 24 hour(s))   CBC WITH AUTOMATED DIFF    Collection Time: 10/27/22  1:59 PM   Result Value Ref Range    WBC 4.4 4.1 - 11.1 K/uL    RBC 3.79 (L) 4.10 - 5.70 M/uL    HGB 12.6 12.1 - 17.0 g/dL    HCT 38.5 36.6 - 50.3 %    .6 (H) 80.0 - 99.0 FL    MCH 33.2 26.0 - 34.0 PG    MCHC 32.7 30.0 - 36.5 g/dL    RDW 14.5 11.5 - 14.5 %    PLATELET 151 (L) 234 - 400 K/uL    MPV 10.7 8.9 - 12.9 FL    NRBC 0.0 0  WBC    ABSOLUTE NRBC 0.00 0.00 - 0.01 K/uL    NEUTROPHILS 81 (H) 32 - 75 %    LYMPHOCYTES 12 12 - 49 %    MONOCYTES 6 5 - 13 %    EOSINOPHILS 0 0 - 7 %    BASOPHILS 0 0 - 1 %    IMMATURE GRANULOCYTES 1 (H) 0.0 - 0.5 %    ABS. NEUTROPHILS 3.6 1.8 - 8.0 K/UL    ABS. LYMPHOCYTES 0.5 (L) 0.8 - 3.5 K/UL    ABS. MONOCYTES 0.3 0.0 - 1.0 K/UL    ABS. EOSINOPHILS 0.0 0.0 - 0.4 K/UL    ABS. BASOPHILS 0.0 0.0 - 0.1 K/UL    ABS. IMM.  GRANS. 0.0 0.00 - 0.04 K/UL    DF SMEAR SCANNED      RBC COMMENTS MACROCYTOSIS  1+       METABOLIC PANEL, COMPREHENSIVE    Collection Time: 10/27/22  1:59 PM   Result Value Ref Range    Sodium 140 136 - 145 mmol/L    Potassium 3.6 3.5 - 5.1 mmol/L    Chloride 103 97 - 108 mmol/L    CO2 31 21 - 32 mmol/L    Anion gap 6 5 - 15 mmol/L    Glucose 96 65 - 100 mg/dL    BUN 24 (H) 6 - 20 MG/DL    Creatinine 1.13 0.70 - 1.30 MG/DL    BUN/Creatinine ratio 21 (H) 12 - 20      eGFR >60 >60 ml/min/1.73m2    Calcium 9.6 8.5 - 10.1 MG/DL    Bilirubin, total 0.5 0.2 - 1.0 MG/DL    ALT (SGPT) 67 12 - 78 U/L    AST (SGOT) 16 15 - 37 U/L    Alk. phosphatase 54 45 - 117 U/L    Protein, total 7.6 6.4 - 8.2 g/dL    Albumin 3.6 3.5 - 5.0 g/dL    Globulin 4.0 2.0 - 4.0 g/dL    A-G Ratio 0.9 (L) 1.1 - 2.2     SAMPLES BEING HELD    Collection Time: 10/27/22  1:59 PM   Result Value Ref Range    SAMPLES BEING HELD 1RED 1BLU     COMMENT        Add-on orders for these samples will be processed based on acceptable specimen integrity and analyte stability, which may vary by analyte. Radiologic Studies -   CT HEAD WO CONT   Final Result   Right frontal craniotomy with mass resection. Extensive hypoattenuation   surrounding the resection cavity, may reflect vasogenic edema, but   age-indeterminate infarct may have a similar appearance. No acute intracranial   hemorrhage. Stable 9 mm leftward midline shift and partial effacement of the   suprasellar cistern. XR CHEST PA LAT   Final Result      No acute process. MRI BRAIN W WO CONT    (Results Pending)     CT Results  (Last 48 hours)                 10/27/22 1244  CT HEAD WO CONT Final result    Impression:  Right frontal craniotomy with mass resection. Extensive hypoattenuation   surrounding the resection cavity, may reflect vasogenic edema, but   age-indeterminate infarct may have a similar appearance. No acute intracranial   hemorrhage. Stable 9 mm leftward midline shift and partial effacement of the   suprasellar cistern. Narrative:  EXAM: CT HEAD WO CONT       INDICATION: glioblastoma, increased confusion, weakness. Anticoagulated       COMPARISON: MRI brain 6/7/2022, CT head 6/6/2022. CONTRAST: None. TECHNIQUE: Unenhanced CT of the head was performed using 5 mm images. Brain and   bone windows were generated. Coronal and sagittal reformats.  CT dose reduction   was achieved through use of a standardized protocol tailored for this   examination and automatic exposure control for dose modulation. FINDINGS:   Right frontal craniotomy with mass resection. Extensive hypoattenuation   surrounding the resection cavity in the right frontal and temporal lobes. No   definite acute intracranial hemorrhage. 9 mm leftward midline shift is similar   to 6/6/2022 CT. Persistent effacement of the right lateral ventricle. Stable   partial effacement of the suprasellar cistern. No evidence of transforaminal   herniation. Stable chronic hypoattenuation in the right aspect of the miguel angel. .       The visualized portions of the paranasal sinuses and mastoid air cells are   clear. CXR Results  (Last 48 hours)                 10/27/22 1211  XR CHEST PA LAT Final result    Impression:      No acute process. Narrative:  EXAM:  XR CHEST PA LAT       INDICATION: Weakness and confusion       COMPARISON: 10/24/2022       TECHNIQUE: Frontal and lateral chest views       FINDINGS: The cardiomediastinal contours are stable. The pulmonary vasculature   is within normal limits. The lungs and pleural spaces are clear. There is no pneumothorax. The bones and   upper abdomen are stable. Medical Decision Making   I, Saul Garcia MD-- am the first provider for this patient, and I am the attending of record for this patient encounter. I reviewed the vital signs, available nursing notes, past medical history, past surgical history, family history and social history. Vital Signs-Reviewed the patient's vital signs.   Patient Vitals for the past 24 hrs:   Temp Pulse Resp BP SpO2   10/27/22 1044 97.9 °F (36.6 °C) 82 18 (!) 168/69 97 %     Records Reviewed: Nursing Notes and Old Medical Records    Provider Notes (Medical Decision Making):   Ddx: metabolic encephalopathy, UTI, pneumonia, electrolyte derangements, dehydration, progression of glioblastoma, functional decline, etc.    Plan: EKG, labs, UA, CT head to start. I spoke to Dr. Linwood Roach at [facility], who is concerned about patient's current sxs- and confirms history above. States patient will require workup to assess for possible metabolic process, but if workup in ED otherwise negative- will require MRI brain and admission. ED Course as of 10/27/22 1513   Thu Oct 27, 2022   1058 EKG at 1053: Normal sinus rhythm, no acute ST changes of concern- largely unchanged compared to prior EKG. QTc normal [JM]   1512 CT head show \"Right frontal craniotomy with mass resection. Extensive hypoattenuation  surrounding the resection cavity, may reflect vasogenic edema, but  age-indeterminate infarct may have a similar appearance. No acute intracranial  hemorrhage. Stable 9 mm leftward midline shift and partial effacement of the  suprasellar cistern. \"    Patient currently takes 4 mg PO decadron at home, but has not had his dose today. Will administer 10 mg of IV decadron. MRI brain w wo cont ordered for further evaluation of above findings. Labs thus far largely unremarkable. Plan for admission. [JM]      ED Course User Index  [JM] Valentino Shawl, MD     Perfect Serve Consult for Admission  3:13 PM    ED Room Number: ZV06/84  Patient Name and age:  Michael Dobbs 61 y.o.  male  Working Diagnosis:   1. Generalized weakness    2. Ambulatory dysfunction    3. Glioblastoma multiforme ([facility] [address]        COVID-19 Suspicion:  no  Sepsis present:  no  Reassessment needed: no  Code Status:  Full Code  Readmission: no  Isolation Requirements:  no  Recommended Level of Care:  med/surg  Department:[facility] Adult ED - 21       ED Course:   Initial assessment performed. The patient's presenting problems have been discussed, and they are in agreement with the care plan formulated and outlined with them. I have encouraged them to ask questions as they arise throughout their visit.     Tamar Gutierrez MD      Disposition:  Admit    Diagnosis     Clinical Impression:   1. Generalized weakness    2. Ambulatory dysfunction    3. Glioblastoma multiforme ([address]        Attestations:    Aneesh Wing MD    Please note that this dictation was completed with Tapru, the computer voice recognition software. Quite often unanticipated grammatical, syntax, homophones, and other interpretive errors are inadvertently transcribed by the computer software. Please disregard these errors. Please excuse any errors that have escaped final proofreading. Thank you.

## 2022-10-27 NOTE — ED TRIAGE NOTES
Pt from home for general malaise since yesterday, has PMH of brain cancer. Oncologist referred pt to ER for medication adjustment. Pt has no other complaints.

## 2022-10-28 PROBLEM — Z71.89 GOALS OF CARE, COUNSELING/DISCUSSION: Status: ACTIVE | Noted: 2022-10-28

## 2022-10-28 PROBLEM — Z51.5 PALLIATIVE CARE ENCOUNTER: Status: ACTIVE | Noted: 2022-10-28

## 2022-10-28 PROBLEM — R63.0 ANOREXIA: Status: ACTIVE | Noted: 2022-10-28

## 2022-10-28 PROBLEM — R53.81 DEBILITY: Status: ACTIVE | Noted: 2022-10-27

## 2022-10-28 LAB
ALBUMIN SERPL-MCNC: 3 G/DL (ref 3.5–5)
ALBUMIN/GLOB SERPL: 1 {RATIO} (ref 1.1–2.2)
ALP SERPL-CCNC: 49 U/L (ref 45–117)
ALT SERPL-CCNC: 54 U/L (ref 12–78)
ANION GAP SERPL CALC-SCNC: 9 MMOL/L (ref 5–15)
AST SERPL-CCNC: 20 U/L (ref 15–37)
BASOPHILS # BLD: 0 K/UL (ref 0–0.1)
BASOPHILS NFR BLD: 0 % (ref 0–1)
BILIRUB SERPL-MCNC: 0.3 MG/DL (ref 0.2–1)
BUN SERPL-MCNC: 24 MG/DL (ref 6–20)
BUN/CREAT SERPL: 24 (ref 12–20)
CALCIUM SERPL-MCNC: 8.7 MG/DL (ref 8.5–10.1)
CHLORIDE SERPL-SCNC: 106 MMOL/L (ref 97–108)
CO2 SERPL-SCNC: 26 MMOL/L (ref 21–32)
CREAT SERPL-MCNC: 0.99 MG/DL (ref 0.7–1.3)
DIFFERENTIAL METHOD BLD: ABNORMAL
EOSINOPHIL # BLD: 0 K/UL (ref 0–0.4)
EOSINOPHIL NFR BLD: 0 % (ref 0–7)
ERYTHROCYTE [DISTWIDTH] IN BLOOD BY AUTOMATED COUNT: 14.6 % (ref 11.5–14.5)
GLOBULIN SER CALC-MCNC: 3 G/DL (ref 2–4)
GLUCOSE SERPL-MCNC: 122 MG/DL (ref 65–100)
HCT VFR BLD AUTO: 33.7 % (ref 36.6–50.3)
HGB BLD-MCNC: 11.4 G/DL (ref 12.1–17)
IMM GRANULOCYTES # BLD AUTO: 0 K/UL (ref 0–0.04)
IMM GRANULOCYTES NFR BLD AUTO: 1 % (ref 0–0.5)
LYMPHOCYTES # BLD: 0.4 K/UL (ref 0.8–3.5)
LYMPHOCYTES NFR BLD: 11 % (ref 12–49)
MCH RBC QN AUTO: 34 PG (ref 26–34)
MCHC RBC AUTO-ENTMCNC: 33.8 G/DL (ref 30–36.5)
MCV RBC AUTO: 100.6 FL (ref 80–99)
MONOCYTES # BLD: 0.2 K/UL (ref 0–1)
MONOCYTES NFR BLD: 5 % (ref 5–13)
NEUTS SEG # BLD: 3.2 K/UL (ref 1.8–8)
NEUTS SEG NFR BLD: 83 % (ref 32–75)
NRBC # BLD: 0 K/UL (ref 0–0.01)
NRBC BLD-RTO: 0 PER 100 WBC
PLATELET # BLD AUTO: 121 K/UL (ref 150–400)
PMV BLD AUTO: 10.8 FL (ref 8.9–12.9)
POTASSIUM SERPL-SCNC: 4.2 MMOL/L (ref 3.5–5.1)
PROT SERPL-MCNC: 6 G/DL (ref 6.4–8.2)
RBC # BLD AUTO: 3.35 M/UL (ref 4.1–5.7)
RBC MORPH BLD: ABNORMAL
SODIUM SERPL-SCNC: 141 MMOL/L (ref 136–145)
WBC # BLD AUTO: 3.8 K/UL (ref 4.1–11.1)

## 2022-10-28 PROCEDURE — 97161 PT EVAL LOW COMPLEX 20 MIN: CPT

## 2022-10-28 PROCEDURE — 97530 THERAPEUTIC ACTIVITIES: CPT

## 2022-10-28 PROCEDURE — 74011250637 HC RX REV CODE- 250/637: Performed by: HOSPITALIST

## 2022-10-28 PROCEDURE — 97535 SELF CARE MNGMENT TRAINING: CPT

## 2022-10-28 PROCEDURE — 97116 GAIT TRAINING THERAPY: CPT

## 2022-10-28 PROCEDURE — 80053 COMPREHEN METABOLIC PANEL: CPT

## 2022-10-28 PROCEDURE — 65270000046 HC RM TELEMETRY

## 2022-10-28 PROCEDURE — 74011250636 HC RX REV CODE- 250/636: Performed by: HOSPITALIST

## 2022-10-28 PROCEDURE — 36415 COLL VENOUS BLD VENIPUNCTURE: CPT

## 2022-10-28 PROCEDURE — 85025 COMPLETE CBC W/AUTO DIFF WBC: CPT

## 2022-10-28 PROCEDURE — 74011000250 HC RX REV CODE- 250: Performed by: HOSPITALIST

## 2022-10-28 PROCEDURE — 97165 OT EVAL LOW COMPLEX 30 MIN: CPT

## 2022-10-28 RX ADMIN — BACLOFEN 10 MG: 10 TABLET ORAL at 09:44

## 2022-10-28 RX ADMIN — SODIUM CHLORIDE, PRESERVATIVE FREE 10 ML: 5 INJECTION INTRAVENOUS at 17:11

## 2022-10-28 RX ADMIN — FUROSEMIDE 20 MG: 20 TABLET ORAL at 09:45

## 2022-10-28 RX ADMIN — GABAPENTIN 300 MG: 300 CAPSULE ORAL at 09:45

## 2022-10-28 RX ADMIN — DEXAMETHASONE SODIUM PHOSPHATE 4 MG: 4 INJECTION, SOLUTION INTRAMUSCULAR; INTRAVENOUS at 17:11

## 2022-10-28 RX ADMIN — DEXAMETHASONE SODIUM PHOSPHATE 4 MG: 4 INJECTION, SOLUTION INTRAMUSCULAR; INTRAVENOUS at 05:55

## 2022-10-28 RX ADMIN — BACLOFEN 10 MG: 10 TABLET ORAL at 17:10

## 2022-10-28 RX ADMIN — BACLOFEN 10 MG: 10 TABLET ORAL at 22:06

## 2022-10-28 RX ADMIN — GABAPENTIN 300 MG: 300 CAPSULE ORAL at 22:06

## 2022-10-28 RX ADMIN — Medication 400 MG: at 09:44

## 2022-10-28 RX ADMIN — GABAPENTIN 300 MG: 300 CAPSULE ORAL at 17:10

## 2022-10-28 RX ADMIN — PANTOPRAZOLE SODIUM 40 MG: 40 TABLET, DELAYED RELEASE ORAL at 07:30

## 2022-10-28 RX ADMIN — DEXAMETHASONE SODIUM PHOSPHATE 4 MG: 4 INJECTION, SOLUTION INTRAMUSCULAR; INTRAVENOUS at 01:11

## 2022-10-28 RX ADMIN — SULFAMETHOXAZOLE AND TRIMETHOPRIM 1 TABLET: 400; 80 TABLET ORAL at 09:44

## 2022-10-28 RX ADMIN — DEXAMETHASONE SODIUM PHOSPHATE 4 MG: 4 INJECTION, SOLUTION INTRAMUSCULAR; INTRAVENOUS at 12:03

## 2022-10-28 RX ADMIN — SODIUM CHLORIDE, PRESERVATIVE FREE 5 ML: 5 INJECTION INTRAVENOUS at 06:00

## 2022-10-28 RX ADMIN — SODIUM CHLORIDE, PRESERVATIVE FREE 10 ML: 5 INJECTION INTRAVENOUS at 22:06

## 2022-10-28 NOTE — CONSULTS
Palliative Medicine Consult  Caio: [phone] ([phone])    Patient Name: Radha Calderon  YOB: 1955    Date of Initial Consult: 10/28/2022  Reason for Consult: care decisions  Requesting Provider: Liliana Pool    Primary Care Physician: Adam Schrader MD     SUMMARY:   Radha Calderon is a 61 y.o. presented to the ED on 10/27/2022 from home due to weakness, confusion and fatigue over the past 24 hours. Admitted with a diagnosis of recurrence of GBM. PMH: glioblastoma (resected June 6, 2022 by Dr. Nima Metz), followed by Dr. Rima Concepcion at [facility], recent PE (Eliquis), HTN, GERD, obesity     Hospitalizations  9/13/2022-9/16/2022:  [facility]    Current medical issues leading to Palliative Medicine involvement include: care decisions. Social:  Spouse:  Pan Arvizu       Dtr:  Shannon Estevez      Son:  Juventino Sewell    for 30 years. 2 children. Patient is a  at [employer]. 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 He has been on leave since his GBM surgery in June. He currently does not drive. He walks with a cane and sometimes a walker and he is in a wheelchair if they are out. The patient enjoys chess, he plays a guitar and sings. 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 PALLIATIVE DIAGNOSES:   Palliative care encounter  Goals of care discussion  Debility   Anorexia        PLAN:   Met with patient and wife. Wife was on a call and had a meting at 12 noon. We made plans for me to return at 2pm  Met with patient and wife. Introduced palliative care services. In talking more with the family, he had resection of GBM on June 6, 2022 by Dr. Nima Metz. He had 6 weeks of radiation and 6 weeks of chemo. He is currently undergoing a drug trial at [facility] with Dr. Rima Concepcion. Patient is on leave as  at [employer]. He is not driving at this time.   The patient and his wife have not spoken to neurosurgery at this time.    We did discuss means of support for the patient. He is undergoing psychotherapy with a [employer] grad student. He is also doing spiritual work through his [employer]. We talked about goals of care. I did acknowledge that he has not spoken to neurosurgery at this time. He wants to survive as long as possible. He does want this 'living to be with quality'      We discussed code status. Patient confirmed a full code status  Patient is working with case management for equipment he may need at discharge  Will have music therapy see patient  I messaged Randal Valdovinos. She indicated Dr. Felix Renteria will see the patient after his OR case today. Initial consult note routed to primary continuity provider and/or primary health care team members  Communicated plan of care with: Palliative Sarah FRANCOIS 192 Team     GOALS OF CARE / TREATMENT PREFERENCES:     GOALS OF CARE:  Patient/Health Care Proxy Stated Goals: Prolong life    TREATMENT PREFERENCES:   Code Status: Full Code    Patient and family's personal goals include: survive as long as possible but does focus also on quality of life     Important upcoming milestones or family events: none reported     The patient identifies the following as important for living well: he loves being  professor [employer]:  [] [facility] Interdisciplinary Team has updated the ACP Navigator with Health Care Decision Maker and Patient Capacity      Advance Care Planning 10/28/2022   Patient's Healthcare Decision Maker is: Legal [facility] Directive Yes, not on file   WifeKierra is the primary decision maker   Dtr, Coral Abrams is the secondary decision maker     Medical Interventions: Full interventions       Other:    As far as possible, the palliative care team has discussed with patient / health care proxy about goals of care / treatment preferences for patient.      HISTORY:     History obtained from: chart, team, family    CHIEF COMPLAINT: Pt admitted with aforementioned history and issues    HPI/SUBJECTIVE:    The patient is:   [x] Verbal and participatory  [] Non-participatory due to       Clinical Pain Assessment (nonverbal scale for severity on nonverbal patients):   Clinical Pain Assessment  Severity: 0          Duration: for how long has pt been experiencing pain (e.g., 2 days, 1 month, years)  Frequency: how often pain is an issue (e.g., several times per day, once every few days, constant)     FUNCTIONAL ASSESSMENT:     Palliative Performance Scale (PPS):  PPS: 40       PSYCHOSOCIAL/SPIRITUAL SCREENING:     Palliative IDT has assessed this patient for cultural preferences / practices and a referral made as appropriate to needs (Cultural Services, Patient Advocacy, Ethics, etc.)    Any spiritual / Jew concerns:  [] Yes /  [x] No  [] Unable to obtain this information  If \"Yes\" to discuss with pastoral care during IDT     Does caregiver feel burdened by caring for their loved one:   [] Yes /  [x] No /  [] No Caregiver Present/Available [] No Caregiver [] Pt Lives at [place] [address]  If \"Yes\" to discuss with social work during IDT    Anticipatory grief assessment:   [x] Normal  / [] Maladaptive   [] Unable to obtain this information  [] n/a  If \"Maladaptive\" to discuss with social work during IDT    ESAS Anxiety: Anxiety: 0    ESAS Depression:          REVIEW OF SYSTEMS:     Positive and pertinent negative findings in ROS are noted above in HPI. The following systems were [x] reviewed objectively / [] unable to be reviewed as noted in HPI  Other findings are noted below. Systems: constitutional, ears/nose/mouth/throat, respiratory, gastrointestinal, genitourinary, musculoskeletal, integumentary, neurologic, psychiatric, endocrine. Positive findings noted below.   Modified ESAS Completed by: provider   Fatigue: 4 Drowsiness: 1     Pain: 0   Anxiety: 0       Dyspnea: 0                    PHYSICAL EXAM:     From RN flowsheet:  Wt Readings from Last 3 Encounters:   09/15/22 255 lb 4.7 oz (115.8 kg)   22 257 lb 9.6 oz (116.8 kg)   22 245 lb (111.1 kg)     Blood pressure (!) 157/90, pulse 100, temperature 98 °F (36.7 °C), resp. rate 14, SpO2 96 %. Pain Scale 1: Numeric (0 - 10)  Pain Intensity 1: 0                 Last bowel movement, if known:     Constitutional: patient sitting in a chair, participating in a conversation,  he is slightly confused about a few details (talking about his last stay at [facility] and billing issues- this is true per the wife but patient is a little uncler)  Eyes: pupils equal, anicteric  ENMT: no nasal discharge, moist mucous membranes  Cardiovascular: regular rhythm  Respiratory: breathing not labored, symmetric, room air   Gastrointestinal:   Musculoskeletal: no deformity, no tenderness to palpation  Skin: warm, dry  Neurologic: following commands, moving all extremities  Psychiatric: full affect, no hallucinations  Other:       HISTORY:     Active Problems:    Weakness (10/27/2022)      Generalized weakness (10/27/2022)    Past Medical History:   Diagnosis Date    Arthritis     GERD (gastroesophageal reflux disease)     Morbid obesity ([facility] [address]       Past Surgical History:   Procedure Laterality Date    HX BACK SURGERY      2 laminectomies and fusion    HX ORTHOPAEDIC Right     orif hand    HX SHOULDER ARTHROSCOPY      HX TONSILLECTOMY      as a child    IR THROMBECTOMY [facility] ART PRIMARY NON AWAIS OR INTRACRANIAL  2022      No family history on file. History reviewed, no pertinent family history.   Social History     Tobacco Use    Smoking status: Former     Types: Cigarettes     Quit date: 1981     Years since quittin.4    Smokeless tobacco: Never   Substance Use Topics    Alcohol use: Yes     Comment: occasional     Allergies   Allergen Reactions    Keflex [Cephalexin] Rash      Current Facility-Administered Medications   Medication Dose Route Frequency    azelastine (ASTELIN) 137mcg/spray nasal spray  2 Spray Both Nostrils BID    baclofen (LIORESAL) tablet 10 mg  10 mg Oral TID    gabapentin (NEURONTIN) capsule 300 mg  300 mg Oral TID    ondansetron (ZOFRAN ODT) tablet 4-8 mg  4-8 mg Oral Q8H PRN    furosemide (LASIX) tablet 20 mg  20 mg Oral DAILY    magnesium oxide (MAG-OX) tablet 400 mg  400 mg Oral DAILY    trimethoprim-sulfamethoxazole (BACTRIM, SEPTRA)  mg per tablet 1 Tablet  1 Tablet Oral DAILY    pantoprazole (PROTONIX) tablet 40 mg  40 mg Oral ACB    cetirizine (ZYRTEC) tablet 10 mg  10 mg Oral DAILY    arformoterol 15 mcg/budesonide 0.25 mg neb solution   Nebulization BID RT    [Held by provider] apixaban (ELIQUIS) tablet 5 mg  5 mg Oral BID    sodium chloride (NS) flush 5-40 mL  5-40 mL IntraVENous Q8H    sodium chloride (NS) flush 5-40 mL  5-40 mL IntraVENous PRN    acetaminophen (TYLENOL) tablet 650 mg  650 mg Oral Q6H PRN    Or    acetaminophen (TYLENOL) suppository 650 mg  650 mg Rectal Q6H PRN    polyethylene glycol (MIRALAX) packet 17 g  17 g Oral DAILY PRN    promethazine (PHENERGAN) tablet 12.5 mg  12.5 mg Oral Q6H PRN    Or    ondansetron (ZOFRAN) injection 4 mg  4 mg IntraVENous Q6H PRN    dexamethasone (DECADRON) 4 mg/mL injection 4 mg  4 mg IntraVENous Q6H    levETIRAcetam (KEPPRA) injection 500 mg  500 mg IntraVENous Q12H          LAB AND IMAGING FINDINGS:     Lab Results   Component Value Date/Time    WBC 3.8 (L) 10/28/2022 05:49 AM    HGB 11.4 (L) 10/28/2022 05:49 AM    PLATELET 028 (L) 55/92/6713 05:49 AM     Lab Results   Component Value Date/Time    Sodium 141 10/28/2022 05:49 AM    Potassium 4.2 10/28/2022 05:49 AM    Chloride 106 10/28/2022 05:49 AM    CO2 26 10/28/2022 05:49 AM    BUN 24 (H) 10/28/2022 05:49 AM    Creatinine 0.99 10/28/2022 05:49 AM    Calcium 8.7 10/28/2022 05:49 AM    Magnesium 2.1 10/24/2022 01:59 PM    Phosphorus 2.5 (L) 06/15/2022 12:23 AM      Lab Results   Component Value Date/Time    Alk.  phosphatase 49 10/28/2022 05:49 AM Protein, total 6.0 (L) 10/28/2022 05:49 AM    Albumin 3.0 (L) 10/28/2022 05:49 AM    Globulin 3.0 10/28/2022 05:49 AM     Lab Results   Component Value Date/Time    INR 1.1 10/24/2022 01:59 PM    Prothrombin time 11.6 (H) 10/24/2022 01:59 PM    aPTT 30.1 09/16/2022 04:51 AM      No results found for: IRON, FE, TIBC, IBCT, PSAT, FERR   No results found for: PH, PCO2, PO2  No components found for: GLPOC   No results found for: CPK, CKMB             Total time:  50 minutes  Counseling / coordination time, spent as noted above:  40 minutes  > 50% counseling / coordination?: yes    Prolonged service was provided for  []30 min   []75 min in face to face time in the presence of the patient, spent as noted above. Time Start:   Time End:   Note: this can only be billed with 90530 (initial) or 01619 (follow up). If multiple start / stop times, list each separately.

## 2022-10-28 NOTE — PROGRESS NOTES
Transition of Care Plan  RUR- Med 19%  DISPOSITION: Home with family assist  Home health PT/OT/SN/A- [address] with [facility]  F/U with PCP/Specialist    Transport: Spouse    Reason for Admission:   Generalized weakness and confusion since past one                     RUR Score:     19%             PCP: First and Last name:   Shandra Manley MD     Name of Practice:    Are you a current patient: Yes/No: Yes   Approximate date of last visit: 10/20   Can you participate in a virtual visit if needed:     Do you (patient/family) have any concerns for transition/discharge? No concerns present              Plan for utilizing home health:   [facility], PT/OT consulted    Current Advanced Directive/Advance Care Plan:  Full Code      Healthcare Decision Maker: Jose Alberto Ruth [phone]  Click here to complete [address] including selection of the Healthcare Decision Maker Relationship (ie \"Primary\")              Transition of Care Plan:          CM met with patient at bedside to introduce self and role. Living situation: lives with spouse and adult son in 2 story home, 5 steps to enter, 14 steps interior   ADLs: needs assist dressing and getting out of bed, otherwise is independent  DME: cane, RW, WC, shower bench  Previous IPR/SNF: [facility]  Previous home health: [facility]  Demographics: confirmed, [employer] insured  Pharmacy: [address] point of contact: Jose Alberto Ruth [phone]    Neurosurgery, Palliaitive and hematology consulted. CM to follow patient progress and assist as recommended with PETE plan. Care Management Interventions  PCP Verified by CM: Yes  Palliative Care Criteria Met (RRAT>21 & CHF Dx)?: Yes  Palliative Consult Recommended?: Yes  Mode of Transport at Discharge:  Other (see comment) (spouse)  MyChart Signup: Yes  Health Maintenance Reviewed: Yes  Physical Therapy Consult: Yes  Occupational Therapy Consult: Yes  Speech Therapy Consult: No  Support Systems: Spouse/Significant Other, Child(sharron)  Confirm Follow Up Transport: Family  The Plan for Transition of Care is Related to the Following Treatment Goals : home  Name of the Patient Representative Who was Provided with a Choice of Provider and Agrees with the Discharge Plan: home  Discharge Location  Patient Expects to be Discharged to[de-identified] Home with family assistance    Transition of Care Plan: PT/OT recommending [facility] for discharge. CM met with patient and spouse at bedside to discuss DC plan. They prefer [facility], [facility]. Patient's spouse requested a home health aid. Unsure if [facility] will provide HHA. Patient's spouse plans on hiring personal care aids, list of agencies provided to spouse. PT recommending hospital bed. Referral sent to [facility] in [place]. The Plan for Transition of Care is related to the following treatment goals: [facility] PT/OT/SN/HHA    The Patient and/or patient representative Iona Nance  was provided with a choice of provider and agrees  with the discharge plan. Yes [x] No []    A Freedom of choice list was provided with basic dialogue that supports the patient's individualized plan of care/goals and shares the quality data associated with the providers. Yes [x] No []    [facility] has accepted patient for home health PT/OT/SN/HHA. Follow up info placed on AVS.    3:49pm: [facility] has approved patient for hospital bed and will reach out to spouse for delivery. Follow up info placed on AVS.    Patient's spouse has connected with [facility] for hospital bed delivery - unsure of when bed will be delivered. In addition, she has also connected with Janett Jay from [facility] to coordinate personal care services. Dr. Pavel Rueda updated that discharge would likely be arranged by tomorrow.  Patient's spouse prefers to transport patient home. AKIRA Segovia.

## 2022-10-28 NOTE — PROGRESS NOTES
Problem: Self Care Deficits Care Plan (Adult)  Goal: *Acute Goals and Plan of Care (Insert Text)  Description: FUNCTIONAL STATUS PRIOR TO ADMISSION: Note patient has history of GBM with prior craniotomy for resection. He had progressed to be ambulatory without assistance using RW, used SPC to ascend/ descend stairs, used wheelchair for outings, performed toileting without assistance using bidet for bowel hygiene, bathed seated without assistance seated in shower, and received assistance for dressing. Recently he has had increased confusion per wife as well as increasing weakness, reduced mobility, and 2 \"slips\" to floor requiring assistance to recover, leading to current admission. HOME SUPPORT: Patient resided at home with his wife to provide assistance. His son and daughter are also supportive. Occupational Therapy Goals  Initiated 10/28/2022  1. Patient will perform grooming standing at sink with contact guard assist within 7 day(s). 2.  Patient will perform bathing with minimal assistance within 7 day(s). 3.  Patient will perform upper body dressing with supervision/set-up within 7 day(s). 4.  Patient will perform lower body dressing using AE PRN with minimal assistance within 7 days. 5.  Patient will perform toilet transfers with contact guard assist within 7 day(s). 6.  Patient will perform all aspects of toileting with minimal assistance within 7 day(s). 7.  Patient will participate in upper extremity therapeutic exercise/activities with supervision/set-up for 10 minutes within 7 day(s). 10/28/2022 1112 by Fernando Holman OT  Outcome: Not Met    OCCUPATIONAL THERAPY EVALUATION  Patient: Jerod Parker (76 y.o. male)  Date: 10/28/2022  Primary Diagnosis: Weakness [R53.1]  Generalized weakness [R53.1]       Precautions: fall       ASSESSMENT  Note patient has history of GBM with prior craniotomy for resection.   He had progressed at home to be ambulatory without assistance using RW, used SPC to ascend/ descend stairs, used wheelchair for outings, performed toileting without assistance using bidet for bowel hygiene, bathed seated without assistance seated in shower, and received assistance for dressing. Recently he has had increased confusion per wife as well as increasing weakness, reduced mobility, and 2 \"slips\" to floor requiring assistance to recover, leading to current admission. Patient presents today for OT evaluation with very impaired attention to task (talkative, requiring frequent redirection to focus on ADL/ mobility performance), impaired safety awareness/ insight into deficits, impaired hygiene awareness (received soiled, unaware), general weakness + mild L hemiparesis, mild PRAJAPATI, mild L personal neglect, impaired sitting and standing balance (L lean), and impaired RW management (requiring assistance to keep RW closer anteriorly). Patient is below his recent baseline but his wife reports his current presentation is an improvement from earlier this week. Discharge recommendation TBD pending neurosurgery consult/ goals of care. If patient d/c home, he would benefit from [address] [place] [address] and would absolutely require require x1 assistance for all mobility tasks and out of bed ADLs due to cognitive impairment and high fall risk. Current Level of Function Impacting Discharge (ADLs/self-care): Required minimum assistance for supine-sit (logrolling), sit-stand, ambulating to/ from bathroom using RW. Required maximum assistance for LB dressing and toileting. Infer overall patient requires set-up to minimum assistance UB ADLs and maximum assistance LB ADLs. Functional Outcome Measure: The patient scored 35/100 on the Barthel Index outcome measure which is indicative of ~65% impairment in functional performance. Patient will benefit from skilled therapy intervention to address the above noted impairments.        PLAN :  Recommendations and Planned Interventions: self care training, functional mobility training, therapeutic exercise, balance training, visual/perceptual training, therapeutic activities, cognitive retraining, endurance activities, neuromuscular re-education, patient education, home safety training, and family training/education    Frequency/Duration: Patient will be followed by occupational therapy 5 times a week to address goals. Recommendation for discharge: (in order for the patient to meet his/her long term goals)  Discharge recommendation TBD pending neurosurgery consult/ goals of care. If patient d/c home, he would benefit from [facility] and would absolutely require require x1 assistance for all mobility tasks and out of bed ADLs due to cognitive impairment and high fall risk. This discharge recommendation:  Has not yet been discussed the attending provider and/or case management         SUBJECTIVE:   Patient stated I need to keep the walker closer to me.     OBJECTIVE DATA SUMMARY:   HISTORY:   Past Medical History:   Diagnosis Date    Arthritis     GERD (gastroesophageal reflux disease)     Morbid obesity ([address]      Past Surgical History:   Procedure Laterality Date    HX BACK SURGERY      2 laminectomies and fusion    HX ORTHOPAEDIC Right     orif hand    HX SHOULDER ARTHROSCOPY      HX TONSILLECTOMY      as a child    IR THROMBECTOMY [facility] ART PRIMARY NON AWAIS OR INTRACRANIAL  9/14/2022       Expanded or extensive additional review of patient history:     Home Situation  Home Environment: Private residence  # Steps to Enter: 5  Rails to Enter: Yes  One/Two Story Residence: Two story  Living Alone: No  Support Systems: Spouse/Significant Other  Patient Expects to be Discharged to[de-identified] Home with family assistance  Current DME Used/Available at Home: Mukund Guerrero, rolling, [employer], straight, Wheelchair, [address] chair, Grab bars (bidet for bowel hygiene)  Tub or Shower Type: Shower    EXAMINATION OF PERFORMANCE DEFICITS:  Cognitive/Behavioral Status:  Neurologic State: Alert  Orientation Level: Oriented X4  Cognition: Follows commands;Decreased attention/concentration;Poor safety awareness  Perception: Cues to attend to left side of body     Safety/Judgement: Decreased insight into deficits; Decreased awareness of need for safety    Skin: visible skin appears intact    Edema: moderate-severe BLE, distal pitting    Hearing: Auditory  Auditory Impairment: None    Vision/Perceptual:    Tracking: Requires cues, head turns, or add eye shifts to track; Able to track right of midline (decreased/ partial tracking to L)              Visual Fields:  (intact)  Diplopia: No    Acuity: Within Defined Limits    Corrective Lenses: Glasses    Range of Motion:    AROM: Generally decreased, functional                         Strength:    Strength: Generally decreased, functional (generalized weakness + mild L HP)                Coordination:  Coordination: Generally decreased, functional  Fine Motor Skills-Upper: Left Impaired;Right Impaired    Gross Motor Skills-Upper: Left Impaired;Right Impaired    Tone & Sensation:    Tone: Normal  Sensation: Impaired (b/l feel diminished at baseline)                      Balance:  Sitting: Impaired; With support  Sitting - Static: Fair (occasional)  Sitting - Dynamic: Poor (constant support)  Standing: Impaired; With support (RW)  Standing - Static: Fair  Standing - Dynamic : Poor    Functional Mobility and Transfers for ADLs:  Bed Mobility:  Rolling: Minimum assistance  Supine to Sit: Minimum assistance (logroll)    Transfers:  Sit to Stand: Minimum assistance  Stand to Sit: Minimum assistance  Bed to Chair: Minimum assistance  Toilet Transfer : Minimum assistance (standard toilet, in bathroom, using grab bar)    ADL Assessment:  Feeding: Setup (inferred)    Oral Facial Hygiene/Grooming: Setup (inferred, seated)    Bathing:  Moderate assistance (inferred for balance, impaired LB reach)         Upper Body Dressing: Minimum assistance (inferred for balance, AROM, mild L HP)    Lower Body Dressing: Maximum assistance (total A for socks, infer maxA overall)    Toileting: Maximum assistance (able to partially raise/ lower brief, totalA for bowel hygiene, 1st rolling in bed after being recieved in toilet, then standing with steadying after using toilet)              Functional Measure:    Barthel Index:  Bathin  Bladder: 5  Bowels: 5  Groomin  Dressin  Feeding: 10  Mobility: 0  Stairs: 0  Toilet Use: 0  Transfer (Bed to Chair and Back): 10  Total: 35/100      The Barthel ADL Index: Guidelines  1. The index should be used as a record of what a patient does, not as a record of what a patient could do. 2. The main aim is to establish degree of independence from any help, physical or verbal, however minor and for whatever reason. 3. The need for supervision renders the patient not independent. 4. A patient's performance should be established using the best available evidence. Asking the patient, friends/relatives and nurses are the usual sources, but direct observation and common sense are also important. However direct testing is not needed. 5. Usually the patient's performance over the preceding 24-48 hours is important, but occasionally longer periods will be relevant. 6. Middle categories imply that the patient supplies over 50 per cent of the effort. 7. Use of aids to be independent is allowed. Score Interpretation (from [address] [address])    Independent   60-79 Minimally independent   40-59 Partially dependent   20-39 Very dependent   <20 Totally dependent     -Mitchell Christine., Barthel, D.W. (1965). Functional evaluation: the Barthel Index. [facility] ([address] (1997). The Barthel activities of daily living index: self-reporting versus actual performance in the old (> or = 75 years). Journal of [address] [place] [address] 45(7), [facility], JNORMA, Nereyda Teresa., Romana Just. (1999).  Measuring the change in disability after inpatient rehabilitation; comparison of the responsiveness of the Barthel Index and Functional [place] Measure. Journal of Neurology, Neurosurgery, and Psychiatry, 66(4), 787-713. DAXA Gregorio, ELMER Trejo, & Juvenal Torres M.A. (2004) Assessment of post-stroke quality of life in cost-effectiveness studies: The usefulness of the Barthel Index and the EuroQoL-5D. Quality of Life Research, 15, 409-37         Occupational Therapy Evaluation Charge Determination   History Examination Decision-Making   LOW Complexity : Brief history review  MEDIUM Complexity : 3-5 performance deficits relating to physical, cognitive , or psychosocial skils that result in activity limitations and / or participation restrictions MEDIUM Complexity : Patient may present with comorbidities that affect occupational performnce. Miniml to moderate modification of tasks or assistance (eg, physical or verbal ) with assesment(s) is necessary to enable patient to complete evaluation       Based on the above components, the patient evaluation is determined to be of the following complexity level: LOW   Pain Rating:  Patient did not report pain    Activity Tolerance:   Good    After treatment patient left in no apparent distress:    Sitting in chair, Call bell within reach, Bed / chair alarm activated, and Caregiver / family present    COMMUNICATION/EDUCATION:   The patients plan of care was discussed with: Physical therapist and Registered nurse. Home safety education was provided and the patient/caregiver indicated understanding., Patient/family have participated as able in goal setting and plan of care. , and Patient/family agree to work toward stated goals and plan of care. This patients plan of care is appropriate for delegation to [facility].     Thank you for this referral.  Isai Story OT  Time Calculation: 56 mins

## 2022-10-28 NOTE — H&P
[address]  HISTORY AND PHYSICAL    Name:  Juanis White  MR#:  [id number]  :  1955  ACCOUNT #:  [de-identified]  ADMIT DATE:  10/27/2022    CHIEF COMPLAINT:  Generalized weakness and confusion since past one day. HISTORY OF PRESENT ILLNESS:  This is a 56-year-old male with past medical history of glioblastoma, status post resection; recent admission with PE, on Eliquis; hypertension. He comes over here because of one day history of weakness, fatigue, and generalized confusion. Usually, the patient is able to ambulate with a cane, but since last Monday, he has been feeling weakness, to the point, that he sort of skidded out of his bed yesterday, and it was getting increasingly difficult for the patient's family members to lift the patient, that is why the EMS was called. Also, the patient has been followed by Dr. Lazarus Chamber at the [facility], advised the patient to come to the ER for further evaluation and management. No chest pain, shortness of breath, cough, fever, nausea, vomiting. No changes in bowel movements. PAST MEDICAL HISTORY:  1.  Osteoarthritis. 2.  GERD. 3.  Obesity. 4.  Glioblastoma. PAST SURGICAL HISTORY:  1.   laminectomy. 2.  Tonsillectomy. 3.  Mechanical thrombectomy. ALLERGIES:  THE PATIENT IS REPORTEDLY ALLERGIC TO [facility], . CURRENT MEDICATIONS:  1. Decadron 50 mg p.o. b.i.d.  2.  Mag-Ox 400 mg p.o. q.a.m.  3.  Lasix 20 mg p.o. daily. 4.  Temodar 180 mg  q.8 hours p.r.n.  5.  Bactrim 1 tablet p.o. b.i.d.  6.  Norvasc 10 mg p.o. daily. 7.  Neurontin 300 mg p.o. t.i.d.  8.  Lisinopril 40 mg p.o. daily. 9.   mg p.o. daily. 10.   40 mg p.o. daily. 11.  Baclofen 10 mg p.o. t.i.d.  12.  Nexium 40 mg p.o. b.i.d.  13.   one b.i.d. 14.  Xyzal 5 mg p.o. daily. 15.  VESIcare 10 mg p.o. daily. 16.  Symbicort two puffs b.i.d. SOCIAL HISTORY:  The patient is a former smoker, quit in [address]. No alcohol or  IV drug abuse.     FAMILY HISTORY:  Significant for coronary artery disease. REVIEW OF SYSTEMS:  Pertinent positives as above. Rest of review of systems were done, they were all negative except for above. PHYSICAL EXAMINATION:  GENERAL:  Not in acute distress. Comfortably lying in bed. VITAL SIGNS:  At the time when the patient was seen, the patient's vitals were as follows:  Blood pressure 139/96, pulse 79, afebrile, respiratory rate 18, saturating 96% on room air. HEENT:  Head:  Normocephalic, atraumatic. Eyes:  PERRLA. EOMI. Ears:  Bilaterally hearing normal.  No growth. Nose:  No polyps, no bleeding. Mouth:  Moist mucous membranes. Decent oral hygiene. NECK:  Supple. No JVD. No thyromegaly. RESPIRATORY:   Clear to auscultation bilaterally. No adventitious breath sounds. CARDIOVASCULAR:  S1, S2 normal.  No murmurs, rubs, or gallops. GI:  Bowel sounds present. Soft, nontender, nondistended. NEUROLOGICAL:  Cranial nerves II through XII intact. bilaterally in upper limbs and lower limbs. Sensory normal.  PSYCHIATRIC:  Mood and affect appropriate. Alert, awake, and oriented x3. LABORATORY AND RADIOLOGICAL RESULTS:  WBC 4.4, hemoglobin 12.8, hematocrit 38, and platelet count of 604. Sodium 140, potassium 3.6, chloride 103, bicarbonate 31, BUN 24, creatinine 1. 13.  CT scan of the head was done, which showed right  craniotomy with massive  collection, extensive hypoattenuation found in the resection cavity,  right lateral ventricle. The patient had an MRI of the brain done as well, which showed extensive involvement of the right frontal  into the left frontal lobe. There is an 8 mm left . EKG showed sinus rhythm, no ST-T wave changes suggestive of ischemia. ASSESSMENT AND PLAN:  1. This is a 68-year-old male with a past medical history of glioblastoma ; pulmonary embolism, on Eliquis; chronic obstructive pulmonary disease,  involve Palliative Care.   I will hold the Eliquis for the surgery and Eliquis might need to be restarted if there are no plans for surgery tomorrow. 2.  Hypertension. We will continue the patient's home medications. likely would be discharged home in the next one to two days.       Todd Harrison MD      [id number]  D:  10/27/2022 22:30  T:  10/28/2022 1:43  JOB #:  [id number]

## 2022-10-28 NOTE — PROGRESS NOTES
Problem: Mobility Impaired (Adult and Pediatric)  Goal: *Acute Goals and Plan of Care (Insert Text)  Description: FUNCTIONAL STATUS PRIOR TO ADMISSION: Patient was modified independent using a rolling walker for household distances, single point cane while asc/desc stairs, and wheelchair typically for functional community level mobility. HOME SUPPORT PRIOR TO ADMISSION: The patient lived with his spouse and son who provided SUP to occasional light assist with ADLs/mobilty. Currently working with [employer]. Physical Therapy Goals  Initiated 10/28/2022  1. Patient will move from supine to sit and sit to supine  and scoot up and down in bed with supervision/set-up within 7 day(s). 2.  Patient will transfer from bed to chair and chair to bed with supervision/set-up using the least restrictive device within 7 day(s). 3.  Patient will perform sit to stand with supervision/set-up within 7 day(s). 4.  Patient will ambulate with supervision/set-up for 75 feet with the least restrictive device within 7 day(s). 5.  Patient will ascend/descend 12 stairs with handrail(s) with minimal assistance/contact guard assist within 7 day(s). Outcome: Progressing Towards Goal     PHYSICAL THERAPY EVALUATION  Patient: Kieran Abdi (89 y.o. male)  Date: 10/28/2022  Primary Diagnosis: Weakness [R53.1]  Generalized weakness [R53.1]       Precautions:   Fall    ASSESSMENT  Based on the objective data described below, the patient presents with impaired functional mobility as compared to previous baseline level 2* increased weakness from baseline (hx L weakness), impaired cognition (decr insights to deficits, decr attention to task, high distractibility, impaired STM, etc), impaired balance, mild L inattention, impaired gait, and poor safety awareness following admission for c/o GLF and weakness. PMHx significant for hx of GBM with crani for resection.  Most recently, he has been at home with his spouse and son, ambulating household distances with a RW. MRI+ for recurrent GBM. Received pt supine in bed, heavily soiled. Participated in bed several bouts of rolling and bridging for extensive pericare and linen change; overall min A with max and frequent cues to redirect attention to task. Progressed to completing supine>sit with min A. In sitting, demos posterior and L lateral lean/LOBs with minimal dynamic challenge requiring assist to correct from. Completed several sit<>stands from various surfaces with min A for lift off and cues for hand placement sequencing. Gait training initiated over short distances in room with RW and up to min A x1 for path navigation and AD proximity. Remained up in chair at end of session, NAD. Discharge plans TBD pending neurosurgery consult/goals of care. If he were to discharge home, he will require assist x1 with use of RW for ADLs/mobility and HHPT. If family is not able to provide then recommend discharge to rehab. For the weekend, recommend patient to complete as able in order to maintain strength, endurance and independence with nursing: OOB to chair 3x/day with min A x1 and ambulating with RW and assist x1. PT to follow-up with patient after the weekend. Current Level of Function Impacting Discharge (mobility/balance): min A; max cognitive cues; poor attention and poor safety awareness     Functional Outcome Measure: The patient scored 4/56 on the Shipley outcome measure which is indicative of high falls risk. Other factors to consider for discharge: impaired cognition; poor safety awareness, high falls risk      Patient will benefit from skilled therapy intervention to address the above noted impairments.        PLAN :  Recommendations and Planned Interventions: bed mobility training, transfer training, gait training, therapeutic exercises, neuromuscular re-education, patient and family training/education, and therapeutic activities      Frequency/Duration: Patient will be followed by physical therapy:  5 times a week to address goals. Recommendation for discharge: (in order for the patient to meet his/her long term goals)  If he were to discharge home, he will require assist x1 with use of RW for all ADLs/mobility and HHPT. If family is not able to provide then recommend discharge to rehab. This discharge recommendation:  Has been made in collaboration with the attending provider and/or case management    IF patient discharges home will need the following DME: patient owns DME required for discharge for mobility; possible hospital bed and transition to first floor set up for safety         SUBJECTIVE:   Patient stated Yes i've been told I need to keep my walker closer to me.  (as he keeps pushing it farther out in front of him)    OBJECTIVE DATA SUMMARY:   HISTORY:    Past Medical History:   Diagnosis Date    Arthritis     GERD (gastroesophageal reflux disease)     Morbid obesity ([facility] [address]      Past Surgical History:   Procedure Laterality Date    HX BACK SURGERY      2 laminectomies and fusion    HX ORTHOPAEDIC Right     orif hand    HX SHOULDER ARTHROSCOPY      HX TONSILLECTOMY      as a child    IR THROMBECTOMY [facility] ART PRIMARY NON AWAIS OR INTRACRANIAL  9/14/2022       Personal factors and/or comorbidities impacting plan of care: PMHx    Home Situation  Home Environment: Private residence  # Steps to Enter: 5  Rails to Enter: Yes  One/Two Story Residence: Two story  Living Alone: No  Support Systems: Spouse/Significant Other  Patient Expects to be Discharged to[de-identified] Home with family assistance  Current DME Used/Available at Home: Nicholas Chung, rolling, [address], [place], straight, Wheelchair, [address] chair, Grab bars (bidet for bowel hygiene)  Tub or Shower Type: Shower    EXAMINATION/PRESENTATION/DECISION MAKING:   Critical Behavior:  Neurologic State: Alert  Orientation Level: Oriented X4  Cognition: Follows commands, Decreased attention/concentration, Poor safety awareness  Safety/Judgement: Decreased insight into deficits, Decreased awareness of need for safety  Hearing: Auditory  Auditory Impairment: None  Skin:    Edema:   Range Of Motion:  AROM: Generally decreased, functional     Strength:    Strength: Generally decreased, functional (generalized weakness + mild L HP)    Tone & Sensation:   Tone: Normal  Sensation: Impaired (b/l feel diminished at baseline)      Coordination:  Coordination: Generally decreased, functional  Vision:   Tracking: Requires cues, head turns, or add eye shifts to track; Able to track right of midline (decreased/ partial tracking to L)  Visual Fields:  (intact)  Diplopia: No  Acuity: Within Defined Limits  Corrective Lenses: Glasses  Functional Mobility:  Bed Mobility:    Rolling: Minimum assistance  Supine to Sit: Minimum assistance (logroll)     Transfers:  Sit to Stand: Minimum assistance  Stand to Sit: Minimum assistance      Bed to Chair: Minimum assistance    Balance:   Sitting: Impaired; With support  Sitting - Static: Fair (occasional)  Sitting - Dynamic: Poor (constant support)  Standing: Impaired; With support (RW)  Standing - Static: Fair  Standing - Dynamic : Poor    Ambulation/Gait Training:  Distance (ft): 25 Feet (ft) (x2)  Assistive Device: Gait belt;Walker, rolling  Ambulation - Level of Assistance: Minimal assistance;Contact guard assistance  Gait Description (WDL): Exceptions to WDL  Gait Abnormalities: Decreased step clearance;Shuffling gait;Trunk sway increased  Base of Support: Center of gravity altered;Shift to left  Speed/Susi: Shuffled; Slow  Step Length: Right shortened;Left shortened      Functional Measure:  Shipley Balance Test:    Sitting to Standin  Standing Unsupported: 0  Sitting with Back Unsupported: 2  Standing to Sittin  Transfers: 1  Standing Unsupported with Eyes Closed: 0  Standing Unsupported with Feet Together: 0  Reach Forward with Outstretched Arm: 0   Object: 0  Turn to Look Over Shoulders: 0  Turn 360 Degrees: 0  Alternate Foot on Step/Stool: 0  Standing Unsupported One Foot in Front: 0  Stand on One Le  Total:          56=Maximum possible score;   0-20=High fall risk  21-40=Moderate fall risk   41-56=Low fall risk                Physical Therapy Evaluation Charge Determination   History Examination Presentation Decision-Making   HIGH Complexity :3+ comorbidities / personal factors will impact the outcome/ POC  MEDIUM Complexity : 3 Standardized tests and measures addressing body structure, function, activity limitation and / or participation in recreation  LOW Complexity : Stable, uncomplicated  HIGH Complexity : FOTO score of 1- 25       Based on the above components, the patient evaluation is determined to be of the following complexity level: LOW     Pain Rating:  None    Activity Tolerance:   Good    After treatment patient left in no apparent distress:   Sitting in chair, Call bell within reach, Bed / chair alarm activated, and Caregiver / family present    COMMUNICATION/EDUCATION:   The patients plan of care was discussed with: Occupational therapist and Registered nurse. Fall prevention education was provided and the patient/caregiver indicated understanding., Patient/family have participated as able in goal setting and plan of care. , and Patient/family agree to work toward stated goals and plan of care.     Thank you for this referral.  Evi Hutchins, PT, DPT   Time Calculation: 56 mins

## 2022-10-28 NOTE — PROGRESS NOTES
[facility] Adult  Hospitalist Group                                                                                          Hospitalist Progress Note  Xuan Beltran MD  Answering service: [phone] from in house phone        Date of Service:  10/28/2022  NAME:  Gurmeet Soto  :  1955  MRN:  [id number]      Admission Summary: This is a 42-year-old male with past medical history of glioblastoma, status post resection; recent admission with PE, on Eliquis; hypertension. He comes over here because of one day history of weakness, fatigue, and generalized confusion. Usually, the patient is able to ambulate with a cane, but since last Monday, he has been feeling weakness, to the point, that he sort of skidded out of his bed yesterday, and it was getting increasingly difficult for the patient's family members to lift the patient, that is why the EMS was called. Also, the patient has been followed by Dr. Gypsy Tee at the [facility], advised the patient to come to the ER for further evaluation and management. Interval history / Subjective:    Follow up Confusion/Weakness   Feels much better  Confusion improved  Weakness has improved as well  Wife in the room and waiting for CM   Assessment & Plan:     Recurrence of GBM  -Keppra/Decadron  -Per patient, he is on a clinical trial with Dr Gypsy Tee  -Appreciate Neurosurgery    Acute PE: on Eliquis, currently on hold  HTn: Continue home meds  COPD: stable    PT/OT HH       Code status: FULL CODE  Prophylaxis: Eliquis, currently on hold    Plan: Patient/family waiting to talk to CM to arrange care/DMEs at home before discharge back to home  Care Plan discussed with: Patient, wife  Anticipated Disposition: home      Hospital Problems  Date Reviewed: 2022            Codes Class Noted POA    Weakness ICD-10-CM: R53.1  ICD-9-CM: 780.79  10/27/2022 Unknown        Generalized weakness ICD-10-CM: R53.1  ICD-9-CM: 780.79  10/27/2022 Unknown Review of Systems:   A comprehensive review of systems was negative except for that written in the HPI. Vital Signs:    Last 24hrs VS reviewed since prior progress note. Most recent are:  Visit Vitals  BP (!) 135/90 (BP 1 Location: Left upper arm)   Pulse 76   Temp 97.4 °F (36.3 °C)   Resp 16   SpO2 98%       No intake or output data in the 24 hours ending 10/28/22 1330     Physical Examination:     I had a face to face encounter with this patient and independently examined them on 10/28/2022 as outlined below:          Constitutional:  No acute distress   ENT:  Oral mucosa moist, oropharynx benign. Resp:  CTA bilaterally. No wheezing/rhonchi/rales. No accessory muscle use. CV:  Regular rhythm, normal rate, no murmurs, gallops, rubs    GI:  Soft, non distended, non tender. normoactive bowel sounds, no hepatosplenomegaly     Musculoskeletal:  No edema, warm, 2+ pulses throughout    Neurologic:  Moves all extremities. AAOx3, CN II-XII reviewed            Data Review:    Review and/or order of clinical lab test      Labs:     Recent Labs     10/28/22  0549 10/27/22  1359   WBC 3.8* 4.4   HGB 11.4* 12.6   HCT 33.7* 38.5   * 114*     Recent Labs     10/28/22  0549 10/27/22  1359    140   K 4.2 3.6    103   CO2 26 31   BUN 24* 24*   CREA 0.99 1.13   * 96   CA 8.7 9.6     Recent Labs     10/28/22  0549 10/27/22  1359   ALT 54 67   AP 49 54   TBILI 0.3 0.5   TP 6.0* 7.6   ALB 3.0* 3.6   GLOB 3.0 4.0     No results for input(s): INR, PTP, APTT, INREXT in the last 72 hours. No results for input(s): FE, TIBC, PSAT, FERR in the last 72 hours. No results found for: FOL, RBCF   No results for input(s): PH, PCO2, PO2 in the last 72 hours. No results for input(s): CPK, CKNDX, TROIQ in the last 72 hours.     No lab exists for component: CPKMB  No results found for: CHOL, CHOLX, CHLST, CHOLV, HDL, HDLP, LDL, LDLC, DLDLP, TGLX, TRIGL, TRIGP, CHHD, CHHDX  Lab Results   Component Value Date/Time    Glucose (POC) 145 (H) 09/16/2022 01:02 PM    Glucose (POC) 129 (H) 09/16/2022 09:43 AM    Glucose (POC) 237 (H) 09/15/2022 09:49 PM    Glucose (POC) 166 (H) 09/14/2022 05:23 PM    Glucose (POC) 126 (H) 09/14/2022 08:14 AM     Lab Results   Component Value Date/Time    Color YELLOW/STRAW 06/02/2022 08:19 PM    Appearance CLEAR 06/02/2022 08:19 PM    Specific gravity 1.009 06/02/2022 08:19 PM    pH (UA) 7.5 06/02/2022 08:19 PM    Protein Negative 06/02/2022 08:19 PM    Glucose Negative 06/02/2022 08:19 PM    Ketone Negative 06/02/2022 08:19 PM    Bilirubin Negative 06/02/2022 08:19 PM    Urobilinogen 1.0 06/02/2022 08:19 PM    Nitrites Negative 06/02/2022 08:19 PM    Leukocyte Esterase Negative 06/02/2022 08:19 PM    Epithelial cells FEW 06/02/2022 08:19 PM    Bacteria Negative 06/02/2022 08:19 PM    WBC 0-4 06/02/2022 08:19 PM    RBC 0-5 06/02/2022 08:19 PM         Medications Reviewed:     Current Facility-Administered Medications   Medication Dose Route Frequency    azelastine (ASTELIN) 137mcg/spray nasal spray  2 Spray Both Nostrils BID    baclofen (LIORESAL) tablet 10 mg  10 mg Oral TID    gabapentin (NEURONTIN) capsule 300 mg  300 mg Oral TID    ondansetron (ZOFRAN ODT) tablet 4-8 mg  4-8 mg Oral Q8H PRN    furosemide (LASIX) tablet 20 mg  20 mg Oral DAILY    magnesium oxide (MAG-OX) tablet 400 mg  400 mg Oral DAILY    trimethoprim-sulfamethoxazole (BACTRIM, SEPTRA)  mg per tablet 1 Tablet  1 Tablet Oral DAILY    pantoprazole (PROTONIX) tablet 40 mg  40 mg Oral ACB    cetirizine (ZYRTEC) tablet 10 mg  10 mg Oral DAILY    arformoterol 15 mcg/budesonide 0.25 mg neb solution   Nebulization BID RT    [Held by provider] apixaban (ELIQUIS) tablet 5 mg  5 mg Oral BID    sodium chloride (NS) flush 5-40 mL  5-40 mL IntraVENous Q8H    sodium chloride (NS) flush 5-40 mL  5-40 mL IntraVENous PRN    acetaminophen (TYLENOL) tablet 650 mg  650 mg Oral Q6H PRN    Or    acetaminophen (TYLENOL) suppository 650 mg  650 mg Rectal Q6H PRN    polyethylene glycol (MIRALAX) packet 17 g  17 g Oral DAILY PRN    promethazine (PHENERGAN) tablet 12.5 mg  12.5 mg Oral Q6H PRN    Or    ondansetron (ZOFRAN) injection 4 mg  4 mg IntraVENous Q6H PRN    dexamethasone (DECADRON) 4 mg/mL injection 4 mg  4 mg IntraVENous Q6H    levETIRAcetam (KEPPRA) injection 500 mg  500 mg IntraVENous Q12H     ______________________________________________________________________  EXPECTED LENGTH OF STAY: 3d 19h  ACTUAL LENGTH OF STAY:          1                 Mike Garcia MD

## 2022-10-28 NOTE — ED NOTES
TRANSFER - OUT REPORT:    Verbal report given to Mily Adamson RN(name) on Cooper Núñez  being transferred to NSTU (unit) for routine progression of care       Report consisted of patients Situation, Background, Assessment and   Recommendations(SBAR). Information from the following report(s) SBAR, Kardex, ED Summary, MAR, and Recent Results was reviewed with the receiving nurse. Lines:   Peripheral IV 10/27/22 Right Antecubital (Active)        Opportunity for questions and clarification was provided.       Patient transported with:   [employer]

## 2022-10-28 NOTE — PROGRESS NOTES
Physician Progress Note      PATIENT:               Constantin RODRIGUES #:                  [id number]  :                       1955  ADMIT DATE:       10/27/2022 10:51 AM  DISCH DATE:  RESPONDING  PROVIDER #:        SOLITARIO Bethea MD          QUERY TEXT:    Pt admitted with Fatigue and has pulmonary embolism documented. If possible, please document in progress notes and discharge summary if you are evaluating and/or treating any of the following: The medical record reflects the following:  Risk Factors: GBM with recent resection, PE on Eliquis, COPD    Clinical Indicators:    10/28 H&P  pulmonary embolism, on Eliquis;    10/28 NeuroSx consultant  recent h/o PE and now on Eliquis    Treatment: Home Eliquis on hold for possible NSx intervention, Once resumed Eliquis 5mg tablet PO two times daily, Hematology consult, Admission to telemetry level of care    Thank you,  Carlton Hoover, PRIYAN, RN, CCRN  [phone]  I am also available via PS. Options provided:  -- Acute pulmonary embolism  -- Subacute pulmonary embolism  -- Recurrent acute pulmonary embolism  -- Chronic pulmonary embolism  -- Other - I will add my own diagnosis  -- Disagree - Not applicable / Not valid  -- Disagree - Clinically unable to determine / Unknown  -- Refer to Clinical Documentation Reviewer    PROVIDER RESPONSE TEXT:    This patient has acute pulmonary embolism. Query created by: Amy Rose on 10/28/2022 10:30 AM      QUERY TEXT:    Pt admitted with Fatigue. Pt noted to have Mass effect with sulcal effacement on MRI and vasogenic edema on CT. If clinically significant, please document in progress notes and discharge summary if you are evaluating/treating any of the following: The medical record reflects the following:  Risk Factors: GBM with recent resection, increased fatigue and weakness    Clinical Indicators:    10/27 CT Head wo contrast  Right frontal craniotomy with mass resection.  Extensive hypoattenuation surrounding the resection cavity, may reflect vasogenic edema, but age-indeterminate infarct may have a similar appearance. No acute intracranial hemorrhage. Stable 9 mm leftward midline shift and partial effacement of the suprasellar cistern. 10/27 MRI Brain W wo cont  IMPRESSION  Postsurgical and treatment related changes right frontal lobe as described, with significant increase in irregular/nodular enhancement of the resection cavity and surrounding FLAIR/T2 hyperintense signal/mass effect. Possible subtle areas of enhancement right basal ganglia and left centrum semiovale. Findings may in part be due to radiation necrosis/treatment related changes, though worrisome for recurrent neoplasm. Treatment: Brain imaging, NeuroSx consult, Admission to [facility] unit, VS and neuro checks Q 4hrs, Decedran 4mg IV Q6hrs, Keppra 500mg IV Q12hrs, Hematology consult, Palliative care consult    Thank you,  PRIYA HajiN, RN, CCRN  [phone]  I am also available via PS. Options provided:  -- Cerebral edema  -- Brain compression  -- Cerebral edema and Brain compression  -- Other - I will add my own diagnosis  -- Disagree - Not applicable / Not valid  -- Disagree - Clinically unable to determine / Unknown  -- Refer to Clinical Documentation Reviewer    PROVIDER RESPONSE TEXT:    This patient has brain compression.     Query created by: Michelle Salazar on 10/28/2022 10:39 AM      Electronically signed by:  Melita Molina MD 10/28/2022 10:49 AM

## 2022-10-28 NOTE — PROGRESS NOTES
Music Therapy Assessment  . [address] [place] [address] [id number]     1955  61 y.o.  male    Patient Telephone Number: [phone] (home)   [employer] Affiliation: Adventism   Language: English   Patient Active Problem List    Diagnosis Date Noted    Palliative care encounter 10/28/2022    Goals of care, counseling/discussion 10/28/2022    Anorexia 10/28/2022    Weakness 10/27/2022    Debility 10/27/2022    Pulmonary embolus ([facility] [address] 09/13/2022    Chemotherapy follow-up examination 08/16/2022    Glioblastoma ([facility] [address] 07/19/2022    Brain mass 06/02/2022        Date: 10/28/2022            Total Time (in minutes): 5          [facility] 6S NEURO-SCI TELE    Referral from Gissel Mcgraw RN for history of glioblastoma. 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 Pt is a  at [place] [address] [employer]. The music therapy team will offer music therapy to the patient upon next returning to this hospital on 11/2/2022. Thank you for including music therapy in this patient's care.      SEUN Lomeli (Music Therapist-Board Certified)  Spiritual Care Department  Referral-based service

## 2022-10-28 NOTE — PROGRESS NOTES
Pt has a very aggressive GBM, recent h/o PE and now  on Eliquis  Recurrence of the tumor that crosses the midline  Only surgery would be palliative repeat subtotal resection that can not be done while the patient is on Eliquis.  We will remain available for questions

## 2022-10-29 PROCEDURE — 74011250636 HC RX REV CODE- 250/636: Performed by: HOSPITALIST

## 2022-10-29 PROCEDURE — 74011250637 HC RX REV CODE- 250/637: Performed by: HOSPITALIST

## 2022-10-29 PROCEDURE — 94640 AIRWAY INHALATION TREATMENT: CPT

## 2022-10-29 PROCEDURE — 65270000046 HC RM TELEMETRY

## 2022-10-29 PROCEDURE — 94664 DEMO&/EVAL PT USE INHALER: CPT

## 2022-10-29 PROCEDURE — 74011000250 HC RX REV CODE- 250: Performed by: HOSPITALIST

## 2022-10-29 RX ORDER — LEVETIRACETAM 500 MG/1
500 TABLET ORAL 2 TIMES DAILY
Status: DISCONTINUED | OUTPATIENT
Start: 2022-10-29 | End: 2022-10-29

## 2022-10-29 RX ADMIN — ARFORMOTEROL TARTRATE: 15 SOLUTION RESPIRATORY (INHALATION) at 09:12

## 2022-10-29 RX ADMIN — DEXAMETHASONE SODIUM PHOSPHATE 4 MG: 4 INJECTION, SOLUTION INTRAMUSCULAR; INTRAVENOUS at 18:00

## 2022-10-29 RX ADMIN — BACLOFEN 10 MG: 10 TABLET ORAL at 08:33

## 2022-10-29 RX ADMIN — FUROSEMIDE 20 MG: 20 TABLET ORAL at 08:33

## 2022-10-29 RX ADMIN — Medication 400 MG: at 08:32

## 2022-10-29 RX ADMIN — DEXAMETHASONE SODIUM PHOSPHATE 4 MG: 4 INJECTION, SOLUTION INTRAMUSCULAR; INTRAVENOUS at 23:50

## 2022-10-29 RX ADMIN — SULFAMETHOXAZOLE AND TRIMETHOPRIM 1 TABLET: 400; 80 TABLET ORAL at 08:32

## 2022-10-29 RX ADMIN — BACLOFEN 10 MG: 10 TABLET ORAL at 22:15

## 2022-10-29 RX ADMIN — SODIUM CHLORIDE, PRESERVATIVE FREE 10 ML: 5 INJECTION INTRAVENOUS at 13:07

## 2022-10-29 RX ADMIN — DEXAMETHASONE SODIUM PHOSPHATE 4 MG: 4 INJECTION, SOLUTION INTRAMUSCULAR; INTRAVENOUS at 13:07

## 2022-10-29 RX ADMIN — APIXABAN 5 MG: 5 TABLET, FILM COATED ORAL at 17:45

## 2022-10-29 RX ADMIN — GABAPENTIN 800 MG: 300 CAPSULE ORAL at 17:44

## 2022-10-29 RX ADMIN — GABAPENTIN 300 MG: 300 CAPSULE ORAL at 08:33

## 2022-10-29 RX ADMIN — DEXAMETHASONE SODIUM PHOSPHATE 4 MG: 4 INJECTION, SOLUTION INTRAMUSCULAR; INTRAVENOUS at 06:19

## 2022-10-29 RX ADMIN — SODIUM CHLORIDE, PRESERVATIVE FREE 10 ML: 5 INJECTION INTRAVENOUS at 06:20

## 2022-10-29 RX ADMIN — GABAPENTIN 800 MG: 300 CAPSULE ORAL at 22:14

## 2022-10-29 RX ADMIN — BACLOFEN 10 MG: 10 TABLET ORAL at 17:44

## 2022-10-29 RX ADMIN — PANTOPRAZOLE SODIUM 40 MG: 40 TABLET, DELAYED RELEASE ORAL at 06:20

## 2022-10-29 RX ADMIN — DEXAMETHASONE SODIUM PHOSPHATE 4 MG: 4 INJECTION, SOLUTION INTRAMUSCULAR; INTRAVENOUS at 00:06

## 2022-10-29 NOTE — PROGRESS NOTES
Problem: Pressure Injury - Risk of  Goal: *Prevention of pressure injury  Description: Document Daniel Scale and appropriate interventions in the flowsheet. Outcome: Progressing Towards Goal  Note: Pressure Injury Interventions: Activity Interventions: Assess need for specialty bed    Mobility Interventions: HOB 30 degrees or less, Pressure redistribution bed/mattress (bed type), Assess need for specialty bed    Nutrition Interventions: Document food/fluid/supplement intake                     Problem: Falls - Risk of  Goal: *Absence of Falls  Description: Document Shannan Don Fall Risk and appropriate interventions in the flowsheet.   Outcome: Progressing Towards Goal  Note: Fall Risk Interventions:            Medication Interventions: Evaluate medications/consider consulting pharmacy, Patient to call before getting OOB, Teach patient to arise slowly

## 2022-10-29 NOTE — CONSULTS
[address]    Name:  Pepper Tristan  MR#:  [id number]  :  1955  ACCOUNT #:  [de-identified]  DATE OF SERVICE:  10/29/2022      CHIEF COMPLAINT:  This 68-year-old man with recurrent glioblastoma, right frontoparietal region that now crosses the midline. HISTORY OF PRESENT ILLNESS:  Generalized weakness and confusion that has increased over the last several days. He is followed by Dr. Lucie Duane at [facility] in Neuro-oncology, who referred him back to [facility]. [place] [facility] [place] [facility] [place]. His son is with him. He tells me that the patient has had increasing weakness, fatigue, more confusion. He is a retired  from [address] [place] [address]. He had a surgical resection by Dr. Madelyn Carrasco in  of this year. I reviewed the medical record including his current medication list.  We have increased his dose of Decadron. PHYSICAL EXAMINATION:  NEUROLOGIC:  On examination, he is awake, alert, and oriented to person, place, and time. Speech is fluent. He moves all four extremities equally. He does not appear to be in any acute distress. Pupils are equal and reactive. He does appear ill. He is on Eliquis with a history of a pulmonary embolism during the course of his treatment. I was able to review the imaging studies that shows a contrast enhancing lesion in the area of the resection site compared to the resection, which was quite good. There now appears to be significant recurrence that crosses the midline. My opinion is that at this point there really is no surgical treatment that would be anything other than just palliative. He is in the clinical trial at [facility].   I had a long discussion with the patient's son both in the presence of the patient and outside the room at his request.  After answering all of his questions, the son decided that they would keep him in the clinical trial as more surgery probably would force him out of that clinical trial.  The son and the patient understand the overall poor prognosis of this disease process and the patient appears to be following the natural history unfortunately of this disease. We will bump his steroids up a little more and remain available for questions. Thank you for asking me to see this patient.       Angel Resendiz MD      [id number]  D:  10/29/2022 10:47  T:  10/29/2022 11:34  JOB #:  [id number]  CC:  Maira Martins MD

## 2022-10-29 NOTE — PROGRESS NOTES
[facility]  Hospitalist Group                                                                                          Hospitalist Progress Note  Michael Glass MD  Answering service: [phone] OR [phone] from in house phone        Date of Service:  10/29/2022  NAME:  Jojo Jacinto  :  1955  MRN:  [id number]      Admission Summary: This is a 63-year-old male with past medical history of glioblastoma, status post resection, recent admission with PE, on Eliquis; hypertension. He comes over here because of one day history of weakness, fatigue, and generalized confusion. Usually, the patient is able to ambulate with a cane, but since last Monday, he has been feeling weakness, to the point, that he sort of skidded out of his bed and it was getting increasingly difficult for the patient's family members to lift the patient, that is why the EMS was called. Also, the patient has been followed by Dr. Yunier Hidalgo at the [facility], advised the patient to come to the ER for further evaluation and management. Interval history / Subjective: Follow up for recurrent of GBM  No acute overnight event     Assessment & Plan:     Recurrence of GBM  MRI 10/27:  Worrisome for recurrent neoplasm  As neuro surgery- no surgical intervention but recommend palliative treatment  Continue follow up with [facility]  Continue decadron and keppra for seizure prophylaxis  -Appreciate Neurosurgery    Acute PE: Resume home eliquis since surgery is not an option at this point  Oxygen sat stable    HTN: BP is stable  COPD: Patient is not bronchospastic    Consult PT     Code status: FULL CODE  Prophylaxis: Eliquis    Plan: Patient/family waiting to talk to CM to arrange care/DMEs at home before discharge back to home  Care Plan discussed with: Patient, wife  Anticipated Disposition: home      Hospital Problems  Date Reviewed: 2022            Codes Class Noted POA    Palliative care encounter ICD-10-CM: Z51.5  ICD-9-CM: V66.7  10/28/2022 Unknown        Goals of care, counseling/discussion ICD-10-CM: Z71.89  ICD-9-CM: V65.49  10/28/2022 Unknown        Anorexia ICD-10-CM: R63.0  ICD-9-CM: 783.0  10/28/2022 Unknown        Weakness ICD-10-CM: R53.1  ICD-9-CM: 780.79  10/27/2022 Unknown        Debility ICD-10-CM: R53.81  ICD-9-CM: 799.3  10/27/2022 Unknown           Review of Systems:   A comprehensive review of systems was negative except for that written in the HPI. Vital Signs:    Last 24hrs VS reviewed since prior progress note. Most recent are:  Visit Vitals  BP (!) 150/78 (BP 1 Location: Left upper arm)   Pulse 75   Temp 98.2 °F (36.8 °C)   Resp 17   Wt 117.5 kg (259 lb 0.7 oz)   SpO2 97%   BMI 41.81 kg/m²       No intake or output data in the 24 hours ending 10/29/22 1531     Physical Examination:     I had a face to face encounter with this patient and independently examined them on 10/29/2022 as outlined below:          Constitutional:  No acute distress   ENT:  Oral mucosa moist, oropharynx benign. Resp:  CTA bilaterally. No wheezing/rhonchi/rales. No accessory muscle use. CV:  Regular rhythm, normal rate, no murmurs, gallops, rubs    GI:  Soft, non distended, non tender. normoactive bowel sounds, no hepatosplenomegaly     Musculoskeletal:  No edema, warm, 2+ pulses throughout    Neurologic:  Moves all extremities.   AAOx3, CN II-XII reviewed            Data Review:    Review and/or order of clinical lab test      Labs:     Recent Labs     10/28/22  0549 10/27/22  1359   WBC 3.8* 4.4   HGB 11.4* 12.6   HCT 33.7* 38.5   * 114*       Recent Labs     10/28/22  0549 10/27/22  1359    140   K 4.2 3.6    103   CO2 26 31   BUN 24* 24*   CREA 0.99 1.13   * 96   CA 8.7 9.6       Recent Labs     10/28/22  0549 10/27/22  1359   ALT 54 67   AP 49 54   TBILI 0.3 0.5   TP 6.0* 7.6   ALB 3.0* 3.6   GLOB 3.0 4.0       No results for input(s): INR, PTP, APTT, INREXT, INREXT in the last 72 hours. No results for input(s): FE, TIBC, PSAT, FERR in the last 72 hours. No results found for: FOL, RBCF   No results for input(s): PH, PCO2, PO2 in the last 72 hours. No results for input(s): CPK, CKNDX, TROIQ in the last 72 hours.     No lab exists for component: CPKMB  No results found for: CHOL, CHOLX, CHLST, CHOLV, HDL, HDLP, LDL, LDLC, DLDLP, TGLX, TRIGL, TRIGP, CHHD, CHHDX  Lab Results   Component Value Date/Time    Glucose (POC) 145 (H) 09/16/2022 01:02 PM    Glucose (POC) 129 (H) 09/16/2022 09:43 AM    Glucose (POC) 237 (H) 09/15/2022 09:49 PM    Glucose (POC) 166 (H) 09/14/2022 05:23 PM    Glucose (POC) 126 (H) 09/14/2022 08:14 AM     Lab Results   Component Value Date/Time    Color YELLOW/STRAW 06/02/2022 08:19 PM    Appearance CLEAR 06/02/2022 08:19 PM    Specific gravity 1.009 06/02/2022 08:19 PM    pH (UA) 7.5 06/02/2022 08:19 PM    Protein Negative 06/02/2022 08:19 PM    Glucose Negative 06/02/2022 08:19 PM    Ketone Negative 06/02/2022 08:19 PM    Bilirubin Negative 06/02/2022 08:19 PM    Urobilinogen 1.0 06/02/2022 08:19 PM    Nitrites Negative 06/02/2022 08:19 PM    Leukocyte Esterase Negative 06/02/2022 08:19 PM    Epithelial cells FEW 06/02/2022 08:19 PM    Bacteria Negative 06/02/2022 08:19 PM    WBC 0-4 06/02/2022 08:19 PM    RBC 0-5 06/02/2022 08:19 PM         Medications Reviewed:     Current Facility-Administered Medications   Medication Dose Route Frequency    azelastine (ASTELIN) 137mcg/spray nasal spray  2 Spray Both Nostrils BID    baclofen (LIORESAL) tablet 10 mg  10 mg Oral TID    gabapentin (NEURONTIN) capsule 300 mg  300 mg Oral TID    ondansetron (ZOFRAN ODT) tablet 4-8 mg  4-8 mg Oral Q8H PRN    furosemide (LASIX) tablet 20 mg  20 mg Oral DAILY    magnesium oxide (MAG-OX) tablet 400 mg  400 mg Oral DAILY    trimethoprim-sulfamethoxazole (BACTRIM, SEPTRA)  mg per tablet 1 Tablet  1 Tablet Oral DAILY    pantoprazole (PROTONIX) tablet 40 mg  40 mg Oral ACB    cetirizine (ZYRTEC) tablet 10 mg  10 mg Oral DAILY    arformoterol 15 mcg/budesonide 0.25 mg neb solution   Nebulization BID RT    [Held by provider] apixaban (ELIQUIS) tablet 5 mg  5 mg Oral BID    sodium chloride (NS) flush 5-40 mL  5-40 mL IntraVENous Q8H    sodium chloride (NS) flush 5-40 mL  5-40 mL IntraVENous PRN    acetaminophen (TYLENOL) tablet 650 mg  650 mg Oral Q6H PRN    Or    acetaminophen (TYLENOL) suppository 650 mg  650 mg Rectal Q6H PRN    polyethylene glycol (MIRALAX) packet 17 g  17 g Oral DAILY PRN    promethazine (PHENERGAN) tablet 12.5 mg  12.5 mg Oral Q6H PRN    Or    ondansetron (ZOFRAN) injection 4 mg  4 mg IntraVENous Q6H PRN    dexamethasone (DECADRON) 4 mg/mL injection 4 mg  4 mg IntraVENous Q6H    levETIRAcetam (KEPPRA) injection 500 mg  500 mg IntraVENous Q12H     ______________________________________________________________________  EXPECTED LENGTH OF STAY: 3d 19h  ACTUAL LENGTH OF STAY:          2                 Janece Blizzard, MD

## 2022-10-30 LAB
GLUCOSE BLD STRIP.AUTO-MCNC: 161 MG/DL (ref 65–117)
SERVICE CMNT-IMP: ABNORMAL

## 2022-10-30 PROCEDURE — 65270000046 HC RM TELEMETRY

## 2022-10-30 PROCEDURE — 82962 GLUCOSE BLOOD TEST: CPT

## 2022-10-30 PROCEDURE — 74011250637 HC RX REV CODE- 250/637: Performed by: HOSPITALIST

## 2022-10-30 PROCEDURE — 74011000250 HC RX REV CODE- 250: Performed by: HOSPITALIST

## 2022-10-30 PROCEDURE — 74011250636 HC RX REV CODE- 250/636: Performed by: HOSPITALIST

## 2022-10-30 RX ADMIN — APIXABAN 5 MG: 5 TABLET, FILM COATED ORAL at 18:13

## 2022-10-30 RX ADMIN — BACLOFEN 10 MG: 10 TABLET ORAL at 09:04

## 2022-10-30 RX ADMIN — APIXABAN 5 MG: 5 TABLET, FILM COATED ORAL at 09:04

## 2022-10-30 RX ADMIN — BACLOFEN 10 MG: 10 TABLET ORAL at 18:14

## 2022-10-30 RX ADMIN — DEXAMETHASONE SODIUM PHOSPHATE 4 MG: 4 INJECTION, SOLUTION INTRAMUSCULAR; INTRAVENOUS at 07:16

## 2022-10-30 RX ADMIN — DEXAMETHASONE SODIUM PHOSPHATE 4 MG: 4 INJECTION, SOLUTION INTRAMUSCULAR; INTRAVENOUS at 18:12

## 2022-10-30 RX ADMIN — Medication 400 MG: at 09:04

## 2022-10-30 RX ADMIN — SULFAMETHOXAZOLE AND TRIMETHOPRIM 1 TABLET: 400; 80 TABLET ORAL at 09:04

## 2022-10-30 RX ADMIN — GABAPENTIN 800 MG: 300 CAPSULE ORAL at 18:13

## 2022-10-30 RX ADMIN — FUROSEMIDE 20 MG: 20 TABLET ORAL at 09:04

## 2022-10-30 RX ADMIN — PANTOPRAZOLE SODIUM 40 MG: 40 TABLET, DELAYED RELEASE ORAL at 07:16

## 2022-10-30 RX ADMIN — BACLOFEN 10 MG: 10 TABLET ORAL at 22:29

## 2022-10-30 RX ADMIN — SODIUM CHLORIDE, PRESERVATIVE FREE 10 ML: 5 INJECTION INTRAVENOUS at 07:16

## 2022-10-30 RX ADMIN — GABAPENTIN 800 MG: 300 CAPSULE ORAL at 22:29

## 2022-10-30 RX ADMIN — DEXAMETHASONE SODIUM PHOSPHATE 4 MG: 4 INJECTION, SOLUTION INTRAMUSCULAR; INTRAVENOUS at 12:00

## 2022-10-30 RX ADMIN — GABAPENTIN 800 MG: 300 CAPSULE ORAL at 13:26

## 2022-10-30 RX ADMIN — GABAPENTIN 800 MG: 300 CAPSULE ORAL at 09:11

## 2022-10-30 RX ADMIN — SODIUM CHLORIDE, PRESERVATIVE FREE 5 ML: 5 INJECTION INTRAVENOUS at 14:00

## 2022-10-30 NOTE — PROGRESS NOTES
[facility]  Hospitalist Group                                                                                          Hospitalist Progress Note  Edmundo Gómez NP  Answering service: [phone] from in house phone        Date of Service:  10/30/2022  NAME:  Demetria Foreman  :  1955  MRN:  [id number]      Admission Summary: This is a 81-year-old male with a PMH of Glioblastoma s/p resection, Osteoarthritis, GERD, Obesity, HTN, recent admission with PE- on Eliquis who presented with c/o of weakness, fatigue, and generalized confusion. Usually able to ambulate with a cane, but since 10/17, had been experiencing weakness that progressed to him falling out of bed on 10/26, family members reported difficulty lifting patient and EMS was called. Also, the patient is followed by Dr. Mk Liz at the [facility] and advised the patient to come to the ED for further evaluation and management. Interval history / Subjective: Follow-up for issues listed below.   -Patient seen and examined, no c/o's. Assessment & Plan:     Glioblastoma: s/p resection by Dr. Mk Liz at the [facility]. -decadJayson guzmánra: seizure prophylaxis  -Neuro Sx: appreciate expertise, no surgical intervention, recommend palliative   -PT/OT  -Hematology consult  -Palliative consult    Pulmonary Embolism: Eliquis resumed (no plans for surgery). HTN: monitor, continue current regimen. COPD: stable, duonebs.     DVTppx: eliquis  GIppx: protonix  Code Status: Full Code  Diet: Cardiac  Activity: OOB to chair TID and PRN  Discharge: TBD  Ambulates:  cane     Hospital Problems  Date Reviewed: 2022            Codes Class Noted POA    Palliative care encounter ICD-10-CM: Z51.5  ICD-9-CM: V66.7  10/28/2022 Unknown        Goals of care, counseling/discussion ICD-10-CM: Z71.89  ICD-9-CM: V65.49  10/28/2022 Unknown        Anorexia ICD-10-CM: R63.0  ICD-9-CM: 783.0  10/28/2022 Unknown        Weakness ICD-10-CM: R53.1  ICD-9-CM: 780.79  10/27/2022 Unknown        Debility ICD-10-CM: R53.81  ICD-9-CM: 799.3  10/27/2022 Unknown             Review of Systems:   A comprehensive review of systems was negative except for that written in the HPI. Vital Signs:    Last 24hrs VS reviewed since prior progress note. Most recent are:  Visit Vitals  BP (!) 155/81   Pulse 83   Temp 97.6 °F (36.4 °C)   Resp 16   Wt 117.5 kg (259 lb 0.7 oz)   SpO2 97%   BMI 41.81 kg/m²       No intake or output data in the 24 hours ending 10/30/22 8080     Physical Examination:     I had a face to face encounter with this patient and independently examined them on 10/30/2022 as outlined below:          Constitutional:  No acute distress, cooperative, pleasant. ENT:  Oral mucosa moist.    Resp:  CTA bilaterally. No wheezing/rhonchi/rales. No accessory muscle use. CV:  Regular rhythm, normal rate, S1,S2.    GI/:  Soft, obese, non tender, no guarding, BS present. Voids Freely. Musculoskeletal:  No edema, warm. Neurologic:  Moves all extremities. AAOx3, confused at times. Skin:  w/d. Psych:  Good insight, Not anxious nor agitated. Data Review:    Review and/or order of clinical lab test      Labs:     Recent Labs     10/28/22  0549 10/27/22  1359   WBC 3.8* 4.4   HGB 11.4* 12.6   HCT 33.7* 38.5   * 114*     Recent Labs     10/28/22  0549 10/27/22  1359    140   K 4.2 3.6    103   CO2 26 31   BUN 24* 24*   CREA 0.99 1.13   * 96   CA 8.7 9.6     Recent Labs     10/28/22  0549 10/27/22  1359   ALT 54 67   AP 49 54   TBILI 0.3 0.5   TP 6.0* 7.6   ALB 3.0* 3.6   GLOB 3.0 4.0     No results for input(s): INR, PTP, APTT, INREXT in the last 72 hours. No results for input(s): FE, TIBC, PSAT, FERR in the last 72 hours. No results found for: FOL, RBCF   No results for input(s): PH, PCO2, PO2 in the last 72 hours. No results for input(s): CPK, CKNDX, TROIQ in the last 72 hours.     No lab exists for component: CPKMB  No results found for: CHOL, CHOLX, CHLST, CHOLV, HDL, HDLP, LDL, LDLC, DLDLP, TGLX, TRIGL, TRIGP, CHHD, CHHDX  Lab Results   Component Value Date/Time    Glucose (POC) 145 (H) 09/16/2022 01:02 PM    Glucose (POC) 129 (H) 09/16/2022 09:43 AM    Glucose (POC) 237 (H) 09/15/2022 09:49 PM    Glucose (POC) 166 (H) 09/14/2022 05:23 PM    Glucose (POC) 126 (H) 09/14/2022 08:14 AM     Lab Results   Component Value Date/Time    Color YELLOW/STRAW 06/02/2022 08:19 PM    Appearance CLEAR 06/02/2022 08:19 PM    Specific gravity 1.009 06/02/2022 08:19 PM    pH (UA) 7.5 06/02/2022 08:19 PM    Protein Negative 06/02/2022 08:19 PM    Glucose Negative 06/02/2022 08:19 PM    Ketone Negative 06/02/2022 08:19 PM    Bilirubin Negative 06/02/2022 08:19 PM    Urobilinogen 1.0 06/02/2022 08:19 PM    Nitrites Negative 06/02/2022 08:19 PM    Leukocyte Esterase Negative 06/02/2022 08:19 PM    Epithelial cells FEW 06/02/2022 08:19 PM    Bacteria Negative 06/02/2022 08:19 PM    WBC 0-4 06/02/2022 08:19 PM    RBC 0-5 06/02/2022 08:19 PM         Medications Reviewed:     Current Facility-Administered Medications   Medication Dose Route Frequency    gabapentin (NEURONTIN) capsule 800 mg  800 mg Oral QID    azelastine (ASTELIN) 137mcg/spray nasal spray  2 Spray Both Nostrils BID    baclofen (LIORESAL) tablet 10 mg  10 mg Oral TID    ondansetron (ZOFRAN ODT) tablet 4-8 mg  4-8 mg Oral Q8H PRN    furosemide (LASIX) tablet 20 mg  20 mg Oral DAILY    magnesium oxide (MAG-OX) tablet 400 mg  400 mg Oral DAILY    trimethoprim-sulfamethoxazole (BACTRIM, SEPTRA)  mg per tablet 1 Tablet  1 Tablet Oral DAILY    pantoprazole (PROTONIX) tablet 40 mg  40 mg Oral ACB    cetirizine (ZYRTEC) tablet 10 mg  10 mg Oral DAILY    arformoterol 15 mcg/budesonide 0.25 mg neb solution   Nebulization BID RT    apixaban (ELIQUIS) tablet 5 mg  5 mg Oral BID    sodium chloride (NS) flush 5-40 mL  5-40 mL IntraVENous Q8H    sodium chloride (NS) flush 5-40 mL  5-40 mL IntraVENous PRN acetaminophen (TYLENOL) tablet 650 mg  650 mg Oral Q6H PRN    Or    acetaminophen (TYLENOL) suppository 650 mg  650 mg Rectal Q6H PRN    polyethylene glycol (MIRALAX) packet 17 g  17 g Oral DAILY PRN    promethazine (PHENERGAN) tablet 12.5 mg  12.5 mg Oral Q6H PRN    Or    ondansetron (ZOFRAN) injection 4 mg  4 mg IntraVENous Q6H PRN    dexamethasone (DECADRON) 4 mg/mL injection 4 mg  4 mg IntraVENous Q6H     ______________________________________________________________________  EXPECTED LENGTH OF STAY: 3d 19h  ACTUAL LENGTH OF STAY:          3                 Efren Hernandez NP

## 2022-10-30 NOTE — PROGRESS NOTES
Problem: Pressure Injury - Risk of  Goal: *Prevention of pressure injury  Description: Document Daniel Scale and appropriate interventions in the flowsheet. Outcome: Progressing Towards Goal  Note: Pressure Injury Interventions: Activity Interventions: Assess need for specialty bed    Mobility Interventions: HOB 30 degrees or less, Pressure redistribution bed/mattress (bed type), Assess need for specialty bed    Nutrition Interventions: Document food/fluid/supplement intake                     Problem: Falls - Risk of  Goal: *Absence of Falls  Description: Document Chula Rodriguez Fall Risk and appropriate interventions in the flowsheet.   Outcome: Progressing Towards Goal  Note: Fall Risk Interventions:            Medication Interventions: Evaluate medications/consider consulting pharmacy, Patient to call before getting OOB, Teach patient to arise slowly

## 2022-10-31 ENCOUNTER — NURSE NAVIGATOR (OUTPATIENT)
Dept: CASE MANAGEMENT | Age: 67
End: 2022-10-31

## 2022-10-31 VITALS
RESPIRATION RATE: 16 BRPM | TEMPERATURE: 98.4 F | DIASTOLIC BLOOD PRESSURE: 74 MMHG | SYSTOLIC BLOOD PRESSURE: 140 MMHG | BODY MASS INDEX: 41.81 KG/M2 | OXYGEN SATURATION: 96 % | HEART RATE: 89 BPM | WEIGHT: 259.04 LBS

## 2022-10-31 PROBLEM — R45.89 NEED FOR EMOTIONAL SUPPORT: Status: ACTIVE | Noted: 2022-10-31

## 2022-10-31 PROCEDURE — 97116 GAIT TRAINING THERAPY: CPT

## 2022-10-31 PROCEDURE — 74011250637 HC RX REV CODE- 250/637: Performed by: HOSPITALIST

## 2022-10-31 PROCEDURE — 97530 THERAPEUTIC ACTIVITIES: CPT

## 2022-10-31 PROCEDURE — 99221 1ST HOSP IP/OBS SF/LOW 40: CPT | Performed by: INTERNAL MEDICINE

## 2022-10-31 PROCEDURE — 74011000250 HC RX REV CODE- 250: Performed by: HOSPITALIST

## 2022-10-31 PROCEDURE — 94640 AIRWAY INHALATION TREATMENT: CPT

## 2022-10-31 PROCEDURE — 74011250636 HC RX REV CODE- 250/636: Performed by: HOSPITALIST

## 2022-10-31 PROCEDURE — 97535 SELF CARE MNGMENT TRAINING: CPT

## 2022-10-31 RX ORDER — BACLOFEN 10 MG/1
10 TABLET ORAL
Qty: 90 TABLET | Refills: 0 | Status: SHIPPED | OUTPATIENT
Start: 2022-10-31

## 2022-10-31 RX ORDER — DEXAMETHASONE 2 MG/1
TABLET ORAL
Qty: 80 TABLET | Refills: 0 | Status: SHIPPED | OUTPATIENT
Start: 2022-10-31 | End: 2022-12-04

## 2022-10-31 RX ADMIN — APIXABAN 5 MG: 5 TABLET, FILM COATED ORAL at 09:47

## 2022-10-31 RX ADMIN — PANTOPRAZOLE SODIUM 40 MG: 40 TABLET, DELAYED RELEASE ORAL at 09:47

## 2022-10-31 RX ADMIN — DEXAMETHASONE SODIUM PHOSPHATE 4 MG: 4 INJECTION, SOLUTION INTRAMUSCULAR; INTRAVENOUS at 00:30

## 2022-10-31 RX ADMIN — SODIUM CHLORIDE, PRESERVATIVE FREE 10 ML: 5 INJECTION INTRAVENOUS at 13:01

## 2022-10-31 RX ADMIN — SODIUM CHLORIDE, PRESERVATIVE FREE 10 ML: 5 INJECTION INTRAVENOUS at 06:34

## 2022-10-31 RX ADMIN — FUROSEMIDE 20 MG: 20 TABLET ORAL at 09:47

## 2022-10-31 RX ADMIN — GABAPENTIN 800 MG: 300 CAPSULE ORAL at 09:47

## 2022-10-31 RX ADMIN — Medication 400 MG: at 09:47

## 2022-10-31 RX ADMIN — SODIUM CHLORIDE, PRESERVATIVE FREE 10 ML: 5 INJECTION INTRAVENOUS at 00:35

## 2022-10-31 RX ADMIN — GABAPENTIN 800 MG: 300 CAPSULE ORAL at 12:58

## 2022-10-31 RX ADMIN — DEXAMETHASONE SODIUM PHOSPHATE 4 MG: 4 INJECTION, SOLUTION INTRAMUSCULAR; INTRAVENOUS at 12:58

## 2022-10-31 RX ADMIN — SULFAMETHOXAZOLE AND TRIMETHOPRIM 1 TABLET: 400; 80 TABLET ORAL at 09:48

## 2022-10-31 RX ADMIN — BACLOFEN 10 MG: 10 TABLET ORAL at 09:47

## 2022-10-31 RX ADMIN — DEXAMETHASONE SODIUM PHOSPHATE 4 MG: 4 INJECTION, SOLUTION INTRAMUSCULAR; INTRAVENOUS at 06:34

## 2022-10-31 RX ADMIN — ARFORMOTEROL TARTRATE: 15 SOLUTION RESPIRATORY (INHALATION) at 09:17

## 2022-10-31 NOTE — CONSULTS
Palliative Medicine Consult  Caio: [phone] ([phone])    Patient Name: Jean Paul Sylvester  YOB: 1955    Date of Initial Consult: 10/28/2022  Reason for Consult: care decisions  Requesting Provider: Sofia Baeza    Primary Care Physician: Sabi Victoria MD     SUMMARY:   Jean Paul Sylvester is a 61 y.o. presented to the ED on 10/27/2022 from home due to weakness, confusion and fatigue over the past 24 hours. Admitted with a diagnosis of recurrence of GBM. PMH: glioblastoma (resected June 6, 2022 by Dr. Jon Lopez), followed by Dr. Devan Castillo at [facility], recent PE (Eliquis), HTN, GERD, obesity     Hospitalizations  9/13/2022-9/16/2022:  PE    Current medical issues leading to Palliative Medicine involvement include: care decisions. Social:  Spouse:  Renate Gilford       Dtr:  Bill Armendariz      Son:  Phan Barahona    for 30 years. 2 children. Patient is a  at [employer]. 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 He has been on leave since his GBM surgery in June. He currently does not drive. He walks with a cane and sometimes a walker and he is in a wheelchair if they are out. The patient enjoys chess, he plays a guitar and sings. 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 PALLIATIVE DIAGNOSES:   Palliative care encounter  Goals of care discussion  Debility   Need for emotional support       PLAN:   Met with patient and wife. Dr. Tiffany Cleveland is not recommending surgery for the GBM recurrent. Oncology has also seen patient. Patient will follow up with oncology at [facility]  Patient's goal at this time is to continue in the trial at [facility]  At this time patient is not having symptoms (no pain, nausea, anorexia)   We did talk about symptom management and/or support for psychosocial distress. Patient is receiving support through a [employer] grad student (this is a [employer] study)  and also from his rabbi.    He know also has support from the [facility]. I did let the patient and his wife know about the outpatient palliative clinic and the services provided. The patient was open for me to send a referral.     Wife is preparing for discharge and is busy coordinating equipment being  delivered to the home. Patient will need lab work at [employer] today (this is for lab work ordered by [facility])  Wife is asking if the decadron has to be given q 6 hours as this will be hard to do with their sleep schedule. Message in to Dr. Cathy Cook to change to q 8 if possible. The patient, wife and I did speak about the possible need for comfort care/hospice in the future. I let them know about the services provided. 10/28/2022  Met with patient and wife. Wife was on a call and had a meting at 12 noon. We made plans for me to return at 2pm  Met with patient and wife. Introduced palliative care services. In talking more with the family, he had resection of GBM on June 6, 2022 by Dr. Sheryl Eldridge. He had 6 weeks of radiation and 6 weeks of chemo. He is currently undergoing a drug trial at [facility] with Dr. Froylan Honeycutt. Patient is on leave as  at [employer]. He is not driving at this time. The patient and his wife have not spoken to neurosurgery at this time. We did discuss means of support for the patient. He is undergoing psychotherapy with a [employer] grad student. He is also doing spiritual work through his [employer]. We talked about goals of care. I did acknowledge that he has not spoken to neurosurgery at this time. He wants to survive as long as possible. He does want this 'living to be with quality'      We discussed code status. Patient confirmed a full code status  Patient is working with case management for equipment he may need at discharge  Will have music therapy see patient  I messaged Jeanine Prasad. She indicated Dr. Sarah Beth Springer will see the patient after his OR case today.    Initial consult note routed to primary continuity provider and/or primary health care team members  Communicated plan of care with: Palliative IDTSarah 192 Team     GOALS OF CARE / TREATMENT PREFERENCES:     GOALS OF CARE:  Patient/Health Care Proxy Stated Goals: Prolong life    TREATMENT PREFERENCES:   Code Status: Full Code    Patient and family's personal goals include: survive as long as possible but does focus also on quality of life     Important upcoming milestones or family events: none reported     The patient identifies the following as important for living well: he loves being  professor       [address]:  [] [facility] Interdisciplinary Team has updated the ACP Navigator with Health Care Decision Maker and Patient Capacity      Advance Care Planning 10/28/2022   Patient's Healthcare Decision Maker is: Legal Next of Arslan [facility] Directive Yes, not on file   WifeLong is the primary decision maker   Dtr, Alize Cueto is the secondary decision maker     AMD on file at [facility] Interventions: Full interventions       Other:    As far as possible, the palliative care team has discussed with patient / health care proxy about goals of care / treatment preferences for patient.      HISTORY:     History obtained from: chart, team, family    CHIEF COMPLAINT:   no symptoms at this time    HPI/SUBJECTIVE:    The patient is:   [x] Verbal and participatory  [] Non-participatory due to       Clinical Pain Assessment (nonverbal scale for severity on nonverbal patients):   Clinical Pain Assessment  Severity: 0          Duration: for how long has pt been experiencing pain (e.g., 2 days, 1 month, years)  Frequency: how often pain is an issue (e.g., several times per day, once every few days, constant)     FUNCTIONAL ASSESSMENT:     Palliative Performance Scale (PPS):  PPS: 50       PSYCHOSOCIAL/SPIRITUAL SCREENING:     Palliative IDT has assessed this patient for cultural preferences / practices and a referral made as appropriate to needs (Cultural Services, Patient Advocacy, Ethics, etc.)    Any spiritual / Scientologist concerns:  [] Yes /  [x] No  [] Unable to obtain this information  If \"Yes\" to discuss with pastoral care during IDT     Does caregiver feel burdened by caring for their loved one:   [] Yes /  [x] No /  [] No Caregiver Present/Available [] No Caregiver [] Pt [facility]  If \"Yes\" to discuss with social work during IDT    Anticipatory grief assessment:   [x] Normal  / [] Maladaptive   [] Unable to obtain this information  [] n/a  If \"Maladaptive\" to discuss with social work during IDT    ESAS Anxiety: Anxiety: 1    ESAS Depression:          REVIEW OF SYSTEMS:     Positive and pertinent negative findings in ROS are noted above in HPI. The following systems were [x] reviewed objectively / [] unable to be reviewed as noted in HPI  Other findings are noted below. Systems: constitutional, ears/nose/mouth/throat, respiratory, gastrointestinal, genitourinary, musculoskeletal, integumentary, neurologic, psychiatric, endocrine. Positive findings noted below. Modified ESAS Completed by: provider   Fatigue: 4 Drowsiness: 0     Pain: 0   Anxiety: 1 Nausea: 0   Anorexia: 2 Dyspnea: 0           Stool Occurrence(s): 1        PHYSICAL EXAM:     From RN flowsheet:  Wt Readings from Last 3 Encounters:   10/29/22 259 lb 0.7 oz (117.5 kg)   09/15/22 255 lb 4.7 oz (115.8 kg)   08/16/22 257 lb 9.6 oz (116.8 kg)     Blood pressure (!) 140/74, pulse 89, temperature 98.4 °F (36.9 °C), resp. rate 16, weight 259 lb 0.7 oz (117.5 kg), SpO2 96 %.     Pain Scale 1: Numeric (0 - 10)  Pain Intensity 1: 2                 Last bowel movement, if known:     Constitutional: patient sitting in a chair, participating in a conversation  Eyes: pupils equal, anicteric  ENMT: no nasal discharge, moist mucous membranes  Cardiovascular: regular rhythm  Respiratory: breathing not labored, symmetric, room air   Gastrointestinal: Musculoskeletal: no deformity, no tenderness to palpation  Skin: warm, dry  Neurologic: following commands, moving all extremities  Psychiatric: full affect, no hallucinations  Other:       HISTORY:     Active Problems:    Weakness (10/27/2022)      Debility (10/27/2022)      Palliative care encounter (10/28/2022)      Goals of care, counseling/discussion (10/28/2022)      Anorexia (10/28/2022)    Past Medical History:   Diagnosis Date    Arthritis     GERD (gastroesophageal reflux disease)     Morbid obesity ([address]       Past Surgical History:   Procedure Laterality Date    HX BACK SURGERY      2 laminectomies and fusion    HX ORTHOPAEDIC Right     orif hand    HX SHOULDER ARTHROSCOPY      HX TONSILLECTOMY      as a child    IR THROMBECTOMY [facility] ART PRIMARY NON AWAIS OR INTRACRANIAL  2022      No family history on file. History reviewed, no pertinent family history.   Social History     Tobacco Use    Smoking status: Former     Types: Cigarettes     Quit date: 1981     Years since quittin.4    Smokeless tobacco: Never   Substance Use Topics    Alcohol use: Yes     Comment: occasional     Allergies   Allergen Reactions    Keflex [Cephalexin] Rash      Current Facility-Administered Medications   Medication Dose Route Frequency    gabapentin (NEURONTIN) capsule 800 mg  800 mg Oral QID    azelastine (ASTELIN) 137mcg/spray nasal spray  2 Spray Both Nostrils BID    baclofen (LIORESAL) tablet 10 mg  10 mg Oral TID    ondansetron (ZOFRAN ODT) tablet 4-8 mg  4-8 mg Oral Q8H PRN    furosemide (LASIX) tablet 20 mg  20 mg Oral DAILY    magnesium oxide (MAG-OX) tablet 400 mg  400 mg Oral DAILY    trimethoprim-sulfamethoxazole (BACTRIM, SEPTRA)  mg per tablet 1 Tablet  1 Tablet Oral DAILY    pantoprazole (PROTONIX) tablet 40 mg  40 mg Oral ACB    cetirizine (ZYRTEC) tablet 10 mg  10 mg Oral DAILY    arformoterol 15 mcg/budesonide 0.25 mg neb solution   Nebulization BID RT    apixaban (ELIQUIS) tablet 5 mg  5 mg Oral BID    sodium chloride (NS) flush 5-40 mL  5-40 mL IntraVENous Q8H    sodium chloride (NS) flush 5-40 mL  5-40 mL IntraVENous PRN    acetaminophen (TYLENOL) tablet 650 mg  650 mg Oral Q6H PRN    Or    acetaminophen (TYLENOL) suppository 650 mg  650 mg Rectal Q6H PRN    polyethylene glycol (MIRALAX) packet 17 g  17 g Oral DAILY PRN    promethazine (PHENERGAN) tablet 12.5 mg  12.5 mg Oral Q6H PRN    Or    ondansetron (ZOFRAN) injection 4 mg  4 mg IntraVENous Q6H PRN    dexamethasone (DECADRON) 4 mg/mL injection 4 mg  4 mg IntraVENous Q6H     Current Outpatient Medications   Medication Sig    dexAMETHasone (DECADRON) 2 mg tablet Take 2 Tablets by mouth every eight (8) hours for 2 days, THEN 2 Tablets every twelve (12) hours for 2 days, THEN 1 Tablet every twelve (12) hours for 30 days. baclofen (LIORESAL) 10 mg tablet Take 1 Tablet by mouth three (3) times daily as needed for Muscle Spasm(s). trimethoprim-sulfamethoxazole (Bactrim)  mg per tablet Take 1 Tablet by mouth daily. apixaban (Eliquis) 5 mg tablet Take 5 mg by mouth two (2) times a day. diclofenac EC (VOLTAREN) 75 mg EC tablet Take 75 mg by mouth two (2) times a day. metFORMIN (GLUCOPHAGE) 500 mg tablet Take 500 mg by mouth daily (with breakfast). magnesium oxide (MAG-OX) 400 mg tablet Take 400 mg by mouth in the morning. furosemide (LASIX) 20 mg tablet Take 20 mg by mouth daily. 2x a day    ondansetron (ZOFRAN ODT) 4 mg disintegrating tablet Take 1-2 Tablets by mouth every eight (8) hours as needed for Nausea or Nausea or Vomiting.    gabapentin (NEURONTIN) 300 mg capsule Take 1 Capsule by mouth three (3) times daily. Max Daily Amount: 900 mg.    azelastine (ASTELIN) 137 mcg (0.1 %) nasal spray 2 Sprays by Both Nostrils route two (2) times a day. Use in each nostril as directed     esomeprazole (NEXIUM) 40 mg capsule Take 40 mg by mouth two (2) times a day. levocetirizine (XYZAL) 5 mg tablet Take 5 mg by mouth. metroNIDAZOLE (METROLOTION) 0.75 % lotion Apply  to affected area two (2) times a day. B.animalis,bifid,infantis,long (Probiotic 4X) 10-15 mg TbEC Take  by mouth.    budesonide-formoteroL (SYMBICORT) 160-4.5 mcg/actuation HFAA Take 2 Puffs by inhalation two (2) times a day. LAB AND IMAGING FINDINGS:     Lab Results   Component Value Date/Time    WBC 3.8 (L) 10/28/2022 05:49 AM    HGB 11.4 (L) 10/28/2022 05:49 AM    PLATELET 156 (L) 51/11/4060 05:49 AM     Lab Results   Component Value Date/Time    Sodium 141 10/28/2022 05:49 AM    Potassium 4.2 10/28/2022 05:49 AM    Chloride 106 10/28/2022 05:49 AM    CO2 26 10/28/2022 05:49 AM    BUN 24 (H) 10/28/2022 05:49 AM    Creatinine 0.99 10/28/2022 05:49 AM    Calcium 8.7 10/28/2022 05:49 AM    Magnesium 2.1 10/24/2022 01:59 PM    Phosphorus 2.5 (L) 06/15/2022 12:23 AM      Lab Results   Component Value Date/Time    Alk. phosphatase 49 10/28/2022 05:49 AM    Protein, total 6.0 (L) 10/28/2022 05:49 AM    Albumin 3.0 (L) 10/28/2022 05:49 AM    Globulin 3.0 10/28/2022 05:49 AM     Lab Results   Component Value Date/Time    INR 1.1 10/24/2022 01:59 PM    Prothrombin time 11.6 (H) 10/24/2022 01:59 PM    aPTT 30.1 09/16/2022 04:51 AM      No results found for: IRON, FE, TIBC, IBCT, PSAT, FERR   No results found for: PH, PCO2, PO2  No components found for: GLPOC   No results found for: CPK, CKMB             Total time:  35 minutes  Counseling / coordination time, spent as noted above:  30 minutes  > 50% counseling / coordination?: yes    Prolonged service was provided for  []30 min   []75 min in face to face time in the presence of the patient, spent as noted above. Time Start:   Time End:   Note: this can only be billed with 07708 (initial) or 69738 (follow up). If multiple start / stop times, list each separately.

## 2022-10-31 NOTE — PROGRESS NOTES
Spiritual Care Assessment/Progress Note  [facility]      NAME: Marcello Alberto      MRN: [id number]  AGE: 61 y.o. SEX: male  Tenriism Affiliation: Jew   Language: English     10/31/2022     Total Time (in minutes): 17     Spiritual Assessment begun in [address] through conversation with:         [x]Patient        [x] Family    [] Friend(s)        Reason for Consult: Palliative Care, Initial/Spiritual Assessment     Spiritual beliefs: (Please include comment if needed)     [x] Identifies with a jethro tradition: Sarah        [x] Supported by a jethro community:            [] Claims no spiritual orientation:           [] Seeking spiritual identity:                [] Adheres to an individual form of spirituality:           [] Not able to assess:                           Identified resources for coping:      [x] Prayer                               [] Music                  [] Guided Imagery     [x] Family/friends                 [] Pet visits     [] Devotional reading                         [] Unknown     [] Other:                                               Interventions offered during this visit: (See comments for more details)    Patient Interventions: Affirmation of emotions/emotional suffering, Catharsis/review of pertinent events in supportive environment, Initial/Spiritual assessment, patient floor, Prayer (assurance of)     Family/Friend(s):  Affirmation of emotions/emotional suffering, Catharsis/review of pertinent events in supportive environment, Initial Assessment, Prayer (assurance of)     Plan of Care:     [x] Support spiritual and/or cultural needs    [] Support AMD and/or advance care planning process      [] Support grieving process   [] Coordinate Rites and/or Rituals    [] Coordination with community clergy   [] No spiritual needs identified at this time   [] Detailed Plan of Care below (See Comments)  [] Make referral to Music Therapy  [] Make referral to Pet Therapy     [] Make referral to Addiction services  [] Make referral to [facility]  [] Make referral to Spiritual Care Partner  [] No future visits requested        [x] Contact Spiritual Care for further referrals     Comments: Visited Mr Johnathan Garrett in room 662 for initial Palliative Care spiritual assessment. Mr Johnathan Garrett was up in the room with staff assistance and his wife was visiting with him; they both appeared in pretty good spirits. Provided spiritual presence and active listening as patient and his wife shared a little about his diagnosis. They denied having concerns to share at that time, stating that patient was supposed to be discharged today. Wife confirmed that patient was [place] and a member of [employer]; shared that their jethro community was aware of his diagnosis. Patient was wearing two necklaces which he shared were [place] spiritual symbols; stated that he had purchased one for each of his immediate family members when his daughter had left for college; felt it was a way to keep them connected spiritually. Patient and wife gave permission for  to keep them in prayer. Assured them of ongoing  availability for support. : Rev. Patricia Barroso.  Fifi Schwartz; [facility], to contact [phone] [address] call: [phone]

## 2022-10-31 NOTE — PROGRESS NOTES
Problem: Mobility Impaired (Adult and Pediatric)  Goal: *Acute Goals and Plan of Care (Insert Text)  Description: FUNCTIONAL STATUS PRIOR TO ADMISSION: Patient was modified independent using a rolling walker for household distances, single point cane while asc/desc stairs, and wheelchair typically for functional community level mobility. HOME SUPPORT PRIOR TO ADMISSION: The patient lived with his spouse and son who provided SUP to occasional light assist with ADLs/mobilty. Currently working with [employer]. Physical Therapy Goals  Initiated 10/28/2022  1. Patient will move from supine to sit and sit to supine  and scoot up and down in bed with supervision/set-up within 7 day(s). 2.  Patient will transfer from bed to chair and chair to bed with supervision/set-up using the least restrictive device within 7 day(s). 3.  Patient will perform sit to stand with supervision/set-up within 7 day(s). 4.  Patient will ambulate with supervision/set-up for 75 feet with the least restrictive device within 7 day(s). 5.  Patient will ascend/descend 12 stairs with handrail(s) with minimal assistance/contact guard assist within 7 day(s). Outcome: Progressing Towards Goal   PHYSICAL THERAPY TREATMENT  Patient: Jerod Parker (34 y.o. male)  Date: 10/31/2022  Diagnosis: Weakness [R53.1]  Generalized weakness [R53.1] <principal problem not specified>      Precautions: Fall  Chart, physical therapy assessment, plan of care and goals were reviewed. ASSESSMENT  Patient continues with skilled PT services and is progressing towards goals. He is received supine in bed. HOB is left elevated and VC are provided to patient to complete supine to sit. He demonstrates markedly improved strength from last PT session. Patient is able to perform sit<>stand with CGA and repeated VC for attention to task.  He continues to demonstrates intermittent inappropriate use of RW ie pushing rolling walker too far away or picking it up when turning. He demonstrates the ability to perform hygiene and toileting with VC this session. Patient ambulates increased distance with CGA. He is able to ascend and descend 8 steps with L hand rail and SPC in right hand. VC are provided to lead on the stairs with his stronger R LE. Despite being able to verbalize this sequence patient continues to require VC. Current Level of Function Impacting Discharge (mobility/balance): CGA- SBA     Other factors to consider for discharge: medical stability         PLAN :  Patient continues to benefit from skilled intervention to address the above impairments. Continue treatment per established plan of care. to address goals. Recommendation for discharge: (in order for the patient to meet his/her long term goals)  Physical therapy at least 2 days/week in the home AND ensure assist and/or supervision for safety with functional mobitliy     This discharge recommendation:  Has been made in collaboration with the attending provider and/or case management    IF patient discharges home will need the following DME: hospital bed       SUBJECTIVE:   Patient stated I'm .     OBJECTIVE DATA SUMMARY:   Critical Behavior:  Neurologic State: Eyes open spontaneously  Orientation Level: Oriented X4  Cognition: Decreased command following  Safety/Judgement: Decreased insight into deficits, Decreased awareness of need for safety  Functional Mobility Training:  Bed Mobility:     Supine to Sit: Bed Modified;Stand-by assistance              Transfers:  Sit to Stand: Contact guard assistance  Stand to Sit: Contact guard assistance        Bed to Chair: Contact guard assistance                    Balance:  Sitting: Intact; With support  Sitting - Static: Good (unsupported)  Sitting - Dynamic: Fair (occasional)  Standing: Impaired; With support  Standing - Static: Constant support; Fair  Standing - Dynamic : Constant support; Fair  Ambulation/Gait Training:  Distance (ft): 100 Feet (ft)  Assistive Device: Gait belt;Walker, rolling  Ambulation - Level of Assistance: Minimal assistance        Gait Abnormalities: Decreased step clearance;Shuffling gait (increased trunk flexion)              Speed/Susi: Slow;Shuffled  Step Length: Right shortened;Left shortened                    Stairs:  Number of Stairs Trained: 8  Stairs - Level of Assistance: Minimum assistance   Rail Use: Left     Therapeutic Exercises:     Pain Rating:      Activity Tolerance:   Fair    After treatment patient left in no apparent distress:   Sitting in chair, Call bell within reach, Bed / chair alarm activated, and Caregiver / family present    COMMUNICATION/COLLABORATION:   The patients plan of care was discussed with: Occupational therapist and Registered nurse.      Randie Harada, PT   Time Calculation: 44 mins

## 2022-10-31 NOTE — NURSE NAVIGATOR
[facility]   Brain Navigator Encounter    Name:    Lam Lopez  Age:    61 y.o. Diagnosis: GBM      Encounter type:  []Patient Initiated  [x]Navigator Follow-up []Pre-op  []Post-op  []Check-in Prior to First Treatment []Treatment Modality Change  []1st point of Contact []Referral []Other:       Narrative:   I reached out to Agata Royal wife to remind her of the help I can give with support and answering questions moving forward. I also reminded her of the [facility] and their resources too and told her to just reach out if and when she needs us, that we are still here. I reminded her [facility] has virtual meditation and also music therapy if her or Russell are interested to just let me or Serjio Polo know and we can help! Say DONAHUE, RN.   [facility] [place] [address] [facility]  Office: [phone]  Cell: [phone]  F: [phone]  [email or web address]  Good Help to Those in [facility]

## 2022-10-31 NOTE — DISCHARGE SUMMARY
Physician Discharge Summary     Patient ID:    Jean Paul Sylvester  [id number]  : 1955    Admit date: 10/27/2022    Discharge date and time: 10/31/2022    Hospital Diagnoses and Treatment Rendered: This is a 29-year-old male with a PMHx of Glioblastoma s/p resection, Osteoarthritis, GERD, Obesity, HTN, recent admission with PE- on Eliquis, who presented to the ED with chief complaint of generalized weakness, fatigue, and confusion. Usually able to ambulate with a cane, but since 10/17, had been experiencing weakness that progressed to him falling out of bed on 10/26, family members reported difficulty lifting patient and EMS was called. Patient follows-up with Dr. Devan Castillo at [facility], who advised the patient to come to the ED for further evaluation and management. HOSPITAL COURSE:  Glioblastoma:   - s/p resection by Dr. Devan Castillo at the [facility].   - Continue Dexamethasone: dose was increased to 4 mg Q6h on admission;   - home dose of Keppra was continued;  - Neurosurgery consulted: no surgical interventions, recommend palliative;  - patient is currently enrolled in a study and wants to continue;   - PT/OT was consulted;   - Hematology consulted: recommend following-up with primary team at [facility];   - plan to taper Dexamethasone at discharge;   - patient has a follow-up appt at [facility] in 1 week;     Pulmonary Embolism:   - continue Eliquis;    HTN:   - continue home BP medications;     COPD:   - stable, continue home regimen of inhalers/Nebs;    DM type II, non-insulin dependent:  - continue Metformin as prior to admission;         Chronic Diagnoses:    Problem List as of 10/31/2022 Date Reviewed: 2022            Codes Class Noted - Resolved    Palliative care encounter ICD-10-CM: Z51.5  ICD-9-CM: V66.7  10/28/2022 - Present        Goals of care, counseling/discussion ICD-10-CM: Z71.89  ICD-9-CM: V65.49  10/28/2022 - Present        Anorexia ICD-10-CM: R63.0  ICD-9-CM: 783.0  10/28/2022 - Present        Weakness ICD-10-CM: R53.1  ICD-9-CM: 780.79  10/27/2022 - Present        Debility ICD-10-CM: R53.81  ICD-9-CM: 799.3  10/27/2022 - Present        Pulmonary embolus ([facility] ICD-10-CM: I26.99  ICD-9-CM: 415.19  9/13/2022 - Present        Chemotherapy follow-up examination ICD-10-CM: E78  ICD-9-CM: V67.2  8/16/2022 - Present        Glioblastoma ([facility] ICD-10-CM: C71.9  ICD-9-CM: 191.9  7/19/2022 - Present        Brain mass ICD-10-CM: G93.89  ICD-9-CM: 348.89  6/2/2022 - Present           Discharge Medications:   Current Discharge Medication List        CONTINUE these medications which have CHANGED    Details   dexAMETHasone (DECADRON) 2 mg tablet Take 2 Tablets by mouth every eight (8) hours for 2 days, THEN 2 Tablets every twelve (12) hours for 2 days, THEN 1 Tablet every twelve (12) hours for 30 days. Qty: 80 Tablet, Refills: 0  Start date: 10/31/2022, End date: 12/4/2022      baclofen (LIORESAL) 10 mg tablet Take 1 Tablet by mouth three (3) times daily as needed for Muscle Spasm(s). Qty: 90 Tablet, Refills: 0  Start date: 10/31/2022           CONTINUE these medications which have NOT CHANGED    Details   trimethoprim-sulfamethoxazole (Bactrim)  mg per tablet Take 1 Tablet by mouth daily. apixaban (Eliquis) 5 mg tablet Take 5 mg by mouth two (2) times a day. diclofenac EC (VOLTAREN) 75 mg EC tablet Take 75 mg by mouth two (2) times a day. metFORMIN (GLUCOPHAGE) 500 mg tablet Take 500 mg by mouth daily (with breakfast). magnesium oxide (MAG-OX) 400 mg tablet Take 400 mg by mouth in the morning. furosemide (LASIX) 20 mg tablet Take 20 mg by mouth daily. 2x a day      ondansetron (ZOFRAN ODT) 4 mg disintegrating tablet Take 1-2 Tablets by mouth every eight (8) hours as needed for Nausea or Nausea or Vomiting. Qty: 60 Tablet, Refills: 1    Associated Diagnoses: Glioblastoma ([facility]      gabapentin (NEURONTIN) 300 mg capsule Take 1 Capsule by mouth three (3) times daily.  Max Daily Amount: 900 mg. Qty: 90 Capsule, Refills: 0    Associated Diagnoses: Right frontal lobe mass      azelastine (ASTELIN) 137 mcg (0.1 %) nasal spray 2 Sprays by Both Nostrils route two (2) times a day. Use in each nostril as directed       esomeprazole (NEXIUM) 40 mg capsule Take 40 mg by mouth two (2) times a day. levocetirizine (XYZAL) 5 mg tablet Take 5 mg by mouth.      metroNIDAZOLE (METROLOTION) 0.75 % lotion Apply  to affected area two (2) times a day. B.animalis,bifid,infantis,long (Probiotic 4X) 10-15 mg TbEC Take  by mouth.      budesonide-formoteroL (SYMBICORT) 160-4.5 mcg/actuation HFAA Take 2 Puffs by inhalation two (2) times a day. Follow up Care:    1. Earnest Cordero MD in 1-2 weeks. Please call to set up an appointment shortly after discharge. Diet:  Cardiac Diet, Diabetic Diet, and Resume previous diet    Disposition:  Home. Advanced Directive:   FULL x   DNR      Discharge Exam:    Vitals reviewed, and stable;      Constitutional:  No acute distress, cooperative, pleasant. ENT:  Oral mucosa moist.    Resp:  CTA bilaterally. No wheezing/rhonchi/rales. No accessory muscle use. CV:  Regular rhythm, normal rate, S1,S2.    GI/:  Soft, obese, non tender, no guarding, BS present. Voids Freely. Musculoskeletal:  No edema, warm. Neurologic:  Moves all extremities. AAOx3, confused at times. Skin:  w/d. Psych:  Good insight, Not anxious nor agitated. CONSULTATIONS: Hematology/Oncology and Neurosurgery;    Significant Diagnostic Studies:   10/27/2022: BUN 24 MG/DL (H; Ref range: 6 - 20 MG/DL); Calcium 9.6 MG/DL (Ref range: 8.5 - 10.1 MG/DL); CO2 31 mmol/L (Ref range: 21 - 32 mmol/L); Creatinine 1.13 MG/DL (Ref range: 0.70 - 1.30 MG/DL); Glucose 96 mg/dL (Ref range: 65 - 100 mg/dL); HCT 38.5 % (Ref range: 36.6 - 50.3 %); HGB 12.6 g/dL (Ref range: 12.1 - 17.0 g/dL); Potassium 3.6 mmol/L (Ref range: 3.5 - 5.1 mmol/L);  Sodium 140 mmol/L (Ref range: 136 - 145 mmol/L)  10/28/2022: BUN 24 MG/DL (H; Ref range: 6 - 20 MG/DL); Calcium 8.7 MG/DL (Ref range: 8.5 - 10.1 MG/DL); CO2 26 mmol/L (Ref range: 21 - 32 mmol/L); Creatinine 0.99 MG/DL (Ref range: 0.70 - 1.30 MG/DL); Glucose 122 mg/dL (H; Ref range: 65 - 100 mg/dL); HCT 33.7 % (L; Ref range: 36.6 - 50.3 %); HGB 11.4 g/dL (L; Ref range: 12.1 - 17.0 g/dL); Potassium 4.2 mmol/L (Ref range: 3.5 - 5.1 mmol/L); Sodium 141 mmol/L (Ref range: 136 - 145 mmol/L)  No results for input(s): WBC, HGB, HCT, PLT, HGBEXT, HCTEXT, PLTEXT in the last 72 hours. No results for input(s): NA, K, CL, CO2, BUN, CREA, GLU, CA, MG, PHOS, URICA in the last 72 hours. No results for input(s): AP, TBIL, TP, ALB, GLOB, GGT, AML, LPSE in the last 72 hours. No lab exists for component: SGOT, GPT, AMYP, HLPSE  No results for input(s): INR, PTP, APTT, INREXT in the last 72 hours. No results for input(s): FE, TIBC, PSAT, FERR in the last 72 hours. No results for input(s): PH, PCO2, PO2 in the last 72 hours. No results for input(s): CPK, CKMB in the last 72 hours. No lab exists for component: TROPONINI  No components found for: [address] [place] [address]:  CT HEAD 10/27/22: IMPRESSION  Right frontal craniotomy with mass resection. Extensive hypoattenuation  surrounding the resection cavity, may reflect vasogenic edema, but  age-indeterminate infarct may have a similar appearance. No acute intracranial  hemorrhage. Stable 9 mm leftward midline shift and partial effacement of the  suprasellar cistern. MRI BRAIN WITH AND WITHOUT IV CONTRAST 10/27/22: IMPRESSION  Postsurgical and treatment related changes right frontal lobe as described, with  significant increase in irregular/nodular enhancement of the resection cavity  and surrounding FLAIR/T2 hyperintense signal/mass effect. Possible subtle areas  of enhancement right basal ganglia and left centrum semiovale.   Findings may in  part be due to radiation necrosis/treatment related changes, though worrisome  for recurrent neoplasm. Short-term follow-up imaging may include perfusion/MP  rage postcontrast sequences as tolerated by the patient.        Discharge Time: >35 minutes;      Signed:  Haider Nuñez MD  10/31/2022  1:45 PM   .

## 2022-10-31 NOTE — ROUTINE PROCESS
Bedside and Verbal shift change report given to Faizan Maguire RN (oncoming nurse) by Lg Howell RN (offgoing nurse). Report included the following information SBAR, Kardex, MAR, Cardiac Rhythm NSR, and Dual Neuro Assessment.

## 2022-10-31 NOTE — PROGRESS NOTES
Problem: Pressure Injury - Risk of  Goal: *Prevention of pressure injury  Description: Document Daniel Scale and appropriate interventions in the flowsheet. Outcome: Resolved/Met  Note: Pressure Injury Interventions:  Sensory Interventions: Float heels    Moisture Interventions: Limit adult briefs, Check for incontinence Q2 hours and as needed    Activity Interventions: PT/OT evaluation, Increase time out of bed    Mobility Interventions: PT/OT evaluation, Turn and reposition approx. every two hours(pillow and wedges)    Nutrition Interventions: Offer support with meals,snacks and hydration                     Problem: Patient Education: Go to Patient Education Activity  Goal: Patient/Family Education  Outcome: Resolved/Met     Problem: Discharge Planning  Goal: *Discharge to safe environment  Outcome: Resolved/Met     Problem: Falls - Risk of  Goal: *Absence of Falls  Description: Document Emily Fall Risk and appropriate interventions in the flowsheet.   Outcome: Resolved/Met  Note: Fall Risk Interventions:  Mobility Interventions: Patient to call before getting OOB, Strengthening exercises (ROM-active/passive)         Medication Interventions: Teach patient to arise slowly    Elimination Interventions: Call light in reach, Toileting schedule/hourly rounds              Problem: Patient Education: Go to Patient Education Activity  Goal: Patient/Family Education  Outcome: Resolved/Met     Problem: Patient Education: Go to Patient Education Activity  Goal: Patient/Family Education  Outcome: Resolved/Met     Problem: Patient Education: Go to Patient Education Activity  Goal: Patient/Family Education  Outcome: Resolved/Met

## 2022-10-31 NOTE — PROGRESS NOTES
Problem: Self Care Deficits Care Plan (Adult)  Goal: *Acute Goals and Plan of Care (Insert Text)  Description: FUNCTIONAL STATUS PRIOR TO ADMISSION: Note patient has history of GBM with prior craniotomy for resection. He had progressed to be ambulatory without assistance using RW, used SPC to ascend/ descend stairs, used wheelchair for outings, performed toileting without assistance using bidet for bowel hygiene, bathed seated without assistance seated in shower, and received assistance for dressing. Recently he has had increased confusion per wife as well as increasing weakness, reduced mobility, and 2 \"slips\" to floor requiring assistance to recover, leading to current admission. HOME SUPPORT: Patient resided at home with his wife to provide assistance. His son and daughter are also supportive. Occupational Therapy Goals  Initiated 10/28/2022  1. Patient will perform grooming standing at sink with contact guard assist within 7 day(s). 2.  Patient will perform bathing with minimal assistance within 7 day(s). 3.  Patient will perform upper body dressing with supervision/set-up within 7 day(s). 4.  Patient will perform lower body dressing using AE PRN with minimal assistance within 7 days. 5.  Patient will perform toilet transfers with contact guard assist within 7 day(s). 6.  Patient will perform all aspects of toileting with minimal assistance within 7 day(s). 7.  Patient will participate in upper extremity therapeutic exercise/activities with supervision/set-up for 10 minutes within 7 day(s). Outcome: Progressing Towards Goal     OCCUPATIONAL THERAPY TREATMENT  Patient: Kaylan Jerome (76 y.o. male)  Date: 10/31/2022  Diagnosis: Weakness [R53.1]  Generalized weakness [R53.1] <principal problem not specified>      Precautions: Fall  Chart, occupational therapy assessment, plan of care, and goals were reviewed.     ASSESSMENT  Patient remains limited by attention to task (talkative, requiring frequent redirection to focus on ADL/ mobility performance), impaired safety awareness/ insight into deficits, general weakness + mild L hemiparesis, mild PRAJAPATI, mild L personal neglect, impaired sitting and standing balance, and impaired RW management (requiring assistance to keep RW closer anteriorly). Patient with improved overall strength and performance today. Note patient is anticipating d/c home today. Recommend HHOT + x1 assistance for all mobility tasks and out of bed ADLs due to cognitive impairment and high fall risk. Current Level of Function Impacting Discharge (ADLs/self-care): Contact guard assistance sit-stand, up to minimum assistance ambulating with RW. Overall set-up to minimum assistance UB ADLs and maximum assistance LB ADLs. PLAN :  Patient continues to benefit from skilled intervention to address the above impairments. Continue treatment per established plan of care to address goals. Recommendation for discharge: (in order for the patient to meet his/her long term goals)  Occupational therapy at least 2 days/week in the home AND ensure assist and/or supervision for safety with ADLs and mobility tasks    This discharge recommendation:  Has been made in collaboration with the attending provider and/or case management         SUBJECTIVE:   Patient stated \"I'm feeling stronger today.     OBJECTIVE DATA SUMMARY:   Cognitive/Behavioral Status:  Neurologic State: Alert  Orientation Level: Oriented X4  Cognition: Follows commands           Functional Mobility and Transfers for ADLs:  Bed Mobility:  Supine to Sit: Bed Modified;Stand-by assistance    Transfers:  Sit to Stand: Contact guard assistance     Bed to Chair: Contact guard assistance    Balance:  Sitting: Intact; With support  Sitting - Static: Good (unsupported)  Sitting - Dynamic: Fair (occasional)  Standing: Impaired; With support  Standing - Static: Constant support; Fair  Standing - Dynamic : Constant support; Fair      Activity Tolerance:   Fair    After treatment patient left in no apparent distress:   Sitting in chair, Call bell within reach, and Bed / chair alarm activated    COMMUNICATION/COLLABORATION:   The patients plan of care was discussed with: Physical therapist and Registered nurse.      Jason Becerra OT  Time Calculation: 25 mins

## 2022-10-31 NOTE — PROGRESS NOTES
Transition of Care Plan  RUR- Med 17%  DISPOSITION: Home with spouse  Home health PT/OT/SN/HHA - [address] [facility]  DME: Hospital Bed - to be delivered by [employer]  F/U with PCP/Specialist    Transport: Spouse    Emergency contact: Renate Gilford [phone]    CM barriers to discharge: None    Patient was approved for home hospital bed by [employer] on Friday. Hospital bed is on the schedule to be delivered to patient's home today. [facility] has accepted patient for [facility] PT/OT/SN and HHA. Patient's spouse planned to coordinate personal care services for patient. Message left for patient's spouse to confirm if this has been completed. No further CM needs for discharge, patient will be transported home by family. CM available to arrange BLS if patient is amenable. 12:13pm: CM met with patient's spouse at bedside, she said they were only waiting on DME delivery. CM reached out to [id number] [place] [address], they did confirm bed would be delivered today. CM requested they contact spouse for delivery window. 1:47pm: [employer] has not yet reached out to patient's spouse to coordinate hospital bed delivery. However, patient is being discharged this afternoon home. Spouse has CM contact # to reach out if bed is not delivered by EOD. Both patient and spouse are in agreement with discharge plan knowing that bed is not yet delivered. [facility] informed of patient's DC.    3:35pm: Patient has been discharged. CM received call from patient's spouse - bed has not yet been delivered and they have not heard from Michael Pedraza. CM escalated DME delivery to [phone] Director. NISA Jefferson.S.RAMONA

## 2022-10-31 NOTE — PROGRESS NOTES
[facility] [place] at [address], [address] [place] [address] Road, [facility]: [phone]  F: [phone]    Reason for Visit:   Caleen Blizzard is a 61 y.o. male seen today in hospital for Glioblastoma. Treatment History:   Brain MRI 6/2/22: Approximately 5 cm right anterior frontal parenchymal mass without other associated areas of abnormal enhancement in the brain suggests primary brain neoplasm/GBM although metastasis cannot be entirely excluded. 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 Persistent vasogenic edema, regional mass effect and right to left midline shift. Blood product within the resection cavity and mild surrounding diffusion restriction likely postoperative. No significant residual masslike enhancement though close follow-up is recommended  Brain MRI 7/12/22 at [facility]: Significant increase in nodular enhancing tumor surrounding the right frontal resection cavity as compared to baseline postoperative exam June 7, 2022. Findings concerning for early tumor recurrence  Concurrent Temodar (75 mg/m2) + XRT 7/18/22 - Current     History of Present Illness:   Caleen Blizzard is a 61 y.o. male seen today in hospital for GBM. Pt is on AGILE trial at [facility]. Not seen by us since 8/22. Admitted with weakness and confusion. Needs hospital bed at home. Wife good support but not in room at my visit. Pt states he is not sure of events at [facility]. Pt wants to go home today. No fevers/ chills/ chest pain/ SOB/ nausea/ vomiting/diarrhea/ pain/          Last visit 8/22:   He started concurrent Temodar (75 mg/m2) + XRT on 7/18/22. After concurrent therapy, he will begin the AGILE clinical trial at [facility].  He reports that he feels well overall today but is fatigued. Keppra was discontinued per [facility]. He is now on 2 mg Dex in the am and 1 mg in the pm. His appetite is good. He denies fever, chills, mouth sores, cough, SOB, CP, nausea, vomiting, diarrhea, and constipation. He denies pain today. CBC and CMP per [facility]. His supportive wife is here today. Past Medical History:   Diagnosis Date    Arthritis     GERD (gastroesophageal reflux disease)     Morbid obesity ([address]       Past Surgical History:   Procedure Laterality Date    HX BACK SURGERY      2 laminectomies and fusion    HX ORTHOPAEDIC Right     orif hand    HX SHOULDER ARTHROSCOPY      HX TONSILLECTOMY      as a child    IR THROMBECTOMY MECH ART PRIMARY NON AWAIS OR INTRACRANIAL  2022      Social History     Tobacco Use    Smoking status: Former     Types: Cigarettes     Quit date: 1981     Years since quittin.4    Smokeless tobacco: Never   Substance Use Topics    Alcohol use: Yes     Comment: occasional      No family history on file.   Current Facility-Administered Medications   Medication Dose Route Frequency    gabapentin (NEURONTIN) capsule 800 mg  800 mg Oral QID    azelastine (ASTELIN) 137mcg/spray nasal spray  2 Spray Both Nostrils BID    baclofen (LIORESAL) tablet 10 mg  10 mg Oral TID    ondansetron (ZOFRAN ODT) tablet 4-8 mg  4-8 mg Oral Q8H PRN    furosemide (LASIX) tablet 20 mg  20 mg Oral DAILY    magnesium oxide (MAG-OX) tablet 400 mg  400 mg Oral DAILY    trimethoprim-sulfamethoxazole (BACTRIM, SEPTRA)  mg per tablet 1 Tablet  1 Tablet Oral DAILY    pantoprazole (PROTONIX) tablet 40 mg  40 mg Oral ACB    cetirizine (ZYRTEC) tablet 10 mg  10 mg Oral DAILY    arformoterol 15 mcg/budesonide 0.25 mg neb solution   Nebulization BID RT    apixaban (ELIQUIS) tablet 5 mg  5 mg Oral BID    sodium chloride (NS) flush 5-40 mL  5-40 mL IntraVENous Q8H    sodium chloride (NS) flush 5-40 mL  5-40 mL IntraVENous PRN    acetaminophen (TYLENOL) tablet 650 mg  650 mg Oral Q6H PRN    Or acetaminophen (TYLENOL) suppository 650 mg  650 mg Rectal Q6H PRN    polyethylene glycol (MIRALAX) packet 17 g  17 g Oral DAILY PRN    promethazine (PHENERGAN) tablet 12.5 mg  12.5 mg Oral Q6H PRN    Or    ondansetron (ZOFRAN) injection 4 mg  4 mg IntraVENous Q6H PRN    dexamethasone (DECADRON) 4 mg/mL injection 4 mg  4 mg IntraVENous Q6H      Allergies   Allergen Reactions    Keflex [Cephalexin] Rash      Review of Systems:   A complete review of systems was obtained, negative except as described above     Physical Exam:     Visit Vitals  BP (!) 140/74 (BP 1 Location: Left upper arm, BP Patient Position: At rest)   Pulse 89   Temp 98.4 °F (36.9 °C)   Resp 16   Wt 259 lb 0.7 oz (117.5 kg)   SpO2 96%   BMI 41.81 kg/m²     ECOG PS: 2-3  General: No distress  Eyes: Anicteric sclerae  HENT: membranes moist  Neck: Supple  Respiratory:  CTAB, normal respiratory effort   CV: Normal rate, regular rhythm on monitor  MS: up in chair   Skin: No rashes, ecchymoses, or petechiae. Normal temperature, turgor, and texture. Psych: Awake, conversant  Neuro: general weakness    Results:     Lab Results   Component Value Date/Time    WBC 3.8 (L) 10/28/2022 05:49 AM    HGB 11.4 (L) 10/28/2022 05:49 AM    HCT 33.7 (L) 10/28/2022 05:49 AM    PLATELET 000 (L) 39/96/2222 05:49 AM    .6 (H) 10/28/2022 05:49 AM    ABS. NEUTROPHILS 3.2 10/28/2022 05:49 AM     Lab Results   Component Value Date/Time    Sodium 141 10/28/2022 05:49 AM    Potassium 4.2 10/28/2022 05:49 AM    Chloride 106 10/28/2022 05:49 AM    CO2 26 10/28/2022 05:49 AM    Glucose 122 (H) 10/28/2022 05:49 AM    BUN 24 (H) 10/28/2022 05:49 AM    Creatinine 0.99 10/28/2022 05:49 AM    GFR est AA >60 09/16/2022 04:51 AM    GFR est non-AA >60 09/16/2022 04:51 AM    Calcium 8.7 10/28/2022 05:49 AM    Glucose (POC) 161 (H) 10/30/2022 12:03 PM     Lab Results   Component Value Date/Time    Bilirubin, total 0.3 10/28/2022 05:49 AM    ALT (SGPT) 54 10/28/2022 05:49 AM    Alk. phosphatase 49 10/28/2022 05:49 AM    Protein, total 6.0 (L) 10/28/2022 05:49 AM    Albumin 3.0 (L) 10/28/2022 05:49 AM    Globulin 3.0 10/28/2022 05:49 AM     Brain MRI 6/2/22  FINDINGS:  Enhancing right anterior frontal mass noted as described above. There is central  nonenhancement/necrosis. Minimal blood products. Some associated central  restricted diffusion is nonspecific. The mass measures approximately 4.1 x 5.1 x  3.4 cm. There is surrounding vasogenic edema and mass effect with significant  effacement and displacement of the right ventricle, effacement of adjacent sulci  and right to left midline shift measuring approximately 12 mm at the level of  the septum pellucidum. There are no other foci of abnormal enhancement demonstrated in the brain. There  is slight asymmetric prominence of the left lateral ventricle and temporal horn  with some ependymal asymmetric T2 hyperintensity which may represent developing  hydrocephalus. Flow voids are present in vertebral basilar and carotid artery systems. There is  some vascularity in the tumor which does not appear particularly hypervascular. No abnormal extra-axial fluid collections. No other findings of acute  intracranial hemorrhage. Structures of the skull base including orbits,  paranasal sinuses and temporal bones are unremarkable. IMPRESSION:  Approximately 5 cm right anterior frontal parenchymal mass without other  associated areas of abnormal enhancement in the brain suggests primary brain  neoplasm/GBM although metastasis cannot be entirely excluded. Significant mass  affect. 6/6/22  FINAL PATHOLOGIC DIAGNOSIS   Brain, right frontal lobe, excision:   Hypercellular glial tissue with necrosis. Pending additional consultation for further characterization. Addendum Diagnosis   The purpose of this addendum is to report the results of outside consultation.   1-3. 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 Further studies are pending. Brain MRI 6/7/22  FINDINGS:    There has been recent right frontal craniotomy with resection of previous large  right frontal lobe mass. There is susceptibility artifact in anterior frontal  region consistent with pneumocephalus. There is also susceptibility artifact  within the right frontal lobe resection cavity with heterogeneous T1 and T2  signal most consistent with blood product. No significant residual masslike  enhancement. Extensive surrounding vasogenic edema with persistent mass effect  upon the lateral ventricles, and approximately 9 mm of midline shift. Small  amount of extra-axial hemorrhage in the right lateral frontal region, as well as  small amount of intraventricular hemorrhage in the occipital horns and fourth  ventricle. No hydrocephalus. Small areas of T2 hyperintensity in the left  centrum semiovale are unchanged. Diffusion restriction around the right frontal  lobe resection cavity. Major intracranial vascular flow voids are patent. Right  frontal scalp edema and fluid. IMPRESSION:  Recent right frontal lobe mass resection, with postoperative changes as  described above. Persistent vasogenic edema, regional mass effect and right to  left midline shift. Blood product within the resection cavity and mild  surrounding diffusion restriction likely postoperative. No significant residual  masslike enhancement though close follow-up is recommended. CT Results (most recent):  Results from Hospital Encounter encounter on 10/27/22    CT HEAD WO CONT    Narrative  EXAM: CT HEAD WO CONT    INDICATION: glioblastoma, increased confusion, weakness. Anticoagulated    COMPARISON: MRI brain 6/7/2022, CT head 6/6/2022. CONTRAST: None. TECHNIQUE: Unenhanced CT of the head was performed using 5 mm images. Brain and  bone windows were generated. Coronal and sagittal reformats.  CT dose reduction  was achieved through use of a standardized protocol tailored for this  examination and automatic exposure control for dose modulation. FINDINGS:  Right frontal craniotomy with mass resection. Extensive hypoattenuation  surrounding the resection cavity in the right frontal and temporal lobes. No  definite acute intracranial hemorrhage. 9 mm leftward midline shift is similar  to 6/6/2022 CT. Persistent effacement of the right lateral ventricle. Stable  partial effacement of the suprasellar cistern. No evidence of transforaminal  herniation. Stable chronic hypoattenuation in the right aspect of the miguel angel. .    The visualized portions of the paranasal sinuses and mastoid air cells are  clear. Impression  Right frontal craniotomy with mass resection. Extensive hypoattenuation  surrounding the resection cavity, may reflect vasogenic edema, but  age-indeterminate infarct may have a similar appearance. No acute intracranial  hemorrhage. Stable 9 mm leftward midline shift and partial effacement of the  suprasellar cistern. CT C/A/P 6/3/22  IMPRESSION:  1. No evidence of primary malignancy in the chest, abdomen or pelvis. 2. Incidental findings as above    Brain MRI 7/12/22 at [facility]  IMPRESSION:   Significant increase in nodular enhancing tumor surrounding the right frontal resection cavity as compared to baseline postoperative exam June 7, 2022. Findings concerning for early tumor recurrence. Records reviewed and summarized above. Pathology report(s) reviewed above. Radiology report(s) reviewed above. Assessment/PLAN:     1) Glioblastoma WHO Grade 4  Brain MRI 6/3/22 showed a 5 cm right anterior frontal parenchymal mass without other associated areas of abnormal enhancement in the brain suggests primary brain neoplasm  Post neurosurgery/ RIGHT frontal craniotomy 6/6/22. 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 He started concurrent Temodar (75 mg/m2) + XRT on 7/18/22 and done on 9/29/22.     Seen today in hospital/ admitted for weakness/ memory issues. Last seen by us 8/22. Patient on AGILE Clinical Trial trial at [facility] . Brain MRI here with possible progression. Needs to be reviewed at [facility] since on trial there. Not a surgical candidate/ seen by neurosurgery. Pt sitting up in chair. Wife good support but not at my visit today. Pt hopefully for d/c today. Will fu with [facility]. Will see us as needed. D/w hospitalist.      2) Confusion/ weakness. Maybe a little better per pt. On dex 4mg q6h. Will need taper. was teaching PChem at [employer] 3) HTN/LE Edema  Management per PCP. 4) Psychosocial  Mood good. Coping ok with [facility] / navigator support. .    He has good family support - wife and daugher here. 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 SW/NN support as needed. Call if questions. Follow up at [facility] and us as needed    I appreciate the opportunity to participate in Mr. Sharp Nalini care.     Signed By: Katlyn Barnett,

## 2022-10-31 NOTE — PROGRESS NOTES
Problem: Pressure Injury - Risk of  Goal: *Prevention of pressure injury  Description: Document Daniel Scale and appropriate interventions in the flowsheet. Outcome: Progressing Towards Goal  Note: Pressure Injury Interventions:  Sensory Interventions: Assess changes in LOC, Float heels, Keep linens dry and wrinkle-free    Moisture Interventions: Absorbent underpads, Check for incontinence Q2 hours and as needed    Activity Interventions: PT/OT evaluation, Increase time out of bed    Mobility Interventions: Pressure redistribution bed/mattress (bed type), PT/OT evaluation, Turn and reposition approx. every two hours(pillow and wedges)    Nutrition Interventions: Document food/fluid/supplement intake                     Problem: Patient Education: Go to Patient Education Activity  Goal: Patient/Family Education  Outcome: Progressing Towards Goal     Problem: Discharge Planning  Goal: *Discharge to safe environment  Outcome: Progressing Towards Goal     Problem: Falls - Risk of  Goal: *Absence of Falls  Description: Document [place] Fall Risk and appropriate interventions in the flowsheet.   Outcome: Progressing Towards Goal  Note: Fall Risk Interventions:  Mobility Interventions: Bed/chair exit alarm, Communicate number of staff needed for ambulation/transfer, OT consult for ADLs, Patient to call before getting OOB, PT Consult for mobility concerns         Medication Interventions: Patient to call before getting OOB, Teach patient to arise slowly    Elimination Interventions: Call light in reach, Toileting schedule/hourly rounds              Problem: Patient Education: Go to Patient Education Activity  Goal: Patient/Family Education  Outcome: Progressing Towards Goal     Problem: Patient Education: Go to Patient Education Activity  Goal: Patient/Family Education  Outcome: Progressing Towards Goal     Problem: Patient Education: Go to Patient Education Activity  Goal: Patient/Family Education  Outcome: Progressing Towards Goal

## 2022-12-08 ENCOUNTER — HOSPITAL ENCOUNTER (OUTPATIENT)
Dept: RADIATION THERAPY | Age: 67
Discharge: HOME | End: 2022-12-08

## 2023-01-01 ENCOUNTER — APPOINTMENT (OUTPATIENT)
Dept: CT IMAGING | Age: 68
DRG: 871 | End: 2023-01-01
Attending: EMERGENCY MEDICINE
Payer: MEDICARE

## 2023-01-01 ENCOUNTER — APPOINTMENT (OUTPATIENT)
Dept: GENERAL RADIOLOGY | Age: 68
DRG: 871 | End: 2023-01-01
Attending: INTERNAL MEDICINE
Payer: MEDICARE

## 2023-01-01 ENCOUNTER — APPOINTMENT (OUTPATIENT)
Dept: GENERAL RADIOLOGY | Age: 68
DRG: 871 | End: 2023-01-01
Attending: EMERGENCY MEDICINE
Payer: MEDICARE

## 2023-01-01 ENCOUNTER — APPOINTMENT (OUTPATIENT)
Dept: GENERAL RADIOLOGY | Age: 68
DRG: 871 | End: 2023-01-01
Attending: ANESTHESIOLOGY
Payer: MEDICARE

## 2023-01-01 ENCOUNTER — HOSPITAL ENCOUNTER (INPATIENT)
Age: 68
LOS: 1 days | DRG: 871 | End: 2023-02-15
Attending: EMERGENCY MEDICINE | Admitting: ANESTHESIOLOGY
Payer: MEDICARE

## 2023-01-01 DIAGNOSIS — R73.9 HYPERGLYCEMIA: ICD-10-CM

## 2023-01-01 DIAGNOSIS — R65.21 SEPTIC SHOCK (HCC): Primary | ICD-10-CM

## 2023-01-01 DIAGNOSIS — J18.9 MULTIFOCAL PNEUMONIA: ICD-10-CM

## 2023-01-01 DIAGNOSIS — A41.9 SEPTIC SHOCK (HCC): Primary | ICD-10-CM

## 2023-01-01 DIAGNOSIS — J96.01 ACUTE RESPIRATORY FAILURE WITH HYPOXIA (HCC): ICD-10-CM

## 2023-01-01 DIAGNOSIS — R40.2430 GLASGOW COMA SCALE TOTAL SCORE 3-8, UNSPECIFIED COMA TIMING (HCC): ICD-10-CM

## 2023-01-01 LAB
ACC. NO. FROM MICRO ORDER, ACCP: ABNORMAL
ACINETOBACTER CALCOACETICUS-BAUMANII COMPLEX, ACBCX: NOT DETECTED
ALBUMIN SERPL-MCNC: 1.7 G/DL (ref 3.5–5)
ALBUMIN SERPL-MCNC: 1.8 G/DL (ref 3.5–5)
ALBUMIN SERPL-MCNC: 2.1 G/DL (ref 3.5–5)
ALBUMIN/GLOB SERPL: 0.5 (ref 1.1–2.2)
ALBUMIN/GLOB SERPL: 0.6 (ref 1.1–2.2)
ALP SERPL-CCNC: 101 U/L (ref 45–117)
ALP SERPL-CCNC: 87 U/L (ref 45–117)
ALT SERPL-CCNC: 145 U/L (ref 12–78)
ALT SERPL-CCNC: 173 U/L (ref 12–78)
ANION GAP BLD CALC-SCNC: 0 (ref 10–20)
ANION GAP SERPL CALC-SCNC: 11 MMOL/L (ref 5–15)
ANION GAP SERPL CALC-SCNC: 11 MMOL/L (ref 5–15)
ANION GAP SERPL CALC-SCNC: 9 MMOL/L (ref 5–15)
ANION GAP SERPL CALC-SCNC: 9 MMOL/L (ref 5–15)
APPEARANCE UR: ABNORMAL
ARTERIAL PATENCY WRIST A: ABNORMAL
ARTERIAL PATENCY WRIST A: POSITIVE
AST SERPL-CCNC: 32 U/L (ref 15–37)
AST SERPL-CCNC: 43 U/L (ref 15–37)
B FRAGILIS DNA BLD POS QL NAA+NON-PROBE: NOT DETECTED
BACTERIA URNS QL MICRO: NEGATIVE /HPF
BASE EXCESS BLD CALC-SCNC: 0 MMOL/L
BASE EXCESS BLD CALC-SCNC: 1.8 MMOL/L
BASOPHILS # BLD: 0 K/UL (ref 0–0.1)
BASOPHILS NFR BLD: 0 % (ref 0–1)
BDY SITE: ABNORMAL
BDY SITE: ABNORMAL
BILIRUB SERPL-MCNC: 0.3 MG/DL (ref 0.2–1)
BILIRUB SERPL-MCNC: 0.4 MG/DL (ref 0.2–1)
BILIRUB UR QL: NEGATIVE
BIOFIRE COMMENT, BCIDPF: ABNORMAL
BUN SERPL-MCNC: 42 MG/DL (ref 6–20)
BUN SERPL-MCNC: 44 MG/DL (ref 6–20)
BUN SERPL-MCNC: 45 MG/DL (ref 6–20)
BUN SERPL-MCNC: 52 MG/DL (ref 6–20)
BUN/CREAT SERPL: 40 (ref 12–20)
BUN/CREAT SERPL: 41 (ref 12–20)
BUN/CREAT SERPL: 42 (ref 12–20)
BUN/CREAT SERPL: 43 (ref 12–20)
C ALBICANS DNA BLD POS QL NAA+NON-PROBE: NOT DETECTED
C AURIS DNA BLD POS QL NAA+NON-PROBE: NOT DETECTED
C GATTII+NEOFOR DNA BLD POS QL NAA+N-PRB: NOT DETECTED
C GLABRATA DNA BLD POS QL NAA+NON-PROBE: NOT DETECTED
C KRUSEI DNA BLD POS QL NAA+NON-PROBE: NOT DETECTED
C PARAP DNA BLD POS QL NAA+NON-PROBE: NOT DETECTED
C TROPICLS DNA BLD POS QL NAA+NON-PROBE: NOT DETECTED
CA-I BLD-MCNC: 1.19 MMOL/L (ref 1.12–1.32)
CA-I BLD-SCNC: 1.13 MMOL/L (ref 1.12–1.32)
CALCIUM SERPL-MCNC: 8.2 MG/DL (ref 8.5–10.1)
CALCIUM SERPL-MCNC: 8.3 MG/DL (ref 8.5–10.1)
CALCIUM SERPL-MCNC: 8.5 MG/DL (ref 8.5–10.1)
CALCIUM SERPL-MCNC: 8.6 MG/DL (ref 8.5–10.1)
CHLORIDE BLD-SCNC: 114 MMOL/L (ref 100–108)
CHLORIDE SERPL-SCNC: 108 MMOL/L (ref 97–108)
CHLORIDE SERPL-SCNC: 110 MMOL/L (ref 97–108)
CHLORIDE SERPL-SCNC: 112 MMOL/L (ref 97–108)
CHLORIDE SERPL-SCNC: 113 MMOL/L (ref 97–108)
CO2 BLD-SCNC: 27 MMOL/L (ref 19–24)
CO2 SERPL-SCNC: 22 MMOL/L (ref 21–32)
CO2 SERPL-SCNC: 23 MMOL/L (ref 21–32)
CO2 SERPL-SCNC: 25 MMOL/L (ref 21–32)
CO2 SERPL-SCNC: 27 MMOL/L (ref 21–32)
COLOR UR: ABNORMAL
CREAT SERPL-MCNC: 1.04 MG/DL (ref 0.7–1.3)
CREAT SERPL-MCNC: 1.07 MG/DL (ref 0.7–1.3)
CREAT SERPL-MCNC: 1.07 MG/DL (ref 0.7–1.3)
CREAT SERPL-MCNC: 1.21 MG/DL (ref 0.7–1.3)
CREAT UR-MCNC: 1.2 MG/DL (ref 0.6–1.3)
DIFFERENTIAL METHOD BLD: ABNORMAL
E CLOAC COMP DNA BLD POS NAA+NON-PROBE: NOT DETECTED
E COLI DNA BLD POS QL NAA+NON-PROBE: NOT DETECTED
E FAECALIS DNA BLD POS QL NAA+NON-PROBE: NOT DETECTED
E FAECIUM DNA BLD POS QL NAA+NON-PROBE: NOT DETECTED
ENTEROBACTERALES DNA BLD POS NAA+N-PRB: NOT DETECTED
EOSINOPHIL # BLD: 0 K/UL (ref 0–0.4)
EOSINOPHIL NFR BLD: 1 % (ref 0–7)
EPITH CASTS URNS QL MICRO: ABNORMAL /LPF
ERYTHROCYTE [DISTWIDTH] IN BLOOD BY AUTOMATED COUNT: 17.6 % (ref 11.5–14.5)
FIO2 ON VENT: 100 %
GAS FLOW.O2 O2 DELIVERY SYS: ABNORMAL
GAS FLOW.O2 O2 DELIVERY SYS: ABNORMAL
GAS FLOW.O2 SETTING OXYMISER: 12 BPM
GLOBULIN SER CALC-MCNC: 3.3 G/DL (ref 2–4)
GLOBULIN SER CALC-MCNC: 3.4 G/DL (ref 2–4)
GLUCOSE BLD STRIP.AUTO-MCNC: 170 MG/DL (ref 65–117)
GLUCOSE BLD STRIP.AUTO-MCNC: 189 MG/DL (ref 74–106)
GLUCOSE BLD STRIP.AUTO-MCNC: 197 MG/DL (ref 65–117)
GLUCOSE BLD STRIP.AUTO-MCNC: 230 MG/DL (ref 65–117)
GLUCOSE BLD STRIP.AUTO-MCNC: 247 MG/DL (ref 65–117)
GLUCOSE SERPL-MCNC: 185 MG/DL (ref 65–100)
GLUCOSE SERPL-MCNC: 194 MG/DL (ref 65–100)
GLUCOSE SERPL-MCNC: 221 MG/DL (ref 65–100)
GLUCOSE SERPL-MCNC: 229 MG/DL (ref 65–100)
GLUCOSE UR STRIP.AUTO-MCNC: NEGATIVE MG/DL
GP B STREP DNA BLD POS QL NAA+NON-PROBE: NOT DETECTED
HAEM INFLU DNA BLD POS QL NAA+NON-PROBE: NOT DETECTED
HCO3 BLD-SCNC: 24.6 MMOL/L (ref 22–26)
HCO3 BLDA-SCNC: 28 MMOL/L
HCT VFR BLD AUTO: 26.4 % (ref 36.6–50.3)
HGB BLD-MCNC: 7.9 G/DL (ref 12.1–17)
HGB UR QL STRIP: NEGATIVE
IMM GRANULOCYTES # BLD AUTO: 0 K/UL
IMM GRANULOCYTES NFR BLD AUTO: 0 %
INSPIRATION.DURATION SETTING TIME VENT: 1.11 SEC
IPAP/PIP, IPAPIP: 31
K OXYTOCA DNA BLD POS QL NAA+NON-PROBE: NOT DETECTED
KETONES UR QL STRIP.AUTO: ABNORMAL MG/DL
KLEBSIELLA SP DNA BLD POS QL NAA+NON-PRB: NOT DETECTED
KLEBSIELLA SP DNA BLD POS QL NAA+NON-PRB: NOT DETECTED
L MONOCYTOG DNA BLD POS QL NAA+NON-PROBE: NOT DETECTED
LACTATE BLD-SCNC: 3.26 MMOL/L (ref 0.4–2)
LACTATE SERPL-SCNC: 3.5 MMOL/L (ref 0.4–2)
LACTATE SERPL-SCNC: 4.2 MMOL/L (ref 0.4–2)
LEUKOCYTE ESTERASE UR QL STRIP.AUTO: NEGATIVE
LYMPHOCYTES # BLD: 0.1 K/UL (ref 0.8–3.5)
LYMPHOCYTES NFR BLD: 12 % (ref 12–49)
MAGNESIUM SERPL-MCNC: 1.8 MG/DL (ref 1.6–2.4)
MCH RBC QN AUTO: 36.7 PG (ref 26–34)
MCHC RBC AUTO-ENTMCNC: 29.9 G/DL (ref 30–36.5)
MCV RBC AUTO: 122.8 FL (ref 80–99)
MECA+MECC ISLT/SPM QL: DETECTED
MONOCYTES # BLD: 0 K/UL (ref 0–1)
MONOCYTES NFR BLD: 2 % (ref 5–13)
MYELOCYTES NFR BLD MANUAL: 4 %
N MEN DNA BLD POS QL NAA+NON-PROBE: NOT DETECTED
NEUTS BAND NFR BLD MANUAL: 3 % (ref 0–6)
NEUTS SEG # BLD: 1 K/UL (ref 1.8–8)
NEUTS SEG NFR BLD: 78 % (ref 32–75)
NITRITE UR QL STRIP.AUTO: NEGATIVE
NRBC # BLD: 0.17 K/UL (ref 0–0.01)
NRBC BLD-RTO: 14.2 PER 100 WBC
O2/TOTAL GAS SETTING VFR VENT: 100 %
OTHER,OTHU: ABNORMAL
P AERUGINOSA DNA BLD POS NAA+NON-PROBE: NOT DETECTED
PAW @ MEAN EXP FLOW ON VENT: 12 CMH2O
PCO2 BLD: 38.9 MMHG (ref 35–45)
PCO2 BLDV: 50.7 MMHG (ref 41–51)
PEEP RESPIRATORY: 8
PEEP RESPIRATORY: 8 CMH2O
PH BLD: 7.41 (ref 7.35–7.45)
PH BLDV: 7.35 (ref 7.32–7.42)
PH UR STRIP: 5 (ref 5–8)
PHOSPHATE SERPL-MCNC: 5.1 MG/DL (ref 2.6–4.7)
PLATELET # BLD AUTO: 90 K/UL (ref 150–400)
PMV BLD AUTO: 11.4 FL (ref 8.9–12.9)
PO2 BLD: 65 MMHG (ref 80–100)
PO2 BLDV: 36 MMHG (ref 25–40)
POTASSIUM BLD-SCNC: 4.3 MMOL/L (ref 3.5–5.5)
POTASSIUM SERPL-SCNC: 4.6 MMOL/L (ref 3.5–5.1)
POTASSIUM SERPL-SCNC: 4.7 MMOL/L (ref 3.5–5.1)
POTASSIUM SERPL-SCNC: 6.2 MMOL/L (ref 3.5–5.1)
POTASSIUM SERPL-SCNC: 6.7 MMOL/L (ref 3.5–5.1)
PROT SERPL-MCNC: 5.1 G/DL (ref 6.4–8.2)
PROT SERPL-MCNC: 5.5 G/DL (ref 6.4–8.2)
PROT UR STRIP-MCNC: ABNORMAL MG/DL
PROTEUS SP DNA BLD POS QL NAA+NON-PROBE: NOT DETECTED
RBC # BLD AUTO: 2.15 M/UL (ref 4.1–5.7)
RBC #/AREA URNS HPF: ABNORMAL /HPF (ref 0–5)
RBC MORPH BLD: ABNORMAL
RESISTANT GENE SPACE, REGENE: ABNORMAL
RESPIRATORY RATE: 18 (ref 5–40)
S AUREUS DNA BLD POS QL NAA+NON-PROBE: NOT DETECTED
S AUREUS+CONS DNA BLD POS NAA+NON-PROBE: DETECTED
S EPIDERMIDIS DNA BLD POS QL NAA+NON-PRB: DETECTED
S LUGDUNENSIS DNA BLD POS QL NAA+NON-PRB: NOT DETECTED
S MALTOPHILIA DNA BLD POS QL NAA+NON-PRB: NOT DETECTED
S MARCESCENS DNA BLD POS NAA+NON-PROBE: NOT DETECTED
S PNEUM DNA BLD POS QL NAA+NON-PROBE: NOT DETECTED
S PYO DNA BLD POS QL NAA+NON-PROBE: NOT DETECTED
SALMONELLA DNA BLD POS QL NAA+NON-PROBE: NOT DETECTED
SAO2 % BLD: 65 %
SAO2 % BLD: 92.7 % (ref 92–97)
SERVICE CMNT-IMP: ABNORMAL
SODIUM BLD-SCNC: 141 MMOL/L (ref 136–145)
SODIUM SERPL-SCNC: 142 MMOL/L (ref 136–145)
SODIUM SERPL-SCNC: 145 MMOL/L (ref 136–145)
SODIUM SERPL-SCNC: 146 MMOL/L (ref 136–145)
SODIUM SERPL-SCNC: 147 MMOL/L (ref 136–145)
SP GR UR REFRACTOMETRY: 1.02 (ref 1–1.03)
SPECIMEN SITE: ABNORMAL
SPECIMEN TYPE: ABNORMAL
STREPTOCOCCUS DNA BLD POS NAA+NON-PROBE: NOT DETECTED
TROPONIN I SERPL HS-MCNC: 76 NG/L (ref 0–76)
UA: UC IF INDICATED,UAUC: ABNORMAL
UR CULT HOLD, URHOLD: NORMAL
UROBILINOGEN UR QL STRIP.AUTO: 1 EU/DL (ref 0.2–1)
VENTILATION MODE VENT: ABNORMAL
VENTILATION MODE VENT: ABNORMAL
VT SETTING VENT: 385 ML
VT SETTING VENT: 550
WBC # BLD AUTO: 1.2 K/UL (ref 4.1–11.1)
WBC MORPH BLD: ABNORMAL
WBC URNS QL MICRO: ABNORMAL /HPF (ref 0–4)

## 2023-01-01 PROCEDURE — 36415 COLL VENOUS BLD VENIPUNCTURE: CPT

## 2023-01-01 PROCEDURE — 93005 ELECTROCARDIOGRAM TRACING: CPT

## 2023-01-01 PROCEDURE — 02HV33Z INSERTION OF INFUSION DEVICE INTO SUPERIOR VENA CAVA, PERCUTANEOUS APPROACH: ICD-10-PCS | Performed by: EMERGENCY MEDICINE

## 2023-01-01 PROCEDURE — 70450 CT HEAD/BRAIN W/O DYE: CPT

## 2023-01-01 PROCEDURE — 74011000258 HC RX REV CODE- 258: Performed by: INTERNAL MEDICINE

## 2023-01-01 PROCEDURE — 82947 ASSAY GLUCOSE BLOOD QUANT: CPT

## 2023-01-01 PROCEDURE — 71045 X-RAY EXAM CHEST 1 VIEW: CPT

## 2023-01-01 PROCEDURE — 74011636637 HC RX REV CODE- 636/637: Performed by: ANESTHESIOLOGY

## 2023-01-01 PROCEDURE — 99285 EMERGENCY DEPT VISIT HI MDM: CPT

## 2023-01-01 PROCEDURE — 65610000006 HC RM INTENSIVE CARE

## 2023-01-01 PROCEDURE — 80053 COMPREHEN METABOLIC PANEL: CPT

## 2023-01-01 PROCEDURE — 75810000455 HC PLCMT CENT VENOUS CATH LVL 2 5182

## 2023-01-01 PROCEDURE — 84484 ASSAY OF TROPONIN QUANT: CPT

## 2023-01-01 PROCEDURE — 74011000250 HC RX REV CODE- 250: Performed by: EMERGENCY MEDICINE

## 2023-01-01 PROCEDURE — 74011000250 HC RX REV CODE- 250: Performed by: ANESTHESIOLOGY

## 2023-01-01 PROCEDURE — 74011636637 HC RX REV CODE- 636/637: Performed by: INTERNAL MEDICINE

## 2023-01-01 PROCEDURE — 75810000304 HC PLACE NEED INTRAOSSEOUS INFUS

## 2023-01-01 PROCEDURE — 74011000258 HC RX REV CODE- 258: Performed by: EMERGENCY MEDICINE

## 2023-01-01 PROCEDURE — 83605 ASSAY OF LACTIC ACID: CPT

## 2023-01-01 PROCEDURE — 82803 BLOOD GASES ANY COMBINATION: CPT

## 2023-01-01 PROCEDURE — 85025 COMPLETE CBC W/AUTO DIFF WBC: CPT

## 2023-01-01 PROCEDURE — 74011250637 HC RX REV CODE- 250/637: Performed by: ANESTHESIOLOGY

## 2023-01-01 PROCEDURE — 51702 INSERT TEMP BLADDER CATH: CPT

## 2023-01-01 PROCEDURE — 87040 BLOOD CULTURE FOR BACTERIA: CPT

## 2023-01-01 PROCEDURE — 74011000250 HC RX REV CODE- 250: Performed by: INTERNAL MEDICINE

## 2023-01-01 PROCEDURE — 94002 VENT MGMT INPAT INIT DAY: CPT

## 2023-01-01 PROCEDURE — 74011250636 HC RX REV CODE- 250/636

## 2023-01-01 PROCEDURE — 36600 WITHDRAWAL OF ARTERIAL BLOOD: CPT

## 2023-01-01 PROCEDURE — 94003 VENT MGMT INPAT SUBQ DAY: CPT

## 2023-01-01 PROCEDURE — 74011250636 HC RX REV CODE- 250/636: Performed by: INTERNAL MEDICINE

## 2023-01-01 PROCEDURE — 74018 RADEX ABDOMEN 1 VIEW: CPT

## 2023-01-01 PROCEDURE — 74011000250 HC RX REV CODE- 250

## 2023-01-01 PROCEDURE — 83735 ASSAY OF MAGNESIUM: CPT

## 2023-01-01 PROCEDURE — 74011250637 HC RX REV CODE- 250/637: Performed by: INTERNAL MEDICINE

## 2023-01-01 PROCEDURE — 81001 URINALYSIS AUTO W/SCOPE: CPT

## 2023-01-01 PROCEDURE — 5A1945Z RESPIRATORY VENTILATION, 24-96 CONSECUTIVE HOURS: ICD-10-PCS | Performed by: ANESTHESIOLOGY

## 2023-01-01 PROCEDURE — 82962 GLUCOSE BLOOD TEST: CPT

## 2023-01-01 PROCEDURE — 80069 RENAL FUNCTION PANEL: CPT

## 2023-01-01 PROCEDURE — 96374 THER/PROPH/DIAG INJ IV PUSH: CPT

## 2023-01-01 PROCEDURE — 74011250636 HC RX REV CODE- 250/636: Performed by: EMERGENCY MEDICINE

## 2023-01-01 PROCEDURE — 31500 INSERT EMERGENCY AIRWAY: CPT

## 2023-01-01 PROCEDURE — 0BH17EZ INSERTION OF ENDOTRACHEAL AIRWAY INTO TRACHEA, VIA NATURAL OR ARTIFICIAL OPENING: ICD-10-PCS | Performed by: EMERGENCY MEDICINE

## 2023-01-01 PROCEDURE — 87150 DNA/RNA AMPLIFIED PROBE: CPT

## 2023-01-01 PROCEDURE — 74011250636 HC RX REV CODE- 250/636: Performed by: ANESTHESIOLOGY

## 2023-01-01 RX ORDER — ACETAMINOPHEN 650 MG/1
650 SUPPOSITORY RECTAL
Status: DISCONTINUED | OUTPATIENT
Start: 2023-01-01 | End: 2023-02-16 | Stop reason: HOSPADM

## 2023-01-01 RX ORDER — SUCCINYLCHOLINE CHLORIDE 20 MG/ML INJECTION SOLUTION
SOLUTION
Status: COMPLETED
Start: 2023-01-01 | End: 2023-01-01

## 2023-01-01 RX ORDER — LEVOFLOXACIN 5 MG/ML
750 INJECTION, SOLUTION INTRAVENOUS ONCE
Status: COMPLETED | OUTPATIENT
Start: 2023-01-01 | End: 2023-01-01

## 2023-01-01 RX ORDER — ONDANSETRON 2 MG/ML
4 INJECTION INTRAMUSCULAR; INTRAVENOUS
Status: DISCONTINUED | OUTPATIENT
Start: 2023-01-01 | End: 2023-02-16 | Stop reason: HOSPADM

## 2023-01-01 RX ORDER — IBUPROFEN 200 MG
4 TABLET ORAL AS NEEDED
Status: DISCONTINUED | OUTPATIENT
Start: 2023-01-01 | End: 2023-02-16 | Stop reason: HOSPADM

## 2023-01-01 RX ORDER — SODIUM CHLORIDE 0.9 % (FLUSH) 0.9 %
5-10 SYRINGE (ML) INJECTION AS NEEDED
Status: DISCONTINUED | OUTPATIENT
Start: 2023-01-01 | End: 2023-02-16 | Stop reason: HOSPADM

## 2023-01-01 RX ORDER — ETOMIDATE 2 MG/ML
30 INJECTION INTRAVENOUS
Status: COMPLETED | OUTPATIENT
Start: 2023-01-01 | End: 2023-01-01

## 2023-01-01 RX ORDER — FENTANYL CITRATE 50 UG/ML
100 INJECTION, SOLUTION INTRAMUSCULAR; INTRAVENOUS ONCE
Status: COMPLETED | OUTPATIENT
Start: 2023-01-01 | End: 2023-01-01

## 2023-01-01 RX ORDER — FENTANYL CITRATE 50 UG/ML
INJECTION, SOLUTION INTRAMUSCULAR; INTRAVENOUS
Status: COMPLETED
Start: 2023-01-01 | End: 2023-01-01

## 2023-01-01 RX ORDER — SODIUM BICARBONATE 1 MEQ/ML
100 SYRINGE (ML) INTRAVENOUS ONCE
Status: COMPLETED | OUTPATIENT
Start: 2023-01-01 | End: 2023-01-01

## 2023-01-01 RX ORDER — ETOMIDATE 2 MG/ML
INJECTION INTRAVENOUS
Status: COMPLETED
Start: 2023-01-01 | End: 2023-01-01

## 2023-01-01 RX ORDER — SODIUM CHLORIDE 0.9 % (FLUSH) 0.9 %
5-40 SYRINGE (ML) INJECTION AS NEEDED
Status: DISCONTINUED | OUTPATIENT
Start: 2023-01-01 | End: 2023-02-16 | Stop reason: HOSPADM

## 2023-01-01 RX ORDER — ACETAMINOPHEN 325 MG/1
650 TABLET ORAL
Status: DISCONTINUED | OUTPATIENT
Start: 2023-01-01 | End: 2023-02-16 | Stop reason: HOSPADM

## 2023-01-01 RX ORDER — CHLORHEXIDINE GLUCONATE 0.12 MG/ML
15 RINSE ORAL EVERY 12 HOURS
Status: DISCONTINUED | OUTPATIENT
Start: 2023-01-01 | End: 2023-02-16 | Stop reason: HOSPADM

## 2023-01-01 RX ORDER — ENOXAPARIN SODIUM 150 MG/ML
105 INJECTION SUBCUTANEOUS EVERY 12 HOURS
Status: DISCONTINUED | OUTPATIENT
Start: 2023-01-01 | End: 2023-02-16 | Stop reason: HOSPADM

## 2023-01-01 RX ORDER — VANCOMYCIN/0.9 % SOD CHLORIDE 1.5G/250ML
1500 PLASTIC BAG, INJECTION (ML) INTRAVENOUS
Status: DISCONTINUED | OUTPATIENT
Start: 2023-02-16 | End: 2023-02-16 | Stop reason: HOSPADM

## 2023-01-01 RX ORDER — ENOXAPARIN SODIUM 100 MG/ML
40 INJECTION SUBCUTANEOUS DAILY
Status: DISCONTINUED | OUTPATIENT
Start: 2023-01-01 | End: 2023-01-01

## 2023-01-01 RX ORDER — INSULIN LISPRO 100 [IU]/ML
INJECTION, SOLUTION INTRAVENOUS; SUBCUTANEOUS EVERY 6 HOURS
Status: DISCONTINUED | OUTPATIENT
Start: 2023-01-01 | End: 2023-02-16 | Stop reason: HOSPADM

## 2023-01-01 RX ORDER — HYDROMORPHONE HYDROCHLORIDE 1 MG/ML
1 INJECTION, SOLUTION INTRAMUSCULAR; INTRAVENOUS; SUBCUTANEOUS
Status: DISCONTINUED | OUTPATIENT
Start: 2023-01-01 | End: 2023-02-16 | Stop reason: HOSPADM

## 2023-01-01 RX ORDER — MAGNESIUM SULFATE HEPTAHYDRATE 40 MG/ML
2 INJECTION, SOLUTION INTRAVENOUS ONCE
Status: COMPLETED | OUTPATIENT
Start: 2023-01-01 | End: 2023-01-01

## 2023-01-01 RX ORDER — POLYETHYLENE GLYCOL 3350 17 G/17G
17 POWDER, FOR SOLUTION ORAL DAILY PRN
Status: DISCONTINUED | OUTPATIENT
Start: 2023-01-01 | End: 2023-02-16 | Stop reason: HOSPADM

## 2023-01-01 RX ORDER — HYDROCORTISONE SODIUM SUCCINATE 100 MG/2ML
100 INJECTION, POWDER, FOR SOLUTION INTRAMUSCULAR; INTRAVENOUS ONCE
Status: DISCONTINUED | OUTPATIENT
Start: 2023-01-01 | End: 2023-01-01

## 2023-01-01 RX ORDER — ONDANSETRON 4 MG/1
4 TABLET, ORALLY DISINTEGRATING ORAL
Status: DISCONTINUED | OUTPATIENT
Start: 2023-01-01 | End: 2023-02-16 | Stop reason: HOSPADM

## 2023-01-01 RX ORDER — SUCCINYLCHOLINE CHLORIDE 20 MG/ML INJECTION SOLUTION
120 SOLUTION
Status: COMPLETED | OUTPATIENT
Start: 2023-01-01 | End: 2023-01-01

## 2023-01-01 RX ORDER — DEXAMETHASONE SODIUM PHOSPHATE 10 MG/ML
10 INJECTION INTRAMUSCULAR; INTRAVENOUS EVERY 6 HOURS
Status: DISCONTINUED | OUTPATIENT
Start: 2023-01-01 | End: 2023-02-16 | Stop reason: HOSPADM

## 2023-01-01 RX ORDER — SODIUM POLYSTYRENE SULFONATE 15 G/60ML
30 SUSPENSION ORAL; RECTAL EVERY 4 HOURS
Status: DISCONTINUED | OUTPATIENT
Start: 2023-01-01 | End: 2023-02-16 | Stop reason: HOSPADM

## 2023-01-01 RX ORDER — NOREPINEPHRINE BITARTRATE/D5W 8 MG/250ML
.5-3 PLASTIC BAG, INJECTION (ML) INTRAVENOUS
Status: DISCONTINUED | OUTPATIENT
Start: 2023-01-01 | End: 2023-02-16 | Stop reason: HOSPADM

## 2023-01-01 RX ORDER — SODIUM CHLORIDE 0.9 % (FLUSH) 0.9 %
5-40 SYRINGE (ML) INJECTION EVERY 8 HOURS
Status: DISCONTINUED | OUTPATIENT
Start: 2023-01-01 | End: 2023-02-16 | Stop reason: HOSPADM

## 2023-01-01 RX ORDER — DOPAMINE HYDROCHLORIDE 320 MG/100ML
0-20 INJECTION, SOLUTION INTRAVENOUS
Status: DISCONTINUED | OUTPATIENT
Start: 2023-01-01 | End: 2023-02-16 | Stop reason: HOSPADM

## 2023-01-01 RX ORDER — SODIUM CHLORIDE, SODIUM LACTATE, POTASSIUM CHLORIDE, CALCIUM CHLORIDE 600; 310; 30; 20 MG/100ML; MG/100ML; MG/100ML; MG/100ML
125 INJECTION, SOLUTION INTRAVENOUS CONTINUOUS
Status: DISCONTINUED | OUTPATIENT
Start: 2023-01-01 | End: 2023-02-16 | Stop reason: HOSPADM

## 2023-01-01 RX ORDER — ENOXAPARIN SODIUM 150 MG/ML
1 INJECTION SUBCUTANEOUS EVERY 12 HOURS
Status: DISCONTINUED | OUTPATIENT
Start: 2023-01-01 | End: 2023-01-01

## 2023-01-01 RX ADMIN — SODIUM CHLORIDE, PRESERVATIVE FREE 10 ML: 5 INJECTION INTRAVENOUS at 22:37

## 2023-01-01 RX ADMIN — ENOXAPARIN SODIUM 40 MG: 100 INJECTION SUBCUTANEOUS at 08:36

## 2023-01-01 RX ADMIN — FENTANYL CITRATE 100 MCG: 50 INJECTION, SOLUTION INTRAMUSCULAR; INTRAVENOUS at 21:36

## 2023-01-01 RX ADMIN — DEXAMETHASONE SODIUM PHOSPHATE 10 MG: 10 INJECTION INTRAMUSCULAR; INTRAVENOUS at 06:28

## 2023-01-01 RX ADMIN — SODIUM CHLORIDE, PRESERVATIVE FREE 10 ML: 5 INJECTION INTRAVENOUS at 05:09

## 2023-01-01 RX ADMIN — CHLORHEXIDINE GLUCONATE 15 ML: 1.2 RINSE ORAL at 22:38

## 2023-01-01 RX ADMIN — FAMOTIDINE 20 MG: 10 INJECTION, SOLUTION INTRAVENOUS at 21:51

## 2023-01-01 RX ADMIN — DEXTROSE MONOHYDRATE 250 ML: 10 INJECTION, SOLUTION INTRAVENOUS at 14:17

## 2023-01-01 RX ADMIN — DEXAMETHASONE SODIUM PHOSPHATE 10 MG: 10 INJECTION INTRAMUSCULAR; INTRAVENOUS at 11:11

## 2023-01-01 RX ADMIN — FENTANYL CITRATE 100 MCG: 50 INJECTION, SOLUTION INTRAMUSCULAR; INTRAVENOUS at 18:54

## 2023-01-01 RX ADMIN — CALCIUM CHLORIDE 1 G: 100 INJECTION INTRAVENOUS; INTRAVENTRICULAR at 22:16

## 2023-01-01 RX ADMIN — FENTANYL CITRATE 300 MCG/HR: 50 INJECTION INTRAVENOUS at 22:07

## 2023-01-01 RX ADMIN — VANCOMYCIN HYDROCHLORIDE 2500 MG: 10 INJECTION, POWDER, LYOPHILIZED, FOR SOLUTION INTRAVENOUS at 09:02

## 2023-01-01 RX ADMIN — NOREPINEPHRINE BITARTRATE 4 MCG/MIN: 1 INJECTION, SOLUTION, CONCENTRATE INTRAVENOUS at 19:08

## 2023-01-01 RX ADMIN — VASOPRESSIN 0.01 UNITS/MIN: 20 INJECTION INTRAVENOUS at 13:59

## 2023-01-01 RX ADMIN — Medication 3 UNITS: at 18:19

## 2023-01-01 RX ADMIN — SUCCINYLCHOLINE CHLORIDE 20 MG/ML INJECTION SOLUTION 120 MG: SOLUTION at 18:45

## 2023-01-01 RX ADMIN — LEVOFLOXACIN 750 MG: 5 INJECTION, SOLUTION INTRAVENOUS at 22:32

## 2023-01-01 RX ADMIN — SODIUM CHLORIDE 1 G: 9 INJECTION, SOLUTION INTRAMUSCULAR; INTRAVENOUS; SUBCUTANEOUS at 08:34

## 2023-01-01 RX ADMIN — HYDROMORPHONE HYDROCHLORIDE 1 MG: 1 INJECTION, SOLUTION INTRAMUSCULAR; INTRAVENOUS; SUBCUTANEOUS at 22:05

## 2023-01-01 RX ADMIN — NOREPINEPHRINE BITARTRATE 27 MCG/MIN: 1 INJECTION, SOLUTION, CONCENTRATE INTRAVENOUS at 23:10

## 2023-01-01 RX ADMIN — SODIUM CHLORIDE, POTASSIUM CHLORIDE, SODIUM LACTATE AND CALCIUM CHLORIDE 2000 ML: 600; 310; 30; 20 INJECTION, SOLUTION INTRAVENOUS at 07:58

## 2023-01-01 RX ADMIN — SODIUM CHLORIDE, PRESERVATIVE FREE 10 ML: 5 INJECTION INTRAVENOUS at 14:04

## 2023-01-01 RX ADMIN — MEROPENEM 1 G: 1 INJECTION, POWDER, FOR SOLUTION INTRAVENOUS at 15:21

## 2023-01-01 RX ADMIN — MAGNESIUM SULFATE HEPTAHYDRATE 2 G: 40 INJECTION, SOLUTION INTRAVENOUS at 11:18

## 2023-01-01 RX ADMIN — Medication 2 UNITS: at 12:11

## 2023-01-01 RX ADMIN — ACETAMINOPHEN 650 MG: 650 SUPPOSITORY RECTAL at 10:48

## 2023-01-01 RX ADMIN — SODIUM BICARBONATE 100 MEQ: 84 INJECTION, SOLUTION INTRAVENOUS at 14:16

## 2023-01-01 RX ADMIN — DEXAMETHASONE SODIUM PHOSPHATE 10 MG: 10 INJECTION INTRAMUSCULAR; INTRAVENOUS at 17:24

## 2023-01-01 RX ADMIN — FAMOTIDINE 20 MG: 10 INJECTION, SOLUTION INTRAVENOUS at 08:37

## 2023-01-01 RX ADMIN — Medication 10 UNITS: at 14:24

## 2023-01-01 RX ADMIN — SODIUM POLYSTYRENE SULFONATE 30 G: 15 SUSPENSION ORAL; RECTAL at 22:09

## 2023-01-01 RX ADMIN — SODIUM CHLORIDE 1000 ML: 9 INJECTION, SOLUTION INTRAVENOUS at 22:32

## 2023-01-01 RX ADMIN — SODIUM CHLORIDE, POTASSIUM CHLORIDE, SODIUM LACTATE AND CALCIUM CHLORIDE 1000 ML: 600; 310; 30; 20 INJECTION, SOLUTION INTRAVENOUS at 11:27

## 2023-01-01 RX ADMIN — ETOMIDATE 30 MG: 2 INJECTION, SOLUTION INTRAVENOUS at 18:42

## 2023-01-01 RX ADMIN — NOREPINEPHRINE BITARTRATE 25 MCG/MIN: 1 INJECTION, SOLUTION, CONCENTRATE INTRAVENOUS at 17:22

## 2023-01-01 RX ADMIN — ACETAMINOPHEN 650 MG: 650 SUPPOSITORY RECTAL at 05:52

## 2023-01-01 RX ADMIN — ETOMIDATE 30 MG: 2 INJECTION INTRAVENOUS at 18:42

## 2023-01-01 RX ADMIN — Medication 120 MG: at 18:45

## 2023-01-01 RX ADMIN — Medication 2 UNITS: at 05:07

## 2023-01-01 RX ADMIN — SODIUM BICARBONATE 100 MEQ: 84 INJECTION INTRAVENOUS at 21:37

## 2023-01-01 RX ADMIN — SODIUM CHLORIDE, POTASSIUM CHLORIDE, SODIUM LACTATE AND CALCIUM CHLORIDE 1000 ML: 600; 310; 30; 20 INJECTION, SOLUTION INTRAVENOUS at 09:01

## 2023-01-01 RX ADMIN — DOPAMINE HYDROCHLORIDE 5 MCG/KG/MIN: 320 INJECTION, SOLUTION INTRAVENOUS at 23:17

## 2023-01-01 RX ADMIN — FENTANYL CITRATE 50 MCG/HR: 50 INJECTION INTRAVENOUS at 21:37

## 2023-01-01 RX ADMIN — SODIUM CHLORIDE, POTASSIUM CHLORIDE, SODIUM LACTATE AND CALCIUM CHLORIDE 125 ML/HR: 600; 310; 30; 20 INJECTION, SOLUTION INTRAVENOUS at 09:02

## 2023-01-01 RX ADMIN — ENOXAPARIN SODIUM 105 MG: 150 INJECTION SUBCUTANEOUS at 22:03

## 2023-01-01 RX ADMIN — DEXTROSE MONOHYDRATE 250 ML: 10 INJECTION, SOLUTION INTRAVENOUS at 22:31

## 2023-01-01 RX ADMIN — SODIUM CHLORIDE, POTASSIUM CHLORIDE, SODIUM LACTATE AND CALCIUM CHLORIDE 1000 ML: 600; 310; 30; 20 INJECTION, SOLUTION INTRAVENOUS at 12:48

## 2023-01-01 RX ADMIN — SODIUM CHLORIDE 1000 ML: 9 INJECTION, SOLUTION INTRAVENOUS at 18:45

## 2023-01-01 RX ADMIN — CHLORHEXIDINE GLUCONATE 15 ML: 1.2 RINSE ORAL at 13:00

## 2023-01-01 RX ADMIN — SODIUM CHLORIDE 1000 ML: 9 INJECTION, SOLUTION INTRAVENOUS at 19:02

## 2023-01-01 RX ADMIN — Medication 10 UNITS: at 22:35

## 2023-02-14 PROBLEM — J96.00 ACUTE RESPIRATORY FAILURE (HCC): Status: ACTIVE | Noted: 2023-01-01

## 2023-02-14 NOTE — ED TRIAGE NOTES
Pt comes from home via EMS with reports of unresponsive. Per EMS reports - pt has glioblastoma, per the caregiver he was fine last night and today he \"never really woke up. \" Typmpanictemp 101.9. . +PE and eliquis Generalized pitting edema. At this time, pt is on 15l NRB for EMS.

## 2023-02-15 NOTE — PROGRESS NOTES
Spiritual Care Assessment/Progress Note  [facility]      NAME: Phil Borrego      MRN: [id number]  AGE: 74 y.o. SEX: male  Denominational Affiliation: Rastafari   Language: English     2/15/2023     Total Time (in minutes): 10     Spiritual Assessment begun in [address] through conversation with:         [x]Patient        [x] Family    [] Friend(s)        Reason for Consult: Initial/Spiritual assessment, critical care     Spiritual beliefs: (Please include comment if needed)     [x] Identifies with a jethro tradition:         [] Supported by a jethro community:            [] Claims no spiritual orientation:           [] Seeking spiritual identity:                [] Adheres to an individual form of spirituality:           [] Not able to assess:                           Identified resources for coping:      [] Prayer                               [] Music                  [] Guided Imagery     [] Family/friends                 [] Pet visits     [] Devotional reading                         [] Unknown     [] Other:                                        Interventions offered during this visit: (See comments for more details)          Family/Friend(s): Bridging, Affirmation of jethro     Plan of Care:     [] Support spiritual and/or cultural needs    [] Support AMD and/or advance care planning process      [] Support grieving process   [] Coordinate Rites and/or Rituals    [] Coordination with community clergy   [] No spiritual needs identified at this time   [] Detailed Plan of Care below (See Comments)  [] Make referral to Music Therapy  [] Make referral to Pet Therapy     [] Make referral to Addiction services  [] Make referral to [facility]  [] Make referral to Spiritual Care Partner  [] No future visits requested        [] Contact Spiritual Care for further referrals     Comments: Patient was not communicative. Children and spouse were present. Remember me from previous visits.  Have ties to [place]. Azeri prayer and words of comfort offered. Advised of continuous availability. Nothing further at this time.    Arash Miller, [facility], Yarsanism Provider

## 2023-02-15 NOTE — PROGRESS NOTES
Critical care progress note. Intubated ventilated and sedated, minimal if any movement to stimulation. Still on 100% FiO2. Received significant IV boluses of LR. Started on norepinephrine, the dose increased and then we started him on vasopressin. Little urine output. Review of system could not be obtained. Medications in the hospital reviewed. On examination he is sedated, blood pressure low and IV fluids and medications being added. Febrile temperature 102. Head examination revealed conjunctival pallor. Mucous membranes were slightly dry. ET tube in place. The neck was supple, no obvious jugular venous distention. The chest showed decreased air entry bilaterally coarse breath sounds with few crackles right lung. Heart sounds were regular S1 and S2. Abdomen was soft, bowel sounds were infrequent. Lower extremities showed bilateral edema. Upper extremities good bilateral edema, no clubbing or cyanosis. X-ray performed today was reviewed and showed diffuse bilateral infiltrate slightly worsened since yesterday. Labs from today reviewed and showed hemoglobin 7.9 WBCs 1.2 platelet count 90 sodium 145 potassium 6.2 chloride 112 bicarbonate 22 BUN 45 creatinine 1.07 calcium 8.5 phosphorus 5.2    Assessment:  1. Septic shock due to pneumonia/diffuse pneumonic process in an immune compromised patient. Blood pressure has been difficult to support with large volume IV fluid resuscitation, 2 pressors and high-dose corticosteroids. Broad-spectrum antibiotics have been started. 2.  Acute respiratory failure secondary to septic shock. The source of the shock is multilobar pneumonia, oxygenation poor even on 100% oxygen. 3.  Altered mental status upon presentation likely due to severe septic shock. He is currently sedated. 4.  Hyperkalemia. Plan:  1. Continue IV fluid infusion. 2.  Continue vasopressors and add as needed. 3.  Continue high-dose IV corticosteroids.   4.  Continue broad-spectrum antibiotics, await cultures. 5.  Wean down FiO2 as tolerated. 6.  Continue sedation as appropriate until more stable. 7.  Measures to lower potassium, repeat count. 8.  He is on chronic Eliquis, we will use Lovenox, we will switch to IV heparin if kidney functions worsened. 9.  I had a long discussion with his wife. She felt that they wanted to continue with the current measures and escalate within limits such as increasing medications or vasopressors. He is not to be resuscitated in case of cardiac arrest and no invasive interventions beyond small procedures such as central line or chest tubes. No surgeries. We will continue to discuss the case as we go and as things develop. Critical care time excluding any procedures 60 minutes.

## 2023-02-15 NOTE — PROGRESS NOTES
Bedside shift change report given to Sailaja Unger RN (oncoming nurse) by Elie Sadler RN (offgoing nurse). Report included the following information SBAR.     0930 - per patient's son (who discussed with mother and sister via phone), IV vasopressors ok. 1100 - per verbal order from Dr. Anatoly Justin, SBP goal > 110.     1400 - vasopressin added to achieve SBP > 110.     1600 - OGT placed per Dr. Anatoly Justin. RN verified it was ok with family at bedside before placement. Bedside shift change report given to Adriana Leary RN (oncoming nurse) by Sailaja Unger RN (offgoing nurse). Report included the following information SBAR. abdominal pain

## 2023-02-15 NOTE — CONSULTS
72yo s/p GBM resection  June 6, 2022. He is followed by PATEL and Dr. Rocky Silva at [facility]. Admitted for AMS. He has had deterioration in functional status since October 2022. This am: WBC 1.2, lactic acid 4.2, CXR with diffuse bilateral opacities, temp 102, BP 76/39 - concerning for sepsis. He is intubated. Head CT without significant change when compared to previous Head CT in Oct 2022 - basilar cisterns are open, no hemorrhage, no hydrocephalus. No role for surgical intervention. Will be available as needed.

## 2023-02-15 NOTE — PROGRESS NOTES
Pharmacist Note - Vancomycin Dosing    Consult provided for this 74 y.o. male for indication of sepsis of unknown etiology. Antibiotic regimen(s): Meropenem + Vancomycin  Patient on vancomycin PTA? NO     Recent Labs     02/15/23  1217 02/15/23  0423 23  2050   WBC  --   --  1.2*   CREA 1.07 1.07 1.04   BUN 45* 44* 42*     Frequency of BMP: x 1 tomorrow am  Height: 167.6 cm  Weight: 111.2 kg  Est CrCl: ~80 ml/min; UO: -- ml/kg/hr  Temp (24hrs), Av.7 °F (38.2 °C), Min:99.4 °F (37.4 °C), Max:102 °F (38.9 °C)    Cultures:   Blood: NGTD    MRSA Swab ordered (if applicable)? NO    The plan below is expected to result in a target range of AUC/JESSE 400-600    Therapy will be initiated with a loading dose of 2500 mg IV x 1 to be followed by a maintenance dose of 1500 mg IV every 18 hours for an estimated AUC/JESSE of 452. Pharmacy to follow patient daily and order levels / make dose adjustments as appropriate. *Vancomycin has been dosed used Bayesian kinetics software to target an AUC/JESSE of 400-600, which provides adequate exposure for an assumed infection due to MRSA with an JESSE of 1 or less while reducing the risk of nephrotoxicity as seen with traditional trough based dosing goals.

## 2023-02-15 NOTE — ED NOTES
Verbal shift change report given to Yanely Boyer (oncoming nurse) by Angel De Jesus (offgoing nurse). Report included the following information MAR, SBAR, ED Summary.

## 2023-02-15 NOTE — ROUTINE PROCESS
0120: TRANSFER - IN REPORT:    Verbal report received from Deanna Dunbar [facility], [facility] (name) on [facility]  being received from ED(unit) for routine progression of care      Report consisted of patients Situation, Background, Assessment and   Recommendations(SBAR). Information from the following report(s) SBAR, Kardex, ED Summary, Intake/Output, MAR, Recent Results, Cardiac Rhythm SR, and Dual Neuro Assessment was reviewed with the receiving nurse. Opportunity for questions and clarification was provided. Assessment completed upon patients arrival to unit and care assumed. Primary Nurse Janay Flores RN and   Simona Rangel RN performed a dual skin assessment on this patient No impairment noted  Daniel score is 7. Per Irvin Mi MD, care is not to be escalated with this patient at request of family. 0730: Bedside shift change report given to Farhat Bain RN (oncoming nurse) by Gabriela Austin RN (offgoing nurse). Report included the following information SBAR, Kardex, ED Summary, Intake/Output, MAR, Recent Results, Cardiac Rhythm ST, and Dual Neuro Assessment.

## 2023-02-15 NOTE — PROGRESS NOTES
Problem: Falls - Risk of  Goal: *Absence of Falls  Description: Document Devendra Knife Fall Risk and appropriate interventions in the flowsheet.   Outcome: Progressing Towards Goal  Note: Fall Risk Interventions:                 Elimination Interventions: Bed/chair exit alarm              Problem: Patient Education: Go to Patient Education Activity  Goal: Patient/Family Education  Outcome: Progressing Towards Goal

## 2023-02-15 NOTE — H&P
ICU Admission H&P      Name: Marco A Guzman   : 1955   MRN: [id number]   Date: 2023      Assessment: 75 y/o patient intubated patient due to being obtunded and not able to protect airway. Brief patient summary:  75 y/o male with h/o glioblastoma. He is in a clinical trial at [facility] and has completed 6 of 2 treatments as per family. Since his last admission in 2022 his condition has continued to deteriorate and he has remained bed bound since October. Family reports they were unable to wake him up this morning. No apparent illness or symptoms other than slight cough past few days. However, no complaints from pt of feeling cold/flu like symptoms. Review of neurosurgery note from 2022 indicates pt not a surgical candidate. I had a discussion with family and they were aware pt has terminal condition. They discussed among the three of them and then stated that they wanted patient to be DNR with no escalation of care. They wanted to continue current therapy with goal to get him home if  possible. If current condition is acute deterioration from the glioblastoma family will consider comfort care.     Critical Care Problems    Patient Active Problem List   Diagnosis Code    Brain mass G93.89    Glioblastoma ([facility] C71.9    Chemotherapy follow-up examination Z09    Pulmonary embolus (HCC) I26.99    Weakness R53.1    Debility R53.81    Palliative care encounter Z51.5    Goals of care, counseling/discussion Z71.89    Anorexia R63.0    Need for emotional support R45.89    Acute respiratory failure (HCC) J96.00         Plan    Neuro  H/o Glioblastoma  Presented obtunded  - cont fentanyl for sedation and comfort  - add sedation if needed for pt-vent synchrony  - daily SAT    CVS  - MAP goal > 65  - currently hemodynamics acceptable  - no pressors; family did not want to escalate care  - no CPR  - cont lasix  - SVO2 acceptable    2D echo 22:  Result status: Final result       Left Ventricle: Normal left ventricular systolic function with a visually estimated EF of 55 - 60%. Not well visualized. Left ventricle size is normal. Diastolic dysfunction present with normal LV EF. Right Ventricle: Not well visualized. Right ventricle size is normal.    Contrast used: Definity. Strain imaging performed, but not reported as it is likely wildly inaccurate due to awful image quality. Pulm  Acute Respiratory failure  - etiology unclear; story taken from family not convincing for a developing infection although pt is at risk from his cancer therapy. - continue vent support  - daily SBT if appropriate    GI  -keep NPO  - start on pepcid  - NGT    Renal  - lytes acceptable  - monitor I/O    Endo  H/o DM  Hyperglycemia  - start on ISS  - pt was on decadron at home; continue    ID  - f/ublood cultures  - trend lactate  - pt received levaquin and tobra in the ED; cont empiric abx. Heme  - hold apixiban since CT head still not resulted  - will add enoxaparin for dvt prophylaxis      ICU Comprehensive Plan of Care:     Plans for this Shift:     Follow up CT head results  Cont vent support. Continue steroids  Prn neb treatments  Hold apixiban      HPI/Consult/Subjective:   HPI:  75 y/o man with h/p glioblastoma who has bene bed bound since October 2022 with progressive worsening of condition despite treatment from clinical trial at [facility]. He was brought to the ED due to acute change in mental status (unable to wake up). He has leukopenia and mild lactic acidosis. Past Medical History:      has a past medical history of Arthritis, GERD (gastroesophageal reflux disease), and Morbid obesity ([address] Past Surgical History:      has a past surgical history that includes hx tonsillectomy; hx back surgery; hx orthopaedic (Right); hx shoulder arthroscopy; and ir thrombectomy [facility] art primary non yamilex or intracranial (9/14/2022).     Home Medications:     Prior to Admission medications    Medication Sig Start Date End Date Taking? Authorizing Provider   baclofen (LIORESAL) 10 mg tablet Take 1 Tablet by mouth three (3) times daily as needed for Muscle Spasm(s). 10/31/22   Elliott Hancock MD   trimethoprim-sulfamethoxazole (Bactrim)  mg per tablet Take 1 Tablet by mouth daily. Provider, Historical   diclofenac EC (VOLTAREN) 75 mg EC tablet Take 75 mg by mouth two (2) times a day. Provider, Historical   metFORMIN (GLUCOPHAGE) 500 mg tablet Take 500 mg by mouth daily (with breakfast). Provider, Historical   magnesium oxide (MAG-OX) 400 mg tablet Take 400 mg by mouth in the morning. Provider, Historical   furosemide (LASIX) 20 mg tablet Take 20 mg by mouth daily. 2x a day    Provider, Historical   ondansetron (ZOFRAN ODT) 4 mg disintegrating tablet Take 1-2 Tablets by mouth every eight (8) hours as needed for Nausea or Nausea or Vomiting. 7/5/22   Keith Rivera NP   gabapentin (NEURONTIN) 300 mg capsule Take 1 Capsule by mouth three (3) times daily. Max Daily Amount: 900 mg. 6/14/22   Mago Gordon MD   azelastine (ASTELIN) 137 mcg (0.1 %) nasal spray 2 Sprays by Both Nostrils route two (2) times a day. Use in each nostril as directed     Provider, Historical   esomeprazole (NEXIUM) 40 mg capsule Take 40 mg by mouth two (2) times a day. Provider, Historical   levocetirizine (XYZAL) 5 mg tablet Take 5 mg by mouth. Provider, Historical   metroNIDAZOLE (METROLOTION) 0.75 % lotion Apply  to affected area two (2) times a day. Provider, Historical   B.animalis,bifid,infantis,long (Probiotic 4X) 10-15 mg TbEC Take  by mouth. Provider, Historical   budesonide-formoteroL (SYMBICORT) 160-4.5 mcg/actuation HFAA Take 2 Puffs by inhalation two (2) times a day. Provider, Historical       Allergies/Social/Family History:      Allergies   Allergen Reactions    Keflex [Cephalexin] Rash      Social History     Tobacco Use    Smoking status: Former     Types: Cigarettes     Quit date: 6/1/1981     Years since quittin.7    Smokeless tobacco: Never   Substance Use Topics    Alcohol use: Yes     Comment: occasional      No family history on file. Objective:   Vital Signs:  Visit Vitals  /75   Pulse (!) 105   Temp 99.8 °F (37.7 °C)   Resp 20   Wt 122.5 kg (270 lb 1 oz)   SpO2 97%   BMI 43.59 kg/m²    O2 Flow Rate (L/min): 15 l/min O2 Device: Ventilator, Endotracheal tube Temp (24hrs), Av.8 °F (37.7 °C), Min:99.8 °F (37.7 °C), Max:99.8 °F (37.7 °C)           Intake/Output:   No intake or output data in the 24 hours ending 23    Physical Exam:  GEN: obese male; intubated  HEENT: NCAT, sclerae white  NECK: no JVD  CHEST: clear to auscultation  CARDIAC: no murmurs  ABD: soft  EXT: pitting edema x4  NEURO: sedated  DERM: no rash    I have examined the patient on this day 2023 and the above documented exam is accurate including the components that have been copied forward    LABS AND  DATA: Personally reviewed  Recent Labs     23   WBC 1.2*   HGB 7.9*   HCT 26.4*   PLT 90*     Recent Labs     23   *   K 4.6   *   CO2 27   BUN 42*   CREA 1.04   *   CA 8.6     Recent Labs     23      TP 5.5*   ALB 2.1*   GLOB 3.4     No results for input(s): INR, PTP, APTT, INREXT in the last 72 hours. No results for input(s): PHI, PCO2I, PO2I, FIO2I in the last 72 hours. No results for input(s): CPK, CKMB, TROIQ, BNPP in the last 72 hours. Hemodynamics:   PAP:   CO:     Wedge:   CI:     CVP:    SVR:       PVR:       Ventilator Settings:  Mode Rate Tidal Volume Pressure FiO2 PEEP   VC+   500 ml    100 % 8 cm H20     Peak airway pressure: 20 cm H2O    Minute ventilation: 10.8 l/min        MEDS: Reviewed    Chest X-Ray:  CXR Results  (Last 48 hours)                 23  XR CHEST PORT Final result    Impression:  1. Right IJ approach catheter projects to terminate in the right atrium.                   Narrative:  INDICATION: . line placement   Additional history:   COMPARISON: Previous chest xray, earlier same day. LIMITATIONS: Portable technique. Bradford Nye FINDINGS: Single frontal view of the chest.    .   Lines/tubes/surgical: A right IJ approach catheter projects to terminate in the   right atrium. ET tube projects over the airway and terminates just below the   level of the clavicular heads. Lungs/pleura: Patchy opacities in the right upper lobe, left midlung and left   base. No visible pneumothorax. Bradford Nye 02/14/23 1854  XR CHEST PORT Final result    Impression:      Interval development of bilateral confluent airspace opacities. Possibilities   include multifocal pneumonia and aspiration           Narrative:  EXAM:  XR CHEST PORT       INDICATION: Evaluate infiltrate       COMPARISON: 10/27/2022       TECHNIQUE: Supine portable chest AP view       FINDINGS: Endotracheal tube is approximately 3.1 cm above the joanne. The   cardiac silhouette is within normal limits. Special        Interval development of bilateral confluent airspace opacities The visualized   bones and upper abdomen are age-appropriate. DISPOSITION  Stay in ICU    CRITICAL CARE CONSULTANT NOTE  I had a in-person encounter with Jolene Vu, reviewed and interpreted patient data including events, labs, images, vital signs, I/O's, and examined patient. I have discussed the case and the plan and management of the patient's care with the consulting services, the bedside nurses and the respiratory therapist.      NOTE OF PERSONAL INVOLVEMENT IN CARE   This patient is at high risk for sudden and clinically significant deterioration, which requires the highest level of preparedness to intervene urgently. I participated in the decision-making and personally managed or directed the management of the following life and organ supporting interventions that required my frequent assessment to treat or prevent imminent deterioration.     I personally spent 65 minutes of critical care time. This is time spent at patient's bedside actively involved in patient care as well as the coordination of care and discussions with the patient's family. This does not include any procedural time which has been billed separately.       ------------------------------------------------------------------------------    Colleen Pineda MD, PhD  [address]  [phone]

## 2023-02-15 NOTE — PROGRESS NOTES
Lovenox Monitoring  Indication:  Hx of DVT/PE (on apixaban PTA)  Recent Labs     02/15/23  1217 02/15/23  0423 02/14/23 2050   HGB  --   --  7.9*   PLT  --   --  90*   CREA 1.07 1.07 1.04     Current Weight: 111.2 kg  Est. CrCl = ~80 ml/min  Current Dose: 120 mg subcutaneously every 12 hours. Plan: Change to 105 mg every 12 hours per the [facility]-approved Enoxaparin Dosing, Rounding & Dispensing Guidelines.

## 2023-02-15 NOTE — ED PROVIDER NOTES
Patient has a history of glioblastoma and has not been responsive all day. Febrile on arrival.    The history is provided by the EMS personnel. Altered mental status   This is a new problem. The current episode started 12 to 24 hours ago. Associated symptoms include unresponsiveness. Functional status baseline:  [EPIC#1537^NOTE}      Past Medical History:   Diagnosis Date    Arthritis     GERD (gastroesophageal reflux disease)     Morbid obesity ([address]        Past Surgical History:   Procedure Laterality Date    HX BACK SURGERY      2 laminectomies and fusion    HX ORTHOPAEDIC Right     orif hand    HX SHOULDER ARTHROSCOPY      HX TONSILLECTOMY      as a child    IR THROMBECTOMY [facility] ART PRIMARY NON AWAIS OR INTRACRANIAL  2022         No family history on file.     Social History     Socioeconomic History    Marital status:      Spouse name: Not on file    Number of children: Not on file    Years of education: Not on file    Highest education level: Not on file   Occupational History    Not on file   Tobacco Use    Smoking status: Former     Types: Cigarettes     Quit date: 1981     Years since quittin.7    Smokeless tobacco: Never   Vaping Use    Vaping Use: Never used   Substance and Sexual Activity    Alcohol use: Yes     Comment: occasional    Drug use: Never    Sexual activity: Not on file   Other Topics Concern    Not on file   Social History Narrative    Not on file     Social Determinants of Health     Financial Resource Strain: Not on file   Food Insecurity: Not on file   Transportation Needs: Not on file   Physical Activity: Not on file   Stress: Not on file   Social Connections: Not on file   Intimate Partner Violence: Not on file   Housing Stability: Not on file         ALLERGIES: Keflex [cephalexin]    Review of Systems   Unable to perform ROS: Patient unresponsive     Vitals:    23 1903 23 1906 23 1909 23 1939   BP: (!) 82/63 (!) 89/68 97/82 111/80   Pulse: (!) 113 (!) 111 (!) 109 (!) 103   Resp: 23 20 20    SpO2: 98% 98% 98% 99%            Physical Exam  Vitals reviewed. Constitutional:       Appearance: He is morbidly obese. Cardiovascular:      Rate and Rhythm: Tachycardia present. Pulmonary:      Breath sounds: Rhonchi present. Abdominal:      General: There is distension. Skin:     General: Skin is warm and dry. Neurological:      Mental Status: He is unresponsive. GCS: GCS eye subscore is 1. GCS verbal subscore is 2. GCS motor subscore is 1. Medical Decision Making  Amount and/or Complexity of Data Reviewed  Labs: ordered. Radiology: ordered. ECG/medicine tests: ordered. Risk  OTC drugs. Prescription drug management. Decision regarding hospitalization. ED Course as of 02/14/23 2323 Tue Feb 14, 2023 2252 Ct findings were discussed with Dr. Festus Chowdhury, neurosurgery, who will evaluate the patient and review the scan [IO]      ED Course User Index  [IO] Jamie Melo MD       Intubation    Date/Time: 2/14/2023 9:42 PM  Performed by: Jamie Melo MD  Authorized by: Jamie Melo MD     Consent:     Consent obtained:  Emergent situation  Pre-procedure details:     Indication: failure to protect airway and predicted clinical deterioration      Patient status:  Unresponsive    Look externally: facial hair and large tongue      Obstruction: none      Neck mobility: normal      Induction agents:  Etomidate    Paralytics:  Succinylcholine  Procedure details:     Preoxygenation:  Bag valve mask    Intubation technique: video assisted      Laryngoscope blade:   Mac 4    Bougie used: no      Tube size (mm):  8.0    Tube type:  Cuffed    Number of attempts:  1    Tube visualized through cords: yes    Placement assessment:     ETT at teeth/gumline (cm):  26    Tube secured with:  ETT gaffney    Breath sounds:  Equal and absent over the epigastrium    Placement verification: chest rise, colorimetric ETCO2, CXR verification, direct visualization, numeric ETCO2 and tube exhalation      CXR findings:  Appropriate position  Post-procedure details:     Procedure completion:  Tolerated well, no immediate complications  Central Line    Date/Time: 2/14/2023 9:43 PM  Performed by: Evelyne Madsen MD  Authorized by: Evelyne Madsen MD     Consent:     Consent obtained:  Emergent situation  Procedure details:     Location:  R internal jugular    Patient position:  Trendelenburg    Procedural supplies:  Triple lumen    Landmarks identified: yes      Ultrasound guidance: yes      Ultrasound guidance timing: real time      Sterile ultrasound techniques: Sterile gel and sterile probe covers were used      Number of attempts:  1    Successful placement: yes    Post-procedure details:     Post-procedure:  Dressing applied and line sutured    Assessment:  Blood return through all ports and placement verified by x-ray    Procedure completion:  Tolerated well, no immediate complications      Troy Carson MD has spent 45 minutes of critical care time involved in lab review, consultations with specialist, family decision-making, and documentation. During this entire length of time I was immediately available to the patient. Critical Care: The reason for providing this level of medical care for this critically ill patient was due a critical illness that impaired one or more vital organ systems such that there was a high probability of imminent or life threatening deterioration in the patients condition. This care involved high complexity decision making to assess, manipulate, and support vital system functions, to treat this degreee vital organ system failure and to prevent further life threatening deterioration of the patients condition. Patient is being admitted to the hospital.  The results of their tests and reasons for their admission have been discussed with them and/or available family.   They convey agreement and understanding for the need to be admitted and for their admission diagnosis. Consultation will be made now with the inpatient physician for hospitalization. SEP-1 CORE MEASURE DATA      Sepsis Criteria   Severe Sepsis Criteria   Septic Shock Criteria       Must meet 2:    [x]Temp >100.9 F (38.3 C) or < 96.8 F (36 C)  [x]HR > 90  []RR > 20  [x]WBC > 12 or < 4 or 10% bands    AND:    [x] Infection Confirmed or Suspected.      Must meet 1:    [x]Lactate > 2       or   [x]Signs of Organ Dysfunction:    - SBP < 90 or MAP < 65  -Creatinine > 2 or increased from baseline  -Urine Output < 0.5 ml/kg/hr  -Bilirubin > 2  -INR > 1.5 (not anticoagulated)  -Platelets < 373,993  -Acute Respiratory Failure as evidenced by new need for NIPPV or mechanical ventilation   Must meet 1:    []Lactate > 4        or   [x]SBP < 90 or MAP < 65 for at least two readings in the first hour after fluid bolus administration    [x]Vasopressors initiated (if hypotension persists after fluid resuscitation)   Patient Vitals for the past 6 hrs:   Temp Pulse Resp BP MAP (Monitor) MAP (Calculated) SpO2   02/14/23 1824 -- (!) 124 18 90/80 -- 83 94 %   02/14/23 1831 -- (!) 124 20 114/81 92 92 96 %   02/14/23 1847 -- (!) 123 12 99/86 92 90 92 %   02/14/23 1848 -- (!) 127 20 (!) 111/93 99 99 98 %   02/14/23 1851 -- (!) 123 19 93/80 87 84 99 %   02/14/23 1854 -- (!) 119 16 (!) 90/39 (!) 56 (!) 56 99 %   02/14/23 1857 -- (!) 116 21 (!) 64/53 (!) 59 (!) 57 97 %   02/14/23 1900 -- (!) 114 21 (!) 81/69 75 73 97 %   02/14/23 1903 -- (!) 113 23 (!) 82/63 71 69 98 %   02/14/23 1906 -- (!) 111 20 (!) 89/68 75 75 98 %   02/14/23 1909 -- (!) 109 20 97/82 89 87 98 %   02/14/23 1939 -- (!) 103 -- 111/80 90 90 99 %   02/14/23 2148 99.8 °F (37.7 °C) 98 -- 123/81 -- 95 99 %      Recent Labs     02/14/23 2050   WBC 1.2*   PLT 90*        Date/Time Septic Shock Identified: [id number]    Fluid Resuscitation Rationale: at least 30mL/kg based on entered actual weight at time of triage    Repeat lactate level: ordered and pending at this time    Reassessment Exam: I have reassessed tissue perfusion and hemodynamic status after fluid bolus at this date/time:    10:06 PM     Perfect Serve Consult for Admission  10:00 PM    ED Room Number: ER06/06  Patient Name and age:  Che Hernandez 74 y.o.  male  Working Diagnosis:   1. Septic shock ([facility]    2. Rosa Maria coma scale total score 3-8, unspecified coma timing ([facility] [facility]    3. Multifocal pneumonia    4. Acute respiratory failure with hypoxia (HCC)    5.  Hyperglycemia      COVID-19 Suspicion:  no  Sepsis present:  yes  Reassessment needed: no  Code Status:  Full Code  Readmission: no  Isolation Requirements:  no  Recommended Level of Care:  ICU  Department: [address] [place] [address] Adult ED - 21   Intubated, no Norepi with central line, unresponsive

## 2023-02-15 NOTE — ED NOTES
Multiple unsuccessful IV attempts with and without ultrasound guided by staff. Dr Oliver Bolaños aware.

## 2023-02-15 NOTE — PROGRESS NOTES
02/15/23 0140 02/15/23 0200 02/15/23 0300   Vent Settings   FIO2 (%) 100 %  --   --    SpO2/FIO2 Ratio 96  --   --    CMV Rate Set (S)  12  (titrated d/t vent asynchrony)  --   --    Back-Up Rate 12  --   --    Vt Set (ml) (S)  385 ml  (adjusted to 6mls/kg per protocol)  --   --    PEEP/VENT (cm H2O) 8 cm H20  --   --    I:E Ratio 1:3.5  --   --    Insp Time (sec) 1.11 sec  --   --    Insp Rise Time % 50 %  --   --    Flow Trigger 3.0  --   --    Ventilator Measurements   Resp Rate Observed 13 11 13   Vt Exhaled (Machine Breath) (ml) 912 ml  --   --    Ve Observed (l/min) 8.92 l/min  --   --    PIP Observed (cm H2O) 23 cm H2O  --   --    Plateau Pressure (cm H2O)   (unable to obtain d/t resp pattern)  --   --    MAP (cm H2O) 12  --   --    I:E Ratio Actual 1:3.2  --   --    Auto PEEP Observed (cm H2O)   (unable to obtain d/t resp pattern)  --   --    Static Compliance (ml/cm H20)   (unable to obtain d/t resp pattern)  --   --    Safety & Alarms   Circuit Temperature 98.6 °F (37 °C)  (no probe #)  --   --    Vent Method/Mode   Ventilator Mode Assist control;VC+  --   --    Ventilator Mode ID [id number]  --   --    $$ Ventilator Subsequent Subsequent Vent Day  --   --       02/15/23 0342 02/15/23 0416   Vent Settings   FIO2 (%)  --   --    SpO2/FIO2 Ratio  --   --    CMV Rate Set  --   --    Back-Up Rate  --   --    Vt Set (ml)  --   --    PEEP/VENT (cm H2O) (S)  10 cm H20  (increased after ABG per protocol) 10 cm H20   I:E Ratio  --   --    Insp Time (sec)  --   --    Insp Rise Time %  --   --    Flow Trigger  --   --    Ventilator Measurements   Resp Rate Observed  --   --    Vt Exhaled (Machine Breath) (ml)  --   --    Ve Observed (l/min)  --   --    PIP Observed (cm H2O)  --   --    Plateau Pressure (cm H2O)  --   --    MAP (cm H2O)  --   --    I:E Ratio Actual  --   --    Auto PEEP Observed (cm H2O)  --   --    Static Compliance (ml/cm H20)  --   --    Safety & Alarms   Circuit Temperature  --   --    Vent Method/Mode   Ventilator Mode  --   --    Ventilator Mode ID  --   --    $$ Ventilator Subsequent  --   --      Significant vent asynchrony noted upon arrival to unit. Vent mode briefly changed to [facility] while NSG performing care. RT able to switch mode back to VC+. RT adjusted vent per protocol but due to asynchrony is pulling ~1000ml volume. AM ABG ordered & obtained. RT discussed with RN due to concerns of such high volumes. RN states pt is now \"Do not escalate\". PEEP increased after ABG due to hypoxemia but no other changes recommended.

## 2023-02-16 ENCOUNTER — APPOINTMENT (OUTPATIENT)
Dept: GENERAL RADIOLOGY | Age: 68
DRG: 871 | End: 2023-02-16
Attending: INTERNAL MEDICINE
Payer: MEDICARE

## 2023-02-16 VITALS
OXYGEN SATURATION: 81 % | WEIGHT: 245.15 LBS | TEMPERATURE: 100 F | BODY MASS INDEX: 39.57 KG/M2 | SYSTOLIC BLOOD PRESSURE: 55 MMHG | DIASTOLIC BLOOD PRESSURE: 29 MMHG

## 2023-02-16 LAB
BACTERIA SPEC CULT: ABNORMAL
BACTERIA SPEC CULT: ABNORMAL
SERVICE CMNT-IMP: ABNORMAL

## 2023-02-16 NOTE — PROGRESS NOTES
Problem: Ventilator Management  Goal: *Adequate oxygenation and ventilation  Outcome: Resolved/Not Met  Goal: *Patient maintains clear airway/free of aspiration  Outcome: Resolved/Not Met  Goal: *Absence of infection signs and symptoms  Outcome: Resolved/Not Met  Goal: *Normal spontaneous ventilation  Outcome: Resolved/Not Met     Problem: Patient Education: Go to Patient Education Activity  Goal: Patient/Family Education  Outcome: Resolved/Not Met     Problem: Falls - Risk of  Goal: *Absence of Falls  Description: Document Emily Fall Risk and appropriate interventions in the flowsheet.   Outcome: Resolved/Not Met  Note: Fall Risk Interventions:                 Elimination Interventions: Bed/chair exit alarm              Problem: Patient Education: Go to Patient Education Activity  Goal: Patient/Family Education  Outcome: Resolved/Not Met

## 2023-02-16 NOTE — DEATH NOTE
Death Pronouncement Note    Patient's Name: Marita Or   Patient's YOB: 1955  MRN Number: [id number]    Admitting Provider: Aleta Lepe MD  Attending Provider: Vitaly Hernandez MD     Patient was examined and the following were absent: Heart sounds, peripheral pulses including carotid pulses, respiratory effort and absent pupillary and corneal reflexes.     I declared the patient dead on February 15, 2023 at   23:45    Preliminary Cause of Death:   Shock due to pneumonia    Electronically signed by Iona Romero MD on 2/15/2023 at 11:48 PM

## 2023-02-16 NOTE — PROGRESS NOTES
Responded to notification of patient's death in ICU. His wife, son and daughter were all at bedside. Listened as they spoke of his life and relationships with others as they were processing the grief incumbent to such a loss.    Chaplain Gena, MDiv, MS, [facility]

## 2023-02-16 NOTE — PROGRESS NOTES
193: Bedside and Verbal shift change report given to Benny Unger (oncoming nurse) by mAy Paige (offgoing nurse). Report included the following information SBAR, Kardex, Intake/Output, MAR, Accordion, Recent Results, Cardiac Rhythm ST/AFib, and Dual Neuro Assessment. : During shift report, informed that patient is DNR. Was also informed that patients family wished to have no escalation of care. Pts levo was increased to 25 and vaso increased to 0.03 throughout day shift. Family agreed to it. Pts K was 6.2 on day shift. At 1200 they give insulin, and D10 to correct. Pt had a K of 6.7 at .    : Dr. Constantin Arellano notified of the K. Ordered Insulin, D10, Calcium. Family at bedside, notified, and agreed to treatment. : Pt breathing over the vent, Fent increased Per MD    0: Delphia Sires increased to max 200 Per MD. Pt still stacking breaths. : Fent increased to 300 Per MD. Pt still breathing over vent. Dilaudid also ordered. 2300 Pt spo2 decreasing and BP decreasing significantly. Spoke to MD to clarify goals for patient care. MD ordered increase of levo to 27, vaso to 0.06, and ordered dopamine drip. Family at bedside, notified, and agreed to care. 2315: Pt heart rate drop to deneen and periods of asystole with irregular QRS complex. SBP down to 68. MD called to bedside     2345: Pt , MD Del Cid called time of death [phone]. Family at bedside, ana called. 0010 Family left, no belongings to pass along. 0100 Pt cleaned, in body bag, supervisor notified. 0150: Supervisor retrieved body.

## 2023-02-16 NOTE — DISCHARGE SUMMARY
Critical care discharge/death summary. Mr. Henny Lee is a 60-year-old man with history of glioblastoma who presented to the hospital on February 14, 2023 after the family found him unresponsive. Eventually he was found to be septic and in septic shock. His chest x-ray demonstrated diffuse bilateral pneumonic process thought to be the source of his sepsis. He was treated with aggressive IV fluid resuscitation and broad-spectrum antibiotics. He was also started on high-dose IV corticosteroids, he was continue mechanical ventilation. He required the addition of high-dose vasopressors. Shortly after his arrival his family made it clear that he does not want to be resuscitated in case of cardiac arrest or rapid deterioration. They wanted to limit the intervention to IV fluids IV antibiotics and vasopressors. They did not want to consider or have any procedures dialysis or other interventions beyond what was described above. He did require sedation to keep him calm and then increase sedation for ventilator synchrony. His potassium was found to be elevated, multiple interventions were implemented without success. Again, the reiterated that they would not want or consider dialysis. Late evening February 15th 2023 he started dropping his blood pressure rapidly and not responding to escalating doses of vasopressors. His blood pressure rapidly dropped, his oxygen saturation also deteriorated. He eventually developed asystole and was pronounced dead on February 15, 2023 at 21: 39.  Cause of death shock due to pneumonia.

## 2023-02-17 LAB
ATRIAL RATE: 101 BPM
CALCULATED P AXIS, ECG09: 50 DEGREES
CALCULATED R AXIS, ECG10: -20 DEGREES
CALCULATED T AXIS, ECG11: 36 DEGREES
DIAGNOSIS, 93000: NORMAL
P-R INTERVAL, ECG05: 130 MS
Q-T INTERVAL, ECG07: 354 MS
QRS DURATION, ECG06: 74 MS
QTC CALCULATION (BEZET), ECG08: 459 MS
VENTRICULAR RATE, ECG03: 101 BPM

## 2023-10-18 NOTE — PROGRESS NOTES
1930: Bedside shift change report given to [address] [place] [address] (oncoming nurse) by Maira Covarrubias (offgoing nurse). Report included the following information SBAR, Kardex, ED Summary, Intake/Output, MAR, Recent Results and Cardiac Rhythm ST.     TRANSFER - OUT REPORT:    2140: Verbal report given to Miriam(name) on Garrett Sylvester  being transferred to [facility] (unit) for routine progression of care       Report consisted of patients Situation, Background, Assessment and   Recommendations(SBAR). Information from the following report(s) SBAR, Kardex, ED Summary, Intake/Output, MAR, Recent Results and Cardiac Rhythm SR was reviewed with the receiving nurse. Lines:   Peripheral IV 06/02/22 Left Antecubital (Active)   Site Assessment Clean, dry, & intact 06/02/22 2009   Phlebitis Assessment 0 06/02/22 2009   Infiltration Assessment 0 06/02/22 2009   Dressing Status Clean, dry, & intact 06/02/22 2009   Dressing Type Transparent 06/02/22 2009   Hub Color/Line Status Pink;Flushed 06/02/22 2009        Opportunity for questions and clarification was provided.       Patient transported with:   Kewen Humira Counseling:  I discussed with the patient the risks of adalimumab including but not limited to myelosuppression, immunosuppression, autoimmune hepatitis, demyelinating diseases, lymphoma, and serious infections.  The patient understands that monitoring is required including a PPD at baseline and must alert us or the primary physician if symptoms of infection or other concerning signs are noted.

## (undated) DEVICE — TOOL 8TA11 LEGEND 8CM 1.1MM TA: Brand: MIDAS REX ™

## (undated) DEVICE — TOOL 9MH30 LEGEND 9CM 3MM MH: Brand: MIDAS REX

## (undated) DEVICE — GLOVE SURG SZ 65 L12IN FNGR THK94MIL STD WHT LTX FREE

## (undated) DEVICE — TOOL F2/8TA23 LEGEND 8CM 2.3MM TAPER: Brand: MIDAS REX™

## (undated) DEVICE — DISPOSABLE TUBING SET AND EXTENDER FILTER TUBING

## (undated) DEVICE — TOTAL TRAY, 16FR 10ML SIL FOLEY, URN: Brand: MEDLINE

## (undated) DEVICE — SCALPFIX STERILE: Brand: AESCULAP

## (undated) DEVICE — SURGICAL PROCEDURE KIT CRANIOTOMY

## (undated) DEVICE — Device

## (undated) DEVICE — SUTURE VCRL SZ 0 L18IN ABSRB VLT L36MM CT-1 1/2 CIR J740D

## (undated) DEVICE — FLOSEAL HEMOSTATIC MATRIX, 10ML: Brand: FLOSEAL HEMOSTATIC MATRIX

## (undated) DEVICE — HANDLE LT SNAP ON ULT DURABLE LENS FOR TRUMPF ALC DISPOSABLE

## (undated) DEVICE — STRAIGHT TIP, UNIVERSAL

## (undated) DEVICE — SURGIFOAM SPNG SZ 100

## (undated) DEVICE — KIT EVAC 0.13IN RECT TB DIA10FR 400CC PVC 3 SPR Y CONN DRN

## (undated) DEVICE — CONTAINER,SPECIMEN,4OZ,OR STRL: Brand: MEDLINE

## (undated) DEVICE — SOLUTION IRRIG 1000ML 0.9% SOD CHL USP POUR PLAS BTL

## (undated) DEVICE — DRAPE FLD WRM W44XL66IN C6L FOR INTRATEMP SYS THERMABASIN

## (undated) DEVICE — AGENT HEMSTAT W2XL14IN OXIDIZED REGENERATED CELOS ABSRB FOR

## (undated) DEVICE — SUTURE VCRL SZ 2-0 L18IN ABSRB UD L26MM CP-2 1/2 CIR REV J762D

## (undated) DEVICE — GARMENT,MEDLINE,DVT,INT,CALF,MED, GEN2: Brand: MEDLINE

## (undated) DEVICE — SOLUTION IV 250ML 0.9% SOD CHL CLR INJ FLX BG CONT PRT CLSR

## (undated) DEVICE — SUTURE NRLN SZ 4-0 L18IN NONABSORBABLE BLK L13MM TF 1/2 CIR C584D